# Patient Record
Sex: FEMALE | Race: BLACK OR AFRICAN AMERICAN | Employment: UNEMPLOYED | URBAN - METROPOLITAN AREA
[De-identification: names, ages, dates, MRNs, and addresses within clinical notes are randomized per-mention and may not be internally consistent; named-entity substitution may affect disease eponyms.]

---

## 2018-03-30 ENCOUNTER — HOSPITAL ENCOUNTER (INPATIENT)
Age: 58
LOS: 24 days | Discharge: HOME | DRG: 750 | End: 2018-04-23
Attending: PSYCHIATRY & NEUROLOGY | Admitting: PSYCHIATRY & NEUROLOGY
Payer: MEDICAID

## 2018-03-30 PROBLEM — F25.9 SCHIZOAFFECTIVE DISORDER (HCC): Status: ACTIVE | Noted: 2018-03-30

## 2018-03-30 PROCEDURE — 65220000003 HC RM SEMIPRIVATE PSYCH

## 2018-03-30 PROCEDURE — 74011250636 HC RX REV CODE- 250/636: Performed by: PSYCHIATRY & NEUROLOGY

## 2018-03-30 PROCEDURE — 74011000250 HC RX REV CODE- 250: Performed by: PSYCHIATRY & NEUROLOGY

## 2018-03-30 RX ORDER — ZOLPIDEM TARTRATE 10 MG/1
10 TABLET ORAL
Status: DISCONTINUED | OUTPATIENT
Start: 2018-03-30 | End: 2018-03-31

## 2018-03-30 RX ORDER — IBUPROFEN 200 MG
1 TABLET ORAL
Status: DISCONTINUED | OUTPATIENT
Start: 2018-03-30 | End: 2018-04-23 | Stop reason: HOSPADM

## 2018-03-30 RX ORDER — LORAZEPAM 1 MG/1
1 TABLET ORAL
Status: DISCONTINUED | OUTPATIENT
Start: 2018-03-30 | End: 2018-04-23 | Stop reason: HOSPADM

## 2018-03-30 RX ORDER — BENZTROPINE MESYLATE 2 MG/1
2 TABLET ORAL
Status: DISCONTINUED | OUTPATIENT
Start: 2018-03-30 | End: 2018-04-23 | Stop reason: HOSPADM

## 2018-03-30 RX ORDER — IBUPROFEN 400 MG/1
400 TABLET ORAL
Status: DISCONTINUED | OUTPATIENT
Start: 2018-03-30 | End: 2018-04-23 | Stop reason: HOSPADM

## 2018-03-30 RX ORDER — ACETAMINOPHEN 325 MG/1
650 TABLET ORAL
Status: DISCONTINUED | OUTPATIENT
Start: 2018-03-30 | End: 2018-04-23 | Stop reason: HOSPADM

## 2018-03-30 RX ORDER — BENZTROPINE MESYLATE 1 MG/ML
2 INJECTION INTRAMUSCULAR; INTRAVENOUS
Status: DISCONTINUED | OUTPATIENT
Start: 2018-03-30 | End: 2018-04-23 | Stop reason: HOSPADM

## 2018-03-30 RX ORDER — LORAZEPAM 2 MG/ML
2 INJECTION INTRAMUSCULAR
Status: DISCONTINUED | OUTPATIENT
Start: 2018-03-30 | End: 2018-04-23 | Stop reason: HOSPADM

## 2018-03-30 RX ORDER — OLANZAPINE 5 MG/1
5 TABLET ORAL
Status: DISCONTINUED | OUTPATIENT
Start: 2018-03-30 | End: 2018-04-23 | Stop reason: HOSPADM

## 2018-03-30 RX ORDER — ADHESIVE BANDAGE
30 BANDAGE TOPICAL DAILY PRN
Status: DISCONTINUED | OUTPATIENT
Start: 2018-03-30 | End: 2018-04-23 | Stop reason: HOSPADM

## 2018-03-30 RX ADMIN — LORAZEPAM 2 MG: 2 INJECTION, SOLUTION INTRAMUSCULAR; INTRAVENOUS at 21:53

## 2018-03-30 RX ADMIN — WATER 20 MG: 1 INJECTION INTRAMUSCULAR; INTRAVENOUS; SUBCUTANEOUS at 21:53

## 2018-03-30 NOTE — IP AVS SNAPSHOT
[address] [facility], [place], operated by [facility] 
 
 
 [address] Patient: Shahla Dupree MRN: [id number] About your hospitalization You were admitted on:  March 30, 2018 You last received care in the:  [address] You were discharged on:  April 23, 2018 Why you were hospitalized Your primary diagnosis was:  Schizoaffective Disorder (Hcc) Your diagnoses also included:  Agitation Follow-up Information Follow up With Details Comments Contact Info [address]  Please follow up with [facility] by accessing walk-in Mon-Fri 8:30am-4:30pm  [address] [place] [address] 
[phone] Haloperidol decanoate 100 mg IM injection  On 5/14/2018 Haloperidol decanoate 100 mg IM injection given on 4/17/18. Next injection is due on 5/14/18. Oculeve None   None (395) Patient stated that they have no PCP Discharge Orders None A check marvin indicates which time of day the medication should be taken. My Medications START taking these medications Instructions Each Dose to Equal  
 Morning Noon Evening Bedtime  
 benztropine 0.5 mg tablet Commonly known as:  COGENTIN Your last dose was: Your next dose is: Take 1 Tab by mouth two (2) times a day for 14 days. Indications: extrapyramidal disease 0.5 mg  
    
   
   
   
  
 divalproex  mg ER tablet Commonly known as:  DEPAKOTE ER Your last dose was: Your next dose is: Take 2 Tabs by mouth nightly for 14 days. Indications: MIXED BIPOLAR I DISORDER  
 1000 mg  
    
   
   
   
  
 haloperidol 10 mg tablet Commonly known as:  HALDOL Your last dose was: Your next dose is: Take 1 Tab by mouth two (2) times a day for 14 days. Indications: Schizoaffective disorder  10 mg  
    
   
   
   
  
 haloperidol decanoate 100 mg/mL injection Commonly known as:  HALDOL DECANOATE Start taking on:  5/15/2018 Your last dose was: Your next dose is:    
   
   
 1 mL by IntraMUSCular route every twenty-eight (28) days for 30 days. Indications: Schizoaffective disorder 100 mg  
    
   
   
   
  
 traZODone 50 mg tablet Commonly known as:  Mery Debarbie Your last dose was: Your next dose is: Take 1 Tab by mouth nightly for 14 days. Indications: insomnia associated with depression 50 mg CONTINUE taking these medications Instructions Each Dose to Equal  
 Morning Noon Evening Bedtime  
 albuterol 90 mcg/actuation inhaler Commonly known as:  PROVENTIL HFA, VENTOLIN HFA, PROAIR HFA Your last dose was: Your next dose is: Take 2 Puffs by inhalation three (3) times daily as needed for Wheezing or Shortness of Breath. 2 Puff Where to Get Your Medications Information on where to get these meds will be given to you by the nurse or doctor. ! Ask your nurse or doctor about these medications  
  benztropine 0.5 mg tablet  
 divalproex  mg ER tablet  
 haloperidol 10 mg tablet  
 haloperidol decanoate 100 mg/mL injection  
 traZODone 50 mg tablet Discharge Instructions DISCHARGE SUMMARY from Nurse PATIENT INSTRUCTIONS: 
 
What to do at Home: 
 
Recommended activity: Activity as tolerated, *  Please give a list of your current medications to your Primary Care Provider. *  Please update this list whenever your medications are discontinued, doses are 
    changed, or new medications (including over-the-counter products) are added. *  Please carry medication information at all times in case of emergency situations. These are general instructions for a healthy lifestyle: No smoking/ No tobacco products/ Avoid exposure to second hand smoke Surgeon General's Warning:  Quitting smoking now greatly reduces serious risk to your health. Obesity, smoking, and sedentary lifestyle greatly increases your risk for illness A healthy diet, regular physical exercise & weight monitoring are important for maintaining a healthy lifestyle You may be retaining fluid if you have a history of heart failure or if you experience any of the following symptoms:  Weight gain of 3 pounds or more overnight or 5 pounds in a week, increased swelling in our hands or feet or shortness of breath while lying flat in bed. Please call your doctor as soon as you notice any of these symptoms; do not wait until your next office visit. Recognize signs and symptoms of STROKE: 
 
F-face looks uneven A-arms unable to move or move unevenly S-speech slurred or non-existent T-time-call 911 as soon as signs and symptoms begin-DO NOT go Back to bed or wait to see if you get better-TIME IS BRAIN. Warning Signs of HEART ATTACK Call 911 if you have these symptoms: 
? Chest discomfort. Most heart attacks involve discomfort in the center of the chest that lasts more than a few minutes, or that goes away and comes back. It can feel like uncomfortable pressure, squeezing, fullness, or pain. ? Discomfort in other areas of the upper body. Symptoms can include pain or discomfort in one or both arms, the back, neck, jaw, or stomach. ? Shortness of breath with or without chest discomfort. ? Other signs may include breaking out in a cold sweat, nausea, or lightheadedness. Don't wait more than five minutes to call [address]! Fast action can save your life. Calling 911 is almost always the fastest way to get lifesaving treatment. Emergency Medical Services staff can begin treatment when they arrive  up to an hour sooner than if someone gets to the hospital by car. The discharge information has been reviewed with the patient.   The patient verbalized understanding. Discharge medications reviewed with the patient and appropriate educational materials and side effects teaching were provided. If I feel that I can not keep these promises and I am at risk of hurting myself or others,I will call the crisis office and speak with a crisis worker who will assist me during my crisis. [address] [place] [address] [place] [phone] [address] [address] [phone] [facility] Ronald  [facility] Crisis [phone] [facility] [phone] 
___________________________________________________________________________________________________________________________________ [employer] Announcement We are excited to announce that we are making your provider's discharge notes available to you in [facility]. You will see these notes when they are completed and signed by the physician that discharged you from your recent hospital stay. If you have any questions or concerns about any information you see in [facility], please call the Health Information Department where you were seen or reach out to your Primary Care Provider for more information about your plan of care. Introducing [facility]! [facility] introduces [facility] patient portal. Now you can access parts of your medical record, email your doctor's office, and request medication refills online. 1. In your internet browser, go to [email or web address]. [employer] 2. Click on the First Time User? Click Here link in the Sign In box. You will see the New Member Sign Up page. 3. Enter your [facility] Access Code exactly as it appears below. You will not need to use this code after youve completed the sign-up process. If you do not sign up before the expiration date, you must request a new code. · [facility] Access Code: [email or web address] Expires: 7/22/2018  9:46 AM 
 
4.  Enter the last four digits of your Social Security Number (xxxx) and Date of Birth (mm/dd/yyyy) as indicated and click Submit. You will be taken to the next sign-up page. 5. Create a [employer] ID. This will be your [employer] login ID and cannot be changed, so think of one that is secure and easy to remember. 6. Create a [employer] password. You can change your password at any time. 7. Enter your Password Reset Question and Answer. This can be used at a later time if you forget your password. 8. Enter your e-mail address. You will receive e-mail notification when new information is available in [address] 9. Click Sign Up. You can now view and download portions of your medical record. 10. Click the Download Summary menu link to download a portable copy of your medical information. If you have questions, please visit the Frequently Asked Questions section of the [employer] website. Remember, [employer] is NOT to be used for urgent needs. For medical emergencies, dial 911. Now available from your iPhone and Android! Introducing Chance Ugarte As a [facility] patient, I wanted to make you aware of our electronic visit tool called Chance Lealradha. [facility] 24/7 allows you to connect within minutes with a medical provider 24 hours a day, seven days a week via a mobile device or tablet or logging into a secure website from your computer. You can access Chance Lealfin from anywhere in the [place]. A virtual visit might be right for you when you have a simple condition and feel like you just dont want to get out of bed, or cant get away from work for an appointment, when your regular [facility] provider is not available (evenings, weekends or holidays), or when youre out of town and need minor care. Electronic visits cost only $49 and if the [facility] 24/7 provider determines a prescription is needed to treat your condition, one can be electronically transmitted to a nearby pharmacy*. Please take a moment to enroll today if you have not already done so. The enrollment process is free and takes just a few minutes. To enroll, please download the [facility]out 24/7 dorian to your tablet or phone, or visit [email or web address] to enroll on your computer. And, as an [address] [place] [address] patient with a [employer] account, the results of your visits will be scanned into your electronic medical record and your primary care provider will be able to view the scanned results. We urge you to continue to see your regular [facility] provider for your ongoing medical care. And while your primary care provider may not be the one available when you seek a [employer] virtual visit, the peace of mind you get from getting a real diagnosis real time can be priceless. For more information on [employer], view our Frequently Asked Questions (FAQs) at [email or web address]. Sincerely, 
 
Mehnaz Lance MD 
Chief Medical Officer [address] [place] [address] *:  certain medications cannot be prescribed via [employer] Providers Seen During Your Hospitalization Provider Specialty Primary office phone Delvis Sanchez MD Psychiatry [phone] Your Primary Care Physician (PCP) Primary Care Physician Office Phone Office Fax NONE ** None ** ** None ** You are allergic to the following No active allergies Recent Documentation Height Weight BMI OB Status Smoking Status 1.549 m 59 kg 24.58 kg/m2 Menopause Current Every Day Smoker Emergency Contacts Name Discharge Info Relation Home Work Mobile Henrietta Jack N/A  AT THIS TIME [6] Other Relative [6] [phone] Patient Belongings The following personal items are in your possession at time of discharge: 
  Dental Appliances: None  Visual Aid: None      Home Medications: None   Jewelry: With patient  Clothing:  At bedside (Shoes and justino in closet) Other Valuables: None  Personal Items Sent to Safe: money, id, and ebt card Please provide this summary of care documentation to your next provider. Signatures-by signing, you are acknowledging that this After Visit Summary has been reviewed with you and you have received a copy. Patient Signature:  ____________________________________________________________ Date:  ____________________________________________________________  
  
Carlitos Landsman Provider Signature:  ____________________________________________________________ Date:  ____________________________________________________________

## 2018-03-30 NOTE — H&P
History and Physical    Subjective:     Michelle Mcgraw is a 61 y.o. female  With no significnant past medical hx. Pt is a pleasant individual. Pt is hospitalized with principal diagnosis of schizoaffective disorder. Pt denies any acute medical issue. Pt is showing no signs of acute distress. Pt has hx of Hep C as lited below but not aware of it. Past Medical History:   Diagnosis Date    Hepatitis C       PSHx:none declared by pt. FHX: none declared by pt. Social History   Substance Use Topics    Smoking status: Current Every Day Smoker    Smokeless tobacco: None    Alcohol use Yes       Prior to Admission medications    Medication Sig Start Date End Date Taking? Authorizing Provider   guaiFENesin (ROBITUSSIN CHEST CONGESTION) 100 mg/5 mL liquid Take 10 mL by mouth three (3) times daily as needed for Cough. 11/9/14   Katie Allen NP   traMADol Shahida Bigger) 50 mg tablet Take 1 tablet by mouth every eight (8) hours as needed for Pain. 11/9/14   Katie Allen NP     No Known Allergies     Review of Systems:  Constitutional: negative  Eyes: negative  Ears, Nose, Mouth, Throat, and Face: negative  Respiratory: negative  Cardiovascular: negative  Gastrointestinal: negative  Genitourinary:negative  Integument/Breast: negative  Hematologic/Lymphatic: negative  Musculoskeletal:negative  Neurological: negative  Behavioral/Psychiatric: schizophrenia. Endocrine: negative  Allergic/Immunologic: negative     Objective: Intake and Output:            Physical Exam:   Visit Vitals    /70 (BP 1 Location: Left arm, BP Patient Position: Sitting)    Pulse 83    Resp 16     General:  Alert, cooperative, no distress, appears stated age. Head:  Normocephalic, without obvious abnormality, atraumatic. Eyes:  Conjunctivae/corneas clear. PERRL, EOMs intact. Ears:  Normal external ear canals both ears. Nose: Nares normal. Septum midline. Mucosa normal. No drainage or sinus tenderness.    Throat: Lips, mucosa, and tongue normal. Teeth and gums normal.   Neck: Supple, symmetrical, trachea midline, no adenopathy, thyroid: no enlargement/tenderness/nodules, no carotid bruit and no JVD. Back:   Symmetric, no curvature. ROM normal. No CVA tenderness. Lungs:   Clear to auscultation bilaterally. Chest wall:  No tenderness or deformity. Heart:  Regular rate and rhythm, S1, S2 normal, no murmur, click, rub or gallop. Abdomen:   Soft, non-tender. Bowel sounds normal. No masses,  No organomegaly. Extremities: Extremities normal, atraumatic, no cyanosis or edema. Pulses: 2+ and symmetric all extremities. Skin: Skin color, texture, turgor normal. No rashes or lesions   Lymph nodes: Cervical, supraclavicular, and axillary nodes normal.   Neurologic: CNII-XII intact. Normal strength, sensation and reflexes throughout. Data Review:   No results found for this or any previous visit (from the past 24 hour(s)). Assessment:     Active Problems:    Schizoaffective disorder ([facility] [address] (3/30/2018)    Hep C    Plan:     Review LFTs. No VTE prophylaxis indicated or necessary at this time.    Signed By: Bossman Skinner MD     March 30, 2018

## 2018-03-30 NOTE — PROGRESS NOTES
Pt admitted under TDO from [facility]. Pt refused to answer any questions and would only say, \"I'm supposed to go home. \" Pt resistant with safety search and skin assessment. Pt refusing food. Pt placed on q 15 min safety checks. Admit orders received from Dr. John Ace. Will continue to monitor.

## 2018-03-30 NOTE — IP AVS SNAPSHOT
Summary of Care Report The Summary of Care report has been created to help improve care coordination. Users with access to [employer] or [address] (Web-based application) may access additional patient information including the Discharge Summary. If you are not currently a [address] user and need more information, please call the number listed below in the [place] [address] section and ask to be connected with Medical Records. Facility Information Name Address Phone [address] [place] [address] [phone] Patient Information Patient Name Sex  Anthony Sepulveda ([id number]) Female 1960 Discharge Information Admitting Provider Service Area Unit Geetha Thomas MD / [address] [place] [address] / [phone] Discharge Provider Discharge Date/Time Discharge Disposition Destination (none) 2018 Afternoon (Pending) AHR (none) Patient Language Language ENGLISH [13] Hospital Problems as of 2018  Never Reviewed Class Noted - Resolved Last Modified POA Active Problems * (Principal)Schizoaffective disorder ([facility] [address]  3/30/2018 - Present 3/31/2018 by Geetha Thomas MD Unknown Entered by Geetha Thomas MD  
  Agitation  3/31/2018 - Present 3/31/2018 by Geetha Thomas MD Yes Entered by Geetha Thomas MD  
  
You are allergic to the following No active allergies Current Discharge Medication List  
  
START taking these medications Dose & Instructions Dispensing Information Comments  
 benztropine 0.5 mg tablet Commonly known as:  COGENTIN Dose:  0.5 mg Take 1 Tab by mouth two (2) times a day for 14 days. Indications: extrapyramidal disease Quantity:  28 Tab Refills:  0  
   
 divalproex  mg ER tablet Commonly known as:  DEPAKOTE ER Dose:  1000 mg Take 2 Tabs by mouth nightly for 14 days. Indications: MIXED BIPOLAR I DISORDER Quantity:  28 Tab Refills:  0  
   
 haloperidol 10 mg tablet Commonly known as:  HALDOL Dose:  10 mg Take 1 Tab by mouth two (2) times a day for 14 days. Indications: Schizoaffective disorder Quantity:  28 Tab Refills:  0  
   
 haloperidol decanoate 100 mg/mL injection Commonly known as:  HALDOL DECANOATE Start taking on:  5/15/2018 Dose:  100 mg  
1 mL by IntraMUSCular route every twenty-eight (28) days for 30 days. Indications: Schizoaffective disorder Quantity:  1 mL Refills:  0  
   
 traZODone 50 mg tablet Commonly known as:  Patricia Hail Dose:  50 mg Take 1 Tab by mouth nightly for 14 days. Indications: insomnia associated with depression Quantity:  14 Tab Refills:  0 CONTINUE these medications which have NOT CHANGED Dose & Instructions Dispensing Information Comments  
 albuterol 90 mcg/actuation inhaler Commonly known as:  PROVENTIL HFA, VENTOLIN HFA, PROAIR HFA Dose:  2 Puff Take 2 Puffs by inhalation three (3) times daily as needed for Wheezing or Shortness of Breath. Refills:  0 Follow-up Information Follow up With Details Comments Contact Info [address]  Please follow up with [facility] by accessing walk-in Mon-Fri 8:30am-4:30pm  [address] [place] [address] 
[phone] Haloperidol decanoate 100 mg IM injection  On 5/14/2018 Haloperidol decanoate 100 mg IM injection given on 4/17/18. Next injection is due on 5/14/18. [email or web address] None   None (395) Patient stated that they have no PCP Discharge Instructions DISCHARGE SUMMARY from Nurse PATIENT INSTRUCTIONS: 
 
What to do at Home: 
 
Recommended activity: Activity as tolerated, *  Please give a list of your current medications to your Primary Care Provider. *  Please update this list whenever your medications are discontinued, doses are 
    changed, or new medications (including over-the-counter products) are added. *  Please carry medication information at all times in case of emergency situations. These are general instructions for a healthy lifestyle: No smoking/ No tobacco products/ Avoid exposure to second hand smoke Surgeon General's Warning:  Quitting smoking now greatly reduces serious risk to your health. Obesity, smoking, and sedentary lifestyle greatly increases your risk for illness A healthy diet, regular physical exercise & weight monitoring are important for maintaining a healthy lifestyle You may be retaining fluid if you have a history of heart failure or if you experience any of the following symptoms:  Weight gain of 3 pounds or more overnight or 5 pounds in a week, increased swelling in our hands or feet or shortness of breath while lying flat in bed. Please call your doctor as soon as you notice any of these symptoms; do not wait until your next office visit. Recognize signs and symptoms of STROKE: 
 
F-face looks uneven A-arms unable to move or move unevenly S-speech slurred or non-existent T-time-call 911 as soon as signs and symptoms begin-DO NOT go Back to bed or wait to see if you get better-TIME IS BRAIN. Warning Signs of HEART ATTACK Call 911 if you have these symptoms: 
? Chest discomfort. Most heart attacks involve discomfort in the center of the chest that lasts more than a few minutes, or that goes away and comes back. It can feel like uncomfortable pressure, squeezing, fullness, or pain. ? Discomfort in other areas of the upper body. Symptoms can include pain or discomfort in one or both arms, the back, neck, jaw, or stomach. ? Shortness of breath with or without chest discomfort. ? Other signs may include breaking out in a cold sweat, nausea, or lightheadedness. Don't wait more than five minutes to call [address]! Fast action can save your life. Calling 911 is almost always the fastest way to get lifesaving treatment. Emergency Medical Services staff can begin treatment when they arrive  up to an hour sooner than if someone gets to the hospital by car. The discharge information has been reviewed with the patient. The patient verbalized understanding. Discharge medications reviewed with the patient and appropriate educational materials and side effects teaching were provided. If I feel that I can not keep these promises and I am at risk of hurting myself or others,I will call the crisis office and speak with a crisis worker who will assist me during my crisis. [address] [place] [address] [phone] [address] [place] [address] [phone] [facility] [place][note 2] Ronald Wild [facility] [phone] Rene Bhupinder [phone] 
___________________________________________________________________________________________________________________________________ Chart Review Routing History No Routing History on File

## 2018-03-30 NOTE — IP AVS SNAPSHOT
[facility] 
 
 
 [address] Patient: Daija Louis MRN: [id number] MICHELLE:3/0[birth year]0346 A check marvin indicates which time of day the medication should be taken. My Medications START taking these medications Instructions Each Dose to Equal  
 Morning Noon Evening Bedtime  
 benztropine 0.5 mg tablet Commonly known as:  COGENTIN Your last dose was: Your next dose is: Take 1 Tab by mouth two (2) times a day for 14 days. Indications: extrapyramidal disease 0.5 mg  
    
   
   
   
  
 divalproex  mg ER tablet Commonly known as:  DEPAKOTE ER Your last dose was: Your next dose is: Take 2 Tabs by mouth nightly for 14 days. Indications: MIXED BIPOLAR I DISORDER  
 1000 mg  
    
   
   
   
  
 haloperidol 10 mg tablet Commonly known as:  HALDOL Your last dose was: Your next dose is: Take 1 Tab by mouth two (2) times a day for 14 days. Indications: Schizoaffective disorder 10 mg  
    
   
   
   
  
 haloperidol decanoate 100 mg/mL injection Commonly known as:  HALDOL DECANOATE Start taking on:  5/15/2018 Your last dose was: Your next dose is:    
   
   
 1 mL by IntraMUSCular route every twenty-eight (28) days for 30 days. Indications: Schizoaffective disorder 100 mg  
    
   
   
   
  
 traZODone 50 mg tablet Commonly known as:  Florentina Runner Your last dose was: Your next dose is: Take 1 Tab by mouth nightly for 14 days. Indications: insomnia associated with depression 50 mg CONTINUE taking these medications Instructions Each Dose to Equal  
 Morning Noon Evening Bedtime  
 albuterol 90 mcg/actuation inhaler Commonly known as:  PROVENTIL HFA, VENTOLIN HFA, PROAIR HFA Your last dose was: Your next dose is: Take 2 Puffs by inhalation three (3) times daily as needed for Wheezing or Shortness of Breath. 2 Puff Where to Get Your Medications Information on where to get these meds will be given to you by the nurse or doctor. ! Ask your nurse or doctor about these medications  
  benztropine 0.5 mg tablet  
 divalproex  mg ER tablet  
 haloperidol 10 mg tablet  
 haloperidol decanoate 100 mg/mL injection  
 traZODone 50 mg tablet

## 2018-03-31 PROBLEM — R45.1 AGITATION: Status: ACTIVE | Noted: 2018-03-31

## 2018-03-31 PROCEDURE — 74011250636 HC RX REV CODE- 250/636: Performed by: PSYCHIATRY & NEUROLOGY

## 2018-03-31 PROCEDURE — 65220000003 HC RM SEMIPRIVATE PSYCH

## 2018-03-31 PROCEDURE — 74011000250 HC RX REV CODE- 250: Performed by: PSYCHIATRY & NEUROLOGY

## 2018-03-31 RX ORDER — TRAZODONE HYDROCHLORIDE 100 MG/1
100 TABLET ORAL
Status: DISCONTINUED | OUTPATIENT
Start: 2018-03-31 | End: 2018-04-21 | Stop reason: DRUGHIGH

## 2018-03-31 RX ADMIN — WATER 20 MG: 1 INJECTION INTRAMUSCULAR; INTRAVENOUS; SUBCUTANEOUS at 14:36

## 2018-03-31 NOTE — H&P
INITIAL PSYCHIATRIC EVALUATION            IDENTIFICATION:    Patient Name  Aliyah Louis   Date of Birth 1960   [facility] [id number]   Medical Record Number  [id number]      Age  61 y.o. PCP None   Admit date:  3/30/2018    Room Number  315/02  @ [facility]   Date of Service  3/31/2018            HISTORY         REASON FOR HOSPITALIZATION:  CC: \"leave me alone\". Pt admitted under a temporary [facility] order (TDO) for agitation and psychosis proving to be an imminent danger to self and others and an inability to care for self. HISTORY OF PRESENT ILLNESS:    The patient, Aliyah Louis, is a 61 y.o. BLACK OR  female with a past psychiatric history significant for schizoaffective d/o, who presents at this time with complaints of (and/or evidence of) the following emotional symptoms: agitation and mood swings. Additional symptomatology include refusing to talk to team, irritable and angry mood and guarded. Per report pt has been deteriorating in the last 2 month and threatening to kill family members. She has been aggressive towards the , she has been breaking into neighbors apartment and religiously preoccupied. She has poor appetite and refusing to complete her ADL with poor hygiene. She has no insight and does not want to take medication. The above symptoms have been present for . These symptoms are of severe severity. These symptoms are constant in nature. The patient's condition has been precipitated by and psychosocial stressors ( ). Patient's condition made worse by treatment noncompliance. UDS: negative; BAL=0. ALLERGIES: No Known Allergies   MEDICATIONS PRIOR TO ADMISSION:   Prescriptions Prior to Admission   Medication Sig    guaiFENesin (ROBITUSSIN CHEST CONGESTION) 100 mg/5 mL liquid Take 10 mL by mouth three (3) times daily as needed for Cough.     traMADol (ULTRAM) 50 mg tablet Take 1 tablet by mouth every eight (8) hours as needed for Pain. PAST MEDICAL HISTORY:   Past Medical History:   Diagnosis Date    Asthma     Hepatitis C    No past surgical history on file. SOCIAL HISTORY:    Social History     Social History    Marital status: SINGLE     Spouse name: N/A    Number of children: N/A    Years of education: N/A     Occupational History    Not on file. Social History Main Topics    Smoking status: Current Every Day Smoker    Smokeless tobacco: Not on file    Alcohol use Yes    Drug use: Yes     Special: Marijuana    Sexual activity: Yes     Partners: Male     Birth control/ protection: None     Other Topics Concern    Not on file     Social History Narrative    No narrative on file      FAMILY HISTORY: History reviewed. No pertinent family history. No family history on file. REVIEW OF SYSTEMS:   Psychological ROS: positive for - behavioral disorder, concentration difficulties, hostility, mood swings, sleep disturbances and psychosis  Pertinent items are noted in the History of Present Illness. All other Systems reviewed and are considered negative. MENTAL STATUS EXAM & VITALS     MENTAL STATUS EXAM (MSE):    MSE FINDINGS ARE WITHIN NORMAL LIMITS (WNL) UNLESS OTHERWISE STATED BELOW. ( ALL OF THE BELOW CATEGORIES OF THE MSE HAVE BEEN REVIEWED (reviewed 3/31/2018) AND UPDATED AS DEEMED APPROPRIATE )  General Presentation age appropriate, evasive, uncooperative and unreliable   Orientation oriented to time, place and person   Vital Signs  See below (reviewed 3/31/2018); Vital Signs (BP, Pulse, & Temp) are within normal limits if not listed below.    Gait and Station Stable/steady, no ataxia   Musculoskeletal System No extrapyramidal symptoms (EPS); no abnormal muscular movements or Tardive Dyskinesia (TD); muscle strength and tone are within normal limits   Language No aphasia or dysarthria   Speech:  hyperverbal and loud   Thought Processes illogical; normal rate of thoughts; poor abstract reasoning/computation   Thought Associations tangential   Thought Content paranoid delusions, Sikhism delusions, auditory hallucinations and internally preoccupied   Suicidal Ideations none   Homicidal Ideations none   Mood:  angry and irritable   Affect:  anxious, hostile, increased in intensity and irritable   Memory recent  impaired   Memory remote:  intact   Concentration/Attention:  distractable   Fund of Knowledge average   Insight:  poor   Reliability poor   Judgment:  poor          VITALS:     Patient Vitals for the past 24 hrs:   Pulse Resp BP   03/30/18 1717 83 16 125/70     Wt Readings from Last 3 Encounters:   12/27/15 61.2 kg (135 lb)   11/09/14 65.8 kg (145 lb)   10/19/13 56.7 kg (125 lb)     Temp Readings from Last 3 Encounters:   12/27/15 98.7 °F (37.1 °C)   11/09/14 98.7 °F (37.1 °C)   10/19/13 98.9 °F (37.2 °C)     BP Readings from Last 3 Encounters:   03/30/18 125/70   12/27/15 132/65   11/09/14 111/65     Pulse Readings from Last 3 Encounters:   03/30/18 83   12/27/15 76   11/09/14 65            DATA     LABORATORY DATA:  Labs Reviewed - No data to display  No visits with results within 2 Day(s) from this visit. Latest known visit with results is:    Admission on 12/27/2015, Discharged on 12/27/2015   Component Date Value Ref Range Status    WBC 12/27/2015 6.0  3.6 - 11.0 K/uL Final    RBC 12/27/2015 4.05  3.80 - 5.20 M/uL Final    HGB 12/27/2015 13.1  11.5 - 16.0 g/dL Final    HCT 12/27/2015 38.3  35.0 - 47.0 % Final    MCV 12/27/2015 94.6  80.0 - 99.0 FL Final    MCH 12/27/2015 32.3  26.0 - 34.0 PG Final    MCHC 12/27/2015 34.2  30.0 - 36.5 g/dL Final    RDW 12/27/2015 12.3  11.5 - 14.5 % Final    PLATELET 19/51/4029 848  150 - 400 K/uL Final    NEUTROPHILS 12/27/2015 38  32 - 75 % Final    LYMPHOCYTES 12/27/2015 49  12 - 49 % Final    MONOCYTES 12/27/2015 11  5 - 13 % Final    EOSINOPHILS 12/27/2015 2  0 - 7 % Final    BASOPHILS 12/27/2015 0  0 - 1 % Final    ABS.  NEUTROPHILS 12/27/2015 2.2  1.8 - 8.0 K/UL Final    ABS. LYMPHOCYTES 12/27/2015 2.9  0.8 - 3.5 K/UL Final    ABS. MONOCYTES 12/27/2015 0.7  0.0 - 1.0 K/UL Final    ABS. EOSINOPHILS 12/27/2015 0.1  0.0 - 0.4 K/UL Final    ABS. BASOPHILS 12/27/2015 0.0  0.0 - 0.1 K/UL Final    Sodium 12/27/2015 141  136 - 145 mmol/L Final    Potassium 12/27/2015 4.1  3.5 - 5.1 mmol/L Final    Chloride 12/27/2015 102  97 - 108 mmol/L Final    CO2 12/27/2015 31  21 - 32 mmol/L Final    Anion gap 12/27/2015 8  5 - 15 mmol/L Final    Glucose 12/27/2015 90  65 - 100 mg/dL Final    BUN 12/27/2015 10  6 - 20 MG/DL Final    Creatinine 12/27/2015 0.96  0.55 - 1.02 MG/DL Final    BUN/Creatinine ratio 12/27/2015 10* 12 - 20   Final    GFR est AA 12/27/2015 >60  >60 ml/min/1.73m2 Final    GFR est non-AA 12/27/2015 >60  >60 ml/min/1.73m2 Final    Calcium 12/27/2015 8.8  8.5 - 10.1 MG/DL Final    Bilirubin, total 12/27/2015 0.2  0.2 - 1.0 MG/DL Final    ALT (SGPT) 12/27/2015 20  12 - 78 U/L Final    AST (SGOT) 12/27/2015 15  15 - 37 U/L Final    Alk.  phosphatase 12/27/2015 97  45 - 117 U/L Final    Protein, total 12/27/2015 7.5  6.4 - 8.2 g/dL Final    Albumin 12/27/2015 3.9  3.5 - 5.0 g/dL Final    Globulin 12/27/2015 3.6  2.0 - 4.0 g/dL Final    A-G Ratio 12/27/2015 1.1  1.1 - 2.2   Final    Color 12/27/2015 YELLOW/STRAW    Final    Appearance 12/27/2015 CLEAR  CLEAR   Final    Specific gravity 12/27/2015 1.025  1.003 - 1.030   Final    pH (UA) 12/27/2015 5.0  5.0 - 8.0   Final    Protein 12/27/2015 NEGATIVE   NEG mg/dL Final    Glucose 12/27/2015 NEGATIVE   NEG mg/dL Final    Ketone 12/27/2015 NEGATIVE   NEG mg/dL Final    Bilirubin 12/27/2015 NEGATIVE   NEG   Final    Blood 12/27/2015 NEGATIVE   NEG   Final    Urobilinogen 12/27/2015 0.2  0.2 - 1.0 EU/dL Final    Nitrites 12/27/2015 NEGATIVE   NEG   Final    Leukocyte Esterase 12/27/2015 NEGATIVE   NEG   Final    WBC 12/27/2015 0-4  0 - 4 /hpf Final    RBC 12/27/2015 0-5  0 - 5 /hpf Final    Epithelial cells 12/27/2015 FEW  FEW /lpf Final    Bacteria 12/27/2015 NEGATIVE   NEG /hpf Final    UA:UC IF INDICATED 12/27/2015 CULTURE NOT INDICATED BY UA RESULT  CNI   Final    AMPHETAMINES 12/27/2015 NEGATIVE   NEG   Final    BARBITURATES 12/27/2015 NEGATIVE   NEG   Final    BENZODIAZEPINES 12/27/2015 NEGATIVE   NEG   Final    COCAINE 12/27/2015 NEGATIVE   NEG   Final    METHADONE 12/27/2015 NEGATIVE   NEG   Final    OPIATES 12/27/2015 NEGATIVE   NEG   Final    PCP(PHENCYCLIDINE) 12/27/2015 NEGATIVE   NEG   Final    THC (TH-CANNABINOL) 12/27/2015 POSITIVE* NEG   Final    Drug screen comment 12/27/2015 (NOTE)   Final    Lipase 12/27/2015 136  73 - 393 U/L Final    Acetaminophen level 12/27/2015 <2* 10 - 30 ug/mL Final    Salicylate level 37/67/8314 2.2* 2.8 - 20.0 MG/DL Final        RADIOLOGY REPORTS:    Results from Hospital Encounter encounter on 11/09/14   XR CHEST PA LAT   Narrative **Final Report**      ICD Codes / Adm. Diagnosis: 12  82 / Back Pain  Sore Throat  Examination:  CR CHEST PA AND LATERAL  - [id number] - Nov 9 2014 12:54PM  Accession No:  [id number]  Reason:  pneumonia      REPORT:  Chest PA and lateral    History: Pneumonia    Comparison: 10/19/2013    Findings: The lungs are well expanded. No focal consolidation, pleural   effusion, or pneumothorax. The cardiomediastinal silhouette is   unremarkable. The visualized osseous structures are unremarkable. IMPRESSION:  No acute cardiopulmonary process. Signing/Reading Doctor: Brayden Linares ([id number])    Approved: Brayden Linares ([id number])  Nov 9 2014 12:57PM                                  No results found.            MEDICATIONS       ALL MEDICATIONS  Current Facility-Administered Medications   Medication Dose Route Frequency    traZODone (DESYREL) tablet 100 mg  100 mg Oral QHS PRN    ziprasidone (GEODON) 20 mg in sterile water (preservative free) 1 mL injection  20 mg IntraMUSCular BID PRN    OLANZapine (ZyPREXA) tablet 5 mg  5 mg Oral Q6H PRN    benztropine (COGENTIN) tablet 2 mg  2 mg Oral BID PRN    benztropine (COGENTIN) injection 2 mg  2 mg IntraMUSCular BID PRN    LORazepam (ATIVAN) injection 2 mg  2 mg IntraMUSCular Q4H PRN    LORazepam (ATIVAN) tablet 1 mg  1 mg Oral Q4H PRN    acetaminophen (TYLENOL) tablet 650 mg  650 mg Oral Q4H PRN    ibuprofen (MOTRIN) tablet 400 mg  400 mg Oral Q8H PRN    magnesium hydroxide (MILK OF MAGNESIA) 400 mg/5 mL oral suspension 30 mL  30 mL Oral DAILY PRN    nicotine (NICODERM CQ) 21 mg/24 hr patch 1 Patch  1 Patch TransDERmal DAILY PRN      SCHEDULED MEDICATIONS  Current Facility-Administered Medications   Medication Dose Route Frequency                ASSESSMENT & PLAN        The patient, Kwadwo Steele, is a 61 y.o.  female who presents at this time for treatment of the following diagnoses:  Patient Active Hospital Problem List:   Schizoaffective disorder ([facility] [address] (3/30/2018)    Assessment: paranoid and Religion delusion themes, agitation- poor insight and non compliance. Plan: prn med and consider AP/Mood stabilizer- need collateral info   Agitation (3/31/2018)    Assessment: acute due to psychosis    Plan: prn med for now          I will continue to monitor blood levels (Depakote, Tegretol, lithium, clozapine---a drug with a narrow therapeutic index= NTI) and associated labs for drug therapy implemented that require intense monitoring for toxicity as deemed appropriate based on current medication side effects and pharmacodynamically determined drug 1/2 lives. A coordinated, multidisplinary treatment team (includes the nurse, unit pharmcist,  and writer) round was conducted for this initial evaluation with the patient present. The following regarding medications was addressed during rounds with patient:   the risks and benefits of the proposed medication. The patient was given the opportunity to ask questions.  Informed consent given to the use of the above medications. I will continue to adjust psychiatric and non-psychiatric medications (see above \"medication\" section and orders section for details) as deemed appropriate & based upon diagnoses and response to treatment. I have reviewed admission (and previous/old) labs and medical tests in the EHR and or transferring hospital documents. I will continue to order blood tests/labs and diagnostic tests as deemed appropriate and review results as they become available (see orders for details). I have reviewed old psychiatric and medical records available in the EHR. I Will order additional psychiatric records from other institutions to further elucidate the nature of patient's psychopathology and review once available. I will gather additional collateral information from friends, family and o/p treatment team to further elucidate the nature of patient's psychopathology and baselline level of psychiatric functioning.       ESTIMATED LENGTH OF STAY:    tbd       STRENGTHS:  Access to housing/residential stability and Interpersonal/supportive relationships (family, friends, peers)                                        SIGNED:    Mandy Aguilar MD  3/31/2018

## 2018-03-31 NOTE — BH NOTES
The patient has no insight @ this documentation. Patient orientated X 2 name & place. Thought blockage is evidenced,with thought process being loose in Associations with topic. Pt contracted for safety. Patient appetite poor,personal hygiene fair. Patient isolative & preoccupied. Patient guarded and Paranoid   1:1 interaction to assess mood and thought process. Staff offering assistance as needed. Pt denies S/H ideations. Pt admits to hearing voices At this times especially when off her medications. Pt absent from most groups. Staff plan is to assess mood & thought process. Staff will monitor for changes. Q 15 minute checks continue.

## 2018-03-31 NOTE — BH NOTES
Patient is trying to elope off unit. Loud. Irritable. Responding to internal stimuli. Not following redirection. patient given Geodon 20mg Im for psychosis. Will continue to monitor patient and assess needs.

## 2018-03-31 NOTE — BH NOTES
Kristy Moore who was absent [place] [address] [facility]    Mac Cueva  3/31/2018  1:16 PM

## 2018-03-31 NOTE — BH NOTES
Pt was observed in her room sitting on bed  and expressed concerns regarding discharge as she stated \" I suppose to be going home, aint I ? \", Writer explained protocol regarding discharge. Writer asked Pt questions regarding SI , HI , AH, and VH, Pt was silent, starred at Carlos Acevedo, observe to have increased labored breathing (appeared upset) , then just laid in bed with back turned to Writer. Writer offered support to Pt. Q15 safety round monitoring continued .

## 2018-03-31 NOTE — BH NOTES
Pt was talking loud in her room, disrupting the unit, fixated on discharge, demanding to let her go home. Unable to follow staff directions. Refused PO PRN medication. Geodon and Ativan I/M PRN given.

## 2018-04-01 PROCEDURE — 65220000003 HC RM SEMIPRIVATE PSYCH

## 2018-04-01 RX ORDER — RISPERIDONE 1 MG/1
1 TABLET, FILM COATED ORAL 2 TIMES DAILY
Status: DISCONTINUED | OUTPATIENT
Start: 2018-04-01 | End: 2018-04-02

## 2018-04-01 NOTE — BH NOTES
GROUP THERAPY PROGRESS NOTE    Media Lax is participating in 5to1.      Group time: 30 minutes    Personal goal for participation:  Unit orientation    Goal orientation: Community    Group therapy participation: active    Therapeutic interventions reviewed and discussed: Yes    Impression of participation: good

## 2018-04-01 NOTE — BH NOTES
Patient up talking, at times with an agitated tone, in room most of night. She declined offered po prns. No s/s of physical distress. Total hours slept 0 - 2. Will continue to monitor.

## 2018-04-01 NOTE — BH NOTES
Pt stayed awake for most of the night. Slept for only 3 hours at intervals. She appears to be responding to internal stimuli evidenced by talking to self, yelling at times and cursing out. Redirected. Refused PRN PO medication. Continues to monitor and assess her needs.

## 2018-04-01 NOTE — BH NOTES
Received patient resting in bed then noted sitting up in bed talking loudly with an angry tone. Cup noted at bedside, offered patient fresh water or sleep and she refused. Will continue to monitor patient in room with q15 min checks.

## 2018-04-01 NOTE — PROGRESS NOTES
Problem: Altered Thought Process (Adult/Pediatric)  Goal: *STG: Participates in treatment plan  Outcome: Not Progressing Towards Goal  Pt is more visible on the unit today. While in her room, she continues to loudly respond, requiring redirection. Pt denies any needs or pain at this time. Pt does not participate in rounds with the MD, turning her back to the MD and ignoring his questions. When she does speak it is with flight of ideas. Pt states that she does not want to take medications. Will continue to monitor pt q15 minutes for safety and support.

## 2018-04-01 NOTE — PROGRESS NOTES
Problem: Altered Thought Process (Adult/Pediatric)  Goal: *STG: Remains safe in hospital  Outcome: Progressing Towards Goal  Patient is alert and oriented but not to the situation and has poor insight. Isolative, stayed in her room for most of the shift, appears to be responding talking to self and yells at times. Medication and meal compliant. Denies SI / HI, pain or discomfort. Continue to monitor the patient's status and assess needs.

## 2018-04-01 NOTE — BH NOTES
PSYCHIATRIC PROGRESS NOTE         Patient Name  Jannette De La Rosa   Date of Birth 1960   [facility] [id number]   Medical Record Number  [id number]      Age  61 y.o. PCP None   Admit date:  3/30/2018    Room Number  315/02  @ [facility]   Date of Service  4/1/2018           E & M PROGRESS NOTE:         HISTORY       CC:  \"leave me alone\"  HISTORY OF PRESENT ILLNESS/INTERVAL HISTORY:  (reviewed/updated 4/1/2018). per initial evaluation: The patient, Jannette De La Rosa, is a 61 y.o. BLACK OR  female with a past psychiatric history significant for schizoaffective d/o, who presents at this time with complaints of (and/or evidence of) the following emotional symptoms: agitation and mood swings. Additional symptomatology include refusing to talk to team, irritable and angry mood and guarded. Per report pt has been deteriorating in the last 2 month and threatening to kill family members. She has been aggressive towards the , she has been breaking into neighbors apartment and religiously preoccupied. She has poor appetite and refusing to complete her ADL with poor hygiene. She has no insight and does not want to take medication. The above symptoms have been present for . These symptoms are of severe severity. These symptoms are constant in nature. The patient's condition has been precipitated by and psychosocial stressors ( ). Patient's condition made worse by treatment noncompliance. UDS: negative; BAL=0. Jannette De La Rosa presents/reports/evidences the following emotional symptoms today, 4/1/2018:agitation and psychotic behavior. The above symptoms have been present for few month. These symptoms are of severe severity. The symptoms are constant in nature. Additional symptomatology and features include labile and angry mood, responding to internal stimuli, paranoid delusion and poor insight. Refusing to take medication, received prn for agitation. SIDE EFFECTS: (reviewed/updated 4/1/2018)  None reported or admitted to. No noted toxicity with use of Depakote/Tegretol/lithium/Clozaril/TCAs   ALLERGIES:(reviewed/updated 4/1/2018)  No Known Allergies   MEDICATIONS PRIOR TO ADMISSION:(reviewed/updated 4/1/2018)  Prescriptions Prior to Admission   Medication Sig    guaiFENesin (ROBITUSSIN CHEST CONGESTION) 100 mg/5 mL liquid Take 10 mL by mouth three (3) times daily as needed for Cough.  traMADol (ULTRAM) 50 mg tablet Take 1 tablet by mouth every eight (8) hours as needed for Pain. PAST MEDICAL HISTORY: Past medical history from the initial psychiatric evaluation has been reviewed (reviewed/updated 4/1/2018) with no additional updates (I asked patient and no additional past medical history provided). Past Medical History:   Diagnosis Date    Asthma     Hepatitis C    No past surgical history on file. SOCIAL HISTORY: Social history from the initial psychiatric evaluation has been reviewed (reviewed/updated 4/1/2018) with no additional updates (I asked patient and no additional social history provided). Social History     Social History    Marital status: SINGLE     Spouse name: N/A    Number of children: N/A    Years of education: N/A     Occupational History    Not on file. Social History Main Topics    Smoking status: Current Every Day Smoker    Smokeless tobacco: Not on file    Alcohol use Yes    Drug use: Yes     Special: Marijuana    Sexual activity: Yes     Partners: Male     Birth control/ protection: None     Other Topics Concern    Not on file     Social History Narrative    No narrative on file      FAMILY HISTORY: Family history from the initial psychiatric evaluation has been reviewed (reviewed/updated 4/1/2018) with no additional updates (I asked patient and no additional family history provided). No family history on file.     REVIEW OF SYSTEMS: (reviewed/updated 4/1/2018)  Appetite:no change from normal   Sleep: 2 hrs  All other Review of Systems: Psychological ROS: positive for - behavioral disorder, concentration difficulties, hallucinations, hostility, mood swings and psychosis  Respiratory ROS: no cough, shortness of breath, or wheezing  Cardiovascular ROS: no chest pain or dyspnea on exertion  Neurological ROS: no TIA or stroke symptoms         [facility] (MSE):    MSE FINDINGS ARE WITHIN NORMAL LIMITS (WNL) UNLESS OTHERWISE STATED BELOW. ( ALL OF THE BELOW CATEGORIES OF THE MSE HAVE BEEN REVIEWED (reviewed 4/1/2018) AND UPDATED AS DEEMED APPROPRIATE )  General Presentation age appropriate and disheveled, evasive, uncooperative and unreliable   Orientation disorganized   Vital Signs  See below (reviewed 4/1/2018); Vital Signs (BP, Pulse, & Temp) are within normal limits if not listed below.    Gait and Station Stable/steady, no ataxia   Musculoskeletal System No extrapyramidal symptoms (EPS); no abnormal muscular movements or Tardive Dyskinesia (TD); muscle strength and tone are within normal limits   Language No aphasia or dysarthria   Speech:  loud and pressured   Thought Processes illogical; slow rate of thoughts; poor abstract reasoning/computation   Thought Associations tangential   Thought Content paranoid delusions, Judaism delusions, auditory hallucinations and internally preoccupied   Suicidal Ideations none   Homicidal Ideations none   Mood:  hostile  and labile    Affect:  hostile, inappropriate and increased in intensity   Memory recent  impaired   Memory remote:  intact   Concentration/Attention:  distractable and hypervigilance   Fund of Knowledge below average   Insight:  poor   Reliability poor   Judgment:  poor          VITALS:     Patient Vitals for the past 24 hrs:   Temp Pulse Resp BP   04/01/18 0550 97 °F (36.1 °C) 82 16 117/75   03/31/18 1233 98 °F (36.7 °C) 82 18 (!) 107/96     Wt Readings from Last 3 Encounters:   12/27/15 61.2 kg (135 lb)   11/09/14 65.8 kg (145 lb)   10/19/13 56.7 kg (125 lb)     Temp Readings from Last 3 Encounters:   04/01/18 97 °F (36.1 °C)   12/27/15 98.7 °F (37.1 °C)   11/09/14 98.7 °F (37.1 °C)     BP Readings from Last 3 Encounters:   04/01/18 117/75   12/27/15 132/65   11/09/14 111/65     Pulse Readings from Last 3 Encounters:   04/01/18 82   12/27/15 76   11/09/14 65            DATA     LABORATORY DATA:(reviewed/updated 4/1/2018)  No results found for this or any previous visit (from the past 24 hour(s)). No results found for: VALF2, VALAC, VALP, VALPR, DS6, CRBAM, CRBAMP, CARB2, XCRBAM  No results found for: LITHM   RADIOLOGY REPORTS:(reviewed/updated 4/1/2018)  No results found.        MEDICATIONS     ALL MEDICATIONS:   Current Facility-Administered Medications   Medication Dose Route Frequency    risperiDONE (RisperDAL) tablet 1 mg  1 mg Oral BID    traZODone (DESYREL) tablet 100 mg  100 mg Oral QHS PRN    ziprasidone (GEODON) 20 mg in sterile water (preservative free) 1 mL injection  20 mg IntraMUSCular BID PRN    OLANZapine (ZyPREXA) tablet 5 mg  5 mg Oral Q6H PRN    benztropine (COGENTIN) tablet 2 mg  2 mg Oral BID PRN    benztropine (COGENTIN) injection 2 mg  2 mg IntraMUSCular BID PRN    LORazepam (ATIVAN) injection 2 mg  2 mg IntraMUSCular Q4H PRN    LORazepam (ATIVAN) tablet 1 mg  1 mg Oral Q4H PRN    acetaminophen (TYLENOL) tablet 650 mg  650 mg Oral Q4H PRN    ibuprofen (MOTRIN) tablet 400 mg  400 mg Oral Q8H PRN    magnesium hydroxide (MILK OF MAGNESIA) 400 mg/5 mL oral suspension 30 mL  30 mL Oral DAILY PRN    nicotine (NICODERM CQ) 21 mg/24 hr patch 1 Patch  1 Patch TransDERmal DAILY PRN      SCHEDULED MEDICATIONS:   Current Facility-Administered Medications   Medication Dose Route Frequency    risperiDONE (RisperDAL) tablet 1 mg  1 mg Oral BID          ASSESSMENT & PLAN     DIAGNOSES REQUIRING ACTIVE TREATMENT AND MONITORING: (reviewed/updated 4/1/2018)  Patient Active Hospital Problem List:    Schizoaffective disorder ([facility] (3/30/2018)    Assessment: paranoid and Roman Catholic delusion themes, agitation- poor insight and non compliance. No change    Plan: prn med and consider AP/Mood stabilizer- need collateral info- start Risperdal, request court order     Agitation (3/31/2018)    Assessment: acute due to psychosis. Prn med    Plan: prn med for now        I will continue to monitor blood levels (Depakote, Tegretol, lithium, clozapine---a drug with a narrow therapeutic index= NTI) and associated labs for drug therapy implemented that require intense monitoring for toxicity as deemed appropriate based on current medication side effects and pharmacodynamically determined drug 1/2 lives. In summary, Iman Ott, is a 61 y.o.  female who presents with a severe exacerbation of the principal diagnosis of Schizoaffective disorder ([facility]  Patient's condition is worsening/not improving/not stable . Patient requires continued inpatient hospitalization for further stabilization, safety monitoring and medication management. I will continue to coordinate the provision of individual, milieu, occupational, group, and substance abuse therapies to address target symptoms/diagnoses as deemed appropriate for the individual patient. A coordinated, multidisplinary treatment team round was conducted with the patient (this team consists of the nurse, psychiatric unit pharmcist,  and writer). Complete current electronic health record for patient has been reviewed today including consultant notes, ancillary staff notes, nurses and psychiatric tech notes. Suicide risk assessment completed and patient deemed to be of low risk for suicide at this time. The following regarding medications was addressed during rounds with patient:   the risks and benefits of the proposed medication. The patient was given the opportunity to ask questions. Informed consent given to the use of the above medications.  Will continue to adjust psychiatric and non-psychiatric medications (see above \"medication\" section and orders section for details) as deemed appropriate & based upon diagnoses and response to treatment. I will continue to order blood tests/labs and diagnostic tests as deemed appropriate and review results as they become available (see orders for details and above listed lab/test results). I will order psychiatric records from previous [facility] hospitals to further elucidate the nature of patient's psychopathology and review once available. I will gather additional collateral information from friends, family and o/p treatment team to further elucidate the nature of patient's psychopathology and baselline level of psychiatric functioning. I certify that this patient's inpatient psychiatric hospital services furnished since the previous certification were, and continue to be, required for treatment that could reasonably be expected to improve the patient's condition, or for diagnostic study, and that the patient continues to need, on a daily basis, active treatment furnished directly by or requiring the supervision of inpatient psychiatric facility personnel. In addition, the hospital records show that services furnished were intensive treatment services, admission or related services, or equivalent services.     EXPECTED DISCHARGE DATE/DAY: TBD     DISPOSITION: Home       Signed By:   Neeta Ace MD  4/1/2018

## 2018-04-02 PROCEDURE — 74011250636 HC RX REV CODE- 250/636: Performed by: PSYCHIATRY & NEUROLOGY

## 2018-04-02 PROCEDURE — 65220000003 HC RM SEMIPRIVATE PSYCH

## 2018-04-02 PROCEDURE — 74011000250 HC RX REV CODE- 250: Performed by: PSYCHIATRY & NEUROLOGY

## 2018-04-02 PROCEDURE — 74011250637 HC RX REV CODE- 250/637: Performed by: PSYCHIATRY & NEUROLOGY

## 2018-04-02 RX ORDER — HALOPERIDOL 5 MG/1
5 TABLET ORAL 2 TIMES DAILY
Status: DISCONTINUED | OUTPATIENT
Start: 2018-04-02 | End: 2018-04-11

## 2018-04-02 RX ORDER — LORAZEPAM 2 MG/ML
1 INJECTION INTRAMUSCULAR
Status: DISCONTINUED | OUTPATIENT
Start: 2018-04-02 | End: 2018-04-12

## 2018-04-02 RX ORDER — HALOPERIDOL 5 MG/ML
5 INJECTION INTRAMUSCULAR 2 TIMES DAILY
Status: DISCONTINUED | OUTPATIENT
Start: 2018-04-02 | End: 2018-04-11

## 2018-04-02 RX ORDER — ALBUTEROL SULFATE 90 UG/1
2 AEROSOL, METERED RESPIRATORY (INHALATION)
COMMUNITY
End: 2022-05-19

## 2018-04-02 RX ORDER — DIVALPROEX SODIUM 500 MG/1
1000 TABLET, EXTENDED RELEASE ORAL
Status: DISCONTINUED | OUTPATIENT
Start: 2018-04-02 | End: 2018-04-12

## 2018-04-02 RX ADMIN — WATER 20 MG: 1 INJECTION INTRAMUSCULAR; INTRAVENOUS; SUBCUTANEOUS at 01:33

## 2018-04-02 RX ADMIN — LORAZEPAM 1 MG: 2 INJECTION, SOLUTION INTRAMUSCULAR; INTRAVENOUS at 21:46

## 2018-04-02 RX ADMIN — HALOPERIDOL LACTATE 5 MG: 5 INJECTION, SOLUTION INTRAMUSCULAR at 21:45

## 2018-04-02 NOTE — BH NOTES
PSYCHOSOCIAL ASSESSMENT  :Patient identifying info:  Wilber Andrade is a 61 y.o., female admitted 3/30/2018 11:11 AM     Presenting problem and precipitating factors: Patient was TDO'd for threatening to kill her family and police. Per the pre-screening the patient has been off her medications since November 2017. Mental status assessment:  The treatment went to the patient's room to meet with her. She got up and left the room without speaking, went down the [place] and in the dayroom and was not responding to the team's request to speak with us. Per report, the past two days the patient yelled at the psychiatrist to leave her alone during his attempts to speak with her. She's refusing to take medication prescribed for her, Risperdal.      Per the pre-screening the patient has not been conducting her ADLs, she's not been eating, was physically aggressive towards family and had a diagnosis of schizoaffective disorder, bipolar type. Her daughter's name is Narciso Bishop (ph: [phone]). Current psychiatric providers and contact info:  None. Per pre-screening the patient does not have an outpatient provider. Previous psychiatric services/providers and response to treatment: Unknown. Worker left a message for [facility] (ph: [phone]), to see if patient has had a history of treatment there. Family history of mental illness : unknown    Substance abuse history:   unknown  Social History   Substance Use Topics    Smoking status: Current Every Day Smoker    Smokeless tobacco: Not on file    Alcohol use Yes       Family constellation: Unknown. Per pres-screening, the patient has at least one daughter, Naomi Ro.       Is significant other involved?  uknownabila      Describe support system:  Unknown     Describe living arrangements and home environment: The patient may reside with her daughter  Health issues:   Hospital Problems  Never Reviewed          Codes Class Noted POA    Agitation ICD-10-CM: R45.1  ICD-9-CM: 307.9  3/31/2018 Yes        * (Principal)Schizoaffective disorder ([facility] ICD-10-CM: F25.9  ICD-9-CM: 295.70  3/30/2018 Unknown              Trauma history:  uknown     Legal issues:  Unknown    History of  service:  unknown    Financial status:  Unknown, but likely has disability    Anglican/cultural factors:  N/A     Education/work history:  unknown    Have you been licensed as a charli care professional (current or ): unknown  Leisure and recreation preferences: unknown  Describe coping skills: unknown    More history could not be gathered at this time due to the patient not speaking with/sharing information at this time.       Tammie Herrera LCSW  2018

## 2018-04-02 NOTE — BH NOTES
PSYCHIATRIC PROGRESS NOTE         Patient Name  Vonda Martinez   Date of Birth 1960   [facility] [id number]   Medical Record Number  [id number]      Age  61 y.o. PCP None   Admit date:  3/30/2018    Room Number  315/02  @ [facility]   Date of Service  4/2/2018           E & M PROGRESS NOTE:         HISTORY       CC:  \"leave me alone\"  HISTORY OF PRESENT ILLNESS/INTERVAL HISTORY:  (reviewed/updated 4/2/2018). per initial evaluation: The patient, Vonda Martinez, is a 61 y.o. BLACK OR  female with a past psychiatric history significant for schizoaffective d/o, who presents at this time with complaints of (and/or evidence of) the following emotional symptoms: agitation and mood swings. Additional symptomatology include refusing to talk to team, irritable and angry mood and guarded. Per report pt has been deteriorating in the last 2 month and threatening to kill family members. She has been aggressive towards the , she has been breaking into neighbors apartment and religiously preoccupied. She has poor appetite and refusing to complete her ADL with poor hygiene. She has no insight and does not want to take medication. The above symptoms have been present for . These symptoms are of severe severity. These symptoms are constant in nature. The patient's condition has been precipitated by and psychosocial stressors ( ). Patient's condition made worse by treatment noncompliance. UDS: negative; BAL=0. Vonda Martinez presents/reports/evidences the following emotional symptoms today, 4/2/2018:agitation and psychotic behavior. The above symptoms have been present for few month. These symptoms are of severe severity. The symptoms are constant in nature. Additional symptomatology and features include labile and angry mood, responding to internal stimuli, paranoid delusion and poor insight. Refusing to take medication, received prn for agitation.     4/2- left the room and refused to be evaluated. She was seen in the hallway yelling and being loud. Poor sleep, paranoid , responding to internal stimuli and anger outburst last night pt received prn med. Refused po med and has poor insight      SIDE EFFECTS: (reviewed/updated 4/2/2018)  None reported or admitted to. No noted toxicity with use of Depakote/Tegretol/lithium/Clozaril/TCAs   ALLERGIES:(reviewed/updated 4/2/2018)  No Known Allergies   MEDICATIONS PRIOR TO ADMISSION:(reviewed/updated 4/2/2018)  Prescriptions Prior to Admission   Medication Sig    guaiFENesin (ROBITUSSIN CHEST CONGESTION) 100 mg/5 mL liquid Take 10 mL by mouth three (3) times daily as needed for Cough.  traMADol (ULTRAM) 50 mg tablet Take 1 tablet by mouth every eight (8) hours as needed for Pain. PAST MEDICAL HISTORY: Past medical history from the initial psychiatric evaluation has been reviewed (reviewed/updated 4/2/2018) with no additional updates (I asked patient and no additional past medical history provided). Past Medical History:   Diagnosis Date    Asthma     Hepatitis C    No past surgical history on file. SOCIAL HISTORY: Social history from the initial psychiatric evaluation has been reviewed (reviewed/updated 4/2/2018) with no additional updates (I asked patient and no additional social history provided). Social History     Social History    Marital status: SINGLE     Spouse name: N/A    Number of children: N/A    Years of education: N/A     Occupational History    Not on file.      Social History Main Topics    Smoking status: Current Every Day Smoker    Smokeless tobacco: Not on file    Alcohol use Yes    Drug use: Yes     Special: Marijuana    Sexual activity: Yes     Partners: Male     Birth control/ protection: None     Other Topics Concern    Not on file     Social History Narrative    No narrative on file      FAMILY HISTORY: Family history from the initial psychiatric evaluation has been reviewed (reviewed/updated 4/2/2018) with no additional updates (I asked patient and no additional family history provided). No family history on file. REVIEW OF SYSTEMS: (reviewed/updated 4/2/2018)  Appetite:no change from normal   Sleep: 2 hrs  All other Review of Systems: Psychological ROS: positive for - behavioral disorder, concentration difficulties, hallucinations, hostility, mood swings and psychosis  Respiratory ROS: no cough, shortness of breath, or wheezing  Cardiovascular ROS: no chest pain or dyspnea on exertion  Neurological ROS: no TIA or stroke symptoms         [facility] (MSE):    MSE FINDINGS ARE WITHIN NORMAL LIMITS (WNL) UNLESS OTHERWISE STATED BELOW. ( ALL OF THE BELOW CATEGORIES OF THE MSE HAVE BEEN REVIEWED (reviewed 4/2/2018) AND UPDATED AS DEEMED APPROPRIATE )  General Presentation age appropriate and disheveled, evasive, uncooperative and unreliable   Orientation disorganized   Vital Signs  See below (reviewed 4/2/2018); Vital Signs (BP, Pulse, & Temp) are within normal limits if not listed below.    Gait and Station Stable/steady, no ataxia   Musculoskeletal System No extrapyramidal symptoms (EPS); no abnormal muscular movements or Tardive Dyskinesia (TD); muscle strength and tone are within normal limits   Language No aphasia or dysarthria   Speech:  loud and pressured   Thought Processes illogical; slow rate of thoughts; poor abstract reasoning/computation   Thought Associations tangential   Thought Content paranoid delusions, Buddhist delusions, auditory hallucinations and internally preoccupied   Suicidal Ideations none   Homicidal Ideations none   Mood:  hostile  and labile    Affect:  hostile, inappropriate and increased in intensity   Memory recent  impaired   Memory remote:  intact   Concentration/Attention:  distractable and hypervigilance   Fund of Knowledge below average   Insight:  poor   Reliability poor   Judgment:  poor          VITALS: No data found. Wt Readings from Last 3 Encounters:   12/27/15 61.2 kg (135 lb)   11/09/14 65.8 kg (145 lb)   10/19/13 56.7 kg (125 lb)     Temp Readings from Last 3 Encounters:   04/01/18 97 °F (36.1 °C)   12/27/15 98.7 °F (37.1 °C)   11/09/14 98.7 °F (37.1 °C)     BP Readings from Last 3 Encounters:   04/01/18 117/75   12/27/15 132/65   11/09/14 111/65     Pulse Readings from Last 3 Encounters:   04/01/18 82   12/27/15 76   11/09/14 65            DATA     LABORATORY DATA:(reviewed/updated 4/2/2018)  No results found for this or any previous visit (from the past 24 hour(s)). No results found for: VALF2, VALAC, VALP, VALPR, DS6, CRBAM, CRBAMP, CARB2, XCRBAM  No results found for: LITHM   RADIOLOGY REPORTS:(reviewed/updated 4/2/2018)  No results found.        MEDICATIONS     ALL MEDICATIONS:   Current Facility-Administered Medications   Medication Dose Route Frequency    risperiDONE (RisperDAL) tablet 1 mg  1 mg Oral BID    traZODone (DESYREL) tablet 100 mg  100 mg Oral QHS PRN    ziprasidone (GEODON) 20 mg in sterile water (preservative free) 1 mL injection  20 mg IntraMUSCular BID PRN    OLANZapine (ZyPREXA) tablet 5 mg  5 mg Oral Q6H PRN    benztropine (COGENTIN) tablet 2 mg  2 mg Oral BID PRN    benztropine (COGENTIN) injection 2 mg  2 mg IntraMUSCular BID PRN    LORazepam (ATIVAN) injection 2 mg  2 mg IntraMUSCular Q4H PRN    LORazepam (ATIVAN) tablet 1 mg  1 mg Oral Q4H PRN    acetaminophen (TYLENOL) tablet 650 mg  650 mg Oral Q4H PRN    ibuprofen (MOTRIN) tablet 400 mg  400 mg Oral Q8H PRN    magnesium hydroxide (MILK OF MAGNESIA) 400 mg/5 mL oral suspension 30 mL  30 mL Oral DAILY PRN    nicotine (NICODERM CQ) 21 mg/24 hr patch 1 Patch  1 Patch TransDERmal DAILY PRN      SCHEDULED MEDICATIONS:   Current Facility-Administered Medications   Medication Dose Route Frequency    risperiDONE (RisperDAL) tablet 1 mg  1 mg Oral BID          ASSESSMENT & PLAN     DIAGNOSES REQUIRING ACTIVE TREATMENT AND MONITORING: (reviewed/updated 4/2/2018)  Patient Active Hospital Problem List:    Schizoaffective disorder ([facility] (3/30/2018)    Assessment:no change in presentation-  paranoid and Zoroastrian delusion themes, agitation- poor insight and non compliance. Plan: prn med and consider AP/Mood stabilizer- need collateral info from family- start Risperdal, request court order med- consider WATERMAN. Agitation (3/31/2018)    Assessment: acute due to psychosis. Prn med    Plan: prn med for now        I will continue to monitor blood levels (Depakote, Tegretol, lithium, clozapine---a drug with a narrow therapeutic index= NTI) and associated labs for drug therapy implemented that require intense monitoring for toxicity as deemed appropriate based on current medication side effects and pharmacodynamically determined drug 1/2 lives. In summary, Aliyah Louis, is a 61 y.o.  female who presents with a severe exacerbation of the principal diagnosis of Schizoaffective disorder ([facility]  Patient's condition is worsening/not improving/not stable . Patient requires continued inpatient hospitalization for further stabilization, safety monitoring and medication management. I will continue to coordinate the provision of individual, milieu, occupational, group, and substance abuse therapies to address target symptoms/diagnoses as deemed appropriate for the individual patient. A coordinated, multidisplinary treatment team round was conducted with the patient (this team consists of the nurse, psychiatric unit pharmcist,  and writer). Complete current electronic health record for patient has been reviewed today including consultant notes, ancillary staff notes, nurses and psychiatric tech notes. Suicide risk assessment completed and patient deemed to be of low risk for suicide at this time.      The following regarding medications was addressed during rounds with patient:   the risks and benefits of the proposed medication. The patient was given the opportunity to ask questions. Informed consent given to the use of the above medications. Will continue to adjust psychiatric and non-psychiatric medications (see above \"medication\" section and orders section for details) as deemed appropriate & based upon diagnoses and response to treatment. I will continue to order blood tests/labs and diagnostic tests as deemed appropriate and review results as they become available (see orders for details and above listed lab/test results). I will order psychiatric records from previous [facility] hospitals to further elucidate the nature of patient's psychopathology and review once available. I will gather additional collateral information from friends, family and o/p treatment team to further elucidate the nature of patient's psychopathology and baselline level of psychiatric functioning. I certify that this patient's inpatient psychiatric hospital services furnished since the previous certification were, and continue to be, required for treatment that could reasonably be expected to improve the patient's condition, or for diagnostic study, and that the patient continues to need, on a daily basis, active treatment furnished directly by or requiring the supervision of inpatient psychiatric facility personnel. In addition, the hospital records show that services furnished were intensive treatment services, admission or related services, or equivalent services.     EXPECTED DISCHARGE DATE/DAY: TBD     DISPOSITION: Home       Signed By:   Paolo Bone MD  4/2/2018

## 2018-04-02 NOTE — PROGRESS NOTES
Problem: Altered Thought Process (Adult/Pediatric)  Goal: *STG: Participates in treatment plan  Outcome: Not Progressing Towards Goal  Pt visible on the unit. Disorganized. Irritable. Refused to speak with the  and  for her hearing. Difficult to redirect. Refused scheduled medications. Paces the halls. Appears preoccupied. Grandiose. 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 Will continue to monitor pt and assess needs.

## 2018-04-02 NOTE — BH NOTES
Pt witness responding to internal stimuli in the day room. Pt heard saying people are trying to control her and she would not let them. Pt was easily redirected when another pt asked her to quiet down. Pt left the day room and returned, now sitting, quietly talking to self.

## 2018-04-02 NOTE — PROGRESS NOTES
Initial Nutrition Assessment:    INTERVENTIONS/RECOMMENDATIONS:   · Meals/Snacks: General/healthful diet:  Continue regular diet. Enc po. ASSESSMENT:   4/2:  Chart reviewed; med noted for schizoaffective disorder on admission. MST trigger for unsure weight loss. No new weight on admission so est needs based on prior admission. Per RN, pt refused to provide information regarding weight hx. Diet Order: Regular  % Eaten:  No data found. Pertinent Medications: [x]Reviewed []Other  Pertinent Labs: [x]Reviewed []Other  Food Allergies: [x]None []Other   Last BM:    [x]Active     []Hyperactive  []Hypoactive       [] Absent BS  Skin:    [x] Intact   [] Incision  [] Breakdown  [] Other:    Anthropometrics:   Height: 5' 1\" (154.9 cm) Weight:     IBW (%IBW):   ( ) UBW (%UBW):   (  %)   Last Weight Metrics:  Weight Loss Metrics 12/27/2015 11/9/2014 10/19/2013   Today's Wt 135 lb 145 lb 125 lb   BMI 24.69 kg/m2 26.51 kg/m2 22.86 kg/m2       BMI: There is no height or weight on file to calculate BMI. This BMI is indicative of:   []Underweight    [x]Normal    []Overweight    [] Obesity   [] Extreme Obesity (BMI>40)     Estimated Nutrition Needs (Based on):   1461 Kcals/day (BMR (1124) x 1. 3AF) , 61 g (1.0 g/kg bw) Protein  Carbohydrate:  At Least 130 g/day  Fluids: 1500 mL/day (1ml/kcal)    NUTRITION DIAGNOSES:   Problem:  No nutritional diagnosis at this time      Etiology: related to       Signs/Symptoms: as evidenced by        NUTRITION INTERVENTIONS:  Meals/Snacks: General/healthful diet                  GOAL:   PO intake at least 50% of meals next 5-7 days    LEARNING NEEDS (Diet, Food/Nutrient-Drug Interaction):    [x] None Identified   [] Identified and Education Provided/Documented   [] Identified and Pt declined/was not appropriate     Cultureal, Jainism, OR Ethnic Dietary Needs:    [x] None Identified   [] Identified and Addressed     [x] Interdisciplinary Care Plan Reviewed/Documented    [x] Discharge Planning:   Continue regular diet    MONITORING /EVALUATION:      Food/Nutrient Intake Outcomes:  Total energy intake  Physical Signs/Symptoms Outcomes: Weight/weight change    NUTRITION RISK:    [x] Patient At Nutritional Risk    [] High              [] Moderate/Mild           [x]  Low     [] Patient Not At Nutritional Risk    PT SEEN FOR:    []  MD Consult: []Calorie Count      []Diabetic Diet Education        []Diet Education     []Electrolyte Management     []General Nutrition Management and Supplements     []Management of Tube Feeding     []TPN Recommendations    [x]  RN Referral:  []MST score >=2     []Enteral/Parenteral Nutrition PTA     []Pregnant: Gestational DM or Multigestation     []Pressure Ulcer/Wound Care needs        []  Low BMI  []  COLLIN      Kyrajohan Anderson[facility], [address]  Pager [phone]  Weekend Pager [phone]

## 2018-04-02 NOTE — BH NOTES
Pt refusing PO medications. Dr. Placido Crisostomo called to get order for linked medication alternatives. Pt refused height and weight due to fear of standing on scale.

## 2018-04-02 NOTE — BH NOTES
Pt remains guarded, paranoid, responding to internal stimuli, yelling loud and cursing at voices. Disruptive, unable to sit or rest. Offered her PO PRN medication and she refused. Escorted to open seclusion, Geodon 20 mg I/M given with no hands on required. Effective. Slept for 3 hours in bed. Q 15 minutes monitoring maintained for the safety.

## 2018-04-02 NOTE — BH NOTES
GROUP THERAPY PROGRESS NOTE    The patient Zabrina Rayo a 61 y.o. female is participating in Reflections Group    Group time: 30 minutes    Personal goal for participation: To discuss the daily goals    Goal orientation: personal    Group therapy participation: passive    Therapeutic interventions reviewed and discussed:  Yes    Impression of participation: Alin Hughes  4/1/2018  9:00 PM

## 2018-04-02 NOTE — PROGRESS NOTES
[facility] Admission Pharmacy Medication Reconciliation     Recommendations/Findings:   1) Per prescreening, patient has not been on medications since November 2017. Unknown what patient was on at that time. Patient is not appropriate for interview at this time. 2) Added albuterol inhaler (last filled in 2/2018)  3) Removed guaifenesin, tramadol     Total Time Spent: 10 minutes    Information obtained from: chart review, , RxQuery    Patient allergies: Allergies as of 03/30/2018    (No Known Allergies)       Prior to Admission Medications   Prescriptions Last Dose Informant Patient Reported? Taking? albuterol (PROVENTIL HFA, VENTOLIN HFA, PROAIR HFA) 90 mcg/actuation inhaler   Yes Yes   Sig: Take 2 Puffs by inhalation three (3) times daily as needed for Wheezing or Shortness of Breath.       Facility-Administered Medications: None        Thank you,  Dianne Gonzales, PHARMD, BCPS

## 2018-04-02 NOTE — PROGRESS NOTES
Laboratory monitoring for mood stabilizer and antipsychotics:    Recommended baseline monitoring has been completed based on this patient's current medication regimen. The following labs were completed at transferring facility: WBC 11.74 (elevated), Hgb 12.5, Hct 36.3, Plts 473 (elevated), sodium 142, potassium 3.4 (low), BUN 3, creatinine 0.81, glucose 109, calcium 9.6, AST 14, ALT 21, alk phos 75, total bili 0.3, albumin 3.6, UDS (-), BAL (-), UA completed     The patient is currently taking the following medication(s):   Current Facility-Administered Medications   Medication Dose Route Frequency    [START ON 4/17/2018] haloperidol decanoate (HALDOL DECANOATE) 100 mg/mL injection 100 mg  100 mg IntraMUSCular Q28D    haloperidol lactate (HALDOL) injection 10 mg +++COURT ORDERED MEDICATION FOR REFUSAL OF PO HALOPERIDOL+++  10 mg IntraMUSCular BID    Or    haloperidol (HALDOL) tablet 10 mg  10 mg Oral BID    LORazepam (ATIVAN) injection 0.5 mg +++COURT ORDERED MEDICATION FOR REFUSAL OF PO DEPAKOTE+++  0.5 mg IntraMUSCular QHS    Or    divalproex ER (DEPAKOTE ER) 24 hour tablet 1,000 mg  1,000 mg Oral QHS    benztropine (COGENTIN) tablet 0.5 mg  0.5 mg Oral BID     Height, Weight, BMI Estimation  Estimated body mass index is 24.58 kg/(m^2) as calculated from the following:    Height as of this encounter: 154.9 cm (61\"). Weight as of this encounter: 59 kg (130 lb 1.1 oz).      Lab Results   Component Value Date/Time    Sodium 141 12/27/2015 12:00 PM    Potassium 4.1 12/27/2015 12:00 PM    Chloride 102 12/27/2015 12:00 PM    CO2 31 12/27/2015 12:00 PM    Anion gap 8 12/27/2015 12:00 PM    Glucose 90 12/27/2015 12:00 PM    BUN 10 12/27/2015 12:00 PM    Creatinine 0.96 12/27/2015 12:00 PM    BUN/Creatinine ratio 10 (L) 12/27/2015 12:00 PM    GFR est AA >60 12/27/2015 12:00 PM    GFR est non-AA >60 12/27/2015 12:00 PM    Calcium 8.8 12/27/2015 12:00 PM    ALT (SGPT) 20 12/27/2015 12:00 PM    AST (SGOT) 15 12/27/2015 12:00 PM    Alk.  phosphatase 97 12/27/2015 12:00 PM    Protein, total 7.5 12/27/2015 12:00 PM    Albumin 3.9 12/27/2015 12:00 PM    Globulin 3.6 12/27/2015 12:00 PM    A-G Ratio 1.1 12/27/2015 12:00 PM    Bilirubin, total 0.2 12/27/2015 12:00 PM       Lab Results   Component Value Date/Time    Glucose 90 12/27/2015 12:00 PM       Lab Results   Component Value Date/Time    Hemoglobin A1c 5.6 04/16/2018 04:48 AM       Hematology  Lab Results   Component Value Date/Time    WBC 6.0 12/27/2015 12:00 PM    HGB 13.1 12/27/2015 12:00 PM    HCT 38.3 12/27/2015 12:00 PM    PLATELET 893 04/44/1225 12:00 PM    MCV 94.6 12/27/2015 12:00 PM       Lipids  Lab Results   Component Value Date/Time    Cholesterol, total 134 04/16/2018 04:48 AM    HDL Cholesterol 46 04/16/2018 04:48 AM    LDL, calculated 75 04/16/2018 04:48 AM    Triglyceride 65 04/16/2018 04:48 AM    CHOL/HDL Ratio 2.9 04/16/2018 04:48 AM       Thyroid Function    Lab Results   Component Value Date/Time    TSH 0.55 04/16/2018 04:48 AM     Vitals  Visit Vitals    BP 97/56    Pulse 62    Temp 97.4 °F (36.3 °C)    Resp 18    Ht 154.9 cm (61\")    Wt 59 kg (130 lb 1.1 oz)    BMI 24.58 kg/m2       Chani Rey, PharmD, PGY1 Pharmacy Resident  [phone] (pharmacy)

## 2018-04-03 PROCEDURE — 74011250636 HC RX REV CODE- 250/636: Performed by: PSYCHIATRY & NEUROLOGY

## 2018-04-03 PROCEDURE — 65220000003 HC RM SEMIPRIVATE PSYCH

## 2018-04-03 RX ADMIN — HALOPERIDOL LACTATE 5 MG: 5 INJECTION, SOLUTION INTRAMUSCULAR at 21:44

## 2018-04-03 RX ADMIN — LORAZEPAM 1 MG: 2 INJECTION, SOLUTION INTRAMUSCULAR; INTRAVENOUS at 21:45

## 2018-04-03 RX ADMIN — HALOPERIDOL LACTATE 5 MG: 5 INJECTION, SOLUTION INTRAMUSCULAR at 08:56

## 2018-04-03 NOTE — PROGRESS NOTES
Problem: Altered Thought Process (Adult/Pediatric)  Goal: *STG: Participates in treatment plan  Outcome: Not Progressing Towards Goal  Variance: Patient Condition  Pt is alert and visible on the unit. She has been responding to internal stimuli throughout the shift. Pt refused PO medication, alternatives not available as of yet so Dr. Jaylin Patton was contacted. Pt refused height and weight due to fear of the scale. Pt redirectable and cooperative otherwise. VS stable, no concerns reported. Staff will continue to monitor for health and safety.

## 2018-04-03 NOTE — BH NOTES
PSYCHIATRIC PROGRESS NOTE         Patient Name  Wilber Andrade   Date of Birth 1960   [facility] [id number]   Medical Record Number  [id number]      Age  61 y.o. PCP None   Admit date:  3/30/2018    Room Number  315/02  @ [facility]   Date of Service  4/3/2018           E & M PROGRESS NOTE:         HISTORY       CC:  \"leave me alone\"  HISTORY OF PRESENT ILLNESS/INTERVAL HISTORY:  (reviewed/updated 4/3/2018). per initial evaluation: The patient, Wilber Andrade, is a 61 y.o. BLACK OR  female with a past psychiatric history significant for schizoaffective d/o, who presents at this time with complaints of (and/or evidence of) the following emotional symptoms: agitation and mood swings. Additional symptomatology include refusing to talk to team, irritable and angry mood and guarded. Per report pt has been deteriorating in the last 2 month and threatening to kill family members. She has been aggressive towards the , she has been breaking into neighbors apartment and religiously preoccupied. She has poor appetite and refusing to complete her ADL with poor hygiene. She has no insight and does not want to take medication. The above symptoms have been present for . These symptoms are of severe severity. These symptoms are constant in nature. The patient's condition has been precipitated by and psychosocial stressors ( ). Patient's condition made worse by treatment noncompliance. UDS: negative; BAL=0. Wilber Andrade presents/reports/evidences the following emotional symptoms today, 4/3/2018:agitation and psychotic behavior. The above symptoms have been present for few month. These symptoms are of severe severity. The symptoms are constant in nature. Additional symptomatology and features include labile and angry mood, responding to internal stimuli, paranoid delusion and poor insight. Refusing to take medication, received prn for agitation.     4/2- left the room and refused to be evaluated. She was seen in the hallway yelling and being loud. Poor sleep, paranoid , responding to internal stimuli and anger outburst last night pt received prn med. Refused po med and has poor insight  4/3- remains labile, paranoid and with poor insight. Responding to internal stimuli. period of threatening behavior and anger outburst. Refusing to speak with the team.      SIDE EFFECTS: (reviewed/updated 4/3/2018)  None reported or admitted to. No noted toxicity with use of Depakote/Tegretol/lithium/Clozaril/TCAs   ALLERGIES:(reviewed/updated 4/3/2018)  No Known Allergies   MEDICATIONS PRIOR TO ADMISSION:(reviewed/updated 4/3/2018)  Prescriptions Prior to Admission   Medication Sig    albuterol (PROVENTIL HFA, VENTOLIN HFA, PROAIR HFA) 90 mcg/actuation inhaler Take 2 Puffs by inhalation three (3) times daily as needed for Wheezing or Shortness of Breath. PAST MEDICAL HISTORY: Past medical history from the initial psychiatric evaluation has been reviewed (reviewed/updated 4/3/2018) with no additional updates (I asked patient and no additional past medical history provided). Past Medical History:   Diagnosis Date    Asthma     Hepatitis C    No past surgical history on file. SOCIAL HISTORY: Social history from the initial psychiatric evaluation has been reviewed (reviewed/updated 4/3/2018) with no additional updates (I asked patient and no additional social history provided). Social History     Social History    Marital status: SINGLE     Spouse name: N/A    Number of children: N/A    Years of education: N/A     Occupational History    Not on file.      Social History Main Topics    Smoking status: Current Every Day Smoker    Smokeless tobacco: Not on file    Alcohol use Yes    Drug use: Yes     Special: Marijuana    Sexual activity: Yes     Partners: Male     Birth control/ protection: None     Other Topics Concern    Not on file     Social History Narrative  No narrative on file      FAMILY HISTORY: Family history from the initial psychiatric evaluation has been reviewed (reviewed/updated 4/3/2018) with no additional updates (I asked patient and no additional family history provided). No family history on file. REVIEW OF SYSTEMS: (reviewed/updated 4/3/2018)  Appetite:no change from normal   Sleep: 2 hrs  All other Review of Systems: Psychological ROS: positive for - behavioral disorder, concentration difficulties, hallucinations, hostility, mood swings and psychosis  Respiratory ROS: no cough, shortness of breath, or wheezing  Cardiovascular ROS: no chest pain or dyspnea on exertion  Neurological ROS: no TIA or stroke symptoms         [address] [place] [address] ([facility]):    MSE FINDINGS ARE WITHIN NORMAL LIMITS (WNL) UNLESS OTHERWISE STATED BELOW. ( ALL OF THE BELOW CATEGORIES OF THE MSE HAVE BEEN REVIEWED (reviewed 4/3/2018) AND UPDATED AS DEEMED APPROPRIATE )  General Presentation age appropriate and disheveled, evasive, uncooperative and unreliable   Orientation disorganized   Vital Signs  See below (reviewed 4/3/2018); Vital Signs (BP, Pulse, & Temp) are within normal limits if not listed below.    Gait and Station Stable/steady, no ataxia   Musculoskeletal System No extrapyramidal symptoms (EPS); no abnormal muscular movements or Tardive Dyskinesia (TD); muscle strength and tone are within normal limits   Language No aphasia or dysarthria   Speech:  loud and pressured   Thought Processes illogical; slow rate of thoughts; poor abstract reasoning/computation   Thought Associations tangential   Thought Content paranoid delusions, Jainism delusions, auditory hallucinations and internally preoccupied   Suicidal Ideations none   Homicidal Ideations none   Mood:  hostile  and labile    Affect:  hostile, inappropriate and increased in intensity   Memory recent  impaired   Memory remote:  intact   Concentration/Attention:  distractable and hypervigilance   Fund of Knowledge below average   Insight:  poor   Reliability poor   Judgment:  poor          VITALS:     Patient Vitals for the past 24 hrs:   Temp Pulse Resp BP   04/03/18 0611 - 86 16 124/73   04/02/18 1614 98.6 °F (37 °C) 94 16 138/71   04/02/18 1152 98 °F (36.7 °C) 89 18 139/70     Wt Readings from Last 3 Encounters:   12/27/15 61.2 kg (135 lb)   11/09/14 65.8 kg (145 lb)   10/19/13 56.7 kg (125 lb)     Temp Readings from Last 3 Encounters:   12/27/15 98.7 °F (37.1 °C)   11/09/14 98.7 °F (37.1 °C)   10/19/13 98.9 °F (37.2 °C)     BP Readings from Last 3 Encounters:   04/03/18 124/73   12/27/15 132/65   11/09/14 111/65     Pulse Readings from Last 3 Encounters:   04/03/18 86   12/27/15 76   11/09/14 65            DATA     LABORATORY DATA:(reviewed/updated 4/3/2018)  No results found for this or any previous visit (from the past 24 hour(s)). No results found for: VALF2, VALAC, VALP, VALPR, DS6, CRBAM, CRBAMP, CARB2, XCRBAM  No results found for: LITHM   RADIOLOGY REPORTS:(reviewed/updated 4/3/2018)  No results found.        MEDICATIONS     ALL MEDICATIONS:   Current Facility-Administered Medications   Medication Dose Route Frequency    divalproex ER (DEPAKOTE ER) 24 hour tablet 1,000 mg  1,000 mg Oral QHS    Or    LORazepam (ATIVAN) injection 1 mg  1 mg IntraMUSCular QHS    haloperidol (HALDOL) tablet 5 mg  5 mg Oral BID    Or    haloperidol lactate (HALDOL) injection 5 mg  5 mg IntraMUSCular BID    traZODone (DESYREL) tablet 100 mg  100 mg Oral QHS PRN    ziprasidone (GEODON) 20 mg in sterile water (preservative free) 1 mL injection  20 mg IntraMUSCular BID PRN    OLANZapine (ZyPREXA) tablet 5 mg  5 mg Oral Q6H PRN    benztropine (COGENTIN) tablet 2 mg  2 mg Oral BID PRN    benztropine (COGENTIN) injection 2 mg  2 mg IntraMUSCular BID PRN    LORazepam (ATIVAN) injection 2 mg  2 mg IntraMUSCular Q4H PRN    LORazepam (ATIVAN) tablet 1 mg  1 mg Oral Q4H PRN    acetaminophen (TYLENOL) tablet 650 mg  650 mg Oral Q4H PRN    ibuprofen (MOTRIN) tablet 400 mg  400 mg Oral Q8H PRN    magnesium hydroxide (MILK OF MAGNESIA) 400 mg/5 mL oral suspension 30 mL  30 mL Oral DAILY PRN    nicotine (NICODERM CQ) 21 mg/24 hr patch 1 Patch  1 Patch TransDERmal DAILY PRN      SCHEDULED MEDICATIONS:   Current Facility-Administered Medications   Medication Dose Route Frequency    divalproex ER (DEPAKOTE ER) 24 hour tablet 1,000 mg  1,000 mg Oral QHS    Or    LORazepam (ATIVAN) injection 1 mg  1 mg IntraMUSCular QHS    haloperidol (HALDOL) tablet 5 mg  5 mg Oral BID    Or    haloperidol lactate (HALDOL) injection 5 mg  5 mg IntraMUSCular BID          ASSESSMENT & PLAN     DIAGNOSES REQUIRING ACTIVE TREATMENT AND MONITORING: (reviewed/updated 4/3/2018)  Patient Active Hospital Problem List:    Schizoaffective disorder ([facility] (3/30/2018)    Assessment:minimal change-  paranoid and Cheondoism delusion themes, agitation- poor insight and non compliance. Plan: Ct to adjust med- Haldol and Depakote, court order granted     Agitation (3/31/2018)    Assessment: acute due to psychosis. Prn med    Plan: prn med for now        I will continue to monitor blood levels (Depakote, Tegretol, lithium, clozapine---a drug with a narrow therapeutic index= NTI) and associated labs for drug therapy implemented that require intense monitoring for toxicity as deemed appropriate based on current medication side effects and pharmacodynamically determined drug 1/2 lives. In summary, Ayde Villalobos, is a 61 y.o.  female who presents with a severe exacerbation of the principal diagnosis of Schizoaffective disorder ([facility]  Patient's condition is worsening/not improving/not stable . Patient requires continued inpatient hospitalization for further stabilization, safety monitoring and medication management.   I will continue to coordinate the provision of individual, milieu, occupational, group, and substance abuse therapies to address target symptoms/diagnoses as deemed appropriate for the individual patient. A coordinated, multidisplinary treatment team round was conducted with the patient (this team consists of the nurse, psychiatric unit pharmcist,  and writer). Complete current electronic health record for patient has been reviewed today including consultant notes, ancillary staff notes, nurses and psychiatric tech notes. Suicide risk assessment completed and patient deemed to be of low risk for suicide at this time. The following regarding medications was addressed during rounds with patient:   the risks and benefits of the proposed medication. The patient was given the opportunity to ask questions. Informed consent given to the use of the above medications. Will continue to adjust psychiatric and non-psychiatric medications (see above \"medication\" section and orders section for details) as deemed appropriate & based upon diagnoses and response to treatment. I will continue to order blood tests/labs and diagnostic tests as deemed appropriate and review results as they become available (see orders for details and above listed lab/test results). I will order psychiatric records from previous [facility] hospitals to further elucidate the nature of patient's psychopathology and review once available. I will gather additional collateral information from friends, family and o/p treatment team to further elucidate the nature of patient's psychopathology and baselline level of psychiatric functioning.          I certify that this patient's inpatient psychiatric hospital services furnished since the previous certification were, and continue to be, required for treatment that could reasonably be expected to improve the patient's condition, or for diagnostic study, and that the patient continues to need, on a daily basis, active treatment furnished directly by or requiring the supervision of inpatient psychiatric facility personnel. In addition, the hospital records show that services furnished were intensive treatment services, admission or related services, or equivalent services.     EXPECTED DISCHARGE DATE/DAY: TBD     DISPOSITION: Home       Signed By:   Juanjo Nelson MD  4/3/2018

## 2018-04-03 NOTE — BH NOTES
GROUP THERAPY PROGRESS NOTE    The patient Kwadwo lozano 61 y.o. female is participating in Reflections Group    Group time: 30 minutes    Personal goal for participation: To discuss the daily goals    Goal orientation: personal    Group therapy participation: passive    Therapeutic interventions reviewed and discussed:  Yes    Impression of participation: Gretchen Escoto  4/2/2018  9:59 PM

## 2018-04-03 NOTE — BH NOTES
Pt refused HS PO medications. Pt informed of court order medications. Pt told RN \"I will stab you with an injection. \" Pt given IM medication alternatives per court order without issue. After administration pt continued to state \"I don't take medication\".

## 2018-04-03 NOTE — PROGRESS NOTES
Problem: Altered Thought Process (Adult/Pediatric)  Goal: *STG: Participates in treatment plan  Outcome: Progressing Towards Goal  Patient remains paranoid. Refusing PO medications. Patient given IM court ordered medications. Patient is visible on the unit. continues to be preoccupied. Responding to internal stimuli. Will continue to monitor patient and assess needs.

## 2018-04-04 PROCEDURE — 65220000003 HC RM SEMIPRIVATE PSYCH

## 2018-04-04 PROCEDURE — 74011250636 HC RX REV CODE- 250/636: Performed by: PSYCHIATRY & NEUROLOGY

## 2018-04-04 RX ADMIN — HALOPERIDOL LACTATE 5 MG: 5 INJECTION, SOLUTION INTRAMUSCULAR at 08:46

## 2018-04-04 RX ADMIN — HALOPERIDOL LACTATE 5 MG: 5 INJECTION, SOLUTION INTRAMUSCULAR at 22:01

## 2018-04-04 RX ADMIN — LORAZEPAM 1 MG: 2 INJECTION, SOLUTION INTRAMUSCULAR; INTRAVENOUS at 22:05

## 2018-04-04 NOTE — BH NOTES
Pt has been isolative to her room for most of the shift. Pt was visibile in the dayroom eating breakfast and lunch. Pt became irritable during rest period because she could not go inside the dayroom but was easily redirected. Pt is selectively mute. Pt will continued to be monitored by staff every 15 min for safety.

## 2018-04-04 NOTE — BH NOTES
PSYCHIATRIC PROGRESS NOTE         Patient Name  Lazarus Gowda   Date of Birth 1960   [facility] [id number]   Medical Record Number  [id number]      Age  61 y.o. PCP None   Admit date:  3/30/2018    Room Number  784/98  @ [facility]   Date of Service  4/4/2018           E & M PROGRESS NOTE:         HISTORY       CC:  \"leave me alone\"  HISTORY OF PRESENT ILLNESS/INTERVAL HISTORY:  (reviewed/updated 4/4/2018). per initial evaluation: The patient, Lazarus Gowda, is a 61 y.o. BLACK OR  female with a past psychiatric history significant for schizoaffective d/o, who presents at this time with complaints of (and/or evidence of) the following emotional symptoms: agitation and mood swings. Additional symptomatology include refusing to talk to team, irritable and angry mood and guarded. Per report pt has been deteriorating in the last 2 month and threatening to kill family members. She has been aggressive towards the , she has been breaking into neighbors apartment and religiously preoccupied. She has poor appetite and refusing to complete her ADL with poor hygiene. She has no insight and does not want to take medication. The above symptoms have been present for . These symptoms are of severe severity. These symptoms are constant in nature. The patient's condition has been precipitated by and psychosocial stressors ( ). Patient's condition made worse by treatment noncompliance. UDS: negative; BAL=0. Lazarus Gowda presents/reports/evidences the following emotional symptoms today, 4/4/2018:agitation and psychotic behavior. The above symptoms have been present for few month. These symptoms are of severe severity. The symptoms are constant in nature. Additional symptomatology and features include labile and angry mood, responding to internal stimuli, paranoid delusion and poor insight. Refusing to take medication, received prn for agitation.     4/2- left the room and refused to be evaluated. She was seen in the hallway yelling and being loud. Poor sleep, paranoid , responding to internal stimuli and anger outburst last night pt received prn med. Refused po med and has poor insight  4/3- remains labile, paranoid and with poor insight. Responding to internal stimuli. period of threatening behavior and anger outburst. Refusing to speak with the team.  4/4/18 she remains paranoid and she refusing to come to see her treatment team , she refused to take medications and she is court ordered to take medications        SIDE EFFECTS: (reviewed/updated 4/4/2018)  None reported or admitted to. No noted toxicity with use of Depakote/Tegretol/lithium/Clozaril/TCAs   ALLERGIES:(reviewed/updated 4/4/2018)  No Known Allergies   MEDICATIONS PRIOR TO ADMISSION:(reviewed/updated 4/4/2018)  Prescriptions Prior to Admission   Medication Sig    albuterol (PROVENTIL HFA, VENTOLIN HFA, PROAIR HFA) 90 mcg/actuation inhaler Take 2 Puffs by inhalation three (3) times daily as needed for Wheezing or Shortness of Breath. PAST MEDICAL HISTORY: Past medical history from the initial psychiatric evaluation has been reviewed (reviewed/updated 4/4/2018) with no additional updates (I asked patient and no additional past medical history provided). Past Medical History:   Diagnosis Date    Asthma     Hepatitis C    No past surgical history on file. SOCIAL HISTORY: Social history from the initial psychiatric evaluation has been reviewed (reviewed/updated 4/4/2018) with no additional updates (I asked patient and no additional social history provided). Social History     Social History    Marital status: SINGLE     Spouse name: N/A    Number of children: N/A    Years of education: N/A     Occupational History    Not on file.      Social History Main Topics    Smoking status: Current Every Day Smoker    Smokeless tobacco: Not on file    Alcohol use Yes    Drug use: Yes     Special: Marijuana    Sexual activity: Yes     Partners: Male     Birth control/ protection: None     Other Topics Concern    Not on file     Social History Narrative    No narrative on file      FAMILY HISTORY: Family history from the initial psychiatric evaluation has been reviewed (reviewed/updated 4/4/2018) with no additional updates (I asked patient and no additional family history provided). No family history on file. REVIEW OF SYSTEMS: (reviewed/updated 4/4/2018)  Appetite:no change from normal   Sleep: 2 hrs  All other Review of Systems: Psychological ROS: positive for - behavioral disorder, concentration difficulties, hallucinations, hostility, mood swings and psychosis  Respiratory ROS: no cough, shortness of breath, or wheezing  Cardiovascular ROS: no chest pain or dyspnea on exertion  Neurological ROS: no TIA or stroke symptoms         [facility] (MSE):    MSE FINDINGS ARE WITHIN NORMAL LIMITS (WNL) UNLESS OTHERWISE STATED BELOW. ( ALL OF THE BELOW CATEGORIES OF THE MSE HAVE BEEN REVIEWED (reviewed 4/4/2018) AND UPDATED AS DEEMED APPROPRIATE )  General Presentation age appropriate and disheveled, evasive, uncooperative and unreliable   Orientation disorganized   Vital Signs  See below (reviewed 4/4/2018); Vital Signs (BP, Pulse, & Temp) are within normal limits if not listed below.    Gait and Station Stable/steady, no ataxia   Musculoskeletal System No extrapyramidal symptoms (EPS); no abnormal muscular movements or Tardive Dyskinesia (TD); muscle strength and tone are within normal limits   Language No aphasia or dysarthria   Speech:  loud and pressured   Thought Processes illogical; slow rate of thoughts; poor abstract reasoning/computation   Thought Associations tangential   Thought Content paranoid delusions, Protestant delusions, auditory hallucinations and internally preoccupied   Suicidal Ideations none   Homicidal Ideations none   Mood:  hostile  and labile Affect:  hostile, inappropriate and increased in intensity   Memory recent  impaired   Memory remote:  intact   Concentration/Attention:  distractable and hypervigilance   Fund of Knowledge below average   Insight:  poor   Reliability poor   Judgment:  poor          VITALS:     No data found. Wt Readings from Last 3 Encounters:   12/27/15 61.2 kg (135 lb)   11/09/14 65.8 kg (145 lb)   10/19/13 56.7 kg (125 lb)     Temp Readings from Last 3 Encounters:   12/27/15 98.7 °F (37.1 °C)   11/09/14 98.7 °F (37.1 °C)   10/19/13 98.9 °F (37.2 °C)     BP Readings from Last 3 Encounters:   04/03/18 124/73   12/27/15 132/65   11/09/14 111/65     Pulse Readings from Last 3 Encounters:   04/03/18 86   12/27/15 76   11/09/14 65            DATA     LABORATORY DATA:(reviewed/updated 4/4/2018)  No results found for this or any previous visit (from the past 24 hour(s)). No results found for: VALF2, VALAC, VALP, VALPR, DS6, CRBAM, CRBAMP, CARB2, XCRBAM  No results found for: LITHM   RADIOLOGY REPORTS:(reviewed/updated 4/4/2018)  No results found.        MEDICATIONS     ALL MEDICATIONS:   Current Facility-Administered Medications   Medication Dose Route Frequency    weight needed  1 Each Other ONCE    divalproex ER (DEPAKOTE ER) 24 hour tablet 1,000 mg  1,000 mg Oral QHS    Or    LORazepam (ATIVAN) injection 1 mg +++COURT ORDERED MEDICATION FOR REFUSAL OF PO DEPAKOTE+++  1 mg IntraMUSCular QHS    haloperidol (HALDOL) tablet 5 mg  5 mg Oral BID    Or    haloperidol lactate (HALDOL) injection 5 mg +++COURT ORDERED MEDICATION FOR REFUSAL OF PO HALOPERIDOL+++  5 mg IntraMUSCular BID    traZODone (DESYREL) tablet 100 mg  100 mg Oral QHS PRN    ziprasidone (GEODON) 20 mg in sterile water (preservative free) 1 mL injection  20 mg IntraMUSCular BID PRN    OLANZapine (ZyPREXA) tablet 5 mg  5 mg Oral Q6H PRN    benztropine (COGENTIN) tablet 2 mg  2 mg Oral BID PRN    benztropine (COGENTIN) injection 2 mg  2 mg IntraMUSCular BID PRN  LORazepam (ATIVAN) injection 2 mg  2 mg IntraMUSCular Q4H PRN    LORazepam (ATIVAN) tablet 1 mg  1 mg Oral Q4H PRN    acetaminophen (TYLENOL) tablet 650 mg  650 mg Oral Q4H PRN    ibuprofen (MOTRIN) tablet 400 mg  400 mg Oral Q8H PRN    magnesium hydroxide (MILK OF MAGNESIA) 400 mg/5 mL oral suspension 30 mL  30 mL Oral DAILY PRN    nicotine (NICODERM CQ) 21 mg/24 hr patch 1 Patch  1 Patch TransDERmal DAILY PRN      SCHEDULED MEDICATIONS:   Current Facility-Administered Medications   Medication Dose Route Frequency    weight needed  1 Each Other ONCE    divalproex ER (DEPAKOTE ER) 24 hour tablet 1,000 mg  1,000 mg Oral QHS    Or    LORazepam (ATIVAN) injection 1 mg +++COURT ORDERED MEDICATION FOR REFUSAL OF PO DEPAKOTE+++  1 mg IntraMUSCular QHS    haloperidol (HALDOL) tablet 5 mg  5 mg Oral BID    Or    haloperidol lactate (HALDOL) injection 5 mg +++COURT ORDERED MEDICATION FOR REFUSAL OF PO HALOPERIDOL+++  5 mg IntraMUSCular BID          ASSESSMENT & PLAN     DIAGNOSES REQUIRING ACTIVE TREATMENT AND MONITORING: (reviewed/updated 4/4/2018)  Patient Active Hospital Problem List:    Schizoaffective disorder ([facility] [address] (3/30/2018)    Assessment:minimal change-  paranoid and Hoahaoism delusion themes, agitation- poor insight and non compliance. Plan: Ct to adjust med- Haldol and Depakote, court order granted     Agitation (3/31/2018)    Assessment: acute due to psychosis. Prn med    Plan: prn med for now   4/4/18 continue medications        I will continue to monitor blood levels (Depakote, Tegretol, lithium, clozapine---a drug with a narrow therapeutic index= NTI) and associated labs for drug therapy implemented that require intense monitoring for toxicity as deemed appropriate based on current medication side effects and pharmacodynamically determined drug 1/2 lives.     In summary, Mckenzie Erickson, is a 61 y.o.  female who presents with a severe exacerbation of the principal diagnosis of Schizoaffective disorder ([facility] [address]  Patient's condition is worsening/not improving/not stable . Patient requires continued inpatient hospitalization for further stabilization, safety monitoring and medication management. I will continue to coordinate the provision of individual, milieu, occupational, group, and substance abuse therapies to address target symptoms/diagnoses as deemed appropriate for the individual patient. A coordinated, multidisplinary treatment team round was conducted with the patient (this team consists of the nurse, psychiatric unit pharmcist,  and writer). Complete current electronic health record for patient has been reviewed today including consultant notes, ancillary staff notes, nurses and psychiatric tech notes. Suicide risk assessment completed and patient deemed to be of low risk for suicide at this time. The following regarding medications was addressed during rounds with patient:   the risks and benefits of the proposed medication. The patient was given the opportunity to ask questions. Informed consent given to the use of the above medications. Will continue to adjust psychiatric and non-psychiatric medications (see above \"medication\" section and orders section for details) as deemed appropriate & based upon diagnoses and response to treatment. I will continue to order blood tests/labs and diagnostic tests as deemed appropriate and review results as they become available (see orders for details and above listed lab/test results). I will order psychiatric records from previous [facility] hospitals to further elucidate the nature of patient's psychopathology and review once available. I will gather additional collateral information from friends, family and o/p treatment team to further elucidate the nature of patient's psychopathology and baselline level of psychiatric functioning.          I certify that this patient's inpatient psychiatric hospital services furnished since the previous certification were, and continue to be, required for treatment that could reasonably be expected to improve the patient's condition, or for diagnostic study, and that the patient continues to need, on a daily basis, active treatment furnished directly by or requiring the supervision of inpatient psychiatric facility personnel. In addition, the hospital records show that services furnished were intensive treatment services, admission or related services, or equivalent services.     EXPECTED DISCHARGE DATE/DAY: TBD     DISPOSITION: Home       Signed By:   Stevie Velez MD  4/4/2018

## 2018-04-04 NOTE — BH NOTES
Patient continues to refuse to see the treatment team or take medication. She has been court ordered to receive medications, thus is getting injections. Worker did connect with [employer] and they have no history on the patient besides what is in her pre-screening. She appears to have a history of non-compliance and due to her not speaking with worker, I've not been able to get her permission to speak with family or others. Worker will continue to allow patient to clear more and will attempt to gather more info and collateral at that time.      Jaime Fox, THALIA

## 2018-04-04 NOTE — PROGRESS NOTES
Problem: Altered Thought Process (Adult/Pediatric)  Goal: *STG: Remains safe in hospital  Outcome: Progressing Towards Goal  Pt guarded but less so as the shift ends. She was cooperative with moving her bed to accommodate a roommate and smiled when thanked for doing so. Pt makes little eye contact. Hygiene is good, independent in ADLs. Gait is steady. Sleep and appetite patterns WNL per patient. No evidence of intent to harm self or others. Will continue to monitor pt with q 15 min checks.

## 2018-04-05 PROCEDURE — 65220000003 HC RM SEMIPRIVATE PSYCH

## 2018-04-05 PROCEDURE — 74011250636 HC RX REV CODE- 250/636: Performed by: PSYCHIATRY & NEUROLOGY

## 2018-04-05 PROCEDURE — 74011250637 HC RX REV CODE- 250/637: Performed by: PSYCHIATRY & NEUROLOGY

## 2018-04-05 RX ADMIN — HALOPERIDOL LACTATE 5 MG: 5 INJECTION, SOLUTION INTRAMUSCULAR at 22:01

## 2018-04-05 RX ADMIN — HALOPERIDOL LACTATE 5 MG: 5 INJECTION, SOLUTION INTRAMUSCULAR at 09:01

## 2018-04-05 RX ADMIN — LORAZEPAM 1 MG: 2 INJECTION, SOLUTION INTRAMUSCULAR; INTRAVENOUS at 22:00

## 2018-04-05 NOTE — PROGRESS NOTES
Problem: Altered Thought Process (Adult/Pediatric)  Goal: *STG: Remains safe in hospital  Outcome: Progressing Towards Goal  Pt has been visible and cooperative on the unit. Pt refused medication PO and took IM court ordered meds. Pt continues to respond. She is less irritable. No medical concerns reported. No PRNs given. Staff will continue to monitor for health and safety.

## 2018-04-05 NOTE — BH NOTES
PSYCHIATRIC PROGRESS NOTE         Patient Name  Edilson Sinclair   Date of Birth 1960   [facility] [id number]   Medical Record Number  [id number]      Age  61 y.o. PCP None   Admit date:  3/30/2018    Room Number  316/01  @ [facility]   Date of Service  4/5/2018           E & M PROGRESS NOTE:         HISTORY       CC:  \"leave me alone\"  HISTORY OF PRESENT ILLNESS/INTERVAL HISTORY:  (reviewed/updated 4/5/2018). per initial evaluation: The patient, Edilson Sinclair, is a 61 y.o. BLACK OR  female with a past psychiatric history significant for schizoaffective d/o, who presents at this time with complaints of (and/or evidence of) the following emotional symptoms: agitation and mood swings. Additional symptomatology include refusing to talk to team, irritable and angry mood and guarded. Per report pt has been deteriorating in the last 2 month and threatening to kill family members. She has been aggressive towards the , she has been breaking into neighbors apartment and religiously preoccupied. She has poor appetite and refusing to complete her ADL with poor hygiene. She has no insight and does not want to take medication. The above symptoms have been present for . These symptoms are of severe severity. These symptoms are constant in nature. The patient's condition has been precipitated by and psychosocial stressors ( ). Patient's condition made worse by treatment noncompliance. UDS: negative; BAL=0. Edilson Sinclair presents/reports/evidences the following emotional symptoms today, 4/5/2018:agitation and psychotic behavior. The above symptoms have been present for few month. These symptoms are of severe severity. The symptoms are constant in nature. Additional symptomatology and features include labile and angry mood, responding to internal stimuli, paranoid delusion and poor insight. Refusing to take medication, received prn for agitation.     4/2- left the room and refused to be evaluated. She was seen in the hallway yelling and being loud. Poor sleep, paranoid , responding to internal stimuli and anger outburst last night pt received prn med. Refused po med and has poor insight  4/3- remains labile, paranoid and with poor insight. Responding to internal stimuli. period of threatening behavior and anger outburst. Refusing to speak with the team.  4/4/18 she remains paranoid and she refusing to come to see her treatment team , she refused to take medications and she is court ordered to take medications    4/5/18 she still paranoid and not engaging well and refused to be seen       SIDE EFFECTS: (reviewed/updated 4/5/2018)  None reported or admitted to. No noted toxicity with use of Depakote/Tegretol/lithium/Clozaril/TCAs   ALLERGIES:(reviewed/updated 4/5/2018)  No Known Allergies   MEDICATIONS PRIOR TO ADMISSION:(reviewed/updated 4/5/2018)  Prescriptions Prior to Admission   Medication Sig    albuterol (PROVENTIL HFA, VENTOLIN HFA, PROAIR HFA) 90 mcg/actuation inhaler Take 2 Puffs by inhalation three (3) times daily as needed for Wheezing or Shortness of Breath. PAST MEDICAL HISTORY: Past medical history from the initial psychiatric evaluation has been reviewed (reviewed/updated 4/5/2018) with no additional updates (I asked patient and no additional past medical history provided). Past Medical History:   Diagnosis Date    Asthma     Hepatitis C    No past surgical history on file. SOCIAL HISTORY: Social history from the initial psychiatric evaluation has been reviewed (reviewed/updated 4/5/2018) with no additional updates (I asked patient and no additional social history provided). Social History     Social History    Marital status: SINGLE     Spouse name: N/A    Number of children: N/A    Years of education: N/A     Occupational History    Not on file.      Social History Main Topics    Smoking status: Current Every Day Smoker    Smokeless tobacco: Not on file    Alcohol use Yes    Drug use: Yes     Special: Marijuana    Sexual activity: Yes     Partners: Male     Birth control/ protection: None     Other Topics Concern    Not on file     Social History Narrative    No narrative on file      FAMILY HISTORY: Family history from the initial psychiatric evaluation has been reviewed (reviewed/updated 4/5/2018) with no additional updates (I asked patient and no additional family history provided). No family history on file. REVIEW OF SYSTEMS: (reviewed/updated 4/5/2018)  Appetite:no change from normal   Sleep: 2 hrs  All other Review of Systems: Psychological ROS: positive for - behavioral disorder, concentration difficulties, hallucinations, hostility, mood swings and psychosis  Respiratory ROS: no cough, shortness of breath, or wheezing  Cardiovascular ROS: no chest pain or dyspnea on exertion  Neurological ROS: no TIA or stroke symptoms         [facility] ([facility]    [facility] FINDINGS ARE WITHIN NORMAL LIMITS (WNL) UNLESS OTHERWISE STATED BELOW. ( ALL OF THE BELOW CATEGORIES OF THE MSE HAVE BEEN REVIEWED (reviewed 4/5/2018) AND UPDATED AS DEEMED APPROPRIATE )  General Presentation age appropriate and disheveled, evasive, uncooperative and unreliable   Orientation disorganized   Vital Signs  See below (reviewed 4/5/2018); Vital Signs (BP, Pulse, & Temp) are within normal limits if not listed below.    Gait and Station Stable/steady, no ataxia   Musculoskeletal System No extrapyramidal symptoms (EPS); no abnormal muscular movements or Tardive Dyskinesia (TD); muscle strength and tone are within normal limits   Language No aphasia or dysarthria   Speech:  loud and pressured   Thought Processes illogical; slow rate of thoughts; poor abstract reasoning/computation   Thought Associations tangential   Thought Content paranoid delusions, Sikhism delusions, auditory hallucinations and internally preoccupied Suicidal Ideations none   Homicidal Ideations none   Mood:  hostile  and labile    Affect:  hostile, inappropriate and increased in intensity   Memory recent  impaired   Memory remote:  intact   Concentration/Attention:  distractable and hypervigilance   Fund of Knowledge below average   Insight:  poor   Reliability poor   Judgment:  poor          VITALS:     Patient Vitals for the past 24 hrs:   Temp Pulse Resp BP   04/05/18 0645 - 81 18 133/71   04/04/18 1737 97.9 °F (36.6 °C) 83 18 108/61     Wt Readings from Last 3 Encounters:   04/04/18 59 kg (130 lb 1.1 oz)   12/27/15 61.2 kg (135 lb)   11/09/14 65.8 kg (145 lb)     Temp Readings from Last 3 Encounters:   12/27/15 98.7 °F (37.1 °C)   11/09/14 98.7 °F (37.1 °C)   10/19/13 98.9 °F (37.2 °C)     BP Readings from Last 3 Encounters:   04/05/18 133/71   12/27/15 132/65   11/09/14 111/65     Pulse Readings from Last 3 Encounters:   04/05/18 81   12/27/15 76   11/09/14 65            DATA     LABORATORY DATA:(reviewed/updated 4/5/2018)  No results found for this or any previous visit (from the past 24 hour(s)). No results found for: VALF2, VALAC, VALP, VALPR, DS6, CRBAM, CRBAMP, CARB2, XCRBAM  No results found for: LITHM   RADIOLOGY REPORTS:(reviewed/updated 4/5/2018)  No results found.        MEDICATIONS     ALL MEDICATIONS:   Current Facility-Administered Medications   Medication Dose Route Frequency    divalproex ER (DEPAKOTE ER) 24 hour tablet 1,000 mg  1,000 mg Oral QHS    Or    LORazepam (ATIVAN) injection 1 mg +++COURT ORDERED MEDICATION FOR REFUSAL OF PO DEPAKOTE+++  1 mg IntraMUSCular QHS    haloperidol (HALDOL) tablet 5 mg  5 mg Oral BID    Or    haloperidol lactate (HALDOL) injection 5 mg +++COURT ORDERED MEDICATION FOR REFUSAL OF PO HALOPERIDOL+++  5 mg IntraMUSCular BID    traZODone (DESYREL) tablet 100 mg  100 mg Oral QHS PRN    ziprasidone (GEODON) 20 mg in sterile water (preservative free) 1 mL injection  20 mg IntraMUSCular BID PRN    OLANZapine (ZyPREXA) tablet 5 mg  5 mg Oral Q6H PRN    benztropine (COGENTIN) tablet 2 mg  2 mg Oral BID PRN    benztropine (COGENTIN) injection 2 mg  2 mg IntraMUSCular BID PRN    LORazepam (ATIVAN) injection 2 mg  2 mg IntraMUSCular Q4H PRN    LORazepam (ATIVAN) tablet 1 mg  1 mg Oral Q4H PRN    acetaminophen (TYLENOL) tablet 650 mg  650 mg Oral Q4H PRN    ibuprofen (MOTRIN) tablet 400 mg  400 mg Oral Q8H PRN    magnesium hydroxide (MILK OF MAGNESIA) 400 mg/5 mL oral suspension 30 mL  30 mL Oral DAILY PRN    nicotine (NICODERM CQ) 21 mg/24 hr patch 1 Patch  1 Patch TransDERmal DAILY PRN      SCHEDULED MEDICATIONS:   Current Facility-Administered Medications   Medication Dose Route Frequency    divalproex ER (DEPAKOTE ER) 24 hour tablet 1,000 mg  1,000 mg Oral QHS    Or    LORazepam (ATIVAN) injection 1 mg +++COURT ORDERED MEDICATION FOR REFUSAL OF PO DEPAKOTE+++  1 mg IntraMUSCular QHS    haloperidol (HALDOL) tablet 5 mg  5 mg Oral BID    Or    haloperidol lactate (HALDOL) injection 5 mg +++COURT ORDERED MEDICATION FOR REFUSAL OF PO HALOPERIDOL+++  5 mg IntraMUSCular BID          ASSESSMENT & PLAN     DIAGNOSES REQUIRING ACTIVE TREATMENT AND MONITORING: (reviewed/updated 4/5/2018)  Patient Active Hospital Problem List:    Schizoaffective disorder ([facility] [address] (3/30/2018)    Assessment:minimal change-  paranoid and Anglican delusion themes, agitation- poor insight and non compliance. Plan: Ct to adjust med- Haldol and Depakote, court order granted     Agitation (3/31/2018)    Assessment: acute due to psychosis.  Prn med    Plan: prn med for now   4/4/18 continue medications   4/5/18 continue same medications         I will continue to monitor blood levels (Depakote, Tegretol, lithium, clozapine---a drug with a narrow therapeutic index= NTI) and associated labs for drug therapy implemented that require intense monitoring for toxicity as deemed appropriate based on current medication side effects and pharmacodynamically determined drug 1/2 lives. In summary, Michelle Mcgraw, is a 61 y.o.  female who presents with a severe exacerbation of the principal diagnosis of Schizoaffective disorder ([facility] [address]  Patient's condition is worsening/not improving/not stable . Patient requires continued inpatient hospitalization for further stabilization, safety monitoring and medication management. I will continue to coordinate the provision of individual, milieu, occupational, group, and substance abuse therapies to address target symptoms/diagnoses as deemed appropriate for the individual patient. A coordinated, multidisplinary treatment team round was conducted with the patient (this team consists of the nurse, psychiatric unit pharmcist,  and writer). Complete current electronic health record for patient has been reviewed today including consultant notes, ancillary staff notes, nurses and psychiatric tech notes. Suicide risk assessment completed and patient deemed to be of low risk for suicide at this time. The following regarding medications was addressed during rounds with patient:   the risks and benefits of the proposed medication. The patient was given the opportunity to ask questions. Informed consent given to the use of the above medications. Will continue to adjust psychiatric and non-psychiatric medications (see above \"medication\" section and orders section for details) as deemed appropriate & based upon diagnoses and response to treatment. I will continue to order blood tests/labs and diagnostic tests as deemed appropriate and review results as they become available (see orders for details and above listed lab/test results). I will order psychiatric records from previous [facility] hospitals to further elucidate the nature of patient's psychopathology and review once available.     I will gather additional collateral information from friends, family and o/p treatment team to further elucidate the nature of patient's psychopathology and baselline level of psychiatric functioning. I certify that this patient's inpatient psychiatric hospital services furnished since the previous certification were, and continue to be, required for treatment that could reasonably be expected to improve the patient's condition, or for diagnostic study, and that the patient continues to need, on a daily basis, active treatment furnished directly by or requiring the supervision of inpatient psychiatric facility personnel. In addition, the hospital records show that services furnished were intensive treatment services, admission or related services, or equivalent services.     EXPECTED DISCHARGE DATE/DAY: TBD     DISPOSITION: Home       Signed By:   Edilberto Christie MD  4/5/2018

## 2018-04-05 NOTE — BH NOTES
GROUP THERAPY PROGRESS NOTE    The patient Santos lozano 61 y.o. female is participating in [employer]cr[employer]. Group time: 45 minutes    Personal goal for participation:  To participate in self esteem General Sentiment game    Goal orientation:  personal    Group therapy participation: minimal    Therapeutic interventions reviewed and discussed: things pertaining to self esteem    Impression of participation:  The patient was present-left early    Calli Clark  4/5/2018  5:37 PM

## 2018-04-05 NOTE — PROGRESS NOTES
Problem: Altered Thought Process (Adult/Pediatric)  Goal: *STG: Remains safe in hospital  Client has been in and out of her room this pm.  Does not want to talk to certain staff, only does when has to. Has been seen talking to herself and if the dayroom gets too noisy she goes back to her room. Meal and med compliant, q 15 min checks for safety continue.

## 2018-04-05 NOTE — BH NOTES
Behavior  The patient Jose Lindsay is alert and oriented.  Her hygiene and nutritional intake  is adequate. Her behavior has been  appropriate in the milieu with peers and staff, but presents isolative.  Contracts for safety.  Pt medication compliant. Per medication nurse. Flat affect.  Depressed, Angry  mood.        Intervention  1:1 interaction to assess mood and thought process. Staff offering assistance as needed.     Response  Pt denies S/H ideations. Pt denies hearing voices At this  times. Pt attending groups. Plan  Plan is to assess mood and thought process Staff encouraging verbalization of issues and feelings in the Appropriate manner Staff will monitor for changes.  Q 15 minute checks continue.

## 2018-04-06 PROCEDURE — 65220000003 HC RM SEMIPRIVATE PSYCH

## 2018-04-06 PROCEDURE — 74011250636 HC RX REV CODE- 250/636: Performed by: PSYCHIATRY & NEUROLOGY

## 2018-04-06 RX ADMIN — HALOPERIDOL LACTATE 5 MG: 5 INJECTION, SOLUTION INTRAMUSCULAR at 09:19

## 2018-04-06 RX ADMIN — HALOPERIDOL LACTATE 5 MG: 5 INJECTION, SOLUTION INTRAMUSCULAR at 21:55

## 2018-04-06 RX ADMIN — LORAZEPAM 1 MG: 2 INJECTION, SOLUTION INTRAMUSCULAR; INTRAVENOUS at 21:48

## 2018-04-06 NOTE — BH NOTES
PSYCHIATRIC PROGRESS NOTE         Patient Name  Iman Ott   Date of Birth 1960   [facility] [id number]   Medical Record Number  [id number]      Age  61 y.o. PCP None   Admit date:  3/30/2018    Room Number  316/01  @ [facility]   Date of Service  4/6/2018           E & M PROGRESS NOTE:         HISTORY       CC:  \"leave me alone\"  HISTORY OF PRESENT ILLNESS/INTERVAL HISTORY:  (reviewed/updated 4/6/2018). per initial evaluation: The patient, Iman Ott, is a 61 y.o. BLACK OR  female with a past psychiatric history significant for schizoaffective d/o, who presents at this time with complaints of (and/or evidence of) the following emotional symptoms: agitation and mood swings. Additional symptomatology include refusing to talk to team, irritable and angry mood and guarded. Per report pt has been deteriorating in the last 2 month and threatening to kill family members. She has been aggressive towards the , she has been breaking into neighbors apartment and religiously preoccupied. She has poor appetite and refusing to complete her ADL with poor hygiene. She has no insight and does not want to take medication. The above symptoms have been present for . These symptoms are of severe severity. These symptoms are constant in nature. The patient's condition has been precipitated by and psychosocial stressors ( ). Patient's condition made worse by treatment noncompliance. UDS: negative; BAL=0. Iman Ott presents/reports/evidences the following emotional symptoms today, 4/6/2018:agitation and psychotic behavior. The above symptoms have been present for few month. These symptoms are of severe severity. The symptoms are constant in nature. Additional symptomatology and features include labile and angry mood, responding to internal stimuli, paranoid delusion and poor insight. Refusing to take medication, received prn for agitation.     4/2- left the room and refused to be evaluated. She was seen in the hallway yelling and being loud. Poor sleep, paranoid , responding to internal stimuli and anger outburst last night pt received prn med. Refused po med and has poor insight  4/3- remains labile, paranoid and with poor insight. Responding to internal stimuli. period of threatening behavior and anger outburst. Refusing to speak with the team.  4/4/18 she remains paranoid and she refusing to come to see her treatment team , she refused to take medications and she is court ordered to take medications    4/5/18 she still paranoid and not engaging well and refused to be seen   4/6/18 she still irritable and paranoid and she still refusing treatment team and still angry       SIDE EFFECTS: (reviewed/updated 4/6/2018)  None reported or admitted to. No noted toxicity with use of Depakote/Tegretol/lithium/Clozaril/TCAs   ALLERGIES:(reviewed/updated 4/6/2018)  No Known Allergies   MEDICATIONS PRIOR TO ADMISSION:(reviewed/updated 4/6/2018)  Prescriptions Prior to Admission   Medication Sig    albuterol (PROVENTIL HFA, VENTOLIN HFA, PROAIR HFA) 90 mcg/actuation inhaler Take 2 Puffs by inhalation three (3) times daily as needed for Wheezing or Shortness of Breath. PAST MEDICAL HISTORY: Past medical history from the initial psychiatric evaluation has been reviewed (reviewed/updated 4/6/2018) with no additional updates (I asked patient and no additional past medical history provided). Past Medical History:   Diagnosis Date    Asthma     Hepatitis C    No past surgical history on file. SOCIAL HISTORY: Social history from the initial psychiatric evaluation has been reviewed (reviewed/updated 4/6/2018) with no additional updates (I asked patient and no additional social history provided).    Social History     Social History    Marital status: SINGLE     Spouse name: N/A    Number of children: N/A    Years of education: N/A     Occupational History    Not on file. Social History Main Topics    Smoking status: Current Every Day Smoker    Smokeless tobacco: Not on file    Alcohol use Yes    Drug use: Yes     Special: Marijuana    Sexual activity: Yes     Partners: Male     Birth control/ protection: None     Other Topics Concern    Not on file     Social History Narrative    No narrative on file      FAMILY HISTORY: Family history from the initial psychiatric evaluation has been reviewed (reviewed/updated 4/6/2018) with no additional updates (I asked patient and no additional family history provided). No family history on file. REVIEW OF SYSTEMS: (reviewed/updated 4/6/2018)  Appetite:no change from normal   Sleep: 2 hrs  All other Review of Systems: Psychological ROS: positive for - behavioral disorder, concentration difficulties, hallucinations, hostility, mood swings and psychosis  Respiratory ROS: no cough, shortness of breath, or wheezing  Cardiovascular ROS: no chest pain or dyspnea on exertion  Neurological ROS: no TIA or stroke symptoms         [facility] (MSE):    MSE FINDINGS ARE WITHIN NORMAL LIMITS (WNL) UNLESS OTHERWISE STATED BELOW. ( ALL OF THE BELOW CATEGORIES OF THE MSE HAVE BEEN REVIEWED (reviewed 4/6/2018) AND UPDATED AS DEEMED APPROPRIATE )  General Presentation age appropriate and disheveled, evasive, uncooperative and unreliable   Orientation disorganized   Vital Signs  See below (reviewed 4/6/2018); Vital Signs (BP, Pulse, & Temp) are within normal limits if not listed below.    Gait and Station Stable/steady, no ataxia   Musculoskeletal System No extrapyramidal symptoms (EPS); no abnormal muscular movements or Tardive Dyskinesia (TD); muscle strength and tone are within normal limits   Language No aphasia or dysarthria   Speech:  loud and pressured   Thought Processes illogical; slow rate of thoughts; poor abstract reasoning/computation   Thought Associations tangential   Thought Content paranoid delusions, Advent delusions, auditory hallucinations and internally preoccupied   Suicidal Ideations none   Homicidal Ideations none   Mood:  hostile  and labile    Affect:  hostile, inappropriate and increased in intensity   Memory recent  impaired   Memory remote:  intact   Concentration/Attention:  distractable and hypervigilance   Fund of Knowledge below average   Insight:  poor   Reliability poor   Judgment:  poor          VITALS:     No data found. Wt Readings from Last 3 Encounters:   04/04/18 59 kg (130 lb 1.1 oz)   12/27/15 61.2 kg (135 lb)   11/09/14 65.8 kg (145 lb)     Temp Readings from Last 3 Encounters:   12/27/15 98.7 °F (37.1 °C)   11/09/14 98.7 °F (37.1 °C)   10/19/13 98.9 °F (37.2 °C)     BP Readings from Last 3 Encounters:   04/05/18 133/71   12/27/15 132/65   11/09/14 111/65     Pulse Readings from Last 3 Encounters:   04/05/18 81   12/27/15 76   11/09/14 65            DATA     LABORATORY DATA:(reviewed/updated 4/6/2018)  No results found for this or any previous visit (from the past 24 hour(s)). No results found for: VALF2, VALAC, VALP, VALPR, DS6, CRBAM, CRBAMP, CARB2, XCRBAM  No results found for: LITHM   RADIOLOGY REPORTS:(reviewed/updated 4/6/2018)  No results found.        MEDICATIONS     ALL MEDICATIONS:   Current Facility-Administered Medications   Medication Dose Route Frequency    divalproex ER (DEPAKOTE ER) 24 hour tablet 1,000 mg  1,000 mg Oral QHS    Or    LORazepam (ATIVAN) injection 1 mg +++COURT ORDERED MEDICATION FOR REFUSAL OF PO DEPAKOTE+++  1 mg IntraMUSCular QHS    haloperidol (HALDOL) tablet 5 mg  5 mg Oral BID    Or    haloperidol lactate (HALDOL) injection 5 mg +++COURT ORDERED MEDICATION FOR REFUSAL OF PO HALOPERIDOL+++  5 mg IntraMUSCular BID    traZODone (DESYREL) tablet 100 mg  100 mg Oral QHS PRN    ziprasidone (GEODON) 20 mg in sterile water (preservative free) 1 mL injection  20 mg IntraMUSCular BID PRN    OLANZapine (ZyPREXA) tablet 5 mg  5 mg Oral Q6H PRN    benztropine (COGENTIN) tablet 2 mg  2 mg Oral BID PRN    benztropine (COGENTIN) injection 2 mg  2 mg IntraMUSCular BID PRN    LORazepam (ATIVAN) injection 2 mg  2 mg IntraMUSCular Q4H PRN    LORazepam (ATIVAN) tablet 1 mg  1 mg Oral Q4H PRN    acetaminophen (TYLENOL) tablet 650 mg  650 mg Oral Q4H PRN    ibuprofen (MOTRIN) tablet 400 mg  400 mg Oral Q8H PRN    magnesium hydroxide (MILK OF MAGNESIA) 400 mg/5 mL oral suspension 30 mL  30 mL Oral DAILY PRN    nicotine (NICODERM CQ) 21 mg/24 hr patch 1 Patch  1 Patch TransDERmal DAILY PRN      SCHEDULED MEDICATIONS:   Current Facility-Administered Medications   Medication Dose Route Frequency    divalproex ER (DEPAKOTE ER) 24 hour tablet 1,000 mg  1,000 mg Oral QHS    Or    LORazepam (ATIVAN) injection 1 mg +++COURT ORDERED MEDICATION FOR REFUSAL OF PO DEPAKOTE+++  1 mg IntraMUSCular QHS    haloperidol (HALDOL) tablet 5 mg  5 mg Oral BID    Or    haloperidol lactate (HALDOL) injection 5 mg +++COURT ORDERED MEDICATION FOR REFUSAL OF PO HALOPERIDOL+++  5 mg IntraMUSCular BID          ASSESSMENT & PLAN     DIAGNOSES REQUIRING ACTIVE TREATMENT AND MONITORING: (reviewed/updated 4/6/2018)  Patient Active Hospital Problem List:    Schizoaffective disorder ([facility] [address] (3/30/2018)    Assessment:minimal change-  paranoid and Congregation delusion themes, agitation- poor insight and non compliance. Plan: Ct to adjust med- Haldol and Depakote, court order granted     Agitation (3/31/2018)    Assessment: acute due to psychosis.  Prn med    Plan: prn med for now   4/4/18 continue medications   4/5/18 continue same medications    4/6/18 continue same medications         I will continue to monitor blood levels (Depakote, Tegretol, lithium, clozapine---a drug with a narrow therapeutic index= NTI) and associated labs for drug therapy implemented that require intense monitoring for toxicity as deemed appropriate based on current medication side effects and pharmacodynamically determined drug 1/2 lives. In summary, Ayde Villalobos, is a 61 y.o.  female who presents with a severe exacerbation of the principal diagnosis of Schizoaffective disorder ([address]  Patient's condition is worsening/not improving/not stable . Patient requires continued inpatient hospitalization for further stabilization, safety monitoring and medication management. I will continue to coordinate the provision of individual, milieu, occupational, group, and substance abuse therapies to address target symptoms/diagnoses as deemed appropriate for the individual patient. A coordinated, multidisplinary treatment team round was conducted with the patient (this team consists of the nurse, psychiatric unit pharmcist,  and writer). Complete current electronic health record for patient has been reviewed today including consultant notes, ancillary staff notes, nurses and psychiatric tech notes. Suicide risk assessment completed and patient deemed to be of low risk for suicide at this time. The following regarding medications was addressed during rounds with patient:   the risks and benefits of the proposed medication. The patient was given the opportunity to ask questions. Informed consent given to the use of the above medications. Will continue to adjust psychiatric and non-psychiatric medications (see above \"medication\" section and orders section for details) as deemed appropriate & based upon diagnoses and response to treatment. I will continue to order blood tests/labs and diagnostic tests as deemed appropriate and review results as they become available (see orders for details and above listed lab/test results). I will order psychiatric records from previous [facility] hospitals to further elucidate the nature of patient's psychopathology and review once available.     I will gather additional collateral information from friends, family and o/p treatment team to further elucidate the nature of patient's psychopathology and baselline level of psychiatric functioning. I certify that this patient's inpatient psychiatric hospital services furnished since the previous certification were, and continue to be, required for treatment that could reasonably be expected to improve the patient's condition, or for diagnostic study, and that the patient continues to need, on a daily basis, active treatment furnished directly by or requiring the supervision of inpatient psychiatric facility personnel. In addition, the hospital records show that services furnished were intensive treatment services, admission or related services, or equivalent services.     EXPECTED DISCHARGE DATE/DAY: TBD     DISPOSITION: Home       Signed By:   Heather Carr MD  4/6/2018

## 2018-04-06 NOTE — BH NOTES
GROUP THERAPY PROGRESS NOTE    The patient Chao Wilkes a 61 y.o. female is participating in [employer]. Group time: 1 hour    Personal goal for participation:  To concentrate on selected task    Goal orientation: social    Group therapy participation: active    Therapeutic interventions reviewed and discussed: Crafts, games, music    Impression of participation: The patient was attentive-required prompting     Angel Sampson  4/6/2018  5:32 PM

## 2018-04-06 NOTE — PROGRESS NOTES
Problem: Altered Thought Process (Adult/Pediatric)  Goal: *STG: Participates in treatment plan  Outcome: Progressing Towards Goal  Patient refuses to participate in treatment team. Paranoid. Visible in the milieu during meals, isolative otherwise. Denies SI/HI. Appears preoccupied. Will continue to monitor patient status and assess needs.

## 2018-04-06 NOTE — BH NOTES
GROUP THERAPY PROGRESS NOTE    The patient Media Lax a 61 y.o. female is participating in [employer] Group    Group time: 30 minutes    Personal goal for participation: To discuss the daily goals    Goal orientation: personal     Group therapy participation: passive    Therapeutic interventions reviewed and discussed:  Yes    Impression of participation:nina Liang  4/5/2018  9:47 PM

## 2018-04-06 NOTE — PROGRESS NOTES
Problem: Altered Thought Process (Adult/Pediatric)  Goal: *STG: Remains safe in hospital  Client has been out of her room a lot this pm, but once she has had enough of the mileu, she goes back to her room. Feels the staff takes too long when giving out snacks or handing out meals. Still noted to be responding, very little eye contact. Still refusing meds, labs and treatment team.  Receiving IM meds with court order.

## 2018-04-07 PROCEDURE — 65220000003 HC RM SEMIPRIVATE PSYCH

## 2018-04-07 PROCEDURE — 74011250636 HC RX REV CODE- 250/636: Performed by: PSYCHIATRY & NEUROLOGY

## 2018-04-07 RX ADMIN — HALOPERIDOL LACTATE 5 MG: 5 INJECTION, SOLUTION INTRAMUSCULAR at 08:55

## 2018-04-07 RX ADMIN — HALOPERIDOL LACTATE 5 MG: 5 INJECTION, SOLUTION INTRAMUSCULAR at 21:34

## 2018-04-07 RX ADMIN — LORAZEPAM 1 MG: 2 INJECTION, SOLUTION INTRAMUSCULAR; INTRAVENOUS at 21:34

## 2018-04-07 NOTE — BH NOTES
GROUP THERAPY PROGRESS NOTE    The patient Analy Hoff is participating in [employer]. Group time: 30 minutes    Personal goal for participation: to orient the patient to the unit.     Goal orientation: successful adoption of unit rules    Group therapy participation: minimal    Therapeutic interventions reviewed and discussed: Yes    Impression of participation: dakota Easley  4/7/2018 1:07 PM

## 2018-04-07 NOTE — BH NOTES
PSYCHIATRIC PROGRESS NOTE         Patient Name  Yisel Sparks   Date of Birth 1960   [place] [place] [id number]   Medical Record Number  [id number]      Age  61 y.o. PCP None   Admit date:  3/30/2018    Room Number  316/01  @ [facility]   Date of Service  4/7/2018           E & M PROGRESS NOTE:         HISTORY       CC:  \"leave me alone\"  HISTORY OF PRESENT ILLNESS/INTERVAL HISTORY:  (reviewed/updated 4/7/2018). per initial evaluation: The patient, Yisel Sparks, is a 61 y.o. BLACK OR  female with a past psychiatric history significant for schizoaffective d/o, who presents at this time with complaints of (and/or evidence of) the following emotional symptoms: agitation and mood swings. Additional symptomatology include refusing to talk to team, irritable and angry mood and guarded. Per report pt has been deteriorating in the last 2 month and threatening to kill family members. She has been aggressive towards the , she has been breaking into neighbors apartment and religiously preoccupied. She has poor appetite and refusing to complete her ADL with poor hygiene. She has no insight and does not want to take medication. The above symptoms have been present for . These symptoms are of severe severity. These symptoms are constant in nature. The patient's condition has been precipitated by and psychosocial stressors ( ). Patient's condition made worse by treatment noncompliance. UDS: negative; BAL=0. Yisel Sparks presents/reports/evidences the following emotional symptoms today, 4/7/2018:agitation and psychotic behavior. The above symptoms have been present for few month. These symptoms are of severe severity. The symptoms are constant in nature. Additional symptomatology and features include labile and angry mood, responding to internal stimuli, paranoid delusion and poor insight. Refusing to take medication, received prn for agitation.     4/2- left the room and refused to be evaluated. She was seen in the hallway yelling and being loud. Poor sleep, paranoid , responding to internal stimuli and anger outburst last night pt received prn med. Refused po med and has poor insight  4/3- remains labile, paranoid and with poor insight. Responding to internal stimuli. period of threatening behavior and anger outburst. Refusing to speak with the team.  4/4/18 she remains paranoid and she refusing to come to see her treatment team , she refused to take medications and she is court ordered to take medications    4/5/18 she still paranoid and not engaging well and refused to be seen   4/6/18 she still irritable and paranoid and she still refusing treatment team and still angry   4/7/18: patient refuse to come in the conference  room, when went to her room with staff she refuse to speak and left the room,she is still paranoid, refusing treatments,irritable, accepting meals. SIDE EFFECTS: (reviewed/updated 4/7/2018)  None reported or admitted to. No noted toxicity with use of Depakote/Tegretol/lithium/Clozaril/TCAs   ALLERGIES:(reviewed/updated 4/7/2018)  No Known Allergies   MEDICATIONS PRIOR TO ADMISSION:(reviewed/updated 4/7/2018)  Prescriptions Prior to Admission   Medication Sig    albuterol (PROVENTIL HFA, VENTOLIN HFA, PROAIR HFA) 90 mcg/actuation inhaler Take 2 Puffs by inhalation three (3) times daily as needed for Wheezing or Shortness of Breath. PAST MEDICAL HISTORY: Past medical history from the initial psychiatric evaluation has been reviewed (reviewed/updated 4/7/2018) with no additional updates (I asked patient and no additional past medical history provided). Past Medical History:   Diagnosis Date    Asthma     Hepatitis C    No past surgical history on file.    SOCIAL HISTORY: Social history from the initial psychiatric evaluation has been reviewed (reviewed/updated 4/7/2018) with no additional updates (I asked patient and no additional social history provided). Social History     Social History    Marital status: SINGLE     Spouse name: N/A    Number of children: N/A    Years of education: N/A     Occupational History    Not on file. Social History Main Topics    Smoking status: Current Every Day Smoker    Smokeless tobacco: Not on file    Alcohol use Yes    Drug use: Yes     Special: Marijuana    Sexual activity: Yes     Partners: Male     Birth control/ protection: None     Other Topics Concern    Not on file     Social History Narrative    No narrative on file      FAMILY HISTORY: Family history from the initial psychiatric evaluation has been reviewed (reviewed/updated 4/7/2018) with no additional updates (I asked patient and no additional family history provided). No family history on file. REVIEW OF SYSTEMS: (reviewed/updated 4/7/2018)  Appetite:no change from normal   Sleep: 2 hrs  All other Review of Systems: Psychological ROS: positive for - behavioral disorder, concentration difficulties, hallucinations, hostility, mood swings and psychosis  Respiratory ROS: no cough, shortness of breath, or wheezing  Cardiovascular ROS: no chest pain or dyspnea on exertion  Neurological ROS: no TIA or stroke symptoms         [address] ([facility]):    [facility] FINDINGS ARE WITHIN NORMAL LIMITS (WNL) UNLESS OTHERWISE STATED BELOW. ( ALL OF THE BELOW CATEGORIES OF THE MSE HAVE BEEN REVIEWED (reviewed 4/7/2018) AND UPDATED AS DEEMED APPROPRIATE )  General Presentation age appropriate and disheveled, evasive, uncooperative and unreliable   Orientation disorganized   Vital Signs  See below (reviewed 4/7/2018); Vital Signs (BP, Pulse, & Temp) are within normal limits if not listed below.    Gait and Station Stable/steady, no ataxia   Musculoskeletal System No extrapyramidal symptoms (EPS); no abnormal muscular movements or Tardive Dyskinesia (TD); muscle strength and tone are within normal limits   Language No aphasia or dysarthria   Speech:  loud and pressured   Thought Processes illogical; slow rate of thoughts; poor abstract reasoning/computation   Thought Associations tangential   Thought Content paranoid delusions, Gnosticism delusions, auditory hallucinations and internally preoccupied   Suicidal Ideations none   Homicidal Ideations none   Mood:  hostile  and labile    Affect:  hostile, inappropriate and increased in intensity   Memory recent  impaired   Memory remote:  intact   Concentration/Attention:  distractable and hypervigilance   Fund of Knowledge below average   Insight:  poor   Reliability poor   Judgment:  poor          VITALS:     Patient Vitals for the past 24 hrs:   Pulse Resp BP   04/07/18 0537 86 18 120/68     Wt Readings from Last 3 Encounters:   04/04/18 59 kg (130 lb 1.1 oz)   12/27/15 61.2 kg (135 lb)   11/09/14 65.8 kg (145 lb)     Temp Readings from Last 3 Encounters:   12/27/15 98.7 °F (37.1 °C)   11/09/14 98.7 °F (37.1 °C)   10/19/13 98.9 °F (37.2 °C)     BP Readings from Last 3 Encounters:   04/07/18 120/68   12/27/15 132/65   11/09/14 111/65     Pulse Readings from Last 3 Encounters:   04/07/18 86   12/27/15 76   11/09/14 65            DATA     LABORATORY DATA:(reviewed/updated 4/7/2018)  No results found for this or any previous visit (from the past 24 hour(s)). No results found for: VALF2, VALAC, VALP, VALPR, DS6, CRBAM, CRBAMP, CARB2, XCRBAM  No results found for: LITHM   RADIOLOGY REPORTS:(reviewed/updated 4/7/2018)  No results found.        MEDICATIONS     ALL MEDICATIONS:   Current Facility-Administered Medications   Medication Dose Route Frequency    divalproex ER (DEPAKOTE ER) 24 hour tablet 1,000 mg  1,000 mg Oral QHS    Or    LORazepam (ATIVAN) injection 1 mg +++COURT ORDERED MEDICATION FOR REFUSAL OF PO DEPAKOTE+++  1 mg IntraMUSCular QHS    haloperidol (HALDOL) tablet 5 mg  5 mg Oral BID    Or    haloperidol lactate (HALDOL) injection 5 mg +++COURT ORDERED MEDICATION FOR REFUSAL OF PO HALOPERIDOL+++  5 mg IntraMUSCular BID    traZODone (DESYREL) tablet 100 mg  100 mg Oral QHS PRN    ziprasidone (GEODON) 20 mg in sterile water (preservative free) 1 mL injection  20 mg IntraMUSCular BID PRN    OLANZapine (ZyPREXA) tablet 5 mg  5 mg Oral Q6H PRN    benztropine (COGENTIN) tablet 2 mg  2 mg Oral BID PRN    benztropine (COGENTIN) injection 2 mg  2 mg IntraMUSCular BID PRN    LORazepam (ATIVAN) injection 2 mg  2 mg IntraMUSCular Q4H PRN    LORazepam (ATIVAN) tablet 1 mg  1 mg Oral Q4H PRN    acetaminophen (TYLENOL) tablet 650 mg  650 mg Oral Q4H PRN    ibuprofen (MOTRIN) tablet 400 mg  400 mg Oral Q8H PRN    magnesium hydroxide (MILK OF MAGNESIA) 400 mg/5 mL oral suspension 30 mL  30 mL Oral DAILY PRN    nicotine (NICODERM CQ) 21 mg/24 hr patch 1 Patch  1 Patch TransDERmal DAILY PRN      SCHEDULED MEDICATIONS:   Current Facility-Administered Medications   Medication Dose Route Frequency    divalproex ER (DEPAKOTE ER) 24 hour tablet 1,000 mg  1,000 mg Oral QHS    Or    LORazepam (ATIVAN) injection 1 mg +++COURT ORDERED MEDICATION FOR REFUSAL OF PO DEPAKOTE+++  1 mg IntraMUSCular QHS    haloperidol (HALDOL) tablet 5 mg  5 mg Oral BID    Or    haloperidol lactate (HALDOL) injection 5 mg +++COURT ORDERED MEDICATION FOR REFUSAL OF PO HALOPERIDOL+++  5 mg IntraMUSCular BID          ASSESSMENT & PLAN     DIAGNOSES REQUIRING ACTIVE TREATMENT AND MONITORING: (reviewed/updated 4/7/2018)  Patient Active Hospital Problem List:    Schizoaffective disorder ([facility] [address] (3/30/2018)    Assessment:minimal change-  paranoid and Methodist delusion themes, agitation- poor insight and non compliance. Plan: Ct to adjust med- Haldol and Depakote, court order granted     Agitation (3/31/2018)    Assessment: acute due to psychosis.  Prn med    Plan: prn med for now   4/4/18 continue medications   4/5/18 continue same medications    4/6/18 continue same medications     4/7/18: no significant change noted, still pyscotic    I will continue to monitor blood levels (Depakote, Tegretol, lithium, clozapine---a drug with a narrow therapeutic index= NTI) and associated labs for drug therapy implemented that require intense monitoring for toxicity as deemed appropriate based on current medication side effects and pharmacodynamically determined drug 1/2 lives. In summary, Oracio Arevalo, is a 61 y.o.  female who presents with a severe exacerbation of the principal diagnosis of Schizoaffective disorder ([facility] [address]  Patient's condition is worsening/not improving/not stable . Patient requires continued inpatient hospitalization for further stabilization, safety monitoring and medication management. I will continue to coordinate the provision of individual, milieu, occupational, group, and substance abuse therapies to address target symptoms/diagnoses as deemed appropriate for the individual patient. A coordinated, multidisplinary treatment team round was conducted with the patient (this team consists of the nurse, psychiatric unit pharmcist,  and writer). Complete current electronic health record for patient has been reviewed today including consultant notes, ancillary staff notes, nurses and psychiatric tech notes. Suicide risk assessment completed and patient deemed to be of low risk for suicide at this time. The following regarding medications was addressed during rounds with patient:   the risks and benefits of the proposed medication. The patient was given the opportunity to ask questions. Informed consent given to the use of the above medications. Will continue to adjust psychiatric and non-psychiatric medications (see above \"medication\" section and orders section for details) as deemed appropriate & based upon diagnoses and response to treatment.      I will continue to order blood tests/labs and diagnostic tests as deemed appropriate and review results as they become available (see orders for details and above listed lab/test results). I will order psychiatric records from previous [facility] hospitals to further elucidate the nature of patient's psychopathology and review once available. I will gather additional collateral information from friends, family and o/p treatment team to further elucidate the nature of patient's psychopathology and baselline level of psychiatric functioning. I certify that this patient's inpatient psychiatric hospital services furnished since the previous certification were, and continue to be, required for treatment that could reasonably be expected to improve the patient's condition, or for diagnostic study, and that the patient continues to need, on a daily basis, active treatment furnished directly by or requiring the supervision of inpatient psychiatric facility personnel. In addition, the hospital records show that services furnished were intensive treatment services, admission or related services, or equivalent services.     EXPECTED DISCHARGE DATE/DAY: TBD     DISPOSITION: Home       Signed By:   Alice Alexis MD  4/7/2018

## 2018-04-07 NOTE — PROGRESS NOTES
Problem: Altered Thought Process (Adult/Pediatric)  Goal: *STG: Remains safe in hospital  Pt remains confused with poor insight. Isolative to room much of shift. Pt refusing PO medications, but taking IM meds. Up for snacks. Minimal interaction with staff a peers. Smiles inappropriately. Will continue to monitor.

## 2018-04-07 NOTE — BH NOTES
GROUP THERAPY PROGRESS NOTE    Daija Louis is participating in reflections.      Group time: 30 minutes    Personal goal for participation: participate in their treatment plan    Goal orientation: personal    Group therapy participation: Patient attended but did not participate in group this evening    Therapeutic interventions reviewed and discussed: No    Impression of participation:  Negative participation

## 2018-04-07 NOTE — BH NOTES
Behavior  The patient, Zakiya Flowers, was alert and oriented, but only seen on the unit during meals. Her hygiene and nutritional intake was adequate. Her behavior remains isolative with peers and staff. Patient contracts for safety. Pt was medication compliant, per medication nurse, but only via IM. Mood pleasant upon approach. Intervention  1:1 interaction to assess mood and thought process. Staff offerred assistance as needed. Response  Pt denied S/H ideations. Pt denied hearing voices at this time. Pt showed no signs of distress, agitation or anxiety. Pt did not attend group today. Plan  Plan is to assess mood and thought process. Staff encouraging verbalization of issues and feelings in the appropriate manner. Staff will monitor for changes. Will continue to check on pt Q15 minutes.

## 2018-04-08 PROCEDURE — 74011250636 HC RX REV CODE- 250/636: Performed by: PSYCHIATRY & NEUROLOGY

## 2018-04-08 PROCEDURE — 65220000003 HC RM SEMIPRIVATE PSYCH

## 2018-04-08 RX ADMIN — HALOPERIDOL LACTATE 5 MG: 5 INJECTION, SOLUTION INTRAMUSCULAR at 09:32

## 2018-04-08 RX ADMIN — LORAZEPAM 1 MG: 2 INJECTION, SOLUTION INTRAMUSCULAR; INTRAVENOUS at 22:07

## 2018-04-08 RX ADMIN — HALOPERIDOL LACTATE 5 MG: 5 INJECTION, SOLUTION INTRAMUSCULAR at 22:07

## 2018-04-08 NOTE — BH NOTES
PSYCHIATRIC PROGRESS NOTE         Patient Name  Leydi Richmond   Date of Birth 1960   [facility] [id number]   Medical Record Number  [id number]      Age  61 y.o. PCP None   Admit date:  3/30/2018    Room Number  316/01  @ [facility]   Date of Service  4/8/2018           E & M PROGRESS NOTE:         HISTORY       CC:  \"leave me alone\"  HISTORY OF PRESENT ILLNESS/INTERVAL HISTORY:  (reviewed/updated 4/8/2018). per initial evaluation: The patient, Leydi Richmond, is a 61 y.o. BLACK OR  female with a past psychiatric history significant for schizoaffective d/o, who presents at this time with complaints of (and/or evidence of) the following emotional symptoms: agitation and mood swings. Additional symptomatology include refusing to talk to team, irritable and angry mood and guarded. Per report pt has been deteriorating in the last 2 month and threatening to kill family members. She has been aggressive towards the , she has been breaking into neighbors apartment and religiously preoccupied. She has poor appetite and refusing to complete her ADL with poor hygiene. She has no insight and does not want to take medication. The above symptoms have been present for . These symptoms are of severe severity. These symptoms are constant in nature. The patient's condition has been precipitated by and psychosocial stressors ( ). Patient's condition made worse by treatment noncompliance. UDS: negative; BAL=0. Leydi Richmond presents/reports/evidences the following emotional symptoms today, 4/8/2018:agitation and psychotic behavior. The above symptoms have been present for few month. These symptoms are of severe severity. The symptoms are constant in nature. Additional symptomatology and features include labile and angry mood, responding to internal stimuli, paranoid delusion and poor insight. Refusing to take medication, received prn for agitation.     4/2- left the room and refused to be evaluated. She was seen in the hallway yelling and being loud. Poor sleep, paranoid , responding to internal stimuli and anger outburst last night pt received prn med. Refused po med and has poor insight  4/3- remains labile, paranoid and with poor insight. Responding to internal stimuli. period of threatening behavior and anger outburst. Refusing to speak with the team.  4/4/18 she remains paranoid and she refusing to come to see her treatment team , she refused to take medications and she is court ordered to take medications    4/5/18 she still paranoid and not engaging well and refused to be seen   4/6/18 she still irritable and paranoid and she still refusing treatment team and still angry   4/7/18: patient refuse to come in the conference  room, when went to her room with staff she refuse to speak and left the room,she is still paranoid, refusing treatments,irritable, accepting meals. 04/8/18: Seen during morning round, she remains isolative, refusing po medications, received Haldol IM,paranoid, irritable, doesn not engage in conversation,acceptng meals,gets angry when questions asked or discuss about medications. SIDE EFFECTS: (reviewed/updated 4/8/2018)  None reported or admitted to. No noted toxicity with use of Depakote/Tegretol/lithium/Clozaril/TCAs   ALLERGIES:(reviewed/updated 4/8/2018)  No Known Allergies   MEDICATIONS PRIOR TO ADMISSION:(reviewed/updated 4/8/2018)  Prescriptions Prior to Admission   Medication Sig    albuterol (PROVENTIL HFA, VENTOLIN HFA, PROAIR HFA) 90 mcg/actuation inhaler Take 2 Puffs by inhalation three (3) times daily as needed for Wheezing or Shortness of Breath. PAST MEDICAL HISTORY: Past medical history from the initial psychiatric evaluation has been reviewed (reviewed/updated 4/8/2018) with no additional updates (I asked patient and no additional past medical history provided).    Past Medical History:   Diagnosis Date    Asthma  Hepatitis C    No past surgical history on file. SOCIAL HISTORY: Social history from the initial psychiatric evaluation has been reviewed (reviewed/updated 4/8/2018) with no additional updates (I asked patient and no additional social history provided). Social History     Social History    Marital status: SINGLE     Spouse name: N/A    Number of children: N/A    Years of education: N/A     Occupational History    Not on file. Social History Main Topics    Smoking status: Current Every Day Smoker    Smokeless tobacco: Not on file    Alcohol use Yes    Drug use: Yes     Special: Marijuana    Sexual activity: Yes     Partners: Male     Birth control/ protection: None     Other Topics Concern    Not on file     Social History Narrative    No narrative on file      FAMILY HISTORY: Family history from the initial psychiatric evaluation has been reviewed (reviewed/updated 4/8/2018) with no additional updates (I asked patient and no additional family history provided). No family history on file. REVIEW OF SYSTEMS: (reviewed/updated 4/8/2018)  Appetite:no change from normal   Sleep: 2 hrs  All other Review of Systems: Psychological ROS: positive for - behavioral disorder, concentration difficulties, hallucinations, hostility, mood swings and psychosis  Respiratory ROS: no cough, shortness of breath, or wheezing  Cardiovascular ROS: no chest pain or dyspnea on exertion  Neurological ROS: no TIA or stroke symptoms         [facility] (MSE):    MSE FINDINGS ARE WITHIN NORMAL LIMITS (WNL) UNLESS OTHERWISE STATED BELOW. ( ALL OF THE BELOW CATEGORIES OF THE MSE HAVE BEEN REVIEWED (reviewed 4/8/2018) AND UPDATED AS DEEMED APPROPRIATE )  General Presentation age appropriate and disheveled, evasive, uncooperative and unreliable   Orientation disorganized   Vital Signs  See below (reviewed 4/8/2018);  Vital Signs (BP, Pulse, & Temp) are within normal limits if not listed below.   Gait and Station Stable/steady, no ataxia   Musculoskeletal System No extrapyramidal symptoms (EPS); no abnormal muscular movements or Tardive Dyskinesia (TD); muscle strength and tone are within normal limits   Language No aphasia or dysarthria   Speech:  loud and pressured   Thought Processes illogical; slow rate of thoughts; poor abstract reasoning/computation   Thought Associations tangential   Thought Content paranoid delusions, Shinto delusions, auditory hallucinations and internally preoccupied   Suicidal Ideations none   Homicidal Ideations none   Mood:  hostile  and labile    Affect:  hostile, inappropriate and increased in intensity   Memory recent  impaired   Memory remote:  intact   Concentration/Attention:  distractable and hypervigilance   Fund of Knowledge below average   Insight:  poor   Reliability poor   Judgment:  poor          VITALS:     Patient Vitals for the past 24 hrs:   Temp Pulse Resp BP   04/08/18 1200 97.1 °F (36.2 °C) 77 16 102/61     Wt Readings from Last 3 Encounters:   04/04/18 59 kg (130 lb 1.1 oz)   12/27/15 61.2 kg (135 lb)   11/09/14 65.8 kg (145 lb)     Temp Readings from Last 3 Encounters:   04/08/18 97.1 °F (36.2 °C)   12/27/15 98.7 °F (37.1 °C)   11/09/14 98.7 °F (37.1 °C)     BP Readings from Last 3 Encounters:   04/08/18 102/61   12/27/15 132/65   11/09/14 111/65     Pulse Readings from Last 3 Encounters:   04/08/18 77   12/27/15 76   11/09/14 65            DATA     LABORATORY DATA:(reviewed/updated 4/8/2018)  No results found for this or any previous visit (from the past 24 hour(s)). No results found for: VALF2, VALAC, VALP, VALPR, DS6, CRBAM, CRBAMP, CARB2, XCRBAM  No results found for: LITHM   RADIOLOGY REPORTS:(reviewed/updated 4/8/2018)  No results found.        MEDICATIONS     ALL MEDICATIONS:   Current Facility-Administered Medications   Medication Dose Route Frequency    divalproex ER (DEPAKOTE ER) 24 hour tablet 1,000 mg  1,000 mg Oral QHS    Or    LORazepam (ATIVAN) injection 1 mg +++COURT ORDERED MEDICATION FOR REFUSAL OF PO DEPAKOTE+++  1 mg IntraMUSCular QHS    haloperidol (HALDOL) tablet 5 mg  5 mg Oral BID    Or    haloperidol lactate (HALDOL) injection 5 mg +++COURT ORDERED MEDICATION FOR REFUSAL OF PO HALOPERIDOL+++  5 mg IntraMUSCular BID    traZODone (DESYREL) tablet 100 mg  100 mg Oral QHS PRN    ziprasidone (GEODON) 20 mg in sterile water (preservative free) 1 mL injection  20 mg IntraMUSCular BID PRN    OLANZapine (ZyPREXA) tablet 5 mg  5 mg Oral Q6H PRN    benztropine (COGENTIN) tablet 2 mg  2 mg Oral BID PRN    benztropine (COGENTIN) injection 2 mg  2 mg IntraMUSCular BID PRN    LORazepam (ATIVAN) injection 2 mg  2 mg IntraMUSCular Q4H PRN    LORazepam (ATIVAN) tablet 1 mg  1 mg Oral Q4H PRN    acetaminophen (TYLENOL) tablet 650 mg  650 mg Oral Q4H PRN    ibuprofen (MOTRIN) tablet 400 mg  400 mg Oral Q8H PRN    magnesium hydroxide (MILK OF MAGNESIA) 400 mg/5 mL oral suspension 30 mL  30 mL Oral DAILY PRN    nicotine (NICODERM CQ) 21 mg/24 hr patch 1 Patch  1 Patch TransDERmal DAILY PRN      SCHEDULED MEDICATIONS:   Current Facility-Administered Medications   Medication Dose Route Frequency    divalproex ER (DEPAKOTE ER) 24 hour tablet 1,000 mg  1,000 mg Oral QHS    Or    LORazepam (ATIVAN) injection 1 mg +++COURT ORDERED MEDICATION FOR REFUSAL OF PO DEPAKOTE+++  1 mg IntraMUSCular QHS    haloperidol (HALDOL) tablet 5 mg  5 mg Oral BID    Or    haloperidol lactate (HALDOL) injection 5 mg +++COURT ORDERED MEDICATION FOR REFUSAL OF PO HALOPERIDOL+++  5 mg IntraMUSCular BID          ASSESSMENT & PLAN     DIAGNOSES REQUIRING ACTIVE TREATMENT AND MONITORING: (reviewed/updated 4/8/2018)  Patient Active Hospital Problem List:    Schizoaffective disorder ([facility] [address] (3/30/2018)    Assessment:minimal change-  paranoid and Mu-ism delusion themes, agitation- poor insight and non compliance.      Plan: Ct to adjust med- Haldol and Depakote, court order granted     Agitation (3/31/2018)    Assessment: acute due to psychosis. Prn med    Plan: prn med for now   4/4/18 continue medications   4/5/18 continue same medications    4/6/18 continue same medications     4/7/18: no significant change noted, still pyscotic  4/8/18: Refusing medications,recieving court order Haldol IM               Consider Long Acting injectable antipsychotic   I will continue to monitor blood levels (Depakote, Tegretol, lithium, clozapine---a drug with a narrow therapeutic index= NTI) and associated labs for drug therapy implemented that require intense monitoring for toxicity as deemed appropriate based on current medication side effects and pharmacodynamically determined drug 1/2 lives. In summary, Sophia Kennedy, is a 61 y.o.  female who presents with a severe exacerbation of the principal diagnosis of Schizoaffective disorder ([facility] [address]  Patient's condition is worsening/not improving/not stable . Patient requires continued inpatient hospitalization for further stabilization, safety monitoring and medication management. I will continue to coordinate the provision of individual, milieu, occupational, group, and substance abuse therapies to address target symptoms/diagnoses as deemed appropriate for the individual patient. A coordinated, multidisplinary treatment team round was conducted with the patient (this team consists of the nurse, psychiatric unit pharmcist,  and writer). Complete current electronic health record for patient has been reviewed today including consultant notes, ancillary staff notes, nurses and psychiatric tech notes. Suicide risk assessment completed and patient deemed to be of low risk for suicide at this time. The following regarding medications was addressed during rounds with patient:   the risks and benefits of the proposed medication. The patient was given the opportunity to ask questions.  Informed consent given to the use of the above medications. Will continue to adjust psychiatric and non-psychiatric medications (see above \"medication\" section and orders section for details) as deemed appropriate & based upon diagnoses and response to treatment. I will continue to order blood tests/labs and diagnostic tests as deemed appropriate and review results as they become available (see orders for details and above listed lab/test results). I will order psychiatric records from previous [facility] hospitals to further elucidate the nature of patient's psychopathology and review once available. I will gather additional collateral information from friends, family and o/p treatment team to further elucidate the nature of patient's psychopathology and baselline level of psychiatric functioning. I certify that this patient's inpatient psychiatric hospital services furnished since the previous certification were, and continue to be, required for treatment that could reasonably be expected to improve the patient's condition, or for diagnostic study, and that the patient continues to need, on a daily basis, active treatment furnished directly by or requiring the supervision of inpatient psychiatric facility personnel. In addition, the hospital records show that services furnished were intensive treatment services, admission or related services, or equivalent services.     EXPECTED DISCHARGE DATE/DAY: TBD     DISPOSITION: Home       Signed By:   Nelly Freeman MD  4/8/2018

## 2018-04-08 NOTE — BH NOTES
Behavior  The patient Zabrina Rayo is alert and oriented.  Her hygiene and nutritional intake  is adequate.  Their behavior is appropriate in the milieu with peers and staff, but presents isolative.  Contracts for safety.  Pt medication compliant. Per medication nurse. Flat affect.  Depressed, sad mood.        Intervention  1:1 interaction to assess mood and thought process. Staff offering assistance as needed.     Response  Pt denies S/H ideations. Pt denies hearing voices At this  times. Pt attending groups. Plan  Plan is to assess mood and thought process Staff encouraging verbalization of issues and feelings in the Appropriate manner Staff will monitor for changes.  Q 15 minute checks continue.

## 2018-04-09 PROCEDURE — 65220000003 HC RM SEMIPRIVATE PSYCH

## 2018-04-09 PROCEDURE — 74011250636 HC RX REV CODE- 250/636: Performed by: PSYCHIATRY & NEUROLOGY

## 2018-04-09 PROCEDURE — 74011250637 HC RX REV CODE- 250/637: Performed by: PSYCHIATRY & NEUROLOGY

## 2018-04-09 RX ADMIN — LORAZEPAM 1 MG: 2 INJECTION, SOLUTION INTRAMUSCULAR; INTRAVENOUS at 21:58

## 2018-04-09 RX ADMIN — HALOPERIDOL 5 MG: 5 TABLET ORAL at 21:44

## 2018-04-09 RX ADMIN — HALOPERIDOL LACTATE 5 MG: 5 INJECTION, SOLUTION INTRAMUSCULAR at 09:12

## 2018-04-09 NOTE — BH NOTES
REFLECTIONS GROUP THERAPY PROGRESS NOTE    The patient Eller Halsted is participating in Reflections Group Therapy. Group time: 30 minutes    Personal goal for participation: Share with group about feelings and concerns throughout the course of the day. Goal orientation: personal    Group therapy participation: active    Therapeutic interventions reviewed and discussed: Yes    Impression of participation:   Positive input.     Libby Up  4/8/2018

## 2018-04-09 NOTE — BH NOTES
PSYCHIATRIC PROGRESS NOTE         Patient Name  Sophia Kennedy   Date of Birth 1960   [facility] [id number]   Medical Record Number  [id number]      Age  61 y.o. PCP None   Admit date:  3/30/2018    Room Number  316/01  @ [facility]   Date of Service  4/9/2018           E & M PROGRESS NOTE:         HISTORY       CC:  \"leave me alone\"  HISTORY OF PRESENT ILLNESS/INTERVAL HISTORY:  (reviewed/updated 4/9/2018). per initial evaluation: The patient, Sophia Kennedy, is a 61 y.o. BLACK OR  female with a past psychiatric history significant for schizoaffective d/o, who presents at this time with complaints of (and/or evidence of) the following emotional symptoms: agitation and mood swings. Additional symptomatology include refusing to talk to team, irritable and angry mood and guarded. Per report pt has been deteriorating in the last 2 month and threatening to kill family members. She has been aggressive towards the , she has been breaking into neighbors apartment and religiously preoccupied. She has poor appetite and refusing to complete her ADL with poor hygiene. She has no insight and does not want to take medication. The above symptoms have been present for . These symptoms are of severe severity. These symptoms are constant in nature. The patient's condition has been precipitated by and psychosocial stressors ( ). Patient's condition made worse by treatment noncompliance. UDS: negative; BAL=0. Sophia Kennedy presents/reports/evidences the following emotional symptoms today, 4/9/2018:agitation and psychotic behavior. The above symptoms have been present for few month. These symptoms are of severe severity. The symptoms are constant in nature. Additional symptomatology and features include labile and angry mood, responding to internal stimuli, paranoid delusion and poor insight. Refusing to take medication, received prn for agitation.     4/2- left the room and refused to be evaluated. She was seen in the hallway yelling and being loud. Poor sleep, paranoid , responding to internal stimuli and anger outburst last night pt received prn med. Refused po med and has poor insight  4/3- remains labile, paranoid and with poor insight. Responding to internal stimuli. period of threatening behavior and anger outburst. Refusing to speak with the team.  4/4/18 she remains paranoid and she refusing to come to see her treatment team , she refused to take medications and she is court ordered to take medications    4/5/18 she still paranoid and not engaging well and refused to be seen   4/6/18 she still irritable and paranoid and she still refusing treatment team and still angry   4/7/18: patient refuse to come in the conference  room, when went to her room with staff she refuse to speak and left the room,she is still paranoid, refusing treatments,irritable, accepting meals. 04/8/18: Seen during morning round, she remains isolative, refusing po medications, received Haldol IM,paranoid, irritable, doesn not engage in conversation,acceptng meals,gets angry when questions asked or discuss about medications. 4/9/18 she still not want to give information and she still paranoid and still talking to herself and still paranoid  And she sleeping better and still refusing medications       SIDE EFFECTS: (reviewed/updated 4/9/2018)  None reported or admitted to. No noted toxicity with use of Depakote/Tegretol/lithium/Clozaril/TCAs   ALLERGIES:(reviewed/updated 4/9/2018)  No Known Allergies   MEDICATIONS PRIOR TO ADMISSION:(reviewed/updated 4/9/2018)  Prescriptions Prior to Admission   Medication Sig    albuterol (PROVENTIL HFA, VENTOLIN HFA, PROAIR HFA) 90 mcg/actuation inhaler Take 2 Puffs by inhalation three (3) times daily as needed for Wheezing or Shortness of Breath.       PAST MEDICAL HISTORY: Past medical history from the initial psychiatric evaluation has been reviewed (reviewed/updated 4/9/2018) with no additional updates (I asked patient and no additional past medical history provided). Past Medical History:   Diagnosis Date    Asthma     Hepatitis C    No past surgical history on file. SOCIAL HISTORY: Social history from the initial psychiatric evaluation has been reviewed (reviewed/updated 4/9/2018) with no additional updates (I asked patient and no additional social history provided). Social History     Social History    Marital status: SINGLE     Spouse name: N/A    Number of children: N/A    Years of education: N/A     Occupational History    Not on file. Social History Main Topics    Smoking status: Current Every Day Smoker    Smokeless tobacco: Not on file    Alcohol use Yes    Drug use: Yes     Special: Marijuana    Sexual activity: Yes     Partners: Male     Birth control/ protection: None     Other Topics Concern    Not on file     Social History Narrative    No narrative on file      FAMILY HISTORY: Family history from the initial psychiatric evaluation has been reviewed (reviewed/updated 4/9/2018) with no additional updates (I asked patient and no additional family history provided). No family history on file.     REVIEW OF SYSTEMS: (reviewed/updated 4/9/2018)  Appetite:no change from normal   Sleep: 2 hrs  All other Review of Systems: Psychological ROS: positive for - behavioral disorder, concentration difficulties, hallucinations, hostility, mood swings and psychosis  Respiratory ROS: no cough, shortness of breath, or wheezing  Cardiovascular ROS: no chest pain or dyspnea on exertion  Neurological ROS: no TIA or stroke symptoms         [facility] (MSE):    MSE FINDINGS ARE WITHIN NORMAL LIMITS (WNL) UNLESS OTHERWISE STATED BELOW. ( ALL OF THE BELOW CATEGORIES OF THE MSE HAVE BEEN REVIEWED (reviewed 4/9/2018) AND UPDATED AS DEEMED APPROPRIATE )  General Presentation age appropriate and disheveled, evasive, uncooperative and unreliable   Orientation disorganized   Vital Signs  See below (reviewed 4/9/2018); Vital Signs (BP, Pulse, & Temp) are within normal limits if not listed below. Gait and Station Stable/steady, no ataxia   Musculoskeletal System No extrapyramidal symptoms (EPS); no abnormal muscular movements or Tardive Dyskinesia (TD); muscle strength and tone are within normal limits   Language No aphasia or dysarthria   Speech:  loud and pressured   Thought Processes illogical; slow rate of thoughts; poor abstract reasoning/computation   Thought Associations tangential   Thought Content paranoid delusions, Mandaen delusions, auditory hallucinations and internally preoccupied   Suicidal Ideations none   Homicidal Ideations none   Mood:  hostile  and labile    Affect:  hostile, inappropriate and increased in intensity   Memory recent  impaired   Memory remote:  intact   Concentration/Attention:  distractable and hypervigilance   Fund of Knowledge below average   Insight:  poor   Reliability poor   Judgment:  poor          VITALS:     Patient Vitals for the past 24 hrs:   Temp Pulse Resp BP   04/08/18 1200 97.1 °F (36.2 °C) 77 16 102/61     Wt Readings from Last 3 Encounters:   04/04/18 59 kg (130 lb 1.1 oz)   12/27/15 61.2 kg (135 lb)   11/09/14 65.8 kg (145 lb)     Temp Readings from Last 3 Encounters:   04/08/18 97.1 °F (36.2 °C)   12/27/15 98.7 °F (37.1 °C)   11/09/14 98.7 °F (37.1 °C)     BP Readings from Last 3 Encounters:   04/08/18 102/61   12/27/15 132/65   11/09/14 111/65     Pulse Readings from Last 3 Encounters:   04/08/18 77   12/27/15 76   11/09/14 65            DATA     LABORATORY DATA:(reviewed/updated 4/9/2018)  No results found for this or any previous visit (from the past 24 hour(s)). No results found for: VALF2, VALAC, VALP, VALPR, DS6, CRBAM, CRBAMP, CARB2, XCRBAM  No results found for: LITHM   RADIOLOGY REPORTS:(reviewed/updated 4/9/2018)  No results found.        MEDICATIONS ALL MEDICATIONS:   Current Facility-Administered Medications   Medication Dose Route Frequency    divalproex ER (DEPAKOTE ER) 24 hour tablet 1,000 mg  1,000 mg Oral QHS    Or    LORazepam (ATIVAN) injection 1 mg +++COURT ORDERED MEDICATION FOR REFUSAL OF PO DEPAKOTE+++  1 mg IntraMUSCular QHS    haloperidol (HALDOL) tablet 5 mg  5 mg Oral BID    Or    haloperidol lactate (HALDOL) injection 5 mg +++COURT ORDERED MEDICATION FOR REFUSAL OF PO HALOPERIDOL+++  5 mg IntraMUSCular BID    traZODone (DESYREL) tablet 100 mg  100 mg Oral QHS PRN    ziprasidone (GEODON) 20 mg in sterile water (preservative free) 1 mL injection  20 mg IntraMUSCular BID PRN    OLANZapine (ZyPREXA) tablet 5 mg  5 mg Oral Q6H PRN    benztropine (COGENTIN) tablet 2 mg  2 mg Oral BID PRN    benztropine (COGENTIN) injection 2 mg  2 mg IntraMUSCular BID PRN    LORazepam (ATIVAN) injection 2 mg  2 mg IntraMUSCular Q4H PRN    LORazepam (ATIVAN) tablet 1 mg  1 mg Oral Q4H PRN    acetaminophen (TYLENOL) tablet 650 mg  650 mg Oral Q4H PRN    ibuprofen (MOTRIN) tablet 400 mg  400 mg Oral Q8H PRN    magnesium hydroxide (MILK OF MAGNESIA) 400 mg/5 mL oral suspension 30 mL  30 mL Oral DAILY PRN    nicotine (NICODERM CQ) 21 mg/24 hr patch 1 Patch  1 Patch TransDERmal DAILY PRN      SCHEDULED MEDICATIONS:   Current Facility-Administered Medications   Medication Dose Route Frequency    divalproex ER (DEPAKOTE ER) 24 hour tablet 1,000 mg  1,000 mg Oral QHS    Or    LORazepam (ATIVAN) injection 1 mg +++COURT ORDERED MEDICATION FOR REFUSAL OF PO DEPAKOTE+++  1 mg IntraMUSCular QHS    haloperidol (HALDOL) tablet 5 mg  5 mg Oral BID    Or    haloperidol lactate (HALDOL) injection 5 mg +++COURT ORDERED MEDICATION FOR REFUSAL OF PO HALOPERIDOL+++  5 mg IntraMUSCular BID          ASSESSMENT & PLAN     DIAGNOSES REQUIRING ACTIVE TREATMENT AND MONITORING: (reviewed/updated 4/9/2018)  Patient Active Hospital Problem List:    Schizoaffective disorder ([facility] (3/30/2018)    Assessment:minimal change-  paranoid and Oriental orthodox delusion themes, agitation- poor insight and non compliance. Plan: Ct to adjust med- Haldol and Depakote, court order granted     Agitation (3/31/2018)    Assessment: acute due to psychosis. Prn med    Plan: prn med for now   4/4/18 continue medications   4/5/18 continue same medications    4/6/18 continue same medications     4/7/18: no significant change noted, still pyscotic  4/8/18: Refusing medications,recieving court order Haldol IM               Consider Long Acting injectable antipsychotic   4/9/18 contnue same medications   I will continue to monitor blood levels (Depakote, Tegretol, lithium, clozapine---a drug with a narrow therapeutic index= NTI) and associated labs for drug therapy implemented that require intense monitoring for toxicity as deemed appropriate based on current medication side effects and pharmacodynamically determined drug 1/2 lives. In summary, Zabrina Rayo, is a 61 y.o.  female who presents with a severe exacerbation of the principal diagnosis of Schizoaffective disorder ([facility]  Patient's condition is worsening/not improving/not stable . Patient requires continued inpatient hospitalization for further stabilization, safety monitoring and medication management. I will continue to coordinate the provision of individual, milieu, occupational, group, and substance abuse therapies to address target symptoms/diagnoses as deemed appropriate for the individual patient. A coordinated, multidisplinary treatment team round was conducted with the patient (this team consists of the nurse, psychiatric unit pharmcist,  and writer). Complete current electronic health record for patient has been reviewed today including consultant notes, ancillary staff notes, nurses and psychiatric tech notes. Suicide risk assessment completed and patient deemed to be of low risk for suicide at this time. The following regarding medications was addressed during rounds with patient:   the risks and benefits of the proposed medication. The patient was given the opportunity to ask questions. Informed consent given to the use of the above medications. Will continue to adjust psychiatric and non-psychiatric medications (see above \"medication\" section and orders section for details) as deemed appropriate & based upon diagnoses and response to treatment. I will continue to order blood tests/labs and diagnostic tests as deemed appropriate and review results as they become available (see orders for details and above listed lab/test results). I will order psychiatric records from previous [facility] hospitals to further elucidate the nature of patient's psychopathology and review once available. I will gather additional collateral information from friends, family and o/p treatment team to further elucidate the nature of patient's psychopathology and baselline level of psychiatric functioning. I certify that this patient's inpatient psychiatric hospital services furnished since the previous certification were, and continue to be, required for treatment that could reasonably be expected to improve the patient's condition, or for diagnostic study, and that the patient continues to need, on a daily basis, active treatment furnished directly by or requiring the supervision of inpatient psychiatric facility personnel. In addition, the hospital records show that services furnished were intensive treatment services, admission or related services, or equivalent services.     EXPECTED DISCHARGE DATE/DAY: TBD     DISPOSITION: Home       Signed By:   Selin Epps MD  4/9/2018

## 2018-04-09 NOTE — BH NOTES
The patient did speak with treatment team a bit longer today. However, she said all her family is dead, therefore staff cannot contact them. The patient said she used to see PCP, Dr. Ari Milian, on [facility]. And worker left a message at the practice for collateral.  Per patient's pre-screening, her last psychiatric hospitalization was in 1997 at [facility]. Per report her daughter, Camron Patton (ph: [phone]) told the pre-screened the patient has been decompensating for about 2 months and she stopped taking her medication in November 2017. There is no further collateral to gain at this time. Worker will continue to try to speak with the patient's family if/when permission is given.       Lynne Sanchez LCSW

## 2018-04-09 NOTE — PROGRESS NOTES
Problem: Altered Thought Process (Adult/Pediatric)  Goal: *STG: Seeks staff when feelings of anxiety and fear arise  Outcome: Progressing Towards Goal  Patient is alert and oriented. She continues to isolates self with minimal interaction with staff and peers. Responded appropriately upon approach. Refused scheduled medication at HS, no receptive to encouragement. Received I/M medication per court order. Denies SI / HI, pain or discomfort. Continue to monitor the patient's status and assess needs.

## 2018-04-09 NOTE — BH NOTES
GROUP THERAPY PROGRESS NOTE    The patient Yisel Sparks is participating in [employer]. Group time: 30 minutes    Personal goal for participation: to orient the patient to the unit.     Goal orientation: successful adoption of unit rules    Group therapy participation: active    Therapeutic interventions reviewed and discussed: Yes    Impression of participation:     Jesusita Barnes  4/9/2018 8:50 AM

## 2018-04-09 NOTE — PROGRESS NOTES
Problem: Altered Thought Process (Adult/Pediatric)  Goal: *STG: Remains safe in hospital  Outcome: Progressing Towards Goal  Patient is visible on th unit. Continue to refused PO scheduled medications. Patient was given court ordered alternatives. Remains preoccupied and paranoid. Will continue to monitor patient and assess needs.

## 2018-04-09 NOTE — BH NOTES
Patient was observed to be sleeping for 7 hours without any distress and even respirations. Q 15 minutes monitoring maintained for the safety and support. Refused to have blood drawn for the labs.

## 2018-04-10 PROCEDURE — 74011250637 HC RX REV CODE- 250/637: Performed by: PSYCHIATRY & NEUROLOGY

## 2018-04-10 PROCEDURE — 65220000003 HC RM SEMIPRIVATE PSYCH

## 2018-04-10 PROCEDURE — 74011250636 HC RX REV CODE- 250/636: Performed by: PSYCHIATRY & NEUROLOGY

## 2018-04-10 RX ADMIN — HALOPERIDOL 5 MG: 5 TABLET ORAL at 21:37

## 2018-04-10 RX ADMIN — LORAZEPAM 1 MG: 2 INJECTION, SOLUTION INTRAMUSCULAR; INTRAVENOUS at 21:43

## 2018-04-10 RX ADMIN — HALOPERIDOL LACTATE 5 MG: 5 INJECTION, SOLUTION INTRAMUSCULAR at 08:38

## 2018-04-10 NOTE — PROGRESS NOTES
Problem: Altered Thought Process (Adult/Pediatric)  Goal: *STG: Participates in treatment plan  Outcome: Progressing Towards Goal  Pt was alert and visible on the unit. Pt took her Haldol PO but refused Depakote due to the size of the pill. She received Ativan IM per court order. Pt was calm and cooperative. She consumed all meals and snacks. No medical concerns reported. Staff will continue to monitor for health and safety.

## 2018-04-10 NOTE — BH NOTES
GROUP THERAPY PROGRESS NOTE    The patient Faisal Kumar a 61 y.o. female is participating in [employer]. Group time: 1 hour    Personal goal for participation: To concentrate on selected task    Goal orientation: social    Group therapy participation: active    Therapeutic interventions reviewed and discussed: Crafts, games, music    Impression of participation: The patient was attentive.     Socorro Velazquez  4/10/2018  5:51 PM

## 2018-04-10 NOTE — PROGRESS NOTES
Problem: Altered Thought Process (Adult/Pediatric)  Goal: *STG: Remains safe in hospital  Outcome: Progressing Towards Goal  Pt alert to self. Continues to refuse scheduled oral medications. Declines to get up and participate in treatment team.Irritable. Conversation loose. Less delusions noted. No behavior management issues. Will continue q15 minute safety checks and assess needs.

## 2018-04-10 NOTE — BH NOTES
GROUP THERAPY PROGRESS NOTE    The patient Maia lozano 61 y.o. female is participating in [address] [place] [address]. Group time: 45 minutes    Personal goal for participation:  To participate in chair yoga routine    Goal orientation:  relaxation    Group therapy participation: active    Therapeutic interventions reviewed and discussed: benefits of yoga/other relaxation techniques    Impression of participation:  The patient was attentive-required prompting    Mahad Mckoy  4/10/2018  5:33 PM

## 2018-04-11 PROCEDURE — 65220000003 HC RM SEMIPRIVATE PSYCH

## 2018-04-11 PROCEDURE — 74011250636 HC RX REV CODE- 250/636: Performed by: PSYCHIATRY & NEUROLOGY

## 2018-04-11 RX ORDER — HALOPERIDOL 5 MG/ML
5 INJECTION INTRAMUSCULAR 2 TIMES DAILY
Status: DISCONTINUED | OUTPATIENT
Start: 2018-04-11 | End: 2018-04-12

## 2018-04-11 RX ORDER — HALOPERIDOL 5 MG/1
10 TABLET ORAL 2 TIMES DAILY
Status: DISCONTINUED | OUTPATIENT
Start: 2018-04-11 | End: 2018-04-12

## 2018-04-11 RX ORDER — BENZTROPINE MESYLATE 1 MG/1
0.5 TABLET ORAL 2 TIMES DAILY
Status: DISCONTINUED | OUTPATIENT
Start: 2018-04-11 | End: 2018-04-23 | Stop reason: HOSPADM

## 2018-04-11 RX ADMIN — HALOPERIDOL LACTATE 5 MG: 5 INJECTION, SOLUTION INTRAMUSCULAR at 21:48

## 2018-04-11 RX ADMIN — HALOPERIDOL LACTATE 5 MG: 5 INJECTION, SOLUTION INTRAMUSCULAR at 08:35

## 2018-04-11 RX ADMIN — LORAZEPAM 1 MG: 2 INJECTION, SOLUTION INTRAMUSCULAR; INTRAVENOUS at 21:48

## 2018-04-11 NOTE — BH NOTES
PSYCHIATRIC PROGRESS NOTE         Patient Name  Holli Augustin   Date of Birth 1960   [facility] [id number]   Medical Record Number  [id number]      Age  61 y.o. PCP None   Admit date:  3/30/2018    Room Number  316/01  @ [facility]   Date of Service  4/11/2018           E & M PROGRESS NOTE:         HISTORY       CC:  \"leave me alone\"  HISTORY OF PRESENT ILLNESS/INTERVAL HISTORY:  (reviewed/updated 4/11/2018). per initial evaluation: The patient, Holli Augustin, is a 61 y.o. BLACK OR  female with a past psychiatric history significant for schizoaffective d/o, who presents at this time with complaints of (and/or evidence of) the following emotional symptoms: agitation and mood swings. Additional symptomatology include refusing to talk to team, irritable and angry mood and guarded. Per report pt has been deteriorating in the last 2 month and threatening to kill family members. She has been aggressive towards the , she has been breaking into neighbors apartment and religiously preoccupied. She has poor appetite and refusing to complete her ADL with poor hygiene. She has no insight and does not want to take medication. The above symptoms have been present for . These symptoms are of severe severity. These symptoms are constant in nature. The patient's condition has been precipitated by and psychosocial stressors ( ). Patient's condition made worse by treatment noncompliance. UDS: negative; BAL=0. Holli Augustin presents/reports/evidences the following emotional symptoms today, 4/11/2018:agitation and psychotic behavior. The above symptoms have been present for few month. These symptoms are of severe severity. The symptoms are constant in nature. Additional symptomatology and features include labile and angry mood, responding to internal stimuli, paranoid delusion and poor insight. Refusing to take medication, received prn for agitation. 4/2- left the room and refused to be evaluated. She was seen in the hallway yelling and being loud. Poor sleep, paranoid , responding to internal stimuli and anger outburst last night pt received prn med. Refused po med and has poor insight  4/3- remains labile, paranoid and with poor insight. Responding to internal stimuli. period of threatening behavior and anger outburst. Refusing to speak with the team.  4/4/18 she remains paranoid and she refusing to come to see her treatment team , she refused to take medications and she is court ordered to take medications    4/5/18 she still paranoid and not engaging well and refused to be seen   4/6/18 she still irritable and paranoid and she still refusing treatment team and still angry   4/7/18: patient refuse to come in the conference  room, when went to her room with staff she refuse to speak and left the room,she is still paranoid, refusing treatments,irritable, accepting meals. 04/8/18: Seen during morning round, she remains isolative, refusing po medications, received Haldol IM,paranoid, irritable, doesn not engage in conversation,acceptng meals,gets angry when questions asked or discuss about medications. 4/9/18 she still not want to give information and she still paranoid and still talking to herself and still paranoid  And she sleeping better and still refusing medications   4/11- refusing med selectively, poor insight but sl better affect and participating more in the groups. She is refusing to meet up with tx team. Guarded. SIDE EFFECTS: (reviewed/updated 4/11/2018)  None reported or admitted to.   No noted toxicity with use of current med   ALLERGIES:(reviewed/updated 4/11/2018)  No Known Allergies   MEDICATIONS PRIOR TO ADMISSION:(reviewed/updated 4/11/2018)  Prescriptions Prior to Admission   Medication Sig    albuterol (PROVENTIL HFA, VENTOLIN HFA, PROAIR HFA) 90 mcg/actuation inhaler Take 2 Puffs by inhalation three (3) times daily as needed for Wheezing or Shortness of Breath. PAST MEDICAL HISTORY: Past medical history from the initial psychiatric evaluation has been reviewed (reviewed/updated 4/11/2018) with no additional updates (I asked patient and no additional past medical history provided). Past Medical History:   Diagnosis Date    Asthma     Hepatitis C    No past surgical history on file. SOCIAL HISTORY: Social history from the initial psychiatric evaluation has been reviewed (reviewed/updated 4/11/2018) with no additional updates (I asked patient and no additional social history provided). Social History     Social History    Marital status: SINGLE     Spouse name: N/A    Number of children: N/A    Years of education: N/A     Occupational History    Not on file. Social History Main Topics    Smoking status: Current Every Day Smoker    Smokeless tobacco: Not on file    Alcohol use Yes    Drug use: Yes     Special: Marijuana    Sexual activity: Yes     Partners: Male     Birth control/ protection: None     Other Topics Concern    Not on file     Social History Narrative    No narrative on file      FAMILY HISTORY: Family history from the initial psychiatric evaluation has been reviewed (reviewed/updated 4/11/2018) with no additional updates (I asked patient and no additional family history provided). No family history on file.     REVIEW OF SYSTEMS: (reviewed/updated 4/11/2018)  Appetite:no change from normal   Sleep: 2 hrs  All other Review of Systems: Psychological ROS: positive for - behavioral disorder, concentration difficulties, hallucinations, hostility, mood swings and psychosis  Respiratory ROS: no cough, shortness of breath, or wheezing  Cardiovascular ROS: no chest pain or dyspnea on exertion  Neurological ROS: no TIA or stroke symptoms         MENTAL STATUS EXAM & VITALS     MENTAL STATUS EXAM (MSE):    MSE FINDINGS ARE WITHIN NORMAL LIMITS (WNL) UNLESS OTHERWISE STATED BELOW. ( ALL OF THE BELOW CATEGORIES OF THE MSE HAVE BEEN REVIEWED (reviewed 4/11/2018) AND UPDATED AS DEEMED APPROPRIATE )  General Presentation age appropriate and disheveled, evasive, uncooperative and unreliable   Orientation disorganized   Vital Signs  See below (reviewed 4/11/2018); Vital Signs (BP, Pulse, & Temp) are within normal limits if not listed below. Gait and Station Stable/steady, no ataxia   Musculoskeletal System No extrapyramidal symptoms (EPS); no abnormal muscular movements or Tardive Dyskinesia (TD); muscle strength and tone are within normal limits   Language No aphasia or dysarthria   Speech:  loud and pressured   Thought Processes illogical; slow rate of thoughts; poor abstract reasoning/computation   Thought Associations tangential   Thought Content paranoid delusions, Anglican delusions, auditory hallucinations and internally preoccupied   Suicidal Ideations none   Homicidal Ideations none   Mood:  hostile  and labile    Affect:  hostile, inappropriate and increased in intensity   Memory recent  impaired   Memory remote:  intact   Concentration/Attention:  distractable and hypervigilance   Fund of Knowledge below average   Insight:  poor   Reliability poor   Judgment:  poor          VITALS:     Patient Vitals for the past 24 hrs:   Temp Pulse BP   04/11/18 1439 97.8 °F (36.6 °C) 85 108/61     Wt Readings from Last 3 Encounters:   04/04/18 59 kg (130 lb 1.1 oz)   12/27/15 61.2 kg (135 lb)   11/09/14 65.8 kg (145 lb)     Temp Readings from Last 3 Encounters:   04/11/18 97.8 °F (36.6 °C)   12/27/15 98.7 °F (37.1 °C)   11/09/14 98.7 °F (37.1 °C)     BP Readings from Last 3 Encounters:   04/11/18 108/61   12/27/15 132/65   11/09/14 111/65     Pulse Readings from Last 3 Encounters:   04/11/18 85   12/27/15 76   11/09/14 65            DATA     LABORATORY DATA:(reviewed/updated 4/11/2018)  No results found for this or any previous visit (from the past 24 hour(s)).   No results found for: VALF2, VALAC, VALP, VALPR, DS6, CRBAM, CRBAMP, Cj Da Silva  No results found for: [facility]   RADIOLOGY REPORTS:(reviewed/updated 4/11/2018)  No results found.        MEDICATIONS     ALL MEDICATIONS:   Current Facility-Administered Medications   Medication Dose Route Frequency    haloperidol (HALDOL) tablet 10 mg  10 mg Oral BID    Or    haloperidol lactate (HALDOL) injection 5 mg +++COURT ORDERED MEDICATION FOR REFUSAL OF PO HALOPERIDOL+++  5 mg IntraMUSCular BID    benztropine (COGENTIN) tablet 0.5 mg  0.5 mg Oral BID    divalproex ER (DEPAKOTE ER) 24 hour tablet 1,000 mg  1,000 mg Oral QHS    Or    LORazepam (ATIVAN) injection 1 mg +++COURT ORDERED MEDICATION FOR REFUSAL OF PO DEPAKOTE+++  1 mg IntraMUSCular QHS    traZODone (DESYREL) tablet 100 mg  100 mg Oral QHS PRN    ziprasidone (GEODON) 20 mg in sterile water (preservative free) 1 mL injection  20 mg IntraMUSCular BID PRN    OLANZapine (ZyPREXA) tablet 5 mg  5 mg Oral Q6H PRN    benztropine (COGENTIN) tablet 2 mg  2 mg Oral BID PRN    benztropine (COGENTIN) injection 2 mg  2 mg IntraMUSCular BID PRN    LORazepam (ATIVAN) injection 2 mg  2 mg IntraMUSCular Q4H PRN    LORazepam (ATIVAN) tablet 1 mg  1 mg Oral Q4H PRN    acetaminophen (TYLENOL) tablet 650 mg  650 mg Oral Q4H PRN    ibuprofen (MOTRIN) tablet 400 mg  400 mg Oral Q8H PRN    magnesium hydroxide (MILK OF MAGNESIA) 400 mg/5 mL oral suspension 30 mL  30 mL Oral DAILY PRN    nicotine (NICODERM CQ) 21 mg/24 hr patch 1 Patch  1 Patch TransDERmal DAILY PRN      SCHEDULED MEDICATIONS:   Current Facility-Administered Medications   Medication Dose Route Frequency    haloperidol (HALDOL) tablet 10 mg  10 mg Oral BID    Or    haloperidol lactate (HALDOL) injection 5 mg +++COURT ORDERED MEDICATION FOR REFUSAL OF PO HALOPERIDOL+++  5 mg IntraMUSCular BID    benztropine (COGENTIN) tablet 0.5 mg  0.5 mg Oral BID    divalproex ER (DEPAKOTE ER) 24 hour tablet 1,000 mg  1,000 mg Oral QHS    Or    LORazepam (ATIVAN) injection 1 mg +++COURT ORDERED MEDICATION FOR REFUSAL OF PO DEPAKOTE+++  1 mg IntraMUSCular QHS          ASSESSMENT & PLAN     DIAGNOSES REQUIRING ACTIVE TREATMENT AND MONITORING: (reviewed/updated 4/11/2018)  Patient Active Hospital Problem List:    Schizoaffective disorder ([facility] [address] [facility] (3/30/2018)    Assessment:minimal change-  paranoid and Quaker delusion themes, agitation- poor insight and non compliance. Plan: Ct to adjust med- Haldol and Depakote, court order granted     Agitation (3/31/2018)    Assessment: acute due to psychosis. Prn med    Plan: prn med for now   4/4/18 continue medications   4/5/18 continue same medications    4/6/18 continue same medications     4/7/18: no significant change noted, still pyscotic  4/8/18: Refusing medications,recieving court order Haldol IM               Consider Long Acting injectable antipsychotic   4/9/18 contnue same medications   4/11- titrate Haldol and add Cogentin, consider changing Depakote to liquid    I will continue to monitor blood levels (Depakote, ---a drug with a narrow therapeutic index= NTI) and associated labs for drug therapy implemented that require intense monitoring for toxicity as deemed appropriate based on current medication side effects and pharmacodynamically determined drug 1/2 lives. In summary, Yisel Sparks, is a 61 y.o.  female who presents with a severe exacerbation of the principal diagnosis of [facility]  Patient's condition is worsening/not improving/not stable . Patient requires continued inpatient hospitalization for further stabilization, safety monitoring and medication management. I will continue to coordinate the provision of individual, milieu, occupational, group, and substance abuse therapies to address target symptoms/diagnoses as deemed appropriate for the individual patient.   A coordinated, multidisplinary treatment team round was conducted with the patient (this team consists of the nurse, psychiatric unit pharmcist,  and writer). Complete current electronic health record for patient has been reviewed today including consultant notes, ancillary staff notes, nurses and psychiatric tech notes. Suicide risk assessment completed and patient deemed to be of low risk for suicide at this time. The following regarding medications was addressed during rounds with patient:   the risks and benefits of the proposed medication. The patient was given the opportunity to ask questions. Informed consent given to the use of the above medications. Will continue to adjust psychiatric and non-psychiatric medications (see above \"medication\" section and orders section for details) as deemed appropriate & based upon diagnoses and response to treatment. I will continue to order blood tests/labs and diagnostic tests as deemed appropriate and review results as they become available (see orders for details and above listed lab/test results). I will order psychiatric records from previous [facility] hospitals to further elucidate the nature of patient's psychopathology and review once available. I will gather additional collateral information from friends, family and o/p treatment team to further elucidate the nature of patient's psychopathology and baselline level of psychiatric functioning. I certify that this patient's inpatient psychiatric hospital services furnished since the previous certification were, and continue to be, required for treatment that could reasonably be expected to improve the patient's condition, or for diagnostic study, and that the patient continues to need, on a daily basis, active treatment furnished directly by or requiring the supervision of inpatient psychiatric facility personnel. In addition, the hospital records show that services furnished were intensive treatment services, admission or related services, or equivalent services.     EXPECTED DISCHARGE DATE/DAY: TBD DISPOSITION: Home       Signed By:   Layne Geiger MD  4/11/2018

## 2018-04-11 NOTE — BH NOTES
Patient had a vomiting episode during breakfast this morning. Patient given ginger ale for nausea/vomitting. Continue to refuse to take po medications. Will continue to monitor patient and and assess needs.

## 2018-04-11 NOTE — BH NOTES
GROUP THERAPY PROGRESS NOTE    The patient Jannette lozano 61 y.o. female is participating in Reflections Group    Group time: 30 minutes    Personal goal for participation: To discuss the daily goals    Goal orientation: personal    Group therapy participation: passive    Therapeutic interventions reviewed and discussed:  Yes    Impression of participation: lulu Clay  4/10/2018  9:52 PM

## 2018-04-11 NOTE — BH NOTES
Restless all night in & out of room several times during the night,  door way starring up & down [place], slept 4-5 hrs.

## 2018-04-11 NOTE — BH NOTES
GROUP THERAPY PROGRESS NOTE    The patient Jaz lozano 61 y.o. female is participating in [employer]. Group time: 45 minutes    Personal goal for participation: To participate in healthy relationships bingo game     Goal orientation:  personal    Group therapy participation: active    Therapeutic interventions reviewed and discussed: things pertaining to relationships    Impression of participation:  The patient was attentive.     Maximino Newman  4/11/2018  2:20 PM

## 2018-04-11 NOTE — PROGRESS NOTES
Problem: Altered Thought Process (Adult/Pediatric)  Goal: *STG: Participates in treatment plan  Outcome: Not Progressing Towards Goal  Pt was calm and cooperative this shift. Pt was seen smiling and pacing from her room to the dayroom. Pt took PO haldol but refused PO Depakote due to the size of the pills. Pt reported she is willing to take liquid or an IM but will not take the large pill. Pt mood is labile but she is easily redirected. No medical concerns reported. Pt did not appear to respond to internal stimuli this shift. Staff will continue to monitor for health and safety.

## 2018-04-12 PROCEDURE — 65220000003 HC RM SEMIPRIVATE PSYCH

## 2018-04-12 PROCEDURE — 74011250637 HC RX REV CODE- 250/637: Performed by: PSYCHIATRY & NEUROLOGY

## 2018-04-12 PROCEDURE — 74011250636 HC RX REV CODE- 250/636: Performed by: PSYCHIATRY & NEUROLOGY

## 2018-04-12 RX ORDER — HALOPERIDOL 5 MG/ML
10 INJECTION INTRAMUSCULAR 2 TIMES DAILY
Status: DISCONTINUED | OUTPATIENT
Start: 2018-04-12 | End: 2018-04-23 | Stop reason: HOSPADM

## 2018-04-12 RX ORDER — HALOPERIDOL 5 MG/1
10 TABLET ORAL 2 TIMES DAILY
Status: DISCONTINUED | OUTPATIENT
Start: 2018-04-12 | End: 2018-04-23 | Stop reason: HOSPADM

## 2018-04-12 RX ORDER — LORAZEPAM 2 MG/ML
0.5 INJECTION INTRAMUSCULAR
Status: DISCONTINUED | OUTPATIENT
Start: 2018-04-12 | End: 2018-04-23 | Stop reason: HOSPADM

## 2018-04-12 RX ORDER — DIVALPROEX SODIUM 500 MG/1
1000 TABLET, EXTENDED RELEASE ORAL
Status: DISCONTINUED | OUTPATIENT
Start: 2018-04-12 | End: 2018-04-23 | Stop reason: HOSPADM

## 2018-04-12 RX ADMIN — HALOPERIDOL 10 MG: 5 TABLET ORAL at 21:53

## 2018-04-12 RX ADMIN — BENZTROPINE MESYLATE 0.5 MG: 1 TABLET ORAL at 21:53

## 2018-04-12 RX ADMIN — DIVALPROEX SODIUM 1000 MG: 500 TABLET, FILM COATED, EXTENDED RELEASE ORAL at 21:53

## 2018-04-12 RX ADMIN — HALOPERIDOL LACTATE 5 MG: 5 INJECTION, SOLUTION INTRAMUSCULAR at 09:41

## 2018-04-12 NOTE — PROGRESS NOTES
Problem: Altered Thought Process (Adult/Pediatric)  Goal: *STG: Participates in treatment plan  Outcome: Progressing Towards Goal  Pt refused scheduled medications. Pt received court-ordered medications. Will continue to monitor pt q15 minutes for safety and support.

## 2018-04-12 NOTE — BH NOTES
GROUP THERAPY PROGRESS NOTE    The patient Mckenzie Erickson a 61 y.o. female is participating in [employer]. Group time: 1 hour    Personal goal for participation:  To concentrate on selected task    Goal orientation: social    Group therapy participation: minimal    Therapeutic interventions reviewed and discussed: Crafts, games, music    Impression of participation: The patient was present-left early    [facility]  4/12/2018  5:25 PM

## 2018-04-12 NOTE — PROGRESS NOTES
Problem: Altered Thought Process (Adult/Pediatric)  Goal: *STG: Remains safe in hospital  Outcome: Progressing Towards Goal  Pt rested quietly in bed with eyes closed. No signs/symptoms of distress, agitation, or anxiety. Will monitor for changes. Q 15 minute checks continue.  Pt slept  6 hrs

## 2018-04-12 NOTE — BH NOTES
Client has been in and out of her room this pm.  Talking to herself. Very little interaction with others but stands and watches. So far has taken her po meds today, do not know about hs meds. Q 15 mn checks for safety continue.

## 2018-04-12 NOTE — PROGRESS NOTES
Follow up Nutrition Assessment:     INTERVENTIONS/RECOMMENDATIONS: ·   Meals/Snacks: General/healthful diet:  Continue regular diet. Enc po.     ASSESSMENT:   4/12:  Chart reviewed; med noted for schizoaffective disorder on admission. MST trigger for unsure weight loss. No new weight on admission so est needs based on prior admission. Per RN, pt refused to provide information regarding weight hx.     Diet Order: Regular  % Eaten:  85%.     Pertinent Medications: [x]Reviewed []Other  Pertinent Labs: [x]Reviewed []Other  Food Allergies: [x]None []Other   Last BM:                     [x]Active     []Hyperactive  []Hypoactive       [] Absent BS  Skin:               [x] Intact                        [] Incision                     [] Breakdown                                    [] Other:     Anthropometrics:   Height: 5' 1\" (154.9 cm)                   Weight:                       IBW (%IBW):   ( )                  UBW (%UBW):   (  %)   Last Weight Metrics:  Weight Loss Metrics 04/12/2018 11/9/2014 10/19/2013   Today's Wt 130 1.1 oz lb 145 lb 125 lb   BMI 24.58 kg/m2 26.51 kg/m2 22.86 kg/m2         BMI: There is no height or weight on file to calculate BMI. This BMI is indicative of:   []Underweight    [x]Normal    []Overweight    [] Obesity   [] Extreme Obesity (BMI>40)      Estimated Nutrition Needs (Based on):   1461 Kcals/day (BMR (1124) x 1. 3AF) , 61 g (1.0 g/kg bw) Protein  Carbohydrate: At Least 130 g/day  Fluids: 1500 mL/day (1ml/kcal)     NUTRITION DIAGNOSES:   Problem:  No nutritional diagnosis at this time      Etiology: related to       Signs/Symptoms: as evidenced by         NUTRITION INTERVENTIONS:  Meals/Snacks: General/healthful diet                   GOAL:   PO intake at least 50% of meals next 5-7 days.  Previous goal met.     LEARNING NEEDS (Diet, Food/Nutrient-Drug Interaction):    [x] None Identified   [] Identified and Education Provided/Documented   [] Identified and Pt declined/was not appropriate      Cultureal, Orthodox, OR Ethnic Dietary Needs:    [x] None Identified   [] Identified and Addressed      [x] Interdisciplinary Care Plan Reviewed/Documented    [x] Discharge Planning:   Continue regular diet     MONITORING /EVALUATION:   Food/Nutrient Intake Outcomes:  Total energy intake  Physical Signs/Symptoms Outcomes: Weight/weight change     NUTRITION RISK:    [] Patient At Nutritional Risk                         [] High              [] Moderate/Mild           []  Low                                       [x] Patient Not At Nutritional Risk     PT SEEN FOR:    []  MD Consult:           []Calorie Count                                                                     []Diabetic Diet Education                                                                                                                                    []Diet Education                                                                    []Electrolyte Management                                                                    []General Nutrition Management and Supplements                                                                    []Management of Tube Feeding                                                                    []TPN Recommendations    [x]  RN Referral:                       []MST score >=2                                                                    []Enteral/Parenteral Nutrition PTA                                                                    []Pregnant: Gestational DM or Multigestation                                                                    []Pressure Ulcer/Wound Care needs      []  Low BMI  []  COLLIN Galdamez, [address]

## 2018-04-12 NOTE — BH NOTES
GROUP THERAPY PROGRESS NOTE    The patient Maia lozano 61 y.o. female is participating in [address] [place] [address]. Group time: 45 minutes    Personal goal for participation: To participate in relaxation activity    Goal orientation:  relaxation    Group therapy participation: active    Therapeutic interventions reviewed and discussed: favorite ways to relax    Impression of participation:  The patient was attentive.     Mahad Mckoy  4/12/2018  2:09 PM

## 2018-04-12 NOTE — BH NOTES
PSYCHIATRIC PROGRESS NOTE         Patient Name  Eller Halsted   Date of Birth 1960   [facility] [id number]   Medical Record Number  [id number]      Age  61 y.o. PCP None   Admit date:  3/30/2018    Room Number  316/01  @ [facility]   Date of Service  4/12/2018           E & M PROGRESS NOTE:         HISTORY       CC:  \"leave me alone\"  HISTORY OF PRESENT ILLNESS/INTERVAL HISTORY:  (reviewed/updated 4/12/2018). per initial evaluation: The patient, Eller Halsted, is a 61 y.o. BLACK OR  female with a past psychiatric history significant for schizoaffective d/o, who presents at this time with complaints of (and/or evidence of) the following emotional symptoms: agitation and mood swings. Additional symptomatology include refusing to talk to team, irritable and angry mood and guarded. Per report pt has been deteriorating in the last 2 month and threatening to kill family members. She has been aggressive towards the , she has been breaking into neighbors apartment and religiously preoccupied. She has poor appetite and refusing to complete her ADL with poor hygiene. She has no insight and does not want to take medication. The above symptoms have been present for . These symptoms are of severe severity. These symptoms are constant in nature. The patient's condition has been precipitated by and psychosocial stressors ( ). Patient's condition made worse by treatment noncompliance. UDS: negative; BAL=0. Eller Halsted presents/reports/evidences the following emotional symptoms today, 4/12/2018:agitation and psychotic behavior. The above symptoms have been present for few month. These symptoms are of severe severity. The symptoms are constant in nature. Additional symptomatology and features include labile and angry mood, responding to internal stimuli, paranoid delusion and poor insight. Refusing to take medication, received prn for agitation. 4/2- left the room and refused to be evaluated. She was seen in the hallway yelling and being loud. Poor sleep, paranoid , responding to internal stimuli and anger outburst last night pt received prn med. Refused po med and has poor insight  4/3- remains labile, paranoid and with poor insight. Responding to internal stimuli. period of threatening behavior and anger outburst. Refusing to speak with the team.  4/4/18 she remains paranoid and she refusing to come to see her treatment team , she refused to take medications and she is court ordered to take medications    4/5/18 she still paranoid and not engaging well and refused to be seen   4/6/18 she still irritable and paranoid and she still refusing treatment team and still angry   4/7/18: patient refuse to come in the conference  room, when went to her room with staff she refuse to speak and left the room,she is still paranoid, refusing treatments,irritable, accepting meals. 04/8/18: Seen during morning round, she remains isolative, refusing po medications, received Haldol IM,paranoid, irritable, doesn not engage in conversation,acceptng meals,gets angry when questions asked or discuss about medications. 4/9/18 she still not want to give information and she still paranoid and still talking to herself and still paranoid  And she sleeping better and still refusing medications   4/11- refusing med selectively, poor insight but sl better affect and participating more in the groups. She is refusing to meet up with tx team. Guarded. 4/12- remains delusional and guarded. Refusing to talk with tx team, refusing po med and receiving court order med      SIDE EFFECTS: (reviewed/updated 4/12/2018)  None reported or admitted to.   No noted toxicity with use of current med   ALLERGIES:(reviewed/updated 4/12/2018)  No Known Allergies   MEDICATIONS PRIOR TO ADMISSION:(reviewed/updated 4/12/2018)  Prescriptions Prior to Admission   Medication Sig    albuterol (PROVENTIL HFA, VENTOLIN HFA, PROAIR HFA) 90 mcg/actuation inhaler Take 2 Puffs by inhalation three (3) times daily as needed for Wheezing or Shortness of Breath. PAST MEDICAL HISTORY: Past medical history from the initial psychiatric evaluation has been reviewed (reviewed/updated 4/12/2018) with no additional updates (I asked patient and no additional past medical history provided). Past Medical History:   Diagnosis Date    Asthma     Hepatitis C    No past surgical history on file. SOCIAL HISTORY: Social history from the initial psychiatric evaluation has been reviewed (reviewed/updated 4/12/2018) with no additional updates (I asked patient and no additional social history provided). Social History     Social History    Marital status: SINGLE     Spouse name: N/A    Number of children: N/A    Years of education: N/A     Occupational History    Not on file. Social History Main Topics    Smoking status: Current Every Day Smoker    Smokeless tobacco: Not on file    Alcohol use Yes    Drug use: Yes     Special: Marijuana    Sexual activity: Yes     Partners: Male     Birth control/ protection: None     Other Topics Concern    Not on file     Social History Narrative    No narrative on file      FAMILY HISTORY: Family history from the initial psychiatric evaluation has been reviewed (reviewed/updated 4/12/2018) with no additional updates (I asked patient and no additional family history provided). No family history on file.     REVIEW OF SYSTEMS: (reviewed/updated 4/12/2018)  Appetite:no change from normal   Sleep: 2 hrs  All other Review of Systems: Psychological ROS: positive for - behavioral disorder, concentration difficulties, hallucinations, hostility, mood swings and psychosis  Respiratory ROS: no cough, shortness of breath, or wheezing  Cardiovascular ROS: no chest pain or dyspnea on exertion  Neurological ROS: no TIA or stroke symptoms         [facility] (MSE):    MSE FINDINGS ARE WITHIN NORMAL LIMITS (WNL) UNLESS OTHERWISE STATED BELOW. ( ALL OF THE BELOW CATEGORIES OF THE MSE HAVE BEEN REVIEWED (reviewed 4/12/2018) AND UPDATED AS DEEMED APPROPRIATE )  General Presentation age appropriate and disheveled, evasive, uncooperative and unreliable   Orientation disorganized   Vital Signs  See below (reviewed 4/12/2018); Vital Signs (BP, Pulse, & Temp) are within normal limits if not listed below.    Gait and Station Stable/steady, no ataxia   Musculoskeletal System No extrapyramidal symptoms (EPS); no abnormal muscular movements or Tardive Dyskinesia (TD); muscle strength and tone are within normal limits   Language No aphasia or dysarthria   Speech:  loud and pressured   Thought Processes illogical; slow rate of thoughts; poor abstract reasoning/computation   Thought Associations tangential   Thought Content paranoid delusions, Adventism delusions, auditory hallucinations and internally preoccupied   Suicidal Ideations none   Homicidal Ideations none   Mood:  hostile  and labile    Affect:  hostile, inappropriate and increased in intensity   Memory recent  impaired   Memory remote:  intact   Concentration/Attention:  distractable and hypervigilance   Fund of Knowledge below average   Insight:  poor   Reliability poor   Judgment:  poor          VITALS:     Patient Vitals for the past 24 hrs:   Temp Pulse Resp BP   04/12/18 1646 98.2 °F (36.8 °C) 79 16 99/60   04/12/18 1456 98 °F (36.7 °C) 75 16 106/64     Wt Readings from Last 3 Encounters:   04/04/18 59 kg (130 lb 1.1 oz)   12/27/15 61.2 kg (135 lb)   11/09/14 65.8 kg (145 lb)     Temp Readings from Last 3 Encounters:   04/12/18 98.2 °F (36.8 °C)   12/27/15 98.7 °F (37.1 °C)   11/09/14 98.7 °F (37.1 °C)     BP Readings from Last 3 Encounters:   04/12/18 99/60   12/27/15 132/65   11/09/14 111/65     Pulse Readings from Last 3 Encounters:   04/12/18 79   12/27/15 76   11/09/14 65            DATA     LABORATORY DATA:(reviewed/updated 4/12/2018)  No results found for this or any previous visit (from the past 24 hour(s)). No results found for: VALF2, VALAC, VALP, VALPR, DS6, CRBAM, CRBAMP, CARB2, XCRBAM  No results found for: LITHM   RADIOLOGY REPORTS:(reviewed/updated 4/12/2018)  No results found.        MEDICATIONS     ALL MEDICATIONS:   Current Facility-Administered Medications   Medication Dose Route Frequency    haloperidol lactate (HALDOL) injection 10 mg +++COURT ORDERED MEDICATION FOR REFUSAL OF PO HALOPERIDOL+++  10 mg IntraMUSCular BID    Or    haloperidol (HALDOL) tablet 10 mg  10 mg Oral BID    benztropine (COGENTIN) tablet 0.5 mg  0.5 mg Oral BID    divalproex ER (DEPAKOTE ER) 24 hour tablet 1,000 mg  1,000 mg Oral QHS    Or    LORazepam (ATIVAN) injection 1 mg +++COURT ORDERED MEDICATION FOR REFUSAL OF PO DEPAKOTE+++  1 mg IntraMUSCular QHS    traZODone (DESYREL) tablet 100 mg  100 mg Oral QHS PRN    ziprasidone (GEODON) 20 mg in sterile water (preservative free) 1 mL injection  20 mg IntraMUSCular BID PRN    OLANZapine (ZyPREXA) tablet 5 mg  5 mg Oral Q6H PRN    benztropine (COGENTIN) tablet 2 mg  2 mg Oral BID PRN    benztropine (COGENTIN) injection 2 mg  2 mg IntraMUSCular BID PRN    LORazepam (ATIVAN) injection 2 mg  2 mg IntraMUSCular Q4H PRN    LORazepam (ATIVAN) tablet 1 mg  1 mg Oral Q4H PRN    acetaminophen (TYLENOL) tablet 650 mg  650 mg Oral Q4H PRN    ibuprofen (MOTRIN) tablet 400 mg  400 mg Oral Q8H PRN    magnesium hydroxide (MILK OF MAGNESIA) 400 mg/5 mL oral suspension 30 mL  30 mL Oral DAILY PRN    nicotine (NICODERM CQ) 21 mg/24 hr patch 1 Patch  1 Patch TransDERmal DAILY PRN      SCHEDULED MEDICATIONS:   Current Facility-Administered Medications   Medication Dose Route Frequency    haloperidol lactate (HALDOL) injection 10 mg +++COURT ORDERED MEDICATION FOR REFUSAL OF PO HALOPERIDOL+++  10 mg IntraMUSCular BID    Or    haloperidol (HALDOL) tablet 10 mg  10 mg Oral BID    benztropine (COGENTIN) tablet 0.5 mg  0.5 mg Oral BID    divalproex ER (DEPAKOTE ER) 24 hour tablet 1,000 mg  1,000 mg Oral QHS    Or    LORazepam (ATIVAN) injection 1 mg +++COURT ORDERED MEDICATION FOR REFUSAL OF PO DEPAKOTE+++  1 mg IntraMUSCular QHS          ASSESSMENT & PLAN     DIAGNOSES REQUIRING ACTIVE TREATMENT AND MONITORING: (reviewed/updated 4/12/2018)  Patient Active Hospital Problem List:    Schizoaffective disorder ([facility].) (3/30/2018)    Assessment:minimal change-  paranoid and Mormon delusion themes, agitation- poor insight and non compliance. Plan: Ct to adjust med- Haldol and Depakote, court order granted     Agitation (3/31/2018)    Assessment: acute due to psychosis. Prn med    Plan: prn med for now   4/4/18 continue medications   4/5/18 continue same medications    4/6/18 continue same medications     4/7/18: no significant change noted, still pyscotic  4/8/18: Refusing medications,recieving court order Haldol IM               Consider Long Acting injectable antipsychotic   4/9/18 contnue same medications   4/11- titrate Haldol and add Cogentin, consider changing Depakote to liquid  4/12- increase Haldol Im dose. Will dc Depakote    I will continue to monitor blood levels (Depakote, ---a drug with a narrow therapeutic index= NTI) and associated labs for drug therapy implemented that require intense monitoring for toxicity as deemed appropriate based on current medication side effects and pharmacodynamically determined drug 1/2 lives. In summary, Soraya Head, is a 61 y.o.  female who presents with a severe exacerbation of the principal diagnosis of Schizoaffective disorder ([facility].)  Patient's condition is worsening/not improving/not stable . Patient requires continued inpatient hospitalization for further stabilization, safety monitoring and medication management.   I will continue to coordinate the provision of individual, milieu, occupational, group, and substance abuse therapies to address target symptoms/diagnoses as deemed appropriate for the individual patient. A coordinated, multidisplinary treatment team round was conducted with the patient (this team consists of the nurse, psychiatric unit pharmcist,  and writer). Complete current electronic health record for patient has been reviewed today including consultant notes, ancillary staff notes, nurses and psychiatric tech notes. Suicide risk assessment completed and patient deemed to be of low risk for suicide at this time. The following regarding medications was addressed during rounds with patient:   the risks and benefits of the proposed medication. The patient was given the opportunity to ask questions. Informed consent given to the use of the above medications. Will continue to adjust psychiatric and non-psychiatric medications (see above \"medication\" section and orders section for details) as deemed appropriate & based upon diagnoses and response to treatment. I will continue to order blood tests/labs and diagnostic tests as deemed appropriate and review results as they become available (see orders for details and above listed lab/test results). I will order psychiatric records from previous [facility] hospitals to further elucidate the nature of patient's psychopathology and review once available. I will gather additional collateral information from friends, family and o/p treatment team to further elucidate the nature of patient's psychopathology and baselline level of psychiatric functioning.          I certify that this patient's inpatient psychiatric hospital services furnished since the previous certification were, and continue to be, required for treatment that could reasonably be expected to improve the patient's condition, or for diagnostic study, and that the patient continues to need, on a daily basis, active treatment furnished directly by or requiring the supervision of inpatient psychiatric facility personnel. In addition, the hospital records show that services furnished were intensive treatment services, admission or related services, or equivalent services.     EXPECTED DISCHARGE DATE/DAY: TBD     DISPOSITION: Home       Signed By:   Aileen Hayes MD  4/12/2018

## 2018-04-12 NOTE — BH NOTES
Pt is alert. She is mostly isolative to her room. Pt does not interact with staff and peers often. Pt denies any needs at this time. Will continue to monitor pt q15 minutes for safety and support.

## 2018-04-13 PROCEDURE — 74011250637 HC RX REV CODE- 250/637: Performed by: PSYCHIATRY & NEUROLOGY

## 2018-04-13 PROCEDURE — 65220000003 HC RM SEMIPRIVATE PSYCH

## 2018-04-13 RX ADMIN — HALOPERIDOL 10 MG: 5 TABLET ORAL at 11:23

## 2018-04-13 RX ADMIN — IBUPROFEN 400 MG: 400 TABLET ORAL at 15:52

## 2018-04-13 RX ADMIN — DIVALPROEX SODIUM 1000 MG: 500 TABLET, FILM COATED, EXTENDED RELEASE ORAL at 21:46

## 2018-04-13 RX ADMIN — HALOPERIDOL 10 MG: 5 TABLET ORAL at 21:46

## 2018-04-13 RX ADMIN — BENZTROPINE MESYLATE 0.5 MG: 1 TABLET ORAL at 11:23

## 2018-04-13 NOTE — BH NOTES
GROUP THERAPY PROGRESS NOTE    The patient Chao Wilkes a 61 y.o. female is participating in [employer]. Group time: 1 hour    Personal goal for participation: To concentrate on selected task    Goal orientation: social    Group therapy participation: active    Therapeutic interventions reviewed and discussed: Crafts, games, music    Impression of participation: The patient was attentive.     Angel Sampson  4/13/2018  5:26 PM

## 2018-04-13 NOTE — BH NOTES
PSYCHIATRIC PROGRESS NOTE         Patient Name  Sophia Kennedy   Date of Birth 1960   [facility] [id number]   Medical Record Number  [id number]      Age  61 y.o. PCP None   Admit date:  3/30/2018    Room Number  294/48  @ [facility]   Date of Service  4/13/2018           E & M PROGRESS NOTE:         HISTORY       CC:  \"leave me alone\"  HISTORY OF PRESENT ILLNESS/INTERVAL HISTORY:  (reviewed/updated 4/13/2018). per initial evaluation: The patient, Sophia Kennedy, is a 61 y.o. BLACK [place]  female with a past psychiatric history significant for schizoaffective d/o, who presents at this time with complaints of (and/or evidence of) the following emotional symptoms: agitation and mood swings. Additional symptomatology include refusing to talk to team, irritable and angry mood and guarded. Per report pt has been deteriorating in the last 2 month and threatening to kill family members. She has been aggressive towards the , she has been breaking into neighbors apartment and religiously preoccupied. She has poor appetite and refusing to complete her ADL with poor hygiene. She has no insight and does not want to take medication. The above symptoms have been present for . These symptoms are of severe severity. These symptoms are constant in nature. The patient's condition has been precipitated by and psychosocial stressors ( ). Patient's condition made worse by treatment noncompliance. UDS: negative; BAL=0. Sophia Kennedy presents/reports/evidences the following emotional symptoms today, 4/13/2018:agitation and psychotic behavior. The above symptoms have been present for few month. These symptoms are of severe severity. The symptoms are constant in nature. Additional symptomatology and features include labile and angry mood, responding to internal stimuli, paranoid delusion and poor insight. Refusing to take medication, received prn for agitation. 4/2- left the room and refused to be evaluated. She was seen in the hallway yelling and being loud. Poor sleep, paranoid , responding to internal stimuli and anger outburst last night pt received prn med. Refused po med and has poor insight  4/3- remains labile, paranoid and with poor insight. Responding to internal stimuli. period of threatening behavior and anger outburst. Refusing to speak with the team.  4/4/18 she remains paranoid and she refusing to come to see her treatment team , she refused to take medications and she is court ordered to take medications    4/5/18 she still paranoid and not engaging well and refused to be seen   4/6/18 she still irritable and paranoid and she still refusing treatment team and still angry   4/7/18: patient refuse to come in the conference  room, when went to her room with staff she refuse to speak and left the room,she is still paranoid, refusing treatments,irritable, accepting meals. 04/8/18: Seen during morning round, she remains isolative, refusing po medications, received Haldol IM,paranoid, irritable, doesn not engage in conversation,acceptng meals,gets angry when questions asked or discuss about medications. 4/9/18 she still not want to give information and she still paranoid and still talking to herself and still paranoid  And she sleeping better and still refusing medications   4/11- refusing med selectively, poor insight but sl better affect and participating more in the groups. She is refusing to meet up with tx team. Guarded. 4/12- remains delusional and guarded. Refusing to talk with tx team, refusing po med and receiving court order med  4/13- pt remains delusional and with poor insight, still refusing to speak to this provider, less agitated and socializing more in the group      SIDE EFFECTS: (reviewed/updated 4/13/2018)  None reported or admitted to.   No noted toxicity with use of current med   ALLERGIES:(reviewed/updated 4/13/2018)  No Known Allergies MEDICATIONS PRIOR TO ADMISSION:(reviewed/updated 4/13/2018)  Prescriptions Prior to Admission   Medication Sig    albuterol (PROVENTIL HFA, VENTOLIN HFA, PROAIR HFA) 90 mcg/actuation inhaler Take 2 Puffs by inhalation three (3) times daily as needed for Wheezing or Shortness of Breath. PAST MEDICAL HISTORY: Past medical history from the initial psychiatric evaluation has been reviewed (reviewed/updated 4/13/2018) with no additional updates (I asked patient and no additional past medical history provided). Past Medical History:   Diagnosis Date    Asthma     Hepatitis C    No past surgical history on file. SOCIAL HISTORY: Social history from the initial psychiatric evaluation has been reviewed (reviewed/updated 4/13/2018) with no additional updates (I asked patient and no additional social history provided). Social History     Social History    Marital status: SINGLE     Spouse name: N/A    Number of children: N/A    Years of education: N/A     Occupational History    Not on file. Social History Main Topics    Smoking status: Current Every Day Smoker    Smokeless tobacco: Not on file    Alcohol use Yes    Drug use: Yes     Special: Marijuana    Sexual activity: Yes     Partners: Male     Birth control/ protection: None     Other Topics Concern    Not on file     Social History Narrative    No narrative on file      FAMILY HISTORY: Family history from the initial psychiatric evaluation has been reviewed (reviewed/updated 4/13/2018) with no additional updates (I asked patient and no additional family history provided). No family history on file.     REVIEW OF SYSTEMS: (reviewed/updated 4/13/2018)  Appetite:no change from normal   Sleep: 2 hrs  All other Review of Systems: Psychological ROS: positive for - behavioral disorder, concentration difficulties, hallucinations, hostility, mood swings and psychosis  Respiratory ROS: no cough, shortness of breath, or wheezing  Cardiovascular ROS: no chest pain or dyspnea on exertion  Neurological ROS: no TIA or stroke symptoms         MENTAL STATUS EXAM & VITALS     MENTAL STATUS EXAM (MSE):    MSE FINDINGS ARE WITHIN NORMAL LIMITS (WNL) UNLESS OTHERWISE STATED BELOW. ( ALL OF THE BELOW CATEGORIES OF THE MSE HAVE BEEN REVIEWED (reviewed 4/13/2018) AND UPDATED AS DEEMED APPROPRIATE )  General Presentation age appropriate and disheveled, evasive, uncooperative and unreliable   Orientation disorganized   Vital Signs  See below (reviewed 4/13/2018); Vital Signs (BP, Pulse, & Temp) are within normal limits if not listed below. Gait and Station Stable/steady, no ataxia   Musculoskeletal System No extrapyramidal symptoms (EPS); no abnormal muscular movements or Tardive Dyskinesia (TD); muscle strength and tone are within normal limits   Language No aphasia or dysarthria   Speech:  loud and pressured   Thought Processes illogical; slow rate of thoughts; poor abstract reasoning/computation   Thought Associations tangential   Thought Content paranoid delusions, Baptist delusions, auditory hallucinations and internally preoccupied   Suicidal Ideations none   Homicidal Ideations none   Mood:  hostile  and labile    Affect:  hostile, inappropriate and increased in intensity   Memory recent  impaired   Memory remote:  intact   Concentration/Attention:  distractable and hypervigilance   Fund of Knowledge below average   Insight:  poor   Reliability poor   Judgment:  poor          VITALS:     No data found.     Wt Readings from Last 3 Encounters:   04/04/18 59 kg (130 lb 1.1 oz)   12/27/15 61.2 kg (135 lb)   11/09/14 65.8 kg (145 lb)     Temp Readings from Last 3 Encounters:   04/12/18 98.2 °F (36.8 °C)   12/27/15 98.7 °F (37.1 °C)   11/09/14 98.7 °F (37.1 °C)     BP Readings from Last 3 Encounters:   04/12/18 99/60   12/27/15 132/65   11/09/14 111/65     Pulse Readings from Last 3 Encounters:   04/12/18 79   12/27/15 76   11/09/14 65            DATA     LABORATORY DATA:(reviewed/updated 4/13/2018)  No results found for this or any previous visit (from the past 24 hour(s)). No results found for: VALF2, VALAC, VALP, VALPR, DS6, CRBAM, CRBAMP, CARB2, XCRBAM  No results found for: LITHM   RADIOLOGY REPORTS:(reviewed/updated 4/13/2018)  No results found.        MEDICATIONS     ALL MEDICATIONS:   Current Facility-Administered Medications   Medication Dose Route Frequency    haloperidol lactate (HALDOL) injection 10 mg +++COURT ORDERED MEDICATION FOR REFUSAL OF PO HALOPERIDOL+++  10 mg IntraMUSCular BID    Or    haloperidol (HALDOL) tablet 10 mg  10 mg Oral BID    LORazepam (ATIVAN) injection 0.5 mg +++COURT ORDERED MEDICATION FOR REFUSAL OF PO DEPAKOTE+++  0.5 mg IntraMUSCular QHS    Or    divalproex ER (DEPAKOTE ER) 24 hour tablet 1,000 mg  1,000 mg Oral QHS    benztropine (COGENTIN) tablet 0.5 mg  0.5 mg Oral BID    traZODone (DESYREL) tablet 100 mg  100 mg Oral QHS PRN    ziprasidone (GEODON) 20 mg in sterile water (preservative free) 1 mL injection  20 mg IntraMUSCular BID PRN    OLANZapine (ZyPREXA) tablet 5 mg  5 mg Oral Q6H PRN    benztropine (COGENTIN) tablet 2 mg  2 mg Oral BID PRN    benztropine (COGENTIN) injection 2 mg  2 mg IntraMUSCular BID PRN    LORazepam (ATIVAN) injection 2 mg  2 mg IntraMUSCular Q4H PRN    LORazepam (ATIVAN) tablet 1 mg  1 mg Oral Q4H PRN    acetaminophen (TYLENOL) tablet 650 mg  650 mg Oral Q4H PRN    ibuprofen (MOTRIN) tablet 400 mg  400 mg Oral Q8H PRN    magnesium hydroxide (MILK OF MAGNESIA) 400 mg/5 mL oral suspension 30 mL  30 mL Oral DAILY PRN    nicotine (NICODERM CQ) 21 mg/24 hr patch 1 Patch  1 Patch TransDERmal DAILY PRN      SCHEDULED MEDICATIONS:   Current Facility-Administered Medications   Medication Dose Route Frequency    haloperidol lactate (HALDOL) injection 10 mg +++COURT ORDERED MEDICATION FOR REFUSAL OF PO HALOPERIDOL+++  10 mg IntraMUSCular BID    Or    haloperidol (HALDOL) tablet 10 mg  10 mg Oral BID  LORazepam (ATIVAN) injection 0.5 mg +++COURT ORDERED MEDICATION FOR REFUSAL OF PO DEPAKOTE+++  0.5 mg IntraMUSCular QHS    Or    divalproex ER (DEPAKOTE ER) 24 hour tablet 1,000 mg  1,000 mg Oral QHS    benztropine (COGENTIN) tablet 0.5 mg  0.5 mg Oral BID          ASSESSMENT & PLAN     DIAGNOSES REQUIRING ACTIVE TREATMENT AND MONITORING: (reviewed/updated 4/13/2018)  Patient Active Hospital Problem List:    Schizoaffective disorder ([facility] (3/30/2018)    Assessment:minimal change-  paranoid and Presybeterian delusion themes, agitation- poor insight and non compliance. Plan: Ct to adjust med- Haldol and Depakote, court order granted     Agitation (3/31/2018)    Assessment: acute due to psychosis. Prn med    Plan: prn med for now   4/4/18 continue medications   4/5/18 continue same medications    4/6/18 continue same medications     4/7/18: no significant change noted, still pyscotic  4/8/18: Refusing medications,recieving court order Haldol IM               Consider Long Acting injectable antipsychotic   4/9/18 contnue same medications   4/11- titrate Haldol and add Cogentin, consider changing Depakote to liquid  4/12- increase Haldol Im dose. Will dc Depakote  4/13- ct with current medication    I will continue to monitor blood levels (Depakote, ---a drug with a narrow therapeutic index= NTI) and associated labs for drug therapy implemented that require intense monitoring for toxicity as deemed appropriate based on current medication side effects and pharmacodynamically determined drug 1/2 lives. In summary, Vonda Martinez, is a 61 y.o.  female who presents with a severe exacerbation of the principal diagnosis of Schizoaffective disorder ([facility]  Patient's condition is worsening/not improving/not stable . Patient requires continued inpatient hospitalization for further stabilization, safety monitoring and medication management.   I will continue to coordinate the provision of individual, milieu, occupational, group, and substance abuse therapies to address target symptoms/diagnoses as deemed appropriate for the individual patient. A coordinated, multidisplinary treatment team round was conducted with the patient (this team consists of the nurse, psychiatric unit pharmcist,  and writer). Complete current electronic health record for patient has been reviewed today including consultant notes, ancillary staff notes, nurses and psychiatric tech notes. Suicide risk assessment completed and patient deemed to be of low risk for suicide at this time. The following regarding medications was addressed during rounds with patient:   the risks and benefits of the proposed medication. The patient was given the opportunity to ask questions. Informed consent given to the use of the above medications. Will continue to adjust psychiatric and non-psychiatric medications (see above \"medication\" section and orders section for details) as deemed appropriate & based upon diagnoses and response to treatment. I will continue to order blood tests/labs and diagnostic tests as deemed appropriate and review results as they become available (see orders for details and above listed lab/test results). I will order psychiatric records from previous [facility] hospitals to further elucidate the nature of patient's psychopathology and review once available. I will gather additional collateral information from friends, family and o/p treatment team to further elucidate the nature of patient's psychopathology and baselline level of psychiatric functioning.          I certify that this patient's inpatient psychiatric hospital services furnished since the previous certification were, and continue to be, required for treatment that could reasonably be expected to improve the patient's condition, or for diagnostic study, and that the patient continues to need, on a daily basis, active treatment furnished directly by or requiring the supervision of inpatient psychiatric facility personnel. In addition, the hospital records show that services furnished were intensive treatment services, admission or related services, or equivalent services.     EXPECTED DISCHARGE DATE/DAY: TBD     DISPOSITION: Home       Signed By:   Layne Geiger MD  4/13/2018

## 2018-04-13 NOTE — BH NOTES
GROUP THERAPY PROGRESS NOTE    Sophia Marcia is participating in reflections.      Group time: 30 minutes    Personal goal for participation: participate in their treatment plan    Goal orientation: personal    Group therapy participation: Patient attended and participated in group this evening    Therapeutic interventions reviewed and discussed: yes    Impression of participation:Positive Participation

## 2018-04-13 NOTE — PROGRESS NOTES
Problem: Altered Thought Process (Adult/Pediatric)  Goal: *STG: Participates in treatment plan  Outcome: Progressing Towards Goal  Pt stated she was doing good, presented Writer with a smile, took all of her schedule medications (even though she expressed her discomfort with taking medication/pills) and had no concerns. Q15 safety monitoring continued.

## 2018-04-13 NOTE — BH NOTES
Holding scheduled morning meds until pt sees Dr. Elvin Sanchez. Endorses she would like medications moved to evening shift. Awaiting to hear back from Dr. Elvin Sanchez.

## 2018-04-13 NOTE — PROGRESS NOTES
Problem: Altered Thought Process (Adult/Pediatric)  Goal: *STG: Remains safe in hospital  Outcome: Progressing Towards Goal  Pt slept for 6 hours and had no signs or symptoms of distress. Pt had no issues through the night. Q15 safety monitoring rounds continued.

## 2018-04-13 NOTE — PROGRESS NOTES
Problem: Altered Thought Process (Adult/Pediatric)  Goal: *STG: Remains safe in hospital  Outcome: Progressing Towards Goal  Patient remains labile. Not participating in treatment team. Patient is took PO medications today. Patient is focused on discharge. remains preoccupied. Attending some groups. Will continue to monitor patient and assess needs.

## 2018-04-13 NOTE — BH NOTES
GROUP THERAPY PROGRESS NOTE    The patient Faisal Kumar a 61 y.o. female is participating in [address] [place] [address]. Group time: 45 minutes    Personal goal for participation:  To identify positive coping strategies a-z    Goal orientation:  personal    Group therapy participation: minimal    Therapeutic interventions reviewed and discussed: worksheet    Impression of participation:  The patient was present-arrived late    Socorro Velazquez  4/13/2018  2:17 PM

## 2018-04-13 NOTE — BH NOTES
Patient continues to speak little and did not communicate with the team again today (though the team saw the patient, she walked away when conversation was initiated). She did take her oral medications last night and this morning as well. Patient was told to keep doing this in order to eventually get discharged. Patient still won't give consent for worker to speak with a family member or anyone else. The patient is not known to Johanny Pacheco and it seems she hasn't had any psychiatric treatment since 1997 when she was last hospitalized. Will continue to provide support through treatment and discharge planning.       Luis Angel Brewer, THALIA

## 2018-04-14 PROCEDURE — 74011250637 HC RX REV CODE- 250/637: Performed by: PSYCHIATRY & NEUROLOGY

## 2018-04-14 PROCEDURE — 65220000003 HC RM SEMIPRIVATE PSYCH

## 2018-04-14 RX ADMIN — BENZTROPINE MESYLATE 0.5 MG: 1 TABLET ORAL at 21:20

## 2018-04-14 RX ADMIN — HALOPERIDOL 10 MG: 5 TABLET ORAL at 21:20

## 2018-04-14 RX ADMIN — DIVALPROEX SODIUM 1000 MG: 500 TABLET, FILM COATED, EXTENDED RELEASE ORAL at 21:20

## 2018-04-14 RX ADMIN — HALOPERIDOL 10 MG: 5 TABLET ORAL at 09:11

## 2018-04-14 NOTE — BH NOTES
PSYCHIATRIC PROGRESS NOTE         Patient Name  Oracio Arevalo   Date of Birth 1960   [facility] [id number]   Medical Record Number  [id number]      Age  61 y.o. PCP None   Admit date:  3/30/2018    Room Number  991/19  @ [facility]   Date of Service  4/14/2018           E & M PROGRESS NOTE:         HISTORY       CC:  \"leave me alone\"  HISTORY OF PRESENT ILLNESS/INTERVAL HISTORY:  (reviewed/updated 4/14/2018). per initial evaluation: The patient, Oracio Arevalo, is a 61 y.o. BLACK OR  female with a past psychiatric history significant for schizoaffective d/o, who presents at this time with complaints of (and/or evidence of) the following emotional symptoms: agitation and mood swings. Additional symptomatology include refusing to talk to team, irritable and angry mood and guarded. Per report pt has been deteriorating in the last 2 month and threatening to kill family members. She has been aggressive towards the , she has been breaking into neighbors apartment and religiously preoccupied. She has poor appetite and refusing to complete her ADL with poor hygiene. She has no insight and does not want to take medication. The above symptoms have been present for . These symptoms are of severe severity. These symptoms are constant in nature. The patient's condition has been precipitated by and psychosocial stressors ( ). Patient's condition made worse by treatment noncompliance. UDS: negative; BAL=0. Oracio Arevalo presents/reports/evidences the following emotional symptoms today, 4/14/2018:agitation and psychotic behavior. The above symptoms have been present for few month. These symptoms are of severe severity. The symptoms are constant in nature. Additional symptomatology and features include labile and angry mood, responding to internal stimuli, paranoid delusion and poor insight. Refusing to take medication, received prn for agitation. 4/2- left the room and refused to be evaluated. She was seen in the hallway yelling and being loud. Poor sleep, paranoid , responding to internal stimuli and anger outburst last night pt received prn med. Refused po med and has poor insight  4/3- remains labile, paranoid and with poor insight. Responding to internal stimuli. period of threatening behavior and anger outburst. Refusing to speak with the team.  4/4/18 she remains paranoid and she refusing to come to see her treatment team , she refused to take medications and she is court ordered to take medications    4/5/18 she still paranoid and not engaging well and refused to be seen   4/6/18 she still irritable and paranoid and she still refusing treatment team and still angry   4/7/18: patient refuse to come in the conference  room, when went to her room with staff she refuse to speak and left the room,she is still paranoid, refusing treatments,irritable, accepting meals. 04/8/18: Seen during morning round, she remains isolative, refusing po medications, received Haldol IM,paranoid, irritable, doesn not engage in conversation,acceptng meals,gets angry when questions asked or discuss about medications. 4/9/18 she still not want to give information and she still paranoid and still talking to herself and still paranoid  And she sleeping better and still refusing medications   4/11- refusing med selectively, poor insight but sl better affect and participating more in the groups. She is refusing to meet up with tx team. Guarded. 4/12- remains delusional and guarded. Refusing to talk with tx team, refusing po med and receiving court order med  4/13- pt remains delusional and with poor insight, still refusing to speak to this provider, less agitated and socializing more in the group  4/14- Pt is very unhappy to be in the hospital. Engages only briefly and wants to be discharged. Compliant with meds. No prn's were used.         SIDE EFFECTS: (reviewed/updated 4/14/2018)  None reported or admitted to. No noted toxicity with use of current med   ALLERGIES:(reviewed/updated 4/14/2018)  No Known Allergies   MEDICATIONS PRIOR TO ADMISSION:(reviewed/updated 4/14/2018)  Prescriptions Prior to Admission   Medication Sig    albuterol (PROVENTIL HFA, VENTOLIN HFA, PROAIR HFA) 90 mcg/actuation inhaler Take 2 Puffs by inhalation three (3) times daily as needed for Wheezing or Shortness of Breath. PAST MEDICAL HISTORY: Past medical history from the initial psychiatric evaluation has been reviewed (reviewed/updated 4/14/2018) with no additional updates (I asked patient and no additional past medical history provided). Past Medical History:   Diagnosis Date    Asthma     Hepatitis C    No past surgical history on file. SOCIAL HISTORY: Social history from the initial psychiatric evaluation has been reviewed (reviewed/updated 4/14/2018) with no additional updates (I asked patient and no additional social history provided). Social History     Social History    Marital status: SINGLE     Spouse name: N/A    Number of children: N/A    Years of education: N/A     Occupational History    Not on file. Social History Main Topics    Smoking status: Current Every Day Smoker    Smokeless tobacco: Not on file    Alcohol use Yes    Drug use: Yes     Special: Marijuana    Sexual activity: Yes     Partners: Male     Birth control/ protection: None     Other Topics Concern    Not on file     Social History Narrative    No narrative on file      FAMILY HISTORY: Family history from the initial psychiatric evaluation has been reviewed (reviewed/updated 4/14/2018) with no additional updates (I asked patient and no additional family history provided). No family history on file.     REVIEW OF SYSTEMS: (reviewed/updated 4/14/2018)  Appetite:no change from normal   Sleep: 2 hrs  All other Review of Systems: Psychological ROS: positive for - behavioral disorder, concentration difficulties, hallucinations, hostility, mood swings and psychosis  Respiratory ROS: no cough, shortness of breath, or wheezing  Cardiovascular ROS: no chest pain or dyspnea on exertion  Neurological ROS: no TIA or stroke symptoms         MENTAL STATUS EXAM & VITALS     MENTAL STATUS EXAM (MSE):    MSE FINDINGS ARE WITHIN NORMAL LIMITS (WNL) UNLESS OTHERWISE STATED BELOW. ( ALL OF THE BELOW CATEGORIES OF THE MSE HAVE BEEN REVIEWED (reviewed 4/14/2018) AND UPDATED AS DEEMED APPROPRIATE )  General Presentation age appropriate and disheveled, evasive, uncooperative and unreliable   Orientation disorganized   Vital Signs  See below (reviewed 4/14/2018); Vital Signs (BP, Pulse, & Temp) are within normal limits if not listed below.    Gait and Station Stable/steady, no ataxia   Musculoskeletal System No extrapyramidal symptoms (EPS); no abnormal muscular movements or Tardive Dyskinesia (TD); muscle strength and tone are within normal limits   Language No aphasia or dysarthria   Speech:  loud and pressured   Thought Processes illogical; slow rate of thoughts; poor abstract reasoning/computation   Thought Associations tangential   Thought Content paranoid delusions, Gnosticist delusions, auditory hallucinations and internally preoccupied   Suicidal Ideations none   Homicidal Ideations none   Mood:  irritable   Affect:  irritable, inappropriate and increased in intensity   Memory recent  impaired   Memory remote:  intact   Concentration/Attention:  distractable and hypervigilance   Fund of Knowledge below average   Insight:  poor   Reliability poor   Judgment:  poor          VITALS:     Patient Vitals for the past 24 hrs:   Temp Pulse Resp BP   04/14/18 1400 98.2 °F (36.8 °C) 75 16 101/57     Wt Readings from Last 3 Encounters:   04/04/18 59 kg (130 lb 1.1 oz)   12/27/15 61.2 kg (135 lb)   11/09/14 65.8 kg (145 lb)     Temp Readings from Last 3 Encounters:   04/14/18 98.2 °F (36.8 °C)   12/27/15 98.7 °F (37.1 °C)   11/09/14 98.7 °F (37.1 °C)     BP Readings from Last 3 Encounters:   04/14/18 101/57   12/27/15 132/65   11/09/14 111/65     Pulse Readings from Last 3 Encounters:   04/14/18 75   12/27/15 76   11/09/14 65            DATA     LABORATORY DATA:(reviewed/updated 4/14/2018)  No results found for this or any previous visit (from the past 24 hour(s)). No results found for: VALF2, VALAC, VALP, VALPR, DS6, CRBAM, CRBAMP, CARB2, XCRBAM  No results found for: LITHM   RADIOLOGY REPORTS:(reviewed/updated 4/14/2018)  No results found.        MEDICATIONS     ALL MEDICATIONS:   Current Facility-Administered Medications   Medication Dose Route Frequency    haloperidol lactate (HALDOL) injection 10 mg +++COURT ORDERED MEDICATION FOR REFUSAL OF PO HALOPERIDOL+++  10 mg IntraMUSCular BID    Or    haloperidol (HALDOL) tablet 10 mg  10 mg Oral BID    LORazepam (ATIVAN) injection 0.5 mg +++COURT ORDERED MEDICATION FOR REFUSAL OF PO DEPAKOTE+++  0.5 mg IntraMUSCular QHS    Or    divalproex ER (DEPAKOTE ER) 24 hour tablet 1,000 mg  1,000 mg Oral QHS    benztropine (COGENTIN) tablet 0.5 mg  0.5 mg Oral BID    traZODone (DESYREL) tablet 100 mg  100 mg Oral QHS PRN    ziprasidone (GEODON) 20 mg in sterile water (preservative free) 1 mL injection  20 mg IntraMUSCular BID PRN    OLANZapine (ZyPREXA) tablet 5 mg  5 mg Oral Q6H PRN    benztropine (COGENTIN) tablet 2 mg  2 mg Oral BID PRN    benztropine (COGENTIN) injection 2 mg  2 mg IntraMUSCular BID PRN    LORazepam (ATIVAN) injection 2 mg  2 mg IntraMUSCular Q4H PRN    LORazepam (ATIVAN) tablet 1 mg  1 mg Oral Q4H PRN    acetaminophen (TYLENOL) tablet 650 mg  650 mg Oral Q4H PRN    ibuprofen (MOTRIN) tablet 400 mg  400 mg Oral Q8H PRN    magnesium hydroxide (MILK OF MAGNESIA) 400 mg/5 mL oral suspension 30 mL  30 mL Oral DAILY PRN    nicotine (NICODERM CQ) 21 mg/24 hr patch 1 Patch  1 Patch TransDERmal DAILY PRN      SCHEDULED MEDICATIONS:   Current Facility-Administered Medications Medication Dose Route Frequency    haloperidol lactate (HALDOL) injection 10 mg +++COURT ORDERED MEDICATION FOR REFUSAL OF PO HALOPERIDOL+++  10 mg IntraMUSCular BID    Or    haloperidol (HALDOL) tablet 10 mg  10 mg Oral BID    LORazepam (ATIVAN) injection 0.5 mg +++COURT ORDERED MEDICATION FOR REFUSAL OF PO DEPAKOTE+++  0.5 mg IntraMUSCular QHS    Or    divalproex ER (DEPAKOTE ER) 24 hour tablet 1,000 mg  1,000 mg Oral QHS    benztropine (COGENTIN) tablet 0.5 mg  0.5 mg Oral BID          ASSESSMENT & PLAN     DIAGNOSES REQUIRING ACTIVE TREATMENT AND MONITORING: (reviewed/updated 4/14/2018)  Patient Active Hospital Problem List:    [facility] (3/30/2018)    Assessment:minimal change-  paranoid and Orthodox delusion themes, agitation- poor insight and non compliance. Plan: Ct to adjust med- Haldol and Depakote, court order granted     Agitation (3/31/2018)    Assessment: acute due to psychosis. Prn med    Plan: prn med for now   4/4/18 continue medications   4/5/18 continue same medications    4/6/18 continue same medications     4/7/18: no significant change noted, still pyscotic  4/8/18: Refusing medications,recieving court order Haldol IM               Consider Long Acting injectable antipsychotic   4/9/18 contnue same medications   4/11- titrate Haldol and add Cogentin, consider changing Depakote to liquid  4/12- increase Haldol Im dose. Will dc Depakote  4/13- ct with current medication    I will continue to monitor blood levels (Depakote, ---a drug with a narrow therapeutic index= NTI) and associated labs for drug therapy implemented that require intense monitoring for toxicity as deemed appropriate based on current medication side effects and pharmacodynamically determined drug 1/2 lives.     In summary, Jannette De La Rosa, is a 61 y.o.  female who presents with a severe exacerbation of the principal diagnosis of Schizoaffective disorder ([facility]  Patient's condition is worsening/not improving/not stable . Patient requires continued inpatient hospitalization for further stabilization, safety monitoring and medication management. I will continue to coordinate the provision of individual, milieu, occupational, group, and substance abuse therapies to address target symptoms/diagnoses as deemed appropriate for the individual patient. A coordinated, multidisplinary treatment team round was conducted with the patient (this team consists of the nurse, psychiatric unit pharmcist,  and writer). Complete current electronic health record for patient has been reviewed today including consultant notes, ancillary staff notes, nurses and psychiatric tech notes. Suicide risk assessment completed and patient deemed to be of low risk for suicide at this time. The following regarding medications was addressed during rounds with patient:   the risks and benefits of the proposed medication. The patient was given the opportunity to ask questions. Informed consent given to the use of the above medications. Will continue to adjust psychiatric and non-psychiatric medications (see above \"medication\" section and orders section for details) as deemed appropriate & based upon diagnoses and response to treatment. I will continue to order blood tests/labs and diagnostic tests as deemed appropriate and review results as they become available (see orders for details and above listed lab/test results). I will order psychiatric records from previous [facility] hospitals to further elucidate the nature of patient's psychopathology and review once available. I will gather additional collateral information from friends, family and o/p treatment team to further elucidate the nature of patient's psychopathology and baselline level of psychiatric functioning.          I certify that this patient's inpatient psychiatric hospital services furnished since the previous certification were, and continue to be, required for treatment that could reasonably be expected to improve the patient's condition, or for diagnostic study, and that the patient continues to need, on a daily basis, active treatment furnished directly by or requiring the supervision of inpatient psychiatric facility personnel. In addition, the hospital records show that services furnished were intensive treatment services, admission or related services, or equivalent services.     EXPECTED DISCHARGE DATE/DAY: TBD     DISPOSITION: Home       Signed By:   Ronak Winter MD  4/14/2018

## 2018-04-14 NOTE — BH NOTES
GROUP THERAPY PROGRESS NOTE    Deniz Fox is participating in [employer].      Group time: 30 minutes    Personal goal for participation:  Unit orientation    Goal orientation: Community    Group therapy participation: Pt die not attend    Therapeutic interventions reviewed and discussed: Yes    Impression of participation:N/A

## 2018-04-14 NOTE — BH NOTES
Patient was observed to be sleeping for 4 hours without any distress and even respirations. Q 15 minutes monitoring maintained for the safety and support.

## 2018-04-14 NOTE — PROGRESS NOTES
Problem: Altered Thought Process (Adult/Pediatric)  Goal: *STG: Seeks staff when feelings of anxiety and fear arise  Outcome: Progressing Towards Goal  Patient is alert and oriented. Stayed in her room for most of the shift, out only at meal time. Compliant with medication to this time today. Denies SI / HI, pain or discomfort. Continue to monitor the patient's status and assess needs.

## 2018-04-15 PROCEDURE — 74011250636 HC RX REV CODE- 250/636: Performed by: PSYCHIATRY & NEUROLOGY

## 2018-04-15 PROCEDURE — 65220000003 HC RM SEMIPRIVATE PSYCH

## 2018-04-15 PROCEDURE — 74011250637 HC RX REV CODE- 250/637: Performed by: PSYCHIATRY & NEUROLOGY

## 2018-04-15 RX ADMIN — LORAZEPAM 0.5 MG: 2 INJECTION, SOLUTION INTRAMUSCULAR; INTRAVENOUS at 22:15

## 2018-04-15 RX ADMIN — HALOPERIDOL 10 MG: 5 TABLET ORAL at 09:13

## 2018-04-15 RX ADMIN — IBUPROFEN 400 MG: 400 TABLET ORAL at 19:11

## 2018-04-15 RX ADMIN — BENZTROPINE MESYLATE 0.5 MG: 1 TABLET ORAL at 09:13

## 2018-04-15 RX ADMIN — BENZTROPINE MESYLATE 0.5 MG: 1 TABLET ORAL at 22:04

## 2018-04-15 RX ADMIN — HALOPERIDOL LACTATE 10 MG: 5 INJECTION, SOLUTION INTRAMUSCULAR at 22:14

## 2018-04-15 NOTE — BH NOTES
GROUP THERAPY PROGRESS NOTE    The patient Media Lax a 61 y.o. female is participating in [employer]    Group time: 30 minutes    Personal goal for participation: To discuss the daily goals    Goal orientation: personal    Group therapy participation: passive    Therapeutic interventions reviewed and discussed:  Yes    Impression of participation:lulu Liang  4/14/2018  10:14 PM

## 2018-04-15 NOTE — PROGRESS NOTES
Problem: Altered Thought Process (Adult/Pediatric)  Goal: *STG: Seeks staff when feelings of anxiety and fear arise  Outcome: Progressing Towards Goal  Patient is alert and oriented. Visible on the unit, interacting appropriately with no behavioral problems. Hesitant to take medication initially at [facility] but eventually accepted [facility] encouragement. Medication and meal compliant. Denies SI / HI, pain or discomfort. Continue to monitor the patient's status and assess needs.

## 2018-04-15 NOTE — PROGRESS NOTES
Problem: Altered Thought Process (Adult/Pediatric)  Goal: *STG: Attends activities and groups  Outcome: Progressing Towards Goal  Pt is alert. She is compliant with medications and mels. Pt is visible on the unit with appropriate interactions with staff and peers. Pt is focused on discharge. Pt denies A/V hallucinations and denies S/H ideations. Will continue to monitor pt q15 minutes for safety and support.

## 2018-04-15 NOTE — BH NOTES
GROUP THERAPY PROGRESS NOTE    The patient Deniz Fox is participating in [employer]. Group time: 30 minutes    Personal goal for participation: to orient the patient to the unit.     Goal orientation: successful adoption of unit rules    Group therapy participation: minimal    Therapeutic interventions reviewed and discussed: Yes    Impression of participation:minimal  Sg Tello  4/15/2018 2:12 PM

## 2018-04-15 NOTE — BH NOTES
PSYCHIATRIC PROGRESS NOTE         Patient Name  Deniz Fox   Date of Birth 1960   [facility] [id number]   Medical Record Number  [id number]      Age  61 y.o. PCP None   Admit date:  3/30/2018    Room Number  871/51  @ [facility]   Date of Service  4/15/2018           E & M PROGRESS NOTE:         HISTORY       CC:  \"leave me alone\"  HISTORY OF PRESENT ILLNESS/INTERVAL HISTORY:  (reviewed/updated 4/15/2018). per initial evaluation: The patient, Deniz Fox, is a 61 y.o. BLACK OR  female with a past psychiatric history significant for schizoaffective d/o, who presents at this time with complaints of (and/or evidence of) the following emotional symptoms: agitation and mood swings. Additional symptomatology include refusing to talk to team, irritable and angry mood and guarded. Per report pt has been deteriorating in the last 2 month and threatening to kill family members. She has been aggressive towards the , she has been breaking into neighbors apartment and religiously preoccupied. She has poor appetite and refusing to complete her ADL with poor hygiene. She has no insight and does not want to take medication. The above symptoms have been present for . These symptoms are of severe severity. These symptoms are constant in nature. The patient's condition has been precipitated by and psychosocial stressors ( ). Patient's condition made worse by treatment noncompliance. UDS: negative; BAL=0. Deniz Fox presents/reports/evidences the following emotional symptoms today, 4/15/2018:agitation and psychotic behavior. The above symptoms have been present for few month. These symptoms are of severe severity. The symptoms are constant in nature. Additional symptomatology and features include labile and angry mood, responding to internal stimuli, paranoid delusion and poor insight. Refusing to take medication, received prn for agitation. 4/2- left the room and refused to be evaluated. She was seen in the hallway yelling and being loud. Poor sleep, paranoid , responding to internal stimuli and anger outburst last night pt received prn med. Refused po med and has poor insight  4/3- remains labile, paranoid and with poor insight. Responding to internal stimuli. period of threatening behavior and anger outburst. Refusing to speak with the team.  4/4/18 she remains paranoid and she refusing to come to see her treatment team , she refused to take medications and she is court ordered to take medications    4/5/18 she still paranoid and not engaging well and refused to be seen   4/6/18 she still irritable and paranoid and she still refusing treatment team and still angry   4/7/18: patient refuse to come in the conference  room, when went to her room with staff she refuse to speak and left the room,she is still paranoid, refusing treatments,irritable, accepting meals. 04/8/18: Seen during morning round, she remains isolative, refusing po medications, received Haldol IM,paranoid, irritable, doesn not engage in conversation,acceptng meals,gets angry when questions asked or discuss about medications. 4/9/18 she still not want to give information and she still paranoid and still talking to herself and still paranoid  And she sleeping better and still refusing medications   4/11- refusing med selectively, poor insight but sl better affect and participating more in the groups. She is refusing to meet up with tx team. Guarded. 4/12- remains delusional and guarded. Refusing to talk with tx team, refusing po med and receiving court order med  4/13- pt remains delusional and with poor insight, still refusing to speak to this provider, less agitated and socializing more in the group  4/14- Pt is very unhappy to be in the hospital. Engages only briefly and wants to be discharged. Compliant with meds. No prn's were used. 4/15-Her focus remains on her discharge. Slept 7 hrs. No prn's were used. Mood/affect remains irritable No SI/HI      SIDE EFFECTS: (reviewed/updated 4/15/2018)  None reported or admitted to. No noted toxicity with use of current med   ALLERGIES:(reviewed/updated 4/15/2018)  No Known Allergies   MEDICATIONS PRIOR TO ADMISSION:(reviewed/updated 4/15/2018)  Prescriptions Prior to Admission   Medication Sig    albuterol (PROVENTIL HFA, VENTOLIN HFA, PROAIR HFA) 90 mcg/actuation inhaler Take 2 Puffs by inhalation three (3) times daily as needed for Wheezing or Shortness of Breath. PAST MEDICAL HISTORY: Past medical history from the initial psychiatric evaluation has been reviewed (reviewed/updated 4/15/2018) with no additional updates (I asked patient and no additional past medical history provided). Past Medical History:   Diagnosis Date    Asthma     Hepatitis C    No past surgical history on file. SOCIAL HISTORY: Social history from the initial psychiatric evaluation has been reviewed (reviewed/updated 4/15/2018) with no additional updates (I asked patient and no additional social history provided). Social History     Social History    Marital status: SINGLE     Spouse name: N/A    Number of children: N/A    Years of education: N/A     Occupational History    Not on file. Social History Main Topics    Smoking status: Current Every Day Smoker    Smokeless tobacco: Not on file    Alcohol use Yes    Drug use: Yes     Special: Marijuana    Sexual activity: Yes     Partners: Male     Birth control/ protection: None     Other Topics Concern    Not on file     Social History Narrative    No narrative on file      FAMILY HISTORY: Family history from the initial psychiatric evaluation has been reviewed (reviewed/updated 4/15/2018) with no additional updates (I asked patient and no additional family history provided). No family history on file.     REVIEW OF SYSTEMS: (reviewed/updated 4/15/2018)  Appetite:no change from normal   Sleep: 2 hrs  All other Review of Systems: Psychological ROS: positive for - behavioral disorder, concentration difficulties, hallucinations, hostility, mood swings and psychosis  Respiratory ROS: no cough, shortness of breath, or wheezing  Cardiovascular ROS: no chest pain or dyspnea on exertion  Neurological ROS: no TIA or stroke symptoms         [facility] (MSE):    MSE FINDINGS ARE WITHIN NORMAL LIMITS (WNL) UNLESS OTHERWISE STATED BELOW. ( ALL OF THE BELOW CATEGORIES OF THE MSE HAVE BEEN REVIEWED (reviewed 4/15/2018) AND UPDATED AS DEEMED APPROPRIATE )  General Presentation age appropriate and disheveled, evasive, uncooperative and unreliable   Orientation disorganized   Vital Signs  See below (reviewed 4/15/2018); Vital Signs (BP, Pulse, & Temp) are within normal limits if not listed below.    Gait and Station Stable/steady, no ataxia   Musculoskeletal System No extrapyramidal symptoms (EPS); no abnormal muscular movements or Tardive Dyskinesia (TD); muscle strength and tone are within normal limits   Language No aphasia or dysarthria   Speech:  loud and pressured   Thought Processes illogical; slow rate of thoughts; poor abstract reasoning/computation   Thought Associations tangential   Thought Content paranoid delusions, Yazidism delusions, auditory hallucinations and internally preoccupied   Suicidal Ideations none   Homicidal Ideations none   Mood:  irritable   Affect:  irritable, inappropriate and increased in intensity   Memory recent  impaired   Memory remote:  intact   Concentration/Attention:  distractable and hypervigilance   Fund of Knowledge below average   Insight:  poor   Reliability poor   Judgment:  poor          VITALS:     Patient Vitals for the past 24 hrs:   Temp Pulse Resp BP   04/15/18 1200 98.2 °F (36.8 °C) 74 16 109/55   04/14/18 2127 99 °F (37.2 °C) 77 16 128/67     Wt Readings from Last 3 Encounters:   04/04/18 59 kg (130 lb 1.1 oz)   12/27/15 61.2 kg (135 lb)   11/09/14 65.8 kg (145 lb)     Temp Readings from Last 3 Encounters:   04/15/18 98.2 °F (36.8 °C)   12/27/15 98.7 °F (37.1 °C)   11/09/14 98.7 °F (37.1 °C)     BP Readings from Last 3 Encounters:   04/15/18 109/55   12/27/15 132/65   11/09/14 111/65     Pulse Readings from Last 3 Encounters:   04/15/18 74   12/27/15 76   11/09/14 65            DATA     LABORATORY DATA:(reviewed/updated 4/15/2018)  No results found for this or any previous visit (from the past 24 hour(s)). No results found for: VALF2, VALAC, VALP, VALPR, DS6, CRBAM, CRBAMP, CARB2, XCRBAM  No results found for: LITHM   RADIOLOGY REPORTS:(reviewed/updated 4/15/2018)  No results found.        MEDICATIONS     ALL MEDICATIONS:   Current Facility-Administered Medications   Medication Dose Route Frequency    haloperidol lactate (HALDOL) injection 10 mg +++COURT ORDERED MEDICATION FOR REFUSAL OF PO HALOPERIDOL+++  10 mg IntraMUSCular BID    Or    haloperidol (HALDOL) tablet 10 mg  10 mg Oral BID    LORazepam (ATIVAN) injection 0.5 mg +++COURT ORDERED MEDICATION FOR REFUSAL OF PO DEPAKOTE+++  0.5 mg IntraMUSCular QHS    Or    divalproex ER (DEPAKOTE ER) 24 hour tablet 1,000 mg  1,000 mg Oral QHS    benztropine (COGENTIN) tablet 0.5 mg  0.5 mg Oral BID    traZODone (DESYREL) tablet 100 mg  100 mg Oral QHS PRN    ziprasidone (GEODON) 20 mg in sterile water (preservative free) 1 mL injection  20 mg IntraMUSCular BID PRN    OLANZapine (ZyPREXA) tablet 5 mg  5 mg Oral Q6H PRN    benztropine (COGENTIN) tablet 2 mg  2 mg Oral BID PRN    benztropine (COGENTIN) injection 2 mg  2 mg IntraMUSCular BID PRN    LORazepam (ATIVAN) injection 2 mg  2 mg IntraMUSCular Q4H PRN    LORazepam (ATIVAN) tablet 1 mg  1 mg Oral Q4H PRN    acetaminophen (TYLENOL) tablet 650 mg  650 mg Oral Q4H PRN    ibuprofen (MOTRIN) tablet 400 mg  400 mg Oral Q8H PRN    magnesium hydroxide (MILK OF MAGNESIA) 400 mg/5 mL oral suspension 30 mL  30 mL Oral DAILY PRN    nicotine (NICODERM CQ) 21 mg/24 hr patch 1 Patch  1 Patch TransDERmal DAILY PRN      SCHEDULED MEDICATIONS:   Current Facility-Administered Medications   Medication Dose Route Frequency    haloperidol lactate (HALDOL) injection 10 mg +++COURT ORDERED MEDICATION FOR REFUSAL OF PO HALOPERIDOL+++  10 mg IntraMUSCular BID    Or    haloperidol (HALDOL) tablet 10 mg  10 mg Oral BID    LORazepam (ATIVAN) injection 0.5 mg +++COURT ORDERED MEDICATION FOR REFUSAL OF PO DEPAKOTE+++  0.5 mg IntraMUSCular QHS    Or    divalproex ER (DEPAKOTE ER) 24 hour tablet 1,000 mg  1,000 mg Oral QHS    benztropine (COGENTIN) tablet 0.5 mg  0.5 mg Oral BID          ASSESSMENT & PLAN     DIAGNOSES REQUIRING ACTIVE TREATMENT AND MONITORING: (reviewed/updated 4/15/2018)  Patient Active Hospital Problem List:    Schizoaffective disorder ([facility] [address] (3/30/2018)    Assessment:minimal change-  paranoid and Lutheran delusion themes, agitation- poor insight and non compliance. Plan: Ct to adjust med- Haldol and Depakote, court order granted     Agitation (3/31/2018)    Assessment: acute due to psychosis. Prn med    Plan: prn med for now   4/4/18 continue medications   4/5/18 continue same medications    4/6/18 continue same medications     4/7/18: no significant change noted, still pyscotic  4/8/18: Refusing medications,recieving court order Haldol IM               Consider Long Acting injectable antipsychotic   4/9/18 contnue same medications   4/11- titrate Haldol and add Cogentin, consider changing Depakote to liquid  4/12- increase Haldol Im dose. Will dc Depakote  4/13- ct with current medication    I will continue to monitor blood levels (Depakote, ---a drug with a narrow therapeutic index= NTI) and associated labs for drug therapy implemented that require intense monitoring for toxicity as deemed appropriate based on current medication side effects and pharmacodynamically determined drug 1/2 lives.     In summaryEdilson is a 61 y.o.  female who presents with a severe exacerbation of the principal diagnosis of Schizoaffective disorder ([facility] [address]  Patient's condition is worsening/not improving/not stable . Patient requires continued inpatient hospitalization for further stabilization, safety monitoring and medication management. I will continue to coordinate the provision of individual, milieu, occupational, group, and substance abuse therapies to address target symptoms/diagnoses as deemed appropriate for the individual patient. A coordinated, multidisplinary treatment team round was conducted with the patient (this team consists of the nurse, psychiatric unit pharmcist,  and writer). Complete current electronic health record for patient has been reviewed today including consultant notes, ancillary staff notes, nurses and psychiatric tech notes. Suicide risk assessment completed and patient deemed to be of low risk for suicide at this time. The following regarding medications was addressed during rounds with patient:   the risks and benefits of the proposed medication. The patient was given the opportunity to ask questions. Informed consent given to the use of the above medications. Will continue to adjust psychiatric and non-psychiatric medications (see above \"medication\" section and orders section for details) as deemed appropriate & based upon diagnoses and response to treatment. I will continue to order blood tests/labs and diagnostic tests as deemed appropriate and review results as they become available (see orders for details and above listed lab/test results). I will order psychiatric records from previous [facility] hospitals to further elucidate the nature of patient's psychopathology and review once available.     I will gather additional collateral information from friends, family and o/p treatment team to further elucidate the nature of patient's psychopathology and baselline level of psychiatric functioning. I certify that this patient's inpatient psychiatric hospital services furnished since the previous certification were, and continue to be, required for treatment that could reasonably be expected to improve the patient's condition, or for diagnostic study, and that the patient continues to need, on a daily basis, active treatment furnished directly by or requiring the supervision of inpatient psychiatric facility personnel. In addition, the hospital records show that services furnished were intensive treatment services, admission or related services, or equivalent services.     EXPECTED DISCHARGE DATE/DAY: TBD     DISPOSITION: Home       Signed By:   Connie Hashimoto, MD  4/15/2018

## 2018-04-16 LAB
CHOLEST SERPL-MCNC: 134 MG/DL
EST. AVERAGE GLUCOSE BLD GHB EST-MCNC: 114 MG/DL
HBA1C MFR BLD: 5.6 % (ref 4.2–6.3)
HDLC SERPL-MCNC: 46 MG/DL
HDLC SERPL: 2.9 {RATIO} (ref 0–5)
LDLC SERPL CALC-MCNC: 75 MG/DL (ref 0–100)
LIPID PROFILE,FLP: NORMAL
TRIGL SERPL-MCNC: 65 MG/DL (ref ?–150)
TSH SERPL DL<=0.05 MIU/L-ACNC: 0.55 UIU/ML (ref 0.36–3.74)
VLDLC SERPL CALC-MCNC: 13 MG/DL

## 2018-04-16 PROCEDURE — 65220000003 HC RM SEMIPRIVATE PSYCH

## 2018-04-16 PROCEDURE — 84443 ASSAY THYROID STIM HORMONE: CPT | Performed by: PSYCHIATRY & NEUROLOGY

## 2018-04-16 PROCEDURE — 83036 HEMOGLOBIN GLYCOSYLATED A1C: CPT | Performed by: PSYCHIATRY & NEUROLOGY

## 2018-04-16 PROCEDURE — 74011250637 HC RX REV CODE- 250/637: Performed by: PSYCHIATRY & NEUROLOGY

## 2018-04-16 PROCEDURE — 80061 LIPID PANEL: CPT | Performed by: PSYCHIATRY & NEUROLOGY

## 2018-04-16 PROCEDURE — 74011250636 HC RX REV CODE- 250/636: Performed by: PSYCHIATRY & NEUROLOGY

## 2018-04-16 RX ORDER — HALOPERIDOL DECANOATE 100 MG/ML
100 INJECTION INTRAMUSCULAR
Status: DISCONTINUED | OUTPATIENT
Start: 2018-04-17 | End: 2018-04-23 | Stop reason: HOSPADM

## 2018-04-16 RX ADMIN — BENZTROPINE MESYLATE 0.5 MG: 1 TABLET ORAL at 21:02

## 2018-04-16 RX ADMIN — HALOPERIDOL 10 MG: 5 TABLET ORAL at 21:02

## 2018-04-16 RX ADMIN — DIVALPROEX SODIUM 1000 MG: 500 TABLET, FILM COATED, EXTENDED RELEASE ORAL at 21:02

## 2018-04-16 RX ADMIN — HALOPERIDOL LACTATE 10 MG: 5 INJECTION, SOLUTION INTRAMUSCULAR at 08:13

## 2018-04-16 NOTE — BH NOTES
Patient was observed to be sleeping for 6 hours without any distress and even respirations. Q 15 minutes monitoring maintained for the safety and support. Pt was cooperative with blood drawn for the labs.

## 2018-04-16 NOTE — BH NOTES
Alert and oriented to all spheres. Pt.'s self care skills and grooming habits are poor; dishelved. Pt. Is compliant with prescribed diet. During the shift this pt. have been visible in the milieu. Presents as being preoccupied, paranoid, and delusional.  Fixated on discharge. Pt. denies A/V hallucinations and S/H ideations. Encourages handling of personal hygiene skills. Offers support and anticipates needs. Q-15 minute checks maintained for safety.

## 2018-04-16 NOTE — BH NOTES
GROUP THERAPY PROGRESS NOTE    The patient Maia Bahena a 61 y.o. female is participating in [facility]    Group time: 30 minutes    Personal goal for participation: To discuss the daily goals    Goal orientation: personal    Group therapy participation: passive    Therapeutic interventions reviewed and discussed:  Yes    Impression of participation: lulu Malik  4/15/2018  10:00 PM

## 2018-04-16 NOTE — BH NOTES
GROUP THERAPY PROGRESS NOTE    Jose Lindsay is participating in [place].      Group time: 30 minutes    Personal goal for participation: daily orientation    Goal orientation: personal    Group therapy participation: active    Therapeutic interventions reviewed and discussed: yes    Impression of participation: Attend    Heath Villegas  4/16/2018

## 2018-04-16 NOTE — BH NOTES
Gillian Morales was down for breakfast and returned to her room afterwards and was resting this morning.

## 2018-04-16 NOTE — BH NOTES
GROUP THERAPY PROGRESS NOTE    The patient Jose lozano 61 y.o. female is participating in [employer]. Group time: 1 hour    Personal goal for participation: To concentrate on selected task    Goal orientation: social    Group therapy participation: active    Therapeutic interventions reviewed and discussed: Crafts, games, music    Impression of participation: The patient was attentive.     Supriya Palacio  4/16/2018  5:41 PM

## 2018-04-17 PROCEDURE — 74011250637 HC RX REV CODE- 250/637: Performed by: PSYCHIATRY & NEUROLOGY

## 2018-04-17 PROCEDURE — 74011250636 HC RX REV CODE- 250/636: Performed by: PSYCHIATRY & NEUROLOGY

## 2018-04-17 PROCEDURE — 65220000003 HC RM SEMIPRIVATE PSYCH

## 2018-04-17 RX ADMIN — HALOPERIDOL 10 MG: 5 TABLET ORAL at 21:04

## 2018-04-17 RX ADMIN — DIVALPROEX SODIUM 1000 MG: 500 TABLET, FILM COATED, EXTENDED RELEASE ORAL at 21:04

## 2018-04-17 RX ADMIN — HALOPERIDOL LACTATE 10 MG: 5 INJECTION, SOLUTION INTRAMUSCULAR at 09:16

## 2018-04-17 RX ADMIN — BENZTROPINE MESYLATE 0.5 MG: 1 TABLET ORAL at 21:05

## 2018-04-17 RX ADMIN — HALOPERIDOL DECANOATE 100 MG: 100 INJECTION INTRAMUSCULAR at 12:03

## 2018-04-17 NOTE — BH NOTES
GROUP THERAPY PROGRESS NOTE    The patient Faisal Kumar is participating in [employer]. Group time: 30 minutes    Personal goal for participation: to orient the patient to the unit.     Goal orientation: successful adoption of unit rules    Group therapy participation: minimal    Therapeutic interventions reviewed and discussed: Yes    Impression of participation: minimal    Km Cai  4/17/2018 10:46 AM

## 2018-04-17 NOTE — BH NOTES
PSYCHIATRIC PROGRESS NOTE         Patient Name  Analy Hoff   Date of Birth 1960   [facility] [id number]   Medical Record Number  [id number]      Age  61 y.o. PCP None   Admit date:  3/30/2018    Room Number  614/22  @ [facility]   Date of Service  4/17/2018           E & M PROGRESS NOTE:         HISTORY       CC:  \"leave me alone\"  HISTORY OF PRESENT ILLNESS/INTERVAL HISTORY:  (reviewed/updated 4/17/2018). per initial evaluation: The patient, Analy Hoff, is a 61 y.o. BLACK OR  female with a past psychiatric history significant for schizoaffective d/o, who presents at this time with complaints of (and/or evidence of) the following emotional symptoms: agitation and mood swings. Additional symptomatology include refusing to talk to team, irritable and angry mood and guarded. Per report pt has been deteriorating in the last 2 month and threatening to kill family members. She has been aggressive towards the , she has been breaking into neighbors apartment and religiously preoccupied. She has poor appetite and refusing to complete her ADL with poor hygiene. She has no insight and does not want to take medication. The above symptoms have been present for . These symptoms are of severe severity. These symptoms are constant in nature. The patient's condition has been precipitated by and psychosocial stressors ( ). Patient's condition made worse by treatment noncompliance. UDS: negative; BAL=0. Analy Hoff presents/reports/evidences the following emotional symptoms today, 4/17/2018:agitation and psychotic behavior. The above symptoms have been present for few month. These symptoms are of severe severity. The symptoms are constant in nature. Additional symptomatology and features include labile and angry mood, responding to internal stimuli, paranoid delusion and poor insight. Refusing to take medication, received prn for agitation. 4/17- irritable and angry affect,. Refusing to meet with tx team. Was unhappy that she is not dc as she took few days med. Poor insight and will not co operate with tx. SIDE EFFECTS: (reviewed/updated 4/17/2018)  None reported or admitted to. No noted toxicity with use of current med   ALLERGIES:(reviewed/updated 4/17/2018)  No Known Allergies   MEDICATIONS PRIOR TO ADMISSION:(reviewed/updated 4/17/2018)  Prescriptions Prior to Admission   Medication Sig    albuterol (PROVENTIL HFA, VENTOLIN HFA, PROAIR HFA) 90 mcg/actuation inhaler Take 2 Puffs by inhalation three (3) times daily as needed for Wheezing or Shortness of Breath. PAST MEDICAL HISTORY: Past medical history from the initial psychiatric evaluation has been reviewed (reviewed/updated 4/17/2018) with no additional updates (I asked patient and no additional past medical history provided). Past Medical History:   Diagnosis Date    Asthma     Hepatitis C    No past surgical history on file. SOCIAL HISTORY: Social history from the initial psychiatric evaluation has been reviewed (reviewed/updated 4/17/2018) with no additional updates (I asked patient and no additional social history provided). Social History     Social History    Marital status: SINGLE     Spouse name: N/A    Number of children: N/A    Years of education: N/A     Occupational History    Not on file. Social History Main Topics    Smoking status: Current Every Day Smoker    Smokeless tobacco: Not on file    Alcohol use Yes    Drug use: Yes     Special: Marijuana    Sexual activity: Yes     Partners: Male     Birth control/ protection: None     Other Topics Concern    Not on file     Social History Narrative    No narrative on file      FAMILY HISTORY: Family history from the initial psychiatric evaluation has been reviewed (reviewed/updated 4/17/2018) with no additional updates (I asked patient and no additional family history provided).  No family history on file.    REVIEW OF SYSTEMS: (reviewed/updated 4/17/2018)  Appetite:no change from normal   Sleep: 2 hrs  All other Review of Systems: Psychological ROS: positive for - behavioral disorder, concentration difficulties, hallucinations, hostility, mood swings and psychosis  Respiratory ROS: no cough, shortness of breath, or wheezing  Cardiovascular ROS: no chest pain or dyspnea on exertion  Neurological ROS: no TIA or stroke symptoms         [facility] ([facility] [place]):    MSE FINDINGS ARE WITHIN NORMAL LIMITS (WNL) UNLESS OTHERWISE STATED BELOW. ( ALL OF THE BELOW CATEGORIES OF THE MSE HAVE BEEN REVIEWED (reviewed 4/17/2018) AND UPDATED AS DEEMED APPROPRIATE )  General Presentation age appropriate and disheveled, evasive, uncooperative and unreliable   Orientation disorganized   Vital Signs  See below (reviewed 4/17/2018); Vital Signs (BP, Pulse, & Temp) are within normal limits if not listed below.    Gait and Station Stable/steady, no ataxia   Musculoskeletal System No extrapyramidal symptoms (EPS); no abnormal muscular movements or Tardive Dyskinesia (TD); muscle strength and tone are within normal limits   Language No aphasia or dysarthria   Speech:  loud and pressured   Thought Processes illogical; slow rate of thoughts; poor abstract reasoning/computation   Thought Associations tangential   Thought Content paranoid delusions, Spiritism delusions, auditory hallucinations and internally preoccupied   Suicidal Ideations none   Homicidal Ideations none   Mood:  irritable   Affect:  irritable, inappropriate and increased in intensity   Memory recent  impaired   Memory remote:  intact   Concentration/Attention:  distractable and hypervigilance   Fund of Knowledge below average   Insight:  poor   Reliability poor   Judgment:  poor          VITALS:     Patient Vitals for the past 24 hrs:   Temp Pulse Resp BP   04/16/18 1627 98 °F (36.7 °C) 63 18 111/56     Wt Readings from Last 3 Encounters: 04/04/18 59 kg (130 lb 1.1 oz)   12/27/15 61.2 kg (135 lb)   11/09/14 65.8 kg (145 lb)     Temp Readings from Last 3 Encounters:   04/16/18 98 °F (36.7 °C)   12/27/15 98.7 °F (37.1 °C)   11/09/14 98.7 °F (37.1 °C)     BP Readings from Last 3 Encounters:   04/16/18 111/56   12/27/15 132/65   11/09/14 111/65     Pulse Readings from Last 3 Encounters:   04/16/18 63   12/27/15 76   11/09/14 65            DATA     LABORATORY DATA:(reviewed/updated 4/17/2018)  No results found for this or any previous visit (from the past 24 hour(s)). No results found for: VALF2, VALAC, VALP, VALPR, DS6, CRBAM, CRBAMP, CARB2, XCRBAM  No results found for: LITHM   RADIOLOGY REPORTS:(reviewed/updated 4/17/2018)  No results found.        MEDICATIONS     ALL MEDICATIONS:   Current Facility-Administered Medications   Medication Dose Route Frequency    haloperidol decanoate (HALDOL DECANOATE) 100 mg/mL injection 100 mg  100 mg IntraMUSCular Q28D    haloperidol lactate (HALDOL) injection 10 mg +++COURT ORDERED MEDICATION FOR REFUSAL OF PO HALOPERIDOL+++  10 mg IntraMUSCular BID    Or    haloperidol (HALDOL) tablet 10 mg  10 mg Oral BID    LORazepam (ATIVAN) injection 0.5 mg +++COURT ORDERED MEDICATION FOR REFUSAL OF PO DEPAKOTE+++  0.5 mg IntraMUSCular QHS    Or    divalproex ER (DEPAKOTE ER) 24 hour tablet 1,000 mg  1,000 mg Oral QHS    benztropine (COGENTIN) tablet 0.5 mg  0.5 mg Oral BID    traZODone (DESYREL) tablet 100 mg  100 mg Oral QHS PRN    ziprasidone (GEODON) 20 mg in sterile water (preservative free) 1 mL injection  20 mg IntraMUSCular BID PRN    OLANZapine (ZyPREXA) tablet 5 mg  5 mg Oral Q6H PRN    benztropine (COGENTIN) tablet 2 mg  2 mg Oral BID PRN    benztropine (COGENTIN) injection 2 mg  2 mg IntraMUSCular BID PRN    LORazepam (ATIVAN) injection 2 mg  2 mg IntraMUSCular Q4H PRN    LORazepam (ATIVAN) tablet 1 mg  1 mg Oral Q4H PRN    acetaminophen (TYLENOL) tablet 650 mg  650 mg Oral Q4H PRN    ibuprofen (MOTRIN) tablet 400 mg  400 mg Oral Q8H PRN    magnesium hydroxide (MILK OF MAGNESIA) 400 mg/5 mL oral suspension 30 mL  30 mL Oral DAILY PRN    nicotine (NICODERM CQ) 21 mg/24 hr patch 1 Patch  1 Patch TransDERmal DAILY PRN      SCHEDULED MEDICATIONS:   Current Facility-Administered Medications   Medication Dose Route Frequency    haloperidol decanoate (HALDOL DECANOATE) 100 mg/mL injection 100 mg  100 mg IntraMUSCular Q28D    haloperidol lactate (HALDOL) injection 10 mg +++COURT ORDERED MEDICATION FOR REFUSAL OF PO HALOPERIDOL+++  10 mg IntraMUSCular BID    Or    haloperidol (HALDOL) tablet 10 mg  10 mg Oral BID    LORazepam (ATIVAN) injection 0.5 mg +++COURT ORDERED MEDICATION FOR REFUSAL OF PO DEPAKOTE+++  0.5 mg IntraMUSCular QHS    Or    divalproex ER (DEPAKOTE ER) 24 hour tablet 1,000 mg  1,000 mg Oral QHS    benztropine (COGENTIN) tablet 0.5 mg  0.5 mg Oral BID          ASSESSMENT & PLAN     DIAGNOSES REQUIRING ACTIVE TREATMENT AND MONITORING: (reviewed/updated 4/17/2018)  Patient Active Hospital Problem List:    [facility] (3/30/2018)    Assessment:minimal change-  paranoid delusion, poor insight     Plan: Ct to adjust med- Haldol and Depakote, court order granted- Haldol Dec today 100 mg IM- need to check baseline    Collateral info from daughter- please see sw report     Agitation (3/31/2018)    Assessment: acute due to psychosis. Prn med    Plan: prn med for now       I will continue to monitor blood levels (Depakote, ---a drug with a narrow therapeutic index= NTI) and associated labs for drug therapy implemented that require intense monitoring for toxicity as deemed appropriate based on current medication side effects and pharmacodynamically determined drug 1/2 lives.     In summary, Michelle Mcgraw, is a 61 y.o.  female who presents with a severe exacerbation of the principal diagnosis of Schizoaffective disorder [facility]  Patient's condition is worsening/not improving/not stable . Patient requires continued inpatient hospitalization for further stabilization, safety monitoring and medication management. I will continue to coordinate the provision of individual, milieu, occupational, group, and substance abuse therapies to address target symptoms/diagnoses as deemed appropriate for the individual patient. A coordinated, multidisplinary treatment team round was conducted with the patient (this team consists of the nurse, psychiatric unit pharmcist,  and writer). Complete current electronic health record for patient has been reviewed today including consultant notes, ancillary staff notes, nurses and psychiatric tech notes. Suicide risk assessment completed and patient deemed to be of low risk for suicide at this time. The following regarding medications was addressed during rounds with patient:   the risks and benefits of the proposed medication. The patient was given the opportunity to ask questions. Informed consent given to the use of the above medications. Will continue to adjust psychiatric and non-psychiatric medications (see above \"medication\" section and orders section for details) as deemed appropriate & based upon diagnoses and response to treatment. I will continue to order blood tests/labs and diagnostic tests as deemed appropriate and review results as they become available (see orders for details and above listed lab/test results). I will order psychiatric records from previous [facility] hospitals to further elucidate the nature of patient's psychopathology and review once available. I will gather additional collateral information from friends, family and o/p treatment team to further elucidate the nature of patient's psychopathology and baselline level of psychiatric functioning.          I certify that this patient's inpatient psychiatric hospital services furnished since the previous certification were, and continue to be, required for treatment that could reasonably be expected to improve the patient's condition, or for diagnostic study, and that the patient continues to need, on a daily basis, active treatment furnished directly by or requiring the supervision of inpatient psychiatric facility personnel. In addition, the hospital records show that services furnished were intensive treatment services, admission or related services, or equivalent services.     EXPECTED DISCHARGE DATE/DAY: TBD     DISPOSITION: Home       Signed By:   Jocelyne Cornejo MD  4/17/2018

## 2018-04-17 NOTE — BH NOTES
GROUP THERAPY PROGRESS NOTE    The patient Chao Wilkes a 61 y.o. female is participating in Reflections Group    Group time: 30 minutes    Personal goal for participation: To discuss the daily goals    Goal orientation: personal    Group therapy participation: minimal    Therapeutic interventions reviewed and discussed:  Yes    lulu Prater  4/16/2018  9:17 PM

## 2018-04-17 NOTE — BH NOTES
The patient continues to exhibit agitation, is uncooperative, and is inconsistent with medication compliance. She appeared to be doing a bit better by conversing more with staff and taking oral medication. However, she refused her oral pills today and was given [facility] medication via injection. Due to the patient not clearing much since her admission, having no collateral or other information about her, her continual psychosis and inability to make good decisions/poor judgement, the team decided worker should reach out to the only listed family member we have record of; Radha Mcclain [phone], the patient's daughter. Tesha Gastelum said she's been trying to call the unit for two weeks but since she doesn't have the patient's code she hasn't been able to speak with the patient. Per Tesha Gastelum the patient hasn't had an episode like this since 1997. At that time she was TDO'd for psychosis/delusions/grandiosity by going into stores on [address] [place] [address] and claiming she owned them. Tesha Diaztereza is not sure if the patient has been on psychotropic medications since, but knew she was seeing a PCP and was getting Trazodone in 2017 for sleep. She stopped the medication in the Fall and has been decompensating since. She became paranoid, wouldn't leave her home, put black sheets over her windows, said family members were going to die and wasn't logical/oriented. The patient has 6 children (5 sons 1 daughter) and 2 of the sons suffer with schizophrenia. One resides in a group home, the other has been psychiatrically hospitalized for 6 months. Tehsa Gastelum will come to visit the patient this Thursday evening and worker will request staff allow her to visit the patient in hopes of engaging her in treatment and build rapport so the team can appropriately treat her.       Dr. Mehnaz Perez (ph: [phone]; patient's PCP) the patient was seen by his practice for the first time in May 2016 and the only psychotropic medication they've ever prescribed was Trazodone 100mg for sleep. He's not known the patient to ever be psychiatrically symptomatic.       Torsten Burton LCSW

## 2018-04-17 NOTE — BH NOTES
PSYCHIATRIC PROGRESS NOTE         Patient Name  Lazarus Gowda   Date of Birth 1960   [facility] [id number]   Medical Record Number  [id number]      Age  61 y.o. PCP None   Admit date:  3/30/2018    Room Number  805/50  @ [facility]   Date of Service  4/16/2018           E & M PROGRESS NOTE:         HISTORY       CC:  \"leave me alone\"  HISTORY OF PRESENT ILLNESS/INTERVAL HISTORY:  (reviewed/updated 4/16/2018). per initial evaluation: The patient, Lazarus Gowda, is a 61 y.o. BLACK OR  female with a past psychiatric history significant for schizoaffective d/o, who presents at this time with complaints of (and/or evidence of) the following emotional symptoms: agitation and mood swings. Additional symptomatology include refusing to talk to team, irritable and angry mood and guarded. Per report pt has been deteriorating in the last 2 month and threatening to kill family members. She has been aggressive towards the , she has been breaking into neighbors apartment and religiously preoccupied. She has poor appetite and refusing to complete her ADL with poor hygiene. She has no insight and does not want to take medication. The above symptoms have been present for . These symptoms are of severe severity. These symptoms are constant in nature. The patient's condition has been precipitated by and psychosocial stressors ( ). Patient's condition made worse by treatment noncompliance. UDS: negative; BAL=0. Lazarus Gowda presents/reports/evidences the following emotional symptoms today, 4/16/2018:agitation and psychotic behavior. The above symptoms have been present for few month. These symptoms are of severe severity. The symptoms are constant in nature. Additional symptomatology and features include labile and angry mood, responding to internal stimuli, paranoid delusion and poor insight. Refusing to take medication, received prn for agitation. 4/2- left the room and refused to be evaluated. She was seen in the hallway yelling and being loud. Poor sleep, paranoid , responding to internal stimuli and anger outburst last night pt received prn med. Refused po med and has poor insight  4/3- remains labile, paranoid and with poor insight. Responding to internal stimuli. period of threatening behavior and anger outburst. Refusing to speak with the team.  4/4/18 she remains paranoid and she refusing to come to see her treatment team , she refused to take medications and she is court ordered to take medications    4/5/18 she still paranoid and not engaging well and refused to be seen   4/6/18 she still irritable and paranoid and she still refusing treatment team and still angry   4/7/18: patient refuse to come in the conference  room, when went to her room with staff she refuse to speak and left the room,she is still paranoid, refusing treatments,irritable, accepting meals. 04/8/18: Seen during morning round, she remains isolative, refusing po medications, received Haldol IM,paranoid, irritable, doesn not engage in conversation,acceptng meals,gets angry when questions asked or discuss about medications. 4/9/18 she still not want to give information and she still paranoid and still talking to herself and still paranoid  And she sleeping better and still refusing medications   4/11- refusing med selectively, poor insight but sl better affect and participating more in the groups. She is refusing to meet up with tx team. Guarded. 4/12- remains delusional and guarded. Refusing to talk with tx team, refusing po med and receiving court order med  4/13- pt remains delusional and with poor insight, still refusing to speak to this provider, less agitated and socializing more in the group  4/14- Pt is very unhappy to be in the hospital. Engages only briefly and wants to be discharged. Compliant with meds. No prn's were used. 4/15-Her focus remains on her discharge. Slept 7 hrs. No prn's were used. Mood/affect remains irritable No SI/HI  4/16- still refusing to take po and has poor insight. Irritable and angry affect but no physical aggression. SIDE EFFECTS: (reviewed/updated 4/16/2018)  None reported or admitted to. No noted toxicity with use of current med   ALLERGIES:(reviewed/updated 4/16/2018)  No Known Allergies   MEDICATIONS PRIOR TO ADMISSION:(reviewed/updated 4/16/2018)  Prescriptions Prior to Admission   Medication Sig    albuterol (PROVENTIL HFA, VENTOLIN HFA, PROAIR HFA) 90 mcg/actuation inhaler Take 2 Puffs by inhalation three (3) times daily as needed for Wheezing or Shortness of Breath. PAST MEDICAL HISTORY: Past medical history from the initial psychiatric evaluation has been reviewed (reviewed/updated ) with no additional updates (I asked patient and no additional past medical history provided). Past Medical History:   Diagnosis Date    Asthma     Hepatitis C    No past surgical history on file. SOCIAL HISTORY: Social history from the initial psychiatric evaluation has been reviewed (reviewed/updated ) with no additional updates (I asked patient and no additional social history provided). Social History     Social History    Marital status: SINGLE     Spouse name: N/A    Number of children: N/A    Years of education: N/A     Occupational History    Not on file. Social History Main Topics    Smoking status: Current Every Day Smoker    Smokeless tobacco: Not on file    Alcohol use Yes    Drug use: Yes     Special: Marijuana    Sexual activity: Yes     Partners: Male     Birth control/ protection: None     Other Topics Concern    Not on file     Social History Narrative    No narrative on file      FAMILY HISTORY: Family history from the initial psychiatric evaluation has been reviewed (reviewed/updated ) with no additional updates (I asked patient and no additional family history provided). No family history on file.     REVIEW OF SYSTEMS: (reviewed/updated )  Appetite:no change from normal   Sleep: 2 hrs  All other Review of Systems: Psychological ROS: positive for - behavioral disorder, concentration difficulties, hallucinations, hostility, mood swings and psychosis  Respiratory ROS: no cough, shortness of breath, or wheezing  Cardiovascular ROS: no chest pain or dyspnea on exertion  Neurological ROS: no TIA or stroke symptoms         [facility] [place]    MSE FINDINGS ARE WITHIN NORMAL LIMITS (WNL) UNLESS OTHERWISE STATED BELOW. ( ALL OF THE BELOW CATEGORIES OF THE MSE HAVE BEEN REVIEWED (reviewed 4/16/2018) AND UPDATED AS DEEMED APPROPRIATE )  General Presentation age appropriate and disheveled, evasive, uncooperative and unreliable   Orientation disorganized   Vital Signs  See below (reviewed 4/16/2018); Vital Signs (BP, Pulse, & Temp) are within normal limits if not listed below.    Gait and Station Stable/steady, no ataxia   Musculoskeletal System No extrapyramidal symptoms (EPS); no abnormal muscular movements or Tardive Dyskinesia (TD); muscle strength and tone are within normal limits   Language No aphasia or dysarthria   Speech:  loud and pressured   Thought Processes illogical; slow rate of thoughts; poor abstract reasoning/computation   Thought Associations tangential   Thought Content paranoid delusions, Islam delusions, auditory hallucinations and internally preoccupied   Suicidal Ideations none   Homicidal Ideations none   Mood:  irritable   Affect:  irritable, inappropriate and increased in intensity   Memory recent  impaired   Memory remote:  intact   Concentration/Attention:  distractable and hypervigilance   Fund of Knowledge below average   Insight:  poor   Reliability poor   Judgment:  poor          VITALS:     Patient Vitals for the past 24 hrs:   Temp Pulse Resp BP   04/16/18 1627 98 °F (36.7 °C) 63 18 111/56     Wt Readings from Last 3 Encounters:   04/04/18 59 kg (130 lb 1.1 oz)   12/27/15 61.2 kg (135 lb)   11/09/14 65.8 kg (145 lb)     Temp Readings from Last 3 Encounters:   04/16/18 98 °F (36.7 °C)   12/27/15 98.7 °F (37.1 °C)   11/09/14 98.7 °F (37.1 °C)     BP Readings from Last 3 Encounters:   04/16/18 111/56   12/27/15 132/65   11/09/14 111/65     Pulse Readings from Last 3 Encounters:   04/16/18 63   12/27/15 76   11/09/14 65            DATA     LABORATORY DATA:(reviewed/updated 4/16/2018)  No results found for this or any previous visit (from the past 24 hour(s)). No results found for: VALF2, VALAC, VALP, VALPR, DS6, CRBAM, CRBAMP, CARB2, XCRBAM  No results found for: LITHM   RADIOLOGY REPORTS:(reviewed/updated 4/16/2018)  No results found.        MEDICATIONS     ALL MEDICATIONS:   Current Facility-Administered Medications   Medication Dose Route Frequency    haloperidol decanoate (HALDOL DECANOATE) 100 mg/mL injection 100 mg  100 mg IntraMUSCular Q28D    haloperidol lactate (HALDOL) injection 10 mg +++COURT ORDERED MEDICATION FOR REFUSAL OF PO HALOPERIDOL+++  10 mg IntraMUSCular BID    Or    haloperidol (HALDOL) tablet 10 mg  10 mg Oral BID    LORazepam (ATIVAN) injection 0.5 mg +++COURT ORDERED MEDICATION FOR REFUSAL OF PO DEPAKOTE+++  0.5 mg IntraMUSCular QHS    Or    divalproex ER (DEPAKOTE ER) 24 hour tablet 1,000 mg  1,000 mg Oral QHS    benztropine (COGENTIN) tablet 0.5 mg  0.5 mg Oral BID    traZODone (DESYREL) tablet 100 mg  100 mg Oral QHS PRN    ziprasidone (GEODON) 20 mg in sterile water (preservative free) 1 mL injection  20 mg IntraMUSCular BID PRN    OLANZapine (ZyPREXA) tablet 5 mg  5 mg Oral Q6H PRN    benztropine (COGENTIN) tablet 2 mg  2 mg Oral BID PRN    benztropine (COGENTIN) injection 2 mg  2 mg IntraMUSCular BID PRN    LORazepam (ATIVAN) injection 2 mg  2 mg IntraMUSCular Q4H PRN    LORazepam (ATIVAN) tablet 1 mg  1 mg Oral Q4H PRN    acetaminophen (TYLENOL) tablet 650 mg  650 mg Oral Q4H PRN    ibuprofen (MOTRIN) tablet 400 mg  400 mg Oral Q8H PRN    magnesium hydroxide (MILK OF MAGNESIA) 400 mg/5 mL oral suspension 30 mL  30 mL Oral DAILY PRN    nicotine (NICODERM CQ) 21 mg/24 hr patch 1 Patch  1 Patch TransDERmal DAILY PRN      SCHEDULED MEDICATIONS:   Current Facility-Administered Medications   Medication Dose Route Frequency    haloperidol decanoate (HALDOL DECANOATE) 100 mg/mL injection 100 mg  100 mg IntraMUSCular Q28D    haloperidol lactate (HALDOL) injection 10 mg +++COURT ORDERED MEDICATION FOR REFUSAL OF PO HALOPERIDOL+++  10 mg IntraMUSCular BID    Or    haloperidol (HALDOL) tablet 10 mg  10 mg Oral BID    LORazepam (ATIVAN) injection 0.5 mg +++COURT ORDERED MEDICATION FOR REFUSAL OF PO DEPAKOTE+++  0.5 mg IntraMUSCular QHS    Or    divalproex ER (DEPAKOTE ER) 24 hour tablet 1,000 mg  1,000 mg Oral QHS    benztropine (COGENTIN) tablet 0.5 mg  0.5 mg Oral BID          ASSESSMENT & PLAN     DIAGNOSES REQUIRING ACTIVE TREATMENT AND MONITORING: (reviewed/updated 4/16/2018)  Patient Active Hospital Problem List:    Schizoaffective disorder ([facility] [address] (3/30/2018)    Assessment:minimal change-  paranoid and Scientologist delusion themes, agitation- poor insight and non compliance. Plan: Ct to adjust med- Haldol and Depakote, court order granted     Agitation (3/31/2018)    Assessment: acute due to psychosis. Prn med    Plan: prn med for now   4/4/18 continue medications   4/5/18 continue same medications    4/6/18 continue same medications     4/7/18: no significant change noted, still pyscotic  4/8/18: Refusing medications,recieving court order Haldol IM               Consider Long Acting injectable antipsychotic   4/9/18 contnue same medications   4/11- titrate Haldol and add Cogentin, consider changing Depakote to liquid  4/12- increase Haldol Im dose. Will dc Depakote  4/13- ct with current medication  4/16- ct with current med.     I will continue to monitor blood levels (Depakote, ---a drug with a narrow therapeutic index= NTI) and associated labs for drug therapy implemented that require intense monitoring for toxicity as deemed appropriate based on current medication side effects and pharmacodynamically determined drug 1/2 lives. In summary, Leydi Richmond, is a 61 y.o.  female who presents with a severe exacerbation of the principal diagnosis of Schizoaffective disorder ([facility] [address]  Patient's condition is worsening/not improving/not stable . Patient requires continued inpatient hospitalization for further stabilization, safety monitoring and medication management. I will continue to coordinate the provision of individual, milieu, occupational, group, and substance abuse therapies to address target symptoms/diagnoses as deemed appropriate for the individual patient. A coordinated, multidisplinary treatment team round was conducted with the patient (this team consists of the nurse, psychiatric unit pharmcist,  and writer). Complete current electronic health record for patient has been reviewed today including consultant notes, ancillary staff notes, nurses and psychiatric tech notes. Suicide risk assessment completed and patient deemed to be of low risk for suicide at this time. The following regarding medications was addressed during rounds with patient:   the risks and benefits of the proposed medication. The patient was given the opportunity to ask questions. Informed consent given to the use of the above medications. Will continue to adjust psychiatric and non-psychiatric medications (see above \"medication\" section and orders section for details) as deemed appropriate & based upon diagnoses and response to treatment. I will continue to order blood tests/labs and diagnostic tests as deemed appropriate and review results as they become available (see orders for details and above listed lab/test results).     I will order psychiatric records from previous [facility] hospitals to further elucidate the nature of patient's psychopathology and review once available. I will gather additional collateral information from friends, family and o/p treatment team to further elucidate the nature of patient's psychopathology and baselline level of psychiatric functioning. I certify that this patient's inpatient psychiatric hospital services furnished since the previous certification were, and continue to be, required for treatment that could reasonably be expected to improve the patient's condition, or for diagnostic study, and that the patient continues to need, on a daily basis, active treatment furnished directly by or requiring the supervision of inpatient psychiatric facility personnel. In addition, the hospital records show that services furnished were intensive treatment services, admission or related services, or equivalent services.     EXPECTED DISCHARGE DATE/DAY: TBD     DISPOSITION: Home       Signed By:   Cyndi López MD  4/17/2018

## 2018-04-17 NOTE — BH NOTES
Client visible w/ limited social  Interaction or conversation with peers and staff ,thought processing more alert or compliant  to treatment on medication this tour but insight on goal remain loose as to treatment goals on compliance after discharge with taking  medication . Will continue Q15 min.  Safety monitoring for safety

## 2018-04-17 NOTE — BH NOTES
GROUP THERAPY PROGRESS NOTE    The patient Nicole Geiger a 61 y.o. female is participating in [employer]. Group time: 1 hour    Personal goal for participation:  To concentrate on selected task    Goal orientation: social    Group therapy participation: minimal    Therapeutic interventions reviewed and discussed: Crafts, games, music    Impression of participation: The patient was present-left early    Andree Cabrera  4/17/2018  5:37 PM

## 2018-04-17 NOTE — BH NOTES
GROUP THERAPY PROGRESS NOTE    The patient Jannette lozano 61 y.o. female is participating in [address] [place] [address]. Group time: 45 minutes    Personal goal for participation: To watch relaxation DVD    Goal orientation:  relaxation    Group therapy participation: active    Therapeutic interventions reviewed and discussed: benefits of watching/other relaxation techniques    Impression of participation:  The patient was attentive.     Brittany Figueroa  4/17/2018  5:28 PM

## 2018-04-18 PROCEDURE — 65220000003 HC RM SEMIPRIVATE PSYCH

## 2018-04-18 PROCEDURE — 74011250637 HC RX REV CODE- 250/637: Performed by: PSYCHIATRY & NEUROLOGY

## 2018-04-18 RX ADMIN — HALOPERIDOL 10 MG: 5 TABLET ORAL at 21:04

## 2018-04-18 RX ADMIN — HALOPERIDOL 10 MG: 5 TABLET ORAL at 09:14

## 2018-04-18 RX ADMIN — BENZTROPINE MESYLATE 0.5 MG: 1 TABLET ORAL at 21:04

## 2018-04-18 RX ADMIN — DIVALPROEX SODIUM 1000 MG: 500 TABLET, FILM COATED, EXTENDED RELEASE ORAL at 21:04

## 2018-04-18 RX ADMIN — BENZTROPINE MESYLATE 0.5 MG: 1 TABLET ORAL at 09:13

## 2018-04-18 NOTE — BH NOTES
GROUP THERAPY PROGRESS NOTE    The patient Chao lozano 61 y.o. female is participating in [address] [place] [address]. Group time: 45 minutes    Personal goal for participation: To participate in relaxation activity    Goal orientation:  relaxation    Group therapy participation: active    Therapeutic interventions reviewed and discussed: favorite ways to relax    Impression of participation:  The patient was attentive.     Rufino Yeung  4/18/2018  1:54 PM

## 2018-04-18 NOTE — BH NOTES
GROUP THERAPY PROGRESS NOTE    Evette Chad is participating in [place].      Group time: 30 minutes    Personal goal for participation:  Unit orientation    Goal orientation: Community    Group therapy participation: active    Therapeutic interventions reviewed and discussed: Yes    Impression of participation:good

## 2018-04-18 NOTE — BH NOTES
PSYCHIATRIC PROGRESS NOTE         Patient Name  Wilber Andrade   Date of Birth 1960   [facility] [id number]   Medical Record Number  [id number]      Age  61 y.o. PCP None   Admit date:  3/30/2018    Room Number  398/58  @ [facility]   Date of Service  4/18/2018           E & M PROGRESS NOTE:         HISTORY       CC:  \"leave me alone\"  HISTORY OF PRESENT ILLNESS/INTERVAL HISTORY:  (reviewed/updated 4/18/2018). per initial evaluation: The patient, Wilber Andrade, is a 61 y.o. BLACK OR  female with a past psychiatric history significant for schizoaffective d/o, who presents at this time with complaints of (and/or evidence of) the following emotional symptoms: agitation and mood swings. Additional symptomatology include refusing to talk to team, irritable and angry mood and guarded. Per report pt has been deteriorating in the last 2 month and threatening to kill family members. She has been aggressive towards the , she has been breaking into neighbors apartment and religiously preoccupied. She has poor appetite and refusing to complete her ADL with poor hygiene. She has no insight and does not want to take medication. The above symptoms have been present for . These symptoms are of severe severity. These symptoms are constant in nature. The patient's condition has been precipitated by and psychosocial stressors ( ). Patient's condition made worse by treatment noncompliance. UDS: negative; BAL=0. Wilber Andrade presents/reports/evidences the following emotional symptoms today, 4/18/2018:agitation and psychotic behavior. The above symptoms have been present for few month. These symptoms are of severe severity. The symptoms are constant in nature. Additional symptomatology and features include labile and angry mood, responding to internal stimuli, paranoid delusion and poor insight. Refusing to take medication, received prn for agitation. 4/17- irritable and angry affect,. Refusing to meet with tx team. Was unhappy that she is not dc as she took few days med. Poor insight and will not co operate with tx.   4/18- pt managed to come to tx team meeting and discuss her dc planning. She is now accepting meed but ct to have irritable and angry mood. She does not trust anyone and remains guarded and preoccupied with dc. She left the meeting mid way when she understood she is not dc today. Poor insight      SIDE EFFECTS: (reviewed/updated 4/18/2018)  None reported or admitted to. No noted toxicity with use of current med   ALLERGIES:(reviewed/updated 4/18/2018)  No Known Allergies   MEDICATIONS PRIOR TO ADMISSION:(reviewed/updated 4/18/2018)  Prescriptions Prior to Admission   Medication Sig    albuterol (PROVENTIL HFA, VENTOLIN HFA, PROAIR HFA) 90 mcg/actuation inhaler Take 2 Puffs by inhalation three (3) times daily as needed for Wheezing or Shortness of Breath. PAST MEDICAL HISTORY: Past medical history from the initial psychiatric evaluation has been reviewed (reviewed/updated 4/18/2018) with no additional updates (I asked patient and no additional past medical history provided). Past Medical History:   Diagnosis Date    Asthma     Hepatitis C    No past surgical history on file. SOCIAL HISTORY: Social history from the initial psychiatric evaluation has been reviewed (reviewed/updated 4/18/2018) with no additional updates (I asked patient and no additional social history provided). Social History     Social History    Marital status: SINGLE     Spouse name: N/A    Number of children: N/A    Years of education: N/A     Occupational History    Not on file.      Social History Main Topics    Smoking status: Current Every Day Smoker    Smokeless tobacco: Not on file    Alcohol use Yes    Drug use: Yes     Special: Marijuana    Sexual activity: Yes     Partners: Male     Birth control/ protection: None     Other Topics Concern    Not on file     Social History Narrative    No narrative on file      FAMILY HISTORY: Family history from the initial psychiatric evaluation has been reviewed (reviewed/updated 4/18/2018) with no additional updates (I asked patient and no additional family history provided). No family history on file. REVIEW OF SYSTEMS: (reviewed/updated 4/18/2018)  Appetite:no change from normal   Sleep: 2 hrs  All other Review of Systems: Psychological ROS: positive for - behavioral disorder, concentration difficulties, hallucinations, hostility, mood swings and psychosis  Respiratory ROS: no cough, shortness of breath, or wheezing  Cardiovascular ROS: no chest pain or dyspnea on exertion  Neurological ROS: no TIA or stroke symptoms         [facility] (MSE):    MSE FINDINGS ARE WITHIN NORMAL LIMITS (WNL) UNLESS OTHERWISE STATED BELOW. ( ALL OF THE BELOW CATEGORIES OF THE MSE HAVE BEEN REVIEWED (reviewed 4/18/2018) AND UPDATED AS DEEMED APPROPRIATE )  General Presentation age appropriate and disheveled, evasive, uncooperative and unreliable   Orientation disorganized   Vital Signs  See below (reviewed 4/18/2018); Vital Signs (BP, Pulse, & Temp) are within normal limits if not listed below.    Gait and Station Stable/steady, no ataxia   Musculoskeletal System No extrapyramidal symptoms (EPS); no abnormal muscular movements or Tardive Dyskinesia (TD); muscle strength and tone are within normal limits   Language No aphasia or dysarthria   Speech:  loud and pressured   Thought Processes illogical; slow rate of thoughts; poor abstract reasoning/computation   Thought Associations tangential   Thought Content paranoid delusions, Scientology delusions, auditory hallucinations and internally preoccupied   Suicidal Ideations none   Homicidal Ideations none   Mood:  irritable   Affect:  irritable, inappropriate and increased in intensity   Memory recent  impaired   Memory remote:  intact Concentration/Attention:  distractable and hypervigilance   Fund of Knowledge below average   Insight:  poor   Reliability poor   Judgment:  poor          VITALS:     Patient Vitals for the past 24 hrs:   Temp Pulse Resp BP   04/18/18 1441 98.1 °F (36.7 °C) (!) 53 16 101/49     Wt Readings from Last 3 Encounters:   04/04/18 59 kg (130 lb 1.1 oz)   12/27/15 61.2 kg (135 lb)   11/09/14 65.8 kg (145 lb)     Temp Readings from Last 3 Encounters:   04/18/18 98.1 °F (36.7 °C)   12/27/15 98.7 °F (37.1 °C)   11/09/14 98.7 °F (37.1 °C)     BP Readings from Last 3 Encounters:   04/18/18 101/49   12/27/15 132/65   11/09/14 111/65     Pulse Readings from Last 3 Encounters:   04/18/18 (!) 53   12/27/15 76   11/09/14 65            DATA     LABORATORY DATA:(reviewed/updated 4/18/2018)  No results found for this or any previous visit (from the past 24 hour(s)). No results found for: VALF2, VALAC, VALP, VALPR, DS6, CRBAM, CRBAMP, CARB2, XCRBAM  No results found for: LITHM   RADIOLOGY REPORTS:(reviewed/updated 4/18/2018)  No results found.        MEDICATIONS     ALL MEDICATIONS:   Current Facility-Administered Medications   Medication Dose Route Frequency    haloperidol decanoate (HALDOL DECANOATE) 100 mg/mL injection 100 mg  100 mg IntraMUSCular Q28D    haloperidol lactate (HALDOL) injection 10 mg +++COURT ORDERED MEDICATION FOR REFUSAL OF PO HALOPERIDOL+++  10 mg IntraMUSCular BID    Or    haloperidol (HALDOL) tablet 10 mg  10 mg Oral BID    LORazepam (ATIVAN) injection 0.5 mg +++COURT ORDERED MEDICATION FOR REFUSAL OF PO DEPAKOTE+++  0.5 mg IntraMUSCular QHS    Or    divalproex ER (DEPAKOTE ER) 24 hour tablet 1,000 mg  1,000 mg Oral QHS    benztropine (COGENTIN) tablet 0.5 mg  0.5 mg Oral BID    traZODone (DESYREL) tablet 100 mg  100 mg Oral QHS PRN    ziprasidone (GEODON) 20 mg in sterile water (preservative free) 1 mL injection  20 mg IntraMUSCular BID PRN    OLANZapine (ZyPREXA) tablet 5 mg  5 mg Oral Q6H PRN    benztropine (COGENTIN) tablet 2 mg  2 mg Oral BID PRN    benztropine (COGENTIN) injection 2 mg  2 mg IntraMUSCular BID PRN    LORazepam (ATIVAN) injection 2 mg  2 mg IntraMUSCular Q4H PRN    LORazepam (ATIVAN) tablet 1 mg  1 mg Oral Q4H PRN    acetaminophen (TYLENOL) tablet 650 mg  650 mg Oral Q4H PRN    ibuprofen (MOTRIN) tablet 400 mg  400 mg Oral Q8H PRN    magnesium hydroxide (MILK OF MAGNESIA) 400 mg/5 mL oral suspension 30 mL  30 mL Oral DAILY PRN    nicotine (NICODERM CQ) 21 mg/24 hr patch 1 Patch  1 Patch TransDERmal DAILY PRN      SCHEDULED MEDICATIONS:   Current Facility-Administered Medications   Medication Dose Route Frequency    haloperidol decanoate (HALDOL DECANOATE) 100 mg/mL injection 100 mg  100 mg IntraMUSCular Q28D    haloperidol lactate (HALDOL) injection 10 mg +++COURT ORDERED MEDICATION FOR REFUSAL OF PO HALOPERIDOL+++  10 mg IntraMUSCular BID    Or    haloperidol (HALDOL) tablet 10 mg  10 mg Oral BID    LORazepam (ATIVAN) injection 0.5 mg +++COURT ORDERED MEDICATION FOR REFUSAL OF PO DEPAKOTE+++  0.5 mg IntraMUSCular QHS    Or    divalproex ER (DEPAKOTE ER) 24 hour tablet 1,000 mg  1,000 mg Oral QHS    benztropine (COGENTIN) tablet 0.5 mg  0.5 mg Oral BID          ASSESSMENT & PLAN     DIAGNOSES REQUIRING ACTIVE TREATMENT AND MONITORING: (reviewed/updated 4/18/2018)  Patient Active Hospital Problem List:    Schizoaffective disorder ([facility] [address] (3/30/2018)    Assessment:slow progress- less agitated and less  paranoid delusion, poor insight     Plan: Ct to adjust med- Haldol dec and Depakote. Ct with same    Collateral info from daughter- please see sw report     Agitation (3/31/2018)    Assessment: acute due to psychosis.  Prn med    Plan: prn med for now       I will continue to monitor blood levels (Depakote, ---a drug with a narrow therapeutic index= NTI) and associated labs for drug therapy implemented that require intense monitoring for toxicity as deemed appropriate based on current medication side effects and pharmacodynamically determined drug 1/2 lives. In summary, Ken Morris, is a 61 y.o.  female who presents with a severe exacerbation of the principal diagnosis of Schizoaffective disorder ([facility] [address]  Patient's condition is worsening/not improving/not stable . Patient requires continued inpatient hospitalization for further stabilization, safety monitoring and medication management. I will continue to coordinate the provision of individual, milieu, occupational, group, and substance abuse therapies to address target symptoms/diagnoses as deemed appropriate for the individual patient. A coordinated, multidisplinary treatment team round was conducted with the patient (this team consists of the nurse, psychiatric unit pharmcist,  and writer). Complete current electronic health record for patient has been reviewed today including consultant notes, ancillary staff notes, nurses and psychiatric tech notes. Suicide risk assessment completed and patient deemed to be of low risk for suicide at this time. The following regarding medications was addressed during rounds with patient:   the risks and benefits of the proposed medication. The patient was given the opportunity to ask questions. Informed consent given to the use of the above medications. Will continue to adjust psychiatric and non-psychiatric medications (see above \"medication\" section and orders section for details) as deemed appropriate & based upon diagnoses and response to treatment. I will continue to order blood tests/labs and diagnostic tests as deemed appropriate and review results as they become available (see orders for details and above listed lab/test results). I will order psychiatric records from previous [facility] hospitals to further elucidate the nature of patient's psychopathology and review once available.     I will gather additional collateral information from friends, family and o/p treatment team to further elucidate the nature of patient's psychopathology and baselline level of psychiatric functioning. I certify that this patient's inpatient psychiatric hospital services furnished since the previous certification were, and continue to be, required for treatment that could reasonably be expected to improve the patient's condition, or for diagnostic study, and that the patient continues to need, on a daily basis, active treatment furnished directly by or requiring the supervision of inpatient psychiatric facility personnel. In addition, the hospital records show that services furnished were intensive treatment services, admission or related services, or equivalent services.     EXPECTED DISCHARGE DATE/DAY: TBD     DISPOSITION: Home       Signed By:   America Wilson MD  4/18/2018

## 2018-04-18 NOTE — PROGRESS NOTES
Problem: Altered Thought Process (Adult/Pediatric)  Goal: *STG: Participates in treatment plan  Outcome: Progressing Towards Goal  Pt alert and visible on the unit. Pt calm and cooperative. Mood labile with congruent affect. Pt interactive with staff and peers. Pt took all medications PO. No PRNs given. Pt ate all meals offered. No medical complaints reported. Pt denies S/I, H/I, AV/H. She is focused on DC. Staff will continue to monitor for health and safety.

## 2018-04-18 NOTE — PROGRESS NOTES
Problem: Altered Thought Process (Adult/Pediatric)  Goal: *STG: Participates in treatment plan  Outcome: Progressing Towards Goal  Pt alert and visible on the unit for meals and spent time in the day room. Pt mood is labile but she is mostly pleasant. Pt took all medication PO this morning with encouragement. No medical concerns reported, no behavioral issues witnessed. Staff will continue to monitor for health and safety.

## 2018-04-18 NOTE — BH NOTES
The documentation for this period is being entered following the guidelines as defined in the [facility] policy by Dilan Ro RN.

## 2018-04-18 NOTE — BH NOTES
GROUP THERAPY PROGRESS NOTE    The patient Faisal Kumar a 61 y.o. female is participating in [employer]. Group time: 1 hour    Personal goal for participation: To concentrate on selected task    Goal orientation: social    Group therapy participation: active    Therapeutic interventions reviewed and discussed: Crafts, games, music    Impression of participation: The patient was attentive.     Socorro Velazquez  4/18/2018  5:34 PM

## 2018-04-19 PROCEDURE — 65220000003 HC RM SEMIPRIVATE PSYCH

## 2018-04-19 PROCEDURE — 74011250637 HC RX REV CODE- 250/637: Performed by: PSYCHIATRY & NEUROLOGY

## 2018-04-19 RX ADMIN — BENZTROPINE MESYLATE 0.5 MG: 1 TABLET ORAL at 09:49

## 2018-04-19 RX ADMIN — BENZTROPINE MESYLATE 0.5 MG: 1 TABLET ORAL at 21:07

## 2018-04-19 RX ADMIN — HALOPERIDOL 10 MG: 5 TABLET ORAL at 09:49

## 2018-04-19 RX ADMIN — HALOPERIDOL 10 MG: 5 TABLET ORAL at 21:07

## 2018-04-19 RX ADMIN — DIVALPROEX SODIUM 1000 MG: 500 TABLET, FILM COATED, EXTENDED RELEASE ORAL at 21:07

## 2018-04-19 NOTE — BH NOTES
GROUP THERAPY PROGRESS NOTE    The patient Eller Halsted a 61 y.o. female is participating in [address] [place] [address]. Group time: 45 minutes    Personal goal for participation: To participate in relaxation activity    Goal orientation:  relaxation    Group therapy participation: active    Therapeutic interventions reviewed and discussed: favorite ways to relax    Impression of participation:  The patient was attentive.     August Buss 4/19/2018  2:00 PM

## 2018-04-19 NOTE — BH NOTES
PSYCHIATRIC PROGRESS NOTE         Patient Name  Daija Louis   Date of Birth 1960   [facility] [id number]   Medical Record Number  [id number]      Age  61 y.o. PCP None   Admit date:  3/30/2018    Room Number  568/42  @ [facility]   Date of Service  4/19/2018           E & M PROGRESS NOTE:         HISTORY       CC:  \"leave me alone\"  HISTORY OF PRESENT ILLNESS/INTERVAL HISTORY:  (reviewed/updated 4/19/2018). per initial evaluation: The patient, Daija Louis, is a 61 y.o. BLACK OR  female with a past psychiatric history significant for schizoaffective d/o, who presents at this time with complaints of (and/or evidence of) the following emotional symptoms: agitation and mood swings. Additional symptomatology include refusing to talk to team, irritable and angry mood and guarded. Per report pt has been deteriorating in the last 2 month and threatening to kill family members. She has been aggressive towards the , she has been breaking into neighbors apartment and religiously preoccupied. She has poor appetite and refusing to complete her ADL with poor hygiene. She has no insight and does not want to take medication. The above symptoms have been present for . These symptoms are of severe severity. These symptoms are constant in nature. The patient's condition has been precipitated by and psychosocial stressors ( ). Patient's condition made worse by treatment noncompliance. UDS: negative; BAL=0. Daija Louis presents/reports/evidences the following emotional symptoms today, 4/19/2018:agitation and psychotic behavior. The above symptoms have been present for few month. These symptoms are of severe severity. The symptoms are constant in nature. Additional symptomatology and features include labile and angry mood, responding to internal stimuli, paranoid delusion and poor insight. Refusing to take medication, received prn for agitation. 4/17- irritable and angry affect,. Refusing to meet with tx team. Was unhappy that she is not dc as she took few days med. Poor insight and will not co operate with tx.   4/18- pt managed to come to tx team meeting and discuss her dc planning. She is now accepting meed but ct to have irritable and angry mood. She does not trust anyone and remains guarded and preoccupied with dc. She left the meeting mid way when she understood she is not dc today. Poor insight  4/19- pt affect is better and pleasant. Less guarded and now accepting med. She did agree for her daughter to visit her. No aggressive behavior. Insight  Is limited. Now she is talking to the tx team      SIDE EFFECTS: (reviewed/updated 4/19/2018)  None reported or admitted to. No noted toxicity with use of current med   ALLERGIES:(reviewed/updated 4/19/2018)  No Known Allergies   MEDICATIONS PRIOR TO ADMISSION:(reviewed/updated 4/19/2018)  Prescriptions Prior to Admission   Medication Sig    albuterol (PROVENTIL HFA, VENTOLIN HFA, PROAIR HFA) 90 mcg/actuation inhaler Take 2 Puffs by inhalation three (3) times daily as needed for Wheezing or Shortness of Breath. PAST MEDICAL HISTORY: Past medical history from the initial psychiatric evaluation has been reviewed (reviewed/updated 4/19/2018) with no additional updates (I asked patient and no additional past medical history provided). Past Medical History:   Diagnosis Date    Asthma     Hepatitis C    No past surgical history on file. SOCIAL HISTORY: Social history from the initial psychiatric evaluation has been reviewed (reviewed/updated 4/19/2018) with no additional updates (I asked patient and no additional social history provided). Social History     Social History    Marital status: SINGLE     Spouse name: N/A    Number of children: N/A    Years of education: N/A     Occupational History    Not on file.      Social History Main Topics    Smoking status: Current Every Day Smoker    Smokeless tobacco: Not on file    Alcohol use Yes    Drug use: Yes     Special: Marijuana    Sexual activity: Yes     Partners: Male     Birth control/ protection: None     Other Topics Concern    Not on file     Social History Narrative    No narrative on file      FAMILY HISTORY: Family history from the initial psychiatric evaluation has been reviewed (reviewed/updated 4/19/2018) with no additional updates (I asked patient and no additional family history provided). No family history on file. REVIEW OF SYSTEMS: (reviewed/updated 4/19/2018)  Appetite:no change from normal   Sleep: 2 hrs  All other Review of Systems: Psychological ROS: positive for - behavioral disorder, concentration difficulties, hallucinations, hostility, mood swings and psychosis  Respiratory ROS: no cough, shortness of breath, or wheezing  Cardiovascular ROS: no chest pain or dyspnea on exertion  Neurological ROS: no TIA or stroke symptoms         [facility] [place]    [facility] FINDINGS ARE WITHIN NORMAL LIMITS (WNL) UNLESS OTHERWISE STATED BELOW. ( ALL OF THE BELOW CATEGORIES OF THE MSE HAVE BEEN REVIEWED (reviewed 4/19/2018) AND UPDATED AS DEEMED APPROPRIATE )  General Presentation age appropriate and disheveled, evasive, uncooperative and unreliable   Orientation disorganized   Vital Signs  See below (reviewed 4/19/2018); Vital Signs (BP, Pulse, & Temp) are within normal limits if not listed below.    Gait and Station Stable/steady, no ataxia   Musculoskeletal System No extrapyramidal symptoms (EPS); no abnormal muscular movements or Tardive Dyskinesia (TD); muscle strength and tone are within normal limits   Language No aphasia or dysarthria   Speech:  loud and pressured   Thought Processes illogical; slow rate of thoughts; poor abstract reasoning/computation   Thought Associations tangential   Thought Content paranoid delusions, Temple delusions, auditory hallucinations and internally preoccupied Suicidal Ideations none   Homicidal Ideations none   Mood:  irritable   Affect:  irritable, inappropriate and increased in intensity   Memory recent  impaired   Memory remote:  intact   Concentration/Attention:  distractable and hypervigilance   Fund of Knowledge below average   Insight:  poor   Reliability poor   Judgment:  poor          VITALS:     No data found. Wt Readings from Last 3 Encounters:   04/04/18 59 kg (130 lb 1.1 oz)   12/27/15 61.2 kg (135 lb)   11/09/14 65.8 kg (145 lb)     Temp Readings from Last 3 Encounters:   04/18/18 98.1 °F (36.7 °C)   12/27/15 98.7 °F (37.1 °C)   11/09/14 98.7 °F (37.1 °C)     BP Readings from Last 3 Encounters:   04/18/18 101/49   12/27/15 132/65   11/09/14 111/65     Pulse Readings from Last 3 Encounters:   04/18/18 (!) 53   12/27/15 76   11/09/14 65            DATA     LABORATORY DATA:(reviewed/updated 4/19/2018)  No results found for this or any previous visit (from the past 24 hour(s)). No results found for: VALF2, VALAC, VALP, VALPR, DS6, CRBAM, CRBAMP, CARB2, XCRBAM  No results found for: LITHM   RADIOLOGY REPORTS:(reviewed/updated 4/19/2018)  No results found.        MEDICATIONS     ALL MEDICATIONS:   Current Facility-Administered Medications   Medication Dose Route Frequency    haloperidol decanoate (HALDOL DECANOATE) 100 mg/mL injection 100 mg  100 mg IntraMUSCular Q28D    haloperidol lactate (HALDOL) injection 10 mg +++COURT ORDERED MEDICATION FOR REFUSAL OF PO HALOPERIDOL+++  10 mg IntraMUSCular BID    Or    haloperidol (HALDOL) tablet 10 mg  10 mg Oral BID    LORazepam (ATIVAN) injection 0.5 mg +++COURT ORDERED MEDICATION FOR REFUSAL OF PO DEPAKOTE+++  0.5 mg IntraMUSCular QHS    Or    divalproex ER (DEPAKOTE ER) 24 hour tablet 1,000 mg  1,000 mg Oral QHS    benztropine (COGENTIN) tablet 0.5 mg  0.5 mg Oral BID    traZODone (DESYREL) tablet 100 mg  100 mg Oral QHS PRN    ziprasidone (GEODON) 20 mg in sterile water (preservative free) 1 mL injection 20 mg IntraMUSCular BID PRN    OLANZapine (ZyPREXA) tablet 5 mg  5 mg Oral Q6H PRN    benztropine (COGENTIN) tablet 2 mg  2 mg Oral BID PRN    benztropine (COGENTIN) injection 2 mg  2 mg IntraMUSCular BID PRN    LORazepam (ATIVAN) injection 2 mg  2 mg IntraMUSCular Q4H PRN    LORazepam (ATIVAN) tablet 1 mg  1 mg Oral Q4H PRN    acetaminophen (TYLENOL) tablet 650 mg  650 mg Oral Q4H PRN    ibuprofen (MOTRIN) tablet 400 mg  400 mg Oral Q8H PRN    magnesium hydroxide (MILK OF MAGNESIA) 400 mg/5 mL oral suspension 30 mL  30 mL Oral DAILY PRN    nicotine (NICODERM CQ) 21 mg/24 hr patch 1 Patch  1 Patch TransDERmal DAILY PRN      SCHEDULED MEDICATIONS:   Current Facility-Administered Medications   Medication Dose Route Frequency    haloperidol decanoate (HALDOL DECANOATE) 100 mg/mL injection 100 mg  100 mg IntraMUSCular Q28D    haloperidol lactate (HALDOL) injection 10 mg +++COURT ORDERED MEDICATION FOR REFUSAL OF PO HALOPERIDOL+++  10 mg IntraMUSCular BID    Or    haloperidol (HALDOL) tablet 10 mg  10 mg Oral BID    LORazepam (ATIVAN) injection 0.5 mg +++COURT ORDERED MEDICATION FOR REFUSAL OF PO DEPAKOTE+++  0.5 mg IntraMUSCular QHS    Or    divalproex ER (DEPAKOTE ER) 24 hour tablet 1,000 mg  1,000 mg Oral QHS    benztropine (COGENTIN) tablet 0.5 mg  0.5 mg Oral BID          ASSESSMENT & PLAN     DIAGNOSES REQUIRING ACTIVE TREATMENT AND MONITORING: (reviewed/updated 4/19/2018)  Patient Active Hospital Problem List:    Schizoaffective disorder ([facility] [address] (3/30/2018)    Assessment:slow progress- less agitated and less  paranoid delusion, sl better insight and agreeable to ct taking med    Plan: Ct to adjust med- Haldol dec and Depakote. Ct with same    Collateral info from daughter- please see sw report     Agitation (3/31/2018)    Assessment: acute due to psychosis.  Prn med    Plan: prn med for now       I will continue to monitor blood levels (Depakote, ---a drug with a narrow therapeutic index= NTI) and associated labs for drug therapy implemented that require intense monitoring for toxicity as deemed appropriate based on current medication side effects and pharmacodynamically determined drug 1/2 lives. In summary, Virgil Prater, is a 61 y.o.  female who presents with a severe exacerbation of the principal diagnosis of Schizoaffective disorder ([facility] [address]  Patient's condition is worsening/not improving/not stable . Patient requires continued inpatient hospitalization for further stabilization, safety monitoring and medication management. I will continue to coordinate the provision of individual, milieu, occupational, group, and substance abuse therapies to address target symptoms/diagnoses as deemed appropriate for the individual patient. A coordinated, multidisplinary treatment team round was conducted with the patient (this team consists of the nurse, psychiatric unit pharmcist,  and writer). Complete current electronic health record for patient has been reviewed today including consultant notes, ancillary staff notes, nurses and psychiatric tech notes. Suicide risk assessment completed and patient deemed to be of low risk for suicide at this time. The following regarding medications was addressed during rounds with patient:   the risks and benefits of the proposed medication. The patient was given the opportunity to ask questions. Informed consent given to the use of the above medications. Will continue to adjust psychiatric and non-psychiatric medications (see above \"medication\" section and orders section for details) as deemed appropriate & based upon diagnoses and response to treatment. I will continue to order blood tests/labs and diagnostic tests as deemed appropriate and review results as they become available (see orders for details and above listed lab/test results).     I will order psychiatric records from previous [facility] hospitals to further elucidate the nature of patient's psychopathology and review once available. I will gather additional collateral information from friends, family and o/p treatment team to further elucidate the nature of patient's psychopathology and baselline level of psychiatric functioning. I certify that this patient's inpatient psychiatric hospital services furnished since the previous certification were, and continue to be, required for treatment that could reasonably be expected to improve the patient's condition, or for diagnostic study, and that the patient continues to need, on a daily basis, active treatment furnished directly by or requiring the supervision of inpatient psychiatric facility personnel. In addition, the hospital records show that services furnished were intensive treatment services, admission or related services, or equivalent services.     EXPECTED DISCHARGE DATE/DAY: TBD     DISPOSITION: Home       Signed By:   Tarik Peña MD  4/19/2018

## 2018-04-19 NOTE — BH NOTES
GROUP THERAPY PROGRESS NOTE    The patient Leydi lozano 61 y.o. female is participating in [employer]. Group time: 1 hour    Personal goal for participation: To concentrate on selected task    Goal orientation: social    Group therapy participation: active    Therapeutic interventions reviewed and discussed: Crafts, games, music    Impression of participation: The patient was attentive.     Chris Flores  4/19/2018  5:32 PM

## 2018-04-19 NOTE — PROGRESS NOTES
Problem: Altered Thought Process (Adult/Pediatric)  Goal: *STG: Participates in treatment plan  Outcome: Progressing Towards Goal  Patient is calm and cooperative. visible on the unit. Patient is meal and medication complaint. Focused on discharge. Attending some groups. Will continue to monitor patient and assess needs.

## 2018-04-19 NOTE — BH NOTES
GROUP THERAPY PROGRESS NOTE    Yisel Sparks is participating in [employer].      Group time: 30 minutes    Personal goal for participation: daily orientation    Goal orientation: personal    Group therapy participation: active    Therapeutic interventions reviewed and discussed: yes    Impression of participation: ok

## 2018-04-19 NOTE — PROGRESS NOTES
Problem: Altered Thought Process (Adult/Pediatric)  Goal: *STG: Remains safe in hospital  Outcome: Progressing Towards Goal  Pt is oriented to Person, Place, Time and Situation. Pt affect euthymic and mood is euthymic. Speech is soft. Pt  denies SI/HI. Pt denies A/V hallucinations. Pt shows no evidence of impairment. Pt compliant with all meds and meals. Will continue to monitor pt for safety per hospital protocol. Will continue to encourage pt to participate in group therapy.

## 2018-04-20 LAB — VALPROATE SERPL-MCNC: 87 UG/ML (ref 50–100)

## 2018-04-20 PROCEDURE — 65220000003 HC RM SEMIPRIVATE PSYCH

## 2018-04-20 PROCEDURE — 74011250637 HC RX REV CODE- 250/637: Performed by: PSYCHIATRY & NEUROLOGY

## 2018-04-20 PROCEDURE — 36415 COLL VENOUS BLD VENIPUNCTURE: CPT | Performed by: PSYCHIATRY & NEUROLOGY

## 2018-04-20 PROCEDURE — 80164 ASSAY DIPROPYLACETIC ACD TOT: CPT | Performed by: PSYCHIATRY & NEUROLOGY

## 2018-04-20 RX ADMIN — HALOPERIDOL 10 MG: 5 TABLET ORAL at 21:00

## 2018-04-20 RX ADMIN — HALOPERIDOL 10 MG: 5 TABLET ORAL at 08:32

## 2018-04-20 RX ADMIN — DIVALPROEX SODIUM 1000 MG: 500 TABLET, FILM COATED, EXTENDED RELEASE ORAL at 21:01

## 2018-04-20 RX ADMIN — BENZTROPINE MESYLATE 0.5 MG: 1 TABLET ORAL at 21:00

## 2018-04-20 RX ADMIN — BENZTROPINE MESYLATE 0.5 MG: 1 TABLET ORAL at 08:32

## 2018-04-20 NOTE — PROGRESS NOTES
Problem: Altered Thought Process (Adult/Pediatric)  Goal: *STG: Remains safe in hospital  Outcome: Progressing Towards Goal  Pt has been calm cooperative spending most of time in room. She asked writer to assist in opening the blinds in her room.  No c/o discomfort Denied SI/HI and a/v hallucinations

## 2018-04-20 NOTE — BH NOTES
GROUP THERAPY PROGRESS NOTE    Deniz Fox is participating in [employer].      Group time: 30 minutes    Personal goal for participation:  Unit orientation    Goal orientation: Community    Group therapy participation: active    Therapeutic interventions reviewed and discussed: Yes    Impression of participation: good

## 2018-04-20 NOTE — PROGRESS NOTES
Problem: Altered Thought Process (Adult/Pediatric)  Goal: *STG: Remains safe in hospital  Client improved, laughing and joking with specific staff, laughing at the other clients acting out. Daughter visited and visit went well. Daughter braided her hair. And they left on good terms. Meal and med compliant, q 15 min checks for safety continue.

## 2018-04-20 NOTE — PROGRESS NOTES
Laboratory Monitoring for Valproic Acid    This patient is currently prescribed the following medication(s):   Current Facility-Administered Medications   Medication Dose Route Frequency    haloperidol decanoate (HALDOL DECANOATE) 100 mg/mL injection 100 mg  100 mg IntraMUSCular Q28D    haloperidol lactate (HALDOL) injection 10 mg +++COURT ORDERED MEDICATION FOR REFUSAL OF PO HALOPERIDOL+++  10 mg IntraMUSCular BID    Or    haloperidol (HALDOL) tablet 10 mg  10 mg Oral BID    LORazepam (ATIVAN) injection 0.5 mg +++COURT ORDERED MEDICATION FOR REFUSAL OF PO DEPAKOTE+++  0.5 mg IntraMUSCular QHS    Or    divalproex ER (DEPAKOTE ER) 24 hour tablet 1,000 mg  1,000 mg Oral QHS    benztropine (COGENTIN) tablet 0.5 mg  0.5 mg Oral BID       The following labs have been completed for monitoring of valproic acid:    Valproic Acid Serum Concentration  Lab Results   Component Value Date/Time    Valproic acid 87 04/20/2018 08:35 AM         Hepatic Function  Lab Results   Component Value Date/Time    Bilirubin, total 0.2 12/27/2015 12:00 PM    Protein, total 7.5 12/27/2015 12:00 PM    Albumin 3.9 12/27/2015 12:00 PM    Globulin 3.6 12/27/2015 12:00 PM    A-G Ratio 1.1 12/27/2015 12:00 PM    ALT (SGPT) 20 12/27/2015 12:00 PM    Alk. phosphatase 97 12/27/2015 12:00 PM       Hematology  Lab Results   Component Value Date/Time    WBC 6.0 12/27/2015 12:00 PM    RBC 4.05 12/27/2015 12:00 PM    HGB 13.1 12/27/2015 12:00 PM    HCT 38.3 12/27/2015 12:00 PM    MCV 94.6 12/27/2015 12:00 PM    MCH 32.3 12/27/2015 12:00 PM    MCHC 34.2 12/27/2015 12:00 PM    RDW 12.3 12/27/2015 12:00 PM    PLATELET 636 04/08/9587 12:00 PM       Assessment/Plan:  Valproic acid level 87 mcg/ml at 0835. Level drawn 11 hours after previous Depakote dose. Level drawn at steady state. No adjustments made to Depakote dose of 1000 mg/day.      Jimenez Syed, PharmD, PGY1 Pharmacy Resident  [phone]

## 2018-04-20 NOTE — BH NOTES
The patient appears to be getting better, but is still inconsistent in her cooperation of treatment and discharge planning. She was visited by her daughter last night and it appeared to go well. The team has projected discharge next week, possibly Monday. Worker called the patient's daughter and she said the patient is better, but she's not quite to baseline. She said the patient is inconsistent and was still making grandiose and delusional comments last night. She will visit the patient Kaleb night to determine if the patient is ready for discharge Monday.       Yobany Mei LCSW

## 2018-04-20 NOTE — BH NOTES
GROUP THERAPY PROGRESS NOTE    The patient Virgil lozano 61 y.o. female is participating in [employer]. Group time: 1 hour    Personal goal for participation: To concentrate on selected task    Goal orientation: social    Group therapy participation: active    Therapeutic interventions reviewed and discussed: Crafts, games, music    Impression of participation: The patient was attentive.     Ashley Mckenna  4/20/2018  5:51 PM

## 2018-04-20 NOTE — BH NOTES
GROUP THERAPY PROGRESS NOTE    The patient Jaz lozano 61 y.o. female is participating in [facility] [employer]. Group time: 45 minutes    Personal goal for participation: To participate in relaxation activity    Goal orientation:  relaxation    Group therapy participation: active    Therapeutic interventions reviewed and discussed: favorite ways to relax    Impression of participation:  The patient was attentive.     Maximino Newman  4/20/2018  5:38 PM

## 2018-04-20 NOTE — BH NOTES
GROUP THERAPY PROGRESS NOTE    The patient Jose lozano 61 y.o. female is participating in [employer]    Group time: 30 minutes    Personal goal for participation: To discuss the daily goals    Goal orientation: personal    Group therapy participation: minimal    Therapeutic interventions reviewed and discussed:  Yes    Impression of participation: lulu Ken  4/19/2018  10:18 PM

## 2018-04-21 PROCEDURE — 74011250637 HC RX REV CODE- 250/637: Performed by: PSYCHIATRY & NEUROLOGY

## 2018-04-21 PROCEDURE — 65220000003 HC RM SEMIPRIVATE PSYCH

## 2018-04-21 RX ORDER — TRAZODONE HYDROCHLORIDE 50 MG/1
50 TABLET ORAL
Status: DISCONTINUED | OUTPATIENT
Start: 2018-04-21 | End: 2018-04-23 | Stop reason: HOSPADM

## 2018-04-21 RX ADMIN — BENZTROPINE MESYLATE 0.5 MG: 1 TABLET ORAL at 21:05

## 2018-04-21 RX ADMIN — HALOPERIDOL 10 MG: 5 TABLET ORAL at 08:28

## 2018-04-21 RX ADMIN — HALOPERIDOL 10 MG: 5 TABLET ORAL at 21:08

## 2018-04-21 RX ADMIN — TRAZODONE HYDROCHLORIDE 50 MG: 50 TABLET ORAL at 21:08

## 2018-04-21 RX ADMIN — DIVALPROEX SODIUM 1000 MG: 500 TABLET, FILM COATED, EXTENDED RELEASE ORAL at 21:08

## 2018-04-21 RX ADMIN — BENZTROPINE MESYLATE 0.5 MG: 1 TABLET ORAL at 08:28

## 2018-04-21 NOTE — PROGRESS NOTES
Problem: Altered Thought Process (Adult/Pediatric)  Goal: *STG: Remains safe in hospital  Outcome: Progressing Towards Goal  Pt is alert. She is compliant with medications and meals. Pt is focused on discharge. She denies hallucinations and denies suicidal and homicidal ideations. Pt is visible on the unit, interacting with staff and peers. Will continue to monitor pt q15 minutes for safety and support.

## 2018-04-21 NOTE — BH NOTES
Pt medication and meals complaint Pt wasn't no problem alert on the unit Get along with others Pt will remain on Q 15 checks for safety.

## 2018-04-21 NOTE — BH NOTES
PSYCHIATRIC PROGRESS NOTE         Patient Name  Loulou Snow   Date of Birth 1960   [facility] [id number]   Medical Record Number  [id number]      Age  61 y.o. PCP None   Admit date:  3/30/2018    Room Number  562/40  @ [facility]   Date of Service  4/21/2018           E & M PROGRESS NOTE:         HISTORY       CC:  \"leave me alone\"  HISTORY OF PRESENT ILLNESS/INTERVAL HISTORY:  (reviewed/updated 4/21/2018). per initial evaluation: The patient, Loulou Snow, is a 61 y.o. BLACK OR  female with a past psychiatric history significant for schizoaffective d/o, who presents at this time with complaints of (and/or evidence of) the following emotional symptoms: agitation and mood swings. Additional symptomatology include refusing to talk to team, irritable and angry mood and guarded. Per report pt has been deteriorating in the last 2 month and threatening to kill family members. She has been aggressive towards the , she has been breaking into neighbors apartment and religiously preoccupied. She has poor appetite and refusing to complete her ADL with poor hygiene. She has no insight and does not want to take medication. The above symptoms have been present for . These symptoms are of severe severity. These symptoms are constant in nature. The patient's condition has been precipitated by and psychosocial stressors ( ). Patient's condition made worse by treatment noncompliance. UDS: negative; BAL=0. Loulou Snow presents/reports/evidences the following emotional symptoms today, 4/21/2018:agitation and psychotic behavior. The above symptoms have been present for few month. These symptoms are of severe severity. The symptoms are constant in nature. Additional symptomatology and features include labile and angry mood, responding to internal stimuli, paranoid delusion and poor insight. Refusing to take medication, received prn for agitation. 4/17- irritable and angry affect,. Refusing to meet with tx team. Was unhappy that she is not dc as she took few days med. Poor insight and will not co operate with tx.   4/18- pt managed to come to tx team meeting and discuss her dc planning. She is now accepting meed but ct to have irritable and angry mood. She does not trust anyone and remains guarded and preoccupied with dc. She left the meeting mid way when she understood she is not dc today. Poor insight  4/19- pt affect is better and pleasant. Less guarded and now accepting med. She did agree for her daughter to visit her. No aggressive behavior. Insight  Is limited. Now she is talking to the tx team  4/20- remains paranoid but affect is brighter. She allowed her daughter to visit but ct to believe she is dead and stated Meg Ruiz is daughter like\". Accepting med but poor insight  4/21- affect is better, want trazodone for sleep despite report that she slept well and for 6 hrs. Limited insight but accepting med. Less delusional and less guarded      SIDE EFFECTS: (reviewed/updated 4/21/2018)  None reported or admitted to. No noted toxicity with use of current med   ALLERGIES:(reviewed/updated 4/21/2018)  No Known Allergies   MEDICATIONS PRIOR TO ADMISSION:(reviewed/updated 4/21/2018)  Prescriptions Prior to Admission   Medication Sig    albuterol (PROVENTIL HFA, VENTOLIN HFA, PROAIR HFA) 90 mcg/actuation inhaler Take 2 Puffs by inhalation three (3) times daily as needed for Wheezing or Shortness of Breath. PAST MEDICAL HISTORY: Past medical history from the initial psychiatric evaluation has been reviewed (reviewed/updated 4/21/2018) with no additional updates (I asked patient and no additional past medical history provided). Past Medical History:   Diagnosis Date    Asthma     Hepatitis C    No past surgical history on file.    SOCIAL HISTORY: Social history from the initial psychiatric evaluation has been reviewed (reviewed/updated 4/21/2018) with no additional updates (I asked patient and no additional social history provided). Social History     Social History    Marital status: SINGLE     Spouse name: N/A    Number of children: N/A    Years of education: N/A     Occupational History    Not on file. Social History Main Topics    Smoking status: Current Every Day Smoker    Smokeless tobacco: Not on file    Alcohol use Yes    Drug use: Yes     Special: Marijuana    Sexual activity: Yes     Partners: Male     Birth control/ protection: None     Other Topics Concern    Not on file     Social History Narrative    No narrative on file      FAMILY HISTORY: Family history from the initial psychiatric evaluation has been reviewed (reviewed/updated 4/21/2018) with no additional updates (I asked patient and no additional family history provided). No family history on file. REVIEW OF SYSTEMS: (reviewed/updated 4/21/2018)  Appetite:no change from normal   Sleep: 2 hrs  All other Review of Systems: Psychological ROS: positive for - behavioral disorder, concentration difficulties, hallucinations, hostility, mood swings and psychosis  Respiratory ROS: no cough, shortness of breath, or wheezing  Cardiovascular ROS: no chest pain or dyspnea on exertion  Neurological ROS: no TIA or stroke symptoms         [facility] (MSE):    MSE FINDINGS ARE WITHIN NORMAL LIMITS (WNL) UNLESS OTHERWISE STATED BELOW. ( ALL OF THE BELOW CATEGORIES OF THE MSE HAVE BEEN REVIEWED (reviewed 4/21/2018) AND UPDATED AS DEEMED APPROPRIATE )  General Presentation age appropriate and disheveled, evasive, uncooperative and unreliable   Orientation disorganized   Vital Signs  See below (reviewed 4/21/2018); Vital Signs (BP, Pulse, & Temp) are within normal limits if not listed below.    Gait and Station Stable/steady, no ataxia   Musculoskeletal System No extrapyramidal symptoms (EPS); no abnormal muscular movements or Tardive Dyskinesia (TD); muscle strength and tone are within normal limits   Language No aphasia or dysarthria   Speech:  loud and pressured   Thought Processes illogical; slow rate of thoughts; poor abstract reasoning/computation   Thought Associations tangential   Thought Content paranoid delusions, Gnosticist delusions, auditory hallucinations and internally preoccupied   Suicidal Ideations none   Homicidal Ideations none   Mood:  irritable   Affect:  irritable, inappropriate and increased in intensity   Memory recent  impaired   Memory remote:  intact   Concentration/Attention:  distractable and hypervigilance   Fund of Knowledge below average   Insight:  poor   Reliability poor   Judgment:  poor          VITALS:     Patient Vitals for the past 24 hrs:   Temp Pulse Resp BP   04/21/18 0518 98.2 °F (36.8 °C) 65 16 105/47     Wt Readings from Last 3 Encounters:   04/04/18 59 kg (130 lb 1.1 oz)   12/27/15 61.2 kg (135 lb)   11/09/14 65.8 kg (145 lb)     Temp Readings from Last 3 Encounters:   04/21/18 98.2 °F (36.8 °C)   12/27/15 98.7 °F (37.1 °C)   11/09/14 98.7 °F (37.1 °C)     BP Readings from Last 3 Encounters:   04/21/18 105/47   12/27/15 132/65   11/09/14 111/65     Pulse Readings from Last 3 Encounters:   04/21/18 65   12/27/15 76   11/09/14 65            DATA     LABORATORY DATA:(reviewed/updated 4/21/2018)  No results found for this or any previous visit (from the past 24 hour(s)). Lab Results   Component Value Date/Time    Valproic acid 87 04/20/2018 08:35 AM     No results found for: LITHM   RADIOLOGY REPORTS:(reviewed/updated 4/21/2018)  No results found.        MEDICATIONS     ALL MEDICATIONS:   Current Facility-Administered Medications   Medication Dose Route Frequency    traZODone (DESYREL) tablet 50 mg  50 mg Oral QHS    haloperidol decanoate (HALDOL DECANOATE) 100 mg/mL injection 100 mg  100 mg IntraMUSCular Q28D    haloperidol lactate (HALDOL) injection 10 mg +++COURT ORDERED MEDICATION FOR REFUSAL OF PO HALOPERIDOL+++  10 mg IntraMUSCular BID    Or    haloperidol (HALDOL) tablet 10 mg  10 mg Oral BID    LORazepam (ATIVAN) injection 0.5 mg +++COURT ORDERED MEDICATION FOR REFUSAL OF PO DEPAKOTE+++  0.5 mg IntraMUSCular QHS    Or    divalproex ER (DEPAKOTE ER) 24 hour tablet 1,000 mg  1,000 mg Oral QHS    benztropine (COGENTIN) tablet 0.5 mg  0.5 mg Oral BID    ziprasidone (GEODON) 20 mg in sterile water (preservative free) 1 mL injection  20 mg IntraMUSCular BID PRN    OLANZapine (ZyPREXA) tablet 5 mg  5 mg Oral Q6H PRN    benztropine (COGENTIN) tablet 2 mg  2 mg Oral BID PRN    benztropine (COGENTIN) injection 2 mg  2 mg IntraMUSCular BID PRN    LORazepam (ATIVAN) injection 2 mg  2 mg IntraMUSCular Q4H PRN    LORazepam (ATIVAN) tablet 1 mg  1 mg Oral Q4H PRN    acetaminophen (TYLENOL) tablet 650 mg  650 mg Oral Q4H PRN    ibuprofen (MOTRIN) tablet 400 mg  400 mg Oral Q8H PRN    magnesium hydroxide (MILK OF MAGNESIA) 400 mg/5 mL oral suspension 30 mL  30 mL Oral DAILY PRN    nicotine (NICODERM CQ) 21 mg/24 hr patch 1 Patch  1 Patch TransDERmal DAILY PRN      SCHEDULED MEDICATIONS:   Current Facility-Administered Medications   Medication Dose Route Frequency    traZODone (DESYREL) tablet 50 mg  50 mg Oral QHS    haloperidol decanoate (HALDOL DECANOATE) 100 mg/mL injection 100 mg  100 mg IntraMUSCular Q28D    haloperidol lactate (HALDOL) injection 10 mg +++COURT ORDERED MEDICATION FOR REFUSAL OF PO HALOPERIDOL+++  10 mg IntraMUSCular BID    Or    haloperidol (HALDOL) tablet 10 mg  10 mg Oral BID    LORazepam (ATIVAN) injection 0.5 mg +++COURT ORDERED MEDICATION FOR REFUSAL OF PO DEPAKOTE+++  0.5 mg IntraMUSCular QHS    Or    divalproex ER (DEPAKOTE ER) 24 hour tablet 1,000 mg  1,000 mg Oral QHS    benztropine (COGENTIN) tablet 0.5 mg  0.5 mg Oral BID          ASSESSMENT & PLAN     DIAGNOSES REQUIRING ACTIVE TREATMENT AND MONITORING: (reviewed/updated 4/21/2018)  Patient Active Hospital Problem List:    [facility] (3/30/2018)    Assessment:slow progress- no agitation. less  paranoid delusion, affect is improving. Denies si/hi/avh    Plan: Ct to adjust med- Haldol dec and Depakote. Ct with same. need close follow up in community    Collateral info from daughter- please see sw report     Agitation (3/31/2018)    Assessment: acute due to psychosis. Prn med    Plan: prn med for now       I will continue to monitor blood levels (Depakote, ---a drug with a narrow therapeutic index= NTI) and associated labs for drug therapy implemented that require intense monitoring for toxicity as deemed appropriate based on current medication side effects and pharmacodynamically determined drug 1/2 lives. - 80 therapeutic    In summary, Maia Bahena, is a 61 y.o.  female who presents with a severe exacerbation of the principal diagnosis of Schizoaffective disorder ([facility].)  Patient's condition is worsening/not improving/not stable . Patient requires continued inpatient hospitalization for further stabilization, safety monitoring and medication management. I will continue to coordinate the provision of individual, milieu, occupational, group, and substance abuse therapies to address target symptoms/diagnoses as deemed appropriate for the individual patient. A coordinated, multidisplinary treatment team round was conducted with the patient (this team consists of the nurse, psychiatric unit pharmcist,  and writer). Complete current electronic health record for patient has been reviewed today including consultant notes, ancillary staff notes, nurses and psychiatric tech notes. Suicide risk assessment completed and patient deemed to be of low risk for suicide at this time. The following regarding medications was addressed during rounds with patient:   the risks and benefits of the proposed medication. The patient was given the opportunity to ask questions.  Informed consent given to the use of the above medications. Will continue to adjust psychiatric and non-psychiatric medications (see above \"medication\" section and orders section for details) as deemed appropriate & based upon diagnoses and response to treatment. I will continue to order blood tests/labs and diagnostic tests as deemed appropriate and review results as they become available (see orders for details and above listed lab/test results). I will order psychiatric records from previous [facility] hospitals to further elucidate the nature of patient's psychopathology and review once available. I will gather additional collateral information from friends, family and o/p treatment team to further elucidate the nature of patient's psychopathology and baselline level of psychiatric functioning. I certify that this patient's inpatient psychiatric hospital services furnished since the previous certification were, and continue to be, required for treatment that could reasonably be expected to improve the patient's condition, or for diagnostic study, and that the patient continues to need, on a daily basis, active treatment furnished directly by or requiring the supervision of inpatient psychiatric facility personnel. In addition, the hospital records show that services furnished were intensive treatment services, admission or related services, or equivalent services. EXPECTED DISCHARGE DATE/DAY:  Monday     DISPOSITION: Home       Signed By:   Molly Schwartz MD  4/21/2018

## 2018-04-21 NOTE — BH NOTES
PSYCHIATRIC PROGRESS NOTE         Patient Name  Michelle Mcgraw   Date of Birth 1960   [facility] [id number]   Medical Record Number  [id number]      Age  61 y.o. PCP None   Admit date:  3/30/2018    Room Number  160/04  @ [facility]   Date of Service  4/20/2018           E & M PROGRESS NOTE:         HISTORY       CC:  \"leave me alone\"  HISTORY OF PRESENT ILLNESS/INTERVAL HISTORY:  (reviewed/updated 4/20/2018). per initial evaluation: The patient, Michelle Mcgraw, is a 61 y.o. BLACK OR  female with a past psychiatric history significant for schizoaffective d/o, who presents at this time with complaints of (and/or evidence of) the following emotional symptoms: agitation and mood swings. Additional symptomatology include refusing to talk to team, irritable and angry mood and guarded. Per report pt has been deteriorating in the last 2 month and threatening to kill family members. She has been aggressive towards the , she has been breaking into neighbors apartment and religiously preoccupied. She has poor appetite and refusing to complete her ADL with poor hygiene. She has no insight and does not want to take medication. The above symptoms have been present for . These symptoms are of severe severity. These symptoms are constant in nature. The patient's condition has been precipitated by and psychosocial stressors ( ). Patient's condition made worse by treatment noncompliance. UDS: negative; BAL=0. Michelle Mcgraw presents/reports/evidences the following emotional symptoms today, 4/20/2018:agitation and psychotic behavior. The above symptoms have been present for few month. These symptoms are of severe severity. The symptoms are constant in nature. Additional symptomatology and features include labile and angry mood, responding to internal stimuli, paranoid delusion and poor insight. Refusing to take medication, received prn for agitation. 4/17- irritable and angry affect,. Refusing to meet with tx team. Was unhappy that she is not dc as she took few days med. Poor insight and will not co operate with tx.   4/18- pt managed to come to tx team meeting and discuss her dc planning. She is now accepting meed but ct to have irritable and angry mood. She does not trust anyone and remains guarded and preoccupied with dc. She left the meeting mid way when she understood she is not dc today. Poor insight  4/19- pt affect is better and pleasant. Less guarded and now accepting med. She did agree for her daughter to visit her. No aggressive behavior. Insight  Is limited. Now she is talking to the tx team  4/20- remains paranoid but affect is brighter. She allowed her daughter to visit but ct to believe she is dead and stated Abeba Valencia is daughter like\". Accepting med but poor insight      SIDE EFFECTS: (reviewed/updated 4/20/2018)  None reported or admitted to. No noted toxicity with use of current med   ALLERGIES:(reviewed/updated 4/20/2018)  No Known Allergies   MEDICATIONS PRIOR TO ADMISSION:(reviewed/updated 4/20/2018)  Prescriptions Prior to Admission   Medication Sig    albuterol (PROVENTIL HFA, VENTOLIN HFA, PROAIR HFA) 90 mcg/actuation inhaler Take 2 Puffs by inhalation three (3) times daily as needed for Wheezing or Shortness of Breath. PAST MEDICAL HISTORY: Past medical history from the initial psychiatric evaluation has been reviewed (reviewed/updated 4/20/2018) with no additional updates (I asked patient and no additional past medical history provided). Past Medical History:   Diagnosis Date    Asthma     Hepatitis C    No past surgical history on file. SOCIAL HISTORY: Social history from the initial psychiatric evaluation has been reviewed (reviewed/updated 4/20/2018) with no additional updates (I asked patient and no additional social history provided).    Social History     Social History    Marital status: SINGLE     Spouse name: N/A   [facility] Number of children: N/A    Years of education: N/A     Occupational History    Not on file. Social History Main Topics    Smoking status: Current Every Day Smoker    Smokeless tobacco: Not on file    Alcohol use Yes    Drug use: Yes     Special: Marijuana    Sexual activity: Yes     Partners: Male     Birth control/ protection: None     Other Topics Concern    Not on file     Social History Narrative    No narrative on file      FAMILY HISTORY: Family history from the initial psychiatric evaluation has been reviewed (reviewed/updated 4/20/2018) with no additional updates (I asked patient and no additional family history provided). No family history on file. REVIEW OF SYSTEMS: (reviewed/updated 4/20/2018)  Appetite:no change from normal   Sleep: 2 hrs  All other Review of Systems: Psychological ROS: positive for - behavioral disorder, concentration difficulties, hallucinations, hostility, mood swings and psychosis  Respiratory ROS: no cough, shortness of breath, or wheezing  Cardiovascular ROS: no chest pain or dyspnea on exertion  Neurological ROS: no TIA or stroke symptoms         [facility] ([facility]):    [facility] FINDINGS ARE WITHIN NORMAL LIMITS (WNL) UNLESS OTHERWISE STATED BELOW. ( ALL OF THE BELOW CATEGORIES OF THE [facility] HAVE BEEN REVIEWED (reviewed 4/20/2018) AND UPDATED AS DEEMED APPROPRIATE )  General Presentation age appropriate and disheveled, evasive, uncooperative and unreliable   Orientation disorganized   Vital Signs  See below (reviewed 4/20/2018); Vital Signs (BP, Pulse, & Temp) are within normal limits if not listed below.    Gait and Station Stable/steady, no ataxia   Musculoskeletal System No extrapyramidal symptoms (EPS); no abnormal muscular movements or Tardive Dyskinesia (TD); muscle strength and tone are within normal limits   Language No aphasia or dysarthria   Speech:  loud and pressured   Thought Processes illogical; slow rate of thoughts; poor abstract reasoning/computation   Thought Associations tangential   Thought Content paranoid delusions, Confucianism delusions, auditory hallucinations and internally preoccupied   Suicidal Ideations none   Homicidal Ideations none   Mood:  irritable   Affect:  irritable, inappropriate and increased in intensity   Memory recent  impaired   Memory remote:  intact   Concentration/Attention:  distractable and hypervigilance   Fund of Knowledge below average   Insight:  poor   Reliability poor   Judgment:  poor          VITALS:     Patient Vitals for the past 24 hrs:   Temp Pulse Resp BP   04/20/18 1219 97.9 °F (36.6 °C) 77 16 94/58     Wt Readings from Last 3 Encounters:   04/04/18 59 kg (130 lb 1.1 oz)   12/27/15 61.2 kg (135 lb)   11/09/14 65.8 kg (145 lb)     Temp Readings from Last 3 Encounters:   04/20/18 97.9 °F (36.6 °C)   12/27/15 98.7 °F (37.1 °C)   11/09/14 98.7 °F (37.1 °C)     BP Readings from Last 3 Encounters:   04/20/18 94/58   12/27/15 132/65   11/09/14 111/65     Pulse Readings from Last 3 Encounters:   04/20/18 77   12/27/15 76   11/09/14 65            DATA     LABORATORY DATA:(reviewed/updated 4/20/2018)  Recent Results (from the past 24 hour(s))   VALPROIC ACID    Collection Time: 04/20/18  8:35 AM   Result Value Ref Range    Valproic acid 87 50 - 100 ug/ml     Lab Results   Component Value Date/Time    Valproic acid 87 04/20/2018 08:35 AM     No results found for: LITHM   RADIOLOGY REPORTS:(reviewed/updated 4/20/2018)  No results found.        MEDICATIONS     ALL MEDICATIONS:   Current Facility-Administered Medications   Medication Dose Route Frequency    haloperidol decanoate (HALDOL DECANOATE) 100 mg/mL injection 100 mg  100 mg IntraMUSCular Q28D    haloperidol lactate (HALDOL) injection 10 mg +++COURT ORDERED MEDICATION FOR REFUSAL OF PO HALOPERIDOL+++  10 mg IntraMUSCular BID    Or    haloperidol (HALDOL) tablet 10 mg  10 mg Oral BID    LORazepam (ATIVAN) injection 0.5 mg +++COURT ORDERED MEDICATION FOR REFUSAL OF PO DEPAKOTE+++  0.5 mg IntraMUSCular QHS    Or    divalproex ER (DEPAKOTE ER) 24 hour tablet 1,000 mg  1,000 mg Oral QHS    benztropine (COGENTIN) tablet 0.5 mg  0.5 mg Oral BID    traZODone (DESYREL) tablet 100 mg  100 mg Oral QHS PRN    ziprasidone (GEODON) 20 mg in sterile water (preservative free) 1 mL injection  20 mg IntraMUSCular BID PRN    OLANZapine (ZyPREXA) tablet 5 mg  5 mg Oral Q6H PRN    benztropine (COGENTIN) tablet 2 mg  2 mg Oral BID PRN    benztropine (COGENTIN) injection 2 mg  2 mg IntraMUSCular BID PRN    LORazepam (ATIVAN) injection 2 mg  2 mg IntraMUSCular Q4H PRN    LORazepam (ATIVAN) tablet 1 mg  1 mg Oral Q4H PRN    acetaminophen (TYLENOL) tablet 650 mg  650 mg Oral Q4H PRN    ibuprofen (MOTRIN) tablet 400 mg  400 mg Oral Q8H PRN    magnesium hydroxide (MILK OF MAGNESIA) 400 mg/5 mL oral suspension 30 mL  30 mL Oral DAILY PRN    nicotine (NICODERM CQ) 21 mg/24 hr patch 1 Patch  1 Patch TransDERmal DAILY PRN      SCHEDULED MEDICATIONS:   Current Facility-Administered Medications   Medication Dose Route Frequency    haloperidol decanoate (HALDOL DECANOATE) 100 mg/mL injection 100 mg  100 mg IntraMUSCular Q28D    haloperidol lactate (HALDOL) injection 10 mg +++COURT ORDERED MEDICATION FOR REFUSAL OF PO HALOPERIDOL+++  10 mg IntraMUSCular BID    Or    haloperidol (HALDOL) tablet 10 mg  10 mg Oral BID    LORazepam (ATIVAN) injection 0.5 mg +++COURT ORDERED MEDICATION FOR REFUSAL OF PO DEPAKOTE+++  0.5 mg IntraMUSCular QHS    Or    divalproex ER (DEPAKOTE ER) 24 hour tablet 1,000 mg  1,000 mg Oral QHS    benztropine (COGENTIN) tablet 0.5 mg  0.5 mg Oral BID          ASSESSMENT & PLAN     DIAGNOSES REQUIRING ACTIVE TREATMENT AND MONITORING: (reviewed/updated 4/20/2018)  Patient Active Hospital Problem List:    Schizoaffective disorder ([address] (3/30/2018)    Assessment:slow progress- less agitated and less  paranoid delusion, sl better insight and agreeable to ct taking med    Plan: Ct to adjust med- Haldol dec and Depakote. Ct with same. need close follow up in community    Collateral info from daughter- please see sw report     Agitation (3/31/2018)    Assessment: acute due to psychosis. Prn med    Plan: prn med for now       I will continue to monitor blood levels (Depakote, ---a drug with a narrow therapeutic index= NTI) and associated labs for drug therapy implemented that require intense monitoring for toxicity as deemed appropriate based on current medication side effects and pharmacodynamically determined drug 1/2 lives. - 80 therapeutic    In summary, Loulou Snow, is a 61 y.o.  female who presents with a severe exacerbation of the principal diagnosis of Schizoaffective disorder ([facility] [address]  Patient's condition is worsening/not improving/not stable . Patient requires continued inpatient hospitalization for further stabilization, safety monitoring and medication management. I will continue to coordinate the provision of individual, milieu, occupational, group, and substance abuse therapies to address target symptoms/diagnoses as deemed appropriate for the individual patient. A coordinated, multidisplinary treatment team round was conducted with the patient (this team consists of the nurse, psychiatric unit pharmcist,  and writer). Complete current electronic health record for patient has been reviewed today including consultant notes, ancillary staff notes, nurses and psychiatric tech notes. Suicide risk assessment completed and patient deemed to be of low risk for suicide at this time. The following regarding medications was addressed during rounds with patient:   the risks and benefits of the proposed medication. The patient was given the opportunity to ask questions. Informed consent given to the use of the above medications.  Will continue to adjust psychiatric and non-psychiatric medications (see above \"medication\" section and orders section for details) as deemed appropriate & based upon diagnoses and response to treatment. I will continue to order blood tests/labs and diagnostic tests as deemed appropriate and review results as they become available (see orders for details and above listed lab/test results). I will order psychiatric records from previous [facility] hospitals to further elucidate the nature of patient's psychopathology and review once available. I will gather additional collateral information from friends, family and o/p treatment team to further elucidate the nature of patient's psychopathology and baselline level of psychiatric functioning. I certify that this patient's inpatient psychiatric hospital services furnished since the previous certification were, and continue to be, required for treatment that could reasonably be expected to improve the patient's condition, or for diagnostic study, and that the patient continues to need, on a daily basis, active treatment furnished directly by or requiring the supervision of inpatient psychiatric facility personnel. In addition, the hospital records show that services furnished were intensive treatment services, admission or related services, or equivalent services. EXPECTED DISCHARGE DATE/DAY:  Monday     DISPOSITION: Home       Signed By:   Kenna Varela MD  4/20/2018

## 2018-04-22 PROCEDURE — 74011250637 HC RX REV CODE- 250/637: Performed by: PSYCHIATRY & NEUROLOGY

## 2018-04-22 PROCEDURE — 65220000003 HC RM SEMIPRIVATE PSYCH

## 2018-04-22 RX ADMIN — DIVALPROEX SODIUM 1000 MG: 500 TABLET, FILM COATED, EXTENDED RELEASE ORAL at 21:10

## 2018-04-22 RX ADMIN — HALOPERIDOL 10 MG: 5 TABLET ORAL at 09:12

## 2018-04-22 RX ADMIN — TRAZODONE HYDROCHLORIDE 50 MG: 50 TABLET ORAL at 21:10

## 2018-04-22 RX ADMIN — BENZTROPINE MESYLATE 0.5 MG: 1 TABLET ORAL at 09:12

## 2018-04-22 RX ADMIN — BENZTROPINE MESYLATE 0.5 MG: 1 TABLET ORAL at 21:10

## 2018-04-22 RX ADMIN — HALOPERIDOL 10 MG: 5 TABLET ORAL at 21:10

## 2018-04-22 NOTE — BH NOTES
GROUP THERAPY PROGRESS NOTE    The patient Leydi Richmond is participating in [employer]. Group time: 30 minutes    Personal goal for participation: to orient the patient to the unit.     Goal orientation: successful adoption of unit rules    Group therapy participation: minimal    Therapeutic interventions reviewed and discussed: Yes    Impression of participation:lulu Guerrero  4/22/2018 2:29 PM

## 2018-04-22 NOTE — PROGRESS NOTES
Problem: Altered Thought Process (Adult/Pediatric)  Goal: *STG: Remains safe in hospital  Outcome: Progressing Towards Goal  Patient is focused on discharge. Visible in the milieu. Medication and meal compliant. Increased appetite. No behavioral issues. Denies SI/HI. Denies A/V hallucinations. Will continue to monitor patient and assess needs.

## 2018-04-23 VITALS
WEIGHT: 130.07 LBS | DIASTOLIC BLOOD PRESSURE: 52 MMHG | SYSTOLIC BLOOD PRESSURE: 91 MMHG | BODY MASS INDEX: 24.56 KG/M2 | RESPIRATION RATE: 16 BRPM | HEART RATE: 57 BPM | HEIGHT: 61 IN | TEMPERATURE: 98.5 F

## 2018-04-23 PROCEDURE — 74011250637 HC RX REV CODE- 250/637: Performed by: PSYCHIATRY & NEUROLOGY

## 2018-04-23 RX ORDER — HALOPERIDOL DECANOATE 100 MG/ML
100 INJECTION INTRAMUSCULAR
Qty: 1 ML | Refills: 0 | Status: SHIPPED | OUTPATIENT
Start: 2018-05-15 | End: 2018-06-14

## 2018-04-23 RX ORDER — BENZTROPINE MESYLATE 0.5 MG/1
0.5 TABLET ORAL 2 TIMES DAILY
Qty: 28 TAB | Refills: 0 | Status: SHIPPED | OUTPATIENT
Start: 2018-04-23 | End: 2018-05-07

## 2018-04-23 RX ORDER — DIVALPROEX SODIUM 500 MG/1
1000 TABLET, EXTENDED RELEASE ORAL
Qty: 28 TAB | Refills: 0 | Status: SHIPPED | OUTPATIENT
Start: 2018-04-23 | End: 2018-05-07

## 2018-04-23 RX ORDER — HALOPERIDOL 10 MG/1
10 TABLET ORAL 2 TIMES DAILY
Qty: 28 TAB | Refills: 0 | Status: SHIPPED | OUTPATIENT
Start: 2018-04-23 | End: 2018-05-07

## 2018-04-23 RX ORDER — TRAZODONE HYDROCHLORIDE 50 MG/1
50 TABLET ORAL
Qty: 14 TAB | Refills: 0 | Status: SHIPPED | OUTPATIENT
Start: 2018-04-23 | End: 2018-05-07

## 2018-04-23 RX ADMIN — HALOPERIDOL 10 MG: 5 TABLET ORAL at 08:58

## 2018-04-23 RX ADMIN — BENZTROPINE MESYLATE 0.5 MG: 1 TABLET ORAL at 08:58

## 2018-04-23 NOTE — BH NOTES
Pt is alert and oriented x person, place and time. Pt denies any SI/HI or AV hallucinations. Pt denies any depression. Pt plans to return home with daughter. Discharge information/Prescriptions reviewed with patient. Pt verbalizes understanding. Pt's belongings/valuables returned. Pt to be transported home by daughter.

## 2018-04-23 NOTE — DISCHARGE SUMMARY
PSYCHIATRIC DISCHARGE SUMMARY         IDENTIFICATION:    Patient Name  Lazarus Gowda   Date of Birth 1960   [facility] [id number]   Medical Record Number  [id number]      Age  61 y.o. PCP None   Admit date:  3/30/2018    Discharge date: 4/23/2018   Room Number  316/02  @ [address]   Date of Service  4/23/2018            TYPE OF DISCHARGE: REGULAR               CONDITION AT DISCHARGE: fair       PROVISIONAL & DISCHARGE DIAGNOSES:    Problem List  Never Reviewed          Codes Class    Agitation ICD-10-CM: R45.1  ICD-9-CM: 307.9         * (Principal)Schizoaffective disorder ([facility] [address] ICD-10-CM: F25.9  ICD-9-CM: 295.70               Active Hospital Problems    Agitation      *Schizoaffective disorder ([facility] [address]        DISCHARGE DIAGNOSIS:   Axis I:  SEE ABOVE  Axis II: SEE ABOVE  Axis III: SEE ABOVE  Axis IV:  lack of structure  Axis V:  20 on admission, 60 on discharge 60(baseline)       CC & HISTORY OF PRESENT ILLNESS:  The patient, Lazarus Gowda, is a 61 y.o. BLACK OR  female with a past psychiatric history significant for schizoaffective d/o, who presents at this time with complaints of (and/or evidence of) the following emotional symptoms: agitation and mood swings. Additional symptomatology include refusing to talk to team, irritable and angry mood and guarded. Per report pt has been deteriorating in the last 2 month and threatening to kill family members. She has been aggressive towards the , she has been breaking into neighbors apartment and religiously preoccupied. She has poor appetite and refusing to complete her ADL with poor hygiene. She has no insight and does not want to take medication. The above symptoms have been present for . These symptoms are of severe severity. These symptoms are constant in nature. The patient's condition has been precipitated by and psychosocial stressors ( ). Patient's condition made worse by treatment noncompliance. UDS: negative; BAL=0.        SOCIAL HISTORY:    Social History     Social History    Marital status: SINGLE     Spouse name: N/A    Number of children: N/A    Years of education: N/A     Occupational History    Not on file. Social History Main Topics    Smoking status: Current Every Day Smoker    Smokeless tobacco: Not on file    Alcohol use Yes    Drug use: Yes     Special: Marijuana    Sexual activity: Yes     Partners: Male     Birth control/ protection: None     Other Topics Concern    Not on file     Social History Narrative    No narrative on file      FAMILY HISTORY:   No family history on file. HOSPITALIZATION COURSE:    Zabrina Rayo was admitted to the inpatient psychiatric unit [facility] [place] for acute psychiatric stabilization in regards to symptomatology as described in the HPI above. The differential diagnosis at time of admission included: bipolar dis vs. schizoaffective vs. Schizophrenia. While on the unit Zabrina Rayo was involved in individual, group, occupational and milieu therapy. Psychiatric medications were adjusted during this hospitalization including Haldol and Depakote. Zabrina Rayo demonstrated a slow, but progressive improvement in overall condition. Much of patient's depression appeared to be related to situational stressors,  and psychological factors. Please see individual progress notes for more specific details regarding patient's hospitalization course. At time of discharge, Zabrina Rayo is without significant problems of depression psychosis  sam. Patient free of suicidal and homicidal ideations (appears to be at very low risk of suicide or homicide) and reports many positive predictive factors in terms of not attempting suicide or homicide. Patient with request for discharge today. There are no grounds to seek a TDO.  Patient has maximized benefit to be derived from acute inpatient psychiatric treatment. All members of the treatment team concur with each other in regards to plans for discharge today per patient's request.  Patient and family are aware and in agreement with discharge and discharge plan. Per my last note:   preoccupied with dc. No delusional thinking but limited insight- now accepting med and compliant with tx plan. No agitation. Sleep is better    Schizoaffective disorder ([address] (3/30/2018)    Assessment:slow progress- no agitation. denies paranoid delusion, affect is improving. Denies si/hi/avh    Plan: Ct to adjust med- Haldol dec and Depakote. Ct with same. need close follow up in [facility]:    Labs Reviewed   LIPID PANEL   TSH 3RD GENERATION   HEMOGLOBIN A1C WITH EAG   VALPROIC ACID     Lab Results   Component Value Date/Time    Valproic acid 87 04/20/2018 08:35 AM     Admission on 03/30/2018   Component Date Value Ref Range Status    LIPID PROFILE 04/16/2018        Final    Cholesterol, total 04/16/2018 134  <200 MG/DL Final    Triglyceride 04/16/2018 65  <150 MG/DL Final    HDL Cholesterol 04/16/2018 46  MG/DL Final    LDL, calculated 04/16/2018 75  0 - 100 MG/DL Final    VLDL, calculated 04/16/2018 13  MG/DL Final    CHOL/HDL Ratio 04/16/2018 2.9  0 - 5.0   Final    TSH 04/16/2018 0.55  0.36 - 3.74 uIU/mL Final    Hemoglobin A1c 04/16/2018 5.6  4.2 - 6.3 % Final    Est. average glucose 04/16/2018 114  mg/dL Final    Valproic acid 04/20/2018 87  50 - 100 ug/ml Final     No results found. DISPOSITION:    Home. Patient to f/u with psychiatric, and psychotherapy appointments. Patient is to f/u with internist as directed. Patient should have a depakote level and associated labs checked within the next 1-2 weeks by patient's o/p psychiatrist/internist.               FOLLOW-UP CARE:    Activity as tolerated  Low fat, Low cholesterol  Wound Care: none needed.   Follow-up Information     Follow up With Details Comments Contact Info [address]  Please follow up with RB by accessing walk-in Mon-Fri 8:30am-4:30pm  [address] [place] [id number]  [phone]    Haloperidol decanoate 100 mg IM injection  On 5/14/2018 Haloperidol decanoate 100 mg IM injection given on 4/17/18. Next injection is due on 5/14/18. [employer]     None   None (395) Patient stated that they have no PCP                   PROGNOSIS:    Guarded / Poor---- based on nature of patient's pathology/ies and treatment compliance issues. Prognosis is greatly dependent upon patient's ability to follow up with psychiatric/psychotherapy appointments as well as to comply with psychiatric medications as prescribed. DISCHARGE MEDICATIONS:     Informed consent given for the use of following psychotropic medications:  Current Discharge Medication List      START taking these medications    Details   benztropine (COGENTIN) 0.5 mg tablet Take 1 Tab by mouth two (2) times a day for 14 days. Indications: extrapyramidal disease  Qty: 28 Tab, Refills: 0      divalproex ER (DEPAKOTE ER) 500 mg ER tablet Take 2 Tabs by mouth nightly for 14 days. Indications: MIXED BIPOLAR I DISORDER  Qty: 28 Tab, Refills: 0      haloperidol (HALDOL) 10 mg tablet Take 1 Tab by mouth two (2) times a day for 14 days. Indications: Schizoaffective disorder  Qty: 28 Tab, Refills: 0      haloperidol decanoate (HALDOL DECANOATE) 100 mg/mL injection 1 mL by IntraMUSCular route every twenty-eight (28) days for 30 days. Indications: Schizoaffective disorder  Qty: 1 mL, Refills: 0      traZODone (DESYREL) 50 mg tablet Take 1 Tab by mouth nightly for 14 days.  Indications: insomnia associated with depression  Qty: 14 Tab, Refills: 0         CONTINUE these medications which have NOT CHANGED    Details   albuterol (PROVENTIL HFA, VENTOLIN HFA, PROAIR HFA) 90 mcg/actuation inhaler Take 2 Puffs by inhalation three (3) times daily as needed for Wheezing or Shortness of Breath. A coordinated, multidisplinary treatment team round was conducted with Jerad Simeon---this is done daily here at [facility]. This team consists of the nurse, psychiatric unit pharmcist,  and writer. I have spent greater than 35 minutes on discharge work.     Signed:  Hussein Solorio MD  4/23/2018

## 2018-04-23 NOTE — PROGRESS NOTES
Problem: Altered Thought Process (Adult/Pediatric)  Goal: *STG: Remains safe in hospital  Outcome: Progressing Towards Goal  Pt slept 4 hours. Pt had uneventful night and required no PRN's. Pt had no complaints or signs of distress throughout night. Respirations were unlabored. Continuing to monitor with q15 min rounds for safety.

## 2018-04-23 NOTE — BH NOTES
PSYCHIATRIC PROGRESS NOTE         Patient Name  Deniz Fox   Date of Birth 1960   [facility] [id number]   Medical Record Number  [id number]      Age  61 y.o. PCP None   Admit date:  3/30/2018    Room Number  397/86  @ [facility]   Date of Service  4/22/2018           E & M PROGRESS NOTE:         HISTORY       CC:  \"leave me alone\"  HISTORY OF PRESENT ILLNESS/INTERVAL HISTORY:  (reviewed/updated 4/22/2018). per initial evaluation: The patient, Deniz Fox, is a 61 y.o. BLACK OR  female with a past psychiatric history significant for schizoaffective d/o, who presents at this time with complaints of (and/or evidence of) the following emotional symptoms: agitation and mood swings. Additional symptomatology include refusing to talk to team, irritable and angry mood and guarded. Per report pt has been deteriorating in the last 2 month and threatening to kill family members. She has been aggressive towards the , she has been breaking into neighbors apartment and religiously preoccupied. She has poor appetite and refusing to complete her ADL with poor hygiene. She has no insight and does not want to take medication. The above symptoms have been present for . These symptoms are of severe severity. These symptoms are constant in nature. The patient's condition has been precipitated by and psychosocial stressors ( ). Patient's condition made worse by treatment noncompliance. UDS: negative; BAL=0. Deniz Fox presents/reports/evidences the following emotional symptoms today, 4/22/2018:agitation and psychotic behavior. The above symptoms have been present for few month. These symptoms are of severe severity. The symptoms are constant in nature. Additional symptomatology and features include labile and angry mood, responding to internal stimuli, paranoid delusion and poor insight. Refusing to take medication, received prn for agitation. 4/17- irritable and angry affect,. Refusing to meet with tx team. Was unhappy that she is not dc as she took few days med. Poor insight and will not co operate with tx.   4/18- pt managed to come to tx team meeting and discuss her dc planning. She is now accepting meed but ct to have irritable and angry mood. She does not trust anyone and remains guarded and preoccupied with dc. She left the meeting mid way when she understood she is not dc today. Poor insight  4/19- pt affect is better and pleasant. Less guarded and now accepting med. She did agree for her daughter to visit her. No aggressive behavior. Insight  Is limited. Now she is talking to the tx team  4/20- remains paranoid but affect is brighter. She allowed her daughter to visit but ct to believe she is dead and stated Nena Robertson is daughter like\". Accepting med but poor insight  4/21- affect is better, want trazodone for sleep despite report that she slept well and for 6 hrs. Limited insight but accepting med. Less delusional and less guarded  4/22- preoccupied with dc. No delusional thinking but limited insight- now accepting med and compliant with tx plan. No agitation. Sleep is better      SIDE EFFECTS: (reviewed/updated 4/22/2018)  None reported or admitted to. No noted toxicity with use of current med   ALLERGIES:(reviewed/updated 4/22/2018)  No Known Allergies   MEDICATIONS PRIOR TO ADMISSION:(reviewed/updated 4/22/2018)  Prescriptions Prior to Admission   Medication Sig    albuterol (PROVENTIL HFA, VENTOLIN HFA, PROAIR HFA) 90 mcg/actuation inhaler Take 2 Puffs by inhalation three (3) times daily as needed for Wheezing or Shortness of Breath. PAST MEDICAL HISTORY: Past medical history from the initial psychiatric evaluation has been reviewed (reviewed/updated 4/22/2018) with no additional updates (I asked patient and no additional past medical history provided).    Past Medical History:   Diagnosis Date    Asthma     Hepatitis C    No past surgical history on file. SOCIAL HISTORY: Social history from the initial psychiatric evaluation has been reviewed (reviewed/updated 4/22/2018) with no additional updates (I asked patient and no additional social history provided). Social History     Social History    Marital status: SINGLE     Spouse name: N/A    Number of children: N/A    Years of education: N/A     Occupational History    Not on file. Social History Main Topics    Smoking status: Current Every Day Smoker    Smokeless tobacco: Not on file    Alcohol use Yes    Drug use: Yes     Special: Marijuana    Sexual activity: Yes     Partners: Male     Birth control/ protection: None     Other Topics Concern    Not on file     Social History Narrative    No narrative on file      FAMILY HISTORY: Family history from the initial psychiatric evaluation has been reviewed (reviewed/updated 4/22/2018) with no additional updates (I asked patient and no additional family history provided). No family history on file. REVIEW OF SYSTEMS: (reviewed/updated 4/22/2018)  Appetite:no change from normal   Sleep: 2 hrs  All other Review of Systems: Psychological ROS: positive for - behavioral disorder, concentration difficulties, hallucinations, hostility, mood swings and psychosis  Respiratory ROS: no cough, shortness of breath, or wheezing  Cardiovascular ROS: no chest pain or dyspnea on exertion  Neurological ROS: no TIA or stroke symptoms         [facility] (MSE):    MSE FINDINGS ARE WITHIN NORMAL LIMITS (WNL) UNLESS OTHERWISE STATED BELOW. ( ALL OF THE BELOW CATEGORIES OF THE MSE HAVE BEEN REVIEWED (reviewed 4/22/2018) AND UPDATED AS DEEMED APPROPRIATE )  General Presentation age appropriate and disheveled, evasive, uncooperative and unreliable   Orientation disorganized   Vital Signs  See below (reviewed 4/22/2018); Vital Signs (BP, Pulse, & Temp) are within normal limits if not listed below.    Gait and Station Stable/steady, no ataxia   Musculoskeletal System No extrapyramidal symptoms (EPS); no abnormal muscular movements or Tardive Dyskinesia (TD); muscle strength and tone are within normal limits   Language No aphasia or dysarthria   Speech:  loud and pressured   Thought Processes illogical; slow rate of thoughts; poor abstract reasoning/computation   Thought Associations tangential   Thought Content paranoid delusions, Religion delusions, auditory hallucinations and internally preoccupied   Suicidal Ideations none   Homicidal Ideations none   Mood:  irritable   Affect:  irritable, inappropriate and increased in intensity   Memory recent  impaired   Memory remote:  intact   Concentration/Attention:  distractable and hypervigilance   Fund of Knowledge below average   Insight:  poor   Reliability poor   Judgment:  poor          VITALS:     Patient Vitals for the past 24 hrs:   Temp Pulse Resp BP   04/22/18 1406 98.5 °F (36.9 °C) 73 18 115/64   04/22/18 0651 - (!) 57 16 (!) 89/53     Wt Readings from Last 3 Encounters:   04/04/18 59 kg (130 lb 1.1 oz)   12/27/15 61.2 kg (135 lb)   11/09/14 65.8 kg (145 lb)     Temp Readings from Last 3 Encounters:   04/22/18 98.5 °F (36.9 °C)   12/27/15 98.7 °F (37.1 °C)   11/09/14 98.7 °F (37.1 °C)     BP Readings from Last 3 Encounters:   04/22/18 115/64   12/27/15 132/65   11/09/14 111/65     Pulse Readings from Last 3 Encounters:   04/22/18 73   12/27/15 76   11/09/14 65            DATA     LABORATORY DATA:(reviewed/updated 4/22/2018)  No results found for this or any previous visit (from the past 24 hour(s)). Lab Results   Component Value Date/Time    Valproic acid 87 04/20/2018 08:35 AM     No results found for: LITHM   RADIOLOGY REPORTS:(reviewed/updated 4/22/2018)  No results found.        MEDICATIONS     ALL MEDICATIONS:   Current Facility-Administered Medications   Medication Dose Route Frequency    traZODone (DESYREL) tablet 50 mg  50 mg Oral QHS    haloperidol decanoate (HALDOL DECANOATE) 100 mg/mL injection 100 mg  100 mg IntraMUSCular Q28D    haloperidol lactate (HALDOL) injection 10 mg +++COURT ORDERED MEDICATION FOR REFUSAL OF PO HALOPERIDOL+++  10 mg IntraMUSCular BID    Or    haloperidol (HALDOL) tablet 10 mg  10 mg Oral BID    LORazepam (ATIVAN) injection 0.5 mg +++COURT ORDERED MEDICATION FOR REFUSAL OF PO DEPAKOTE+++  0.5 mg IntraMUSCular QHS    Or    divalproex ER (DEPAKOTE ER) 24 hour tablet 1,000 mg  1,000 mg Oral QHS    benztropine (COGENTIN) tablet 0.5 mg  0.5 mg Oral BID    ziprasidone (GEODON) 20 mg in sterile water (preservative free) 1 mL injection  20 mg IntraMUSCular BID PRN    OLANZapine (ZyPREXA) tablet 5 mg  5 mg Oral Q6H PRN    benztropine (COGENTIN) tablet 2 mg  2 mg Oral BID PRN    benztropine (COGENTIN) injection 2 mg  2 mg IntraMUSCular BID PRN    LORazepam (ATIVAN) injection 2 mg  2 mg IntraMUSCular Q4H PRN    LORazepam (ATIVAN) tablet 1 mg  1 mg Oral Q4H PRN    acetaminophen (TYLENOL) tablet 650 mg  650 mg Oral Q4H PRN    ibuprofen (MOTRIN) tablet 400 mg  400 mg Oral Q8H PRN    magnesium hydroxide (MILK OF MAGNESIA) 400 mg/5 mL oral suspension 30 mL  30 mL Oral DAILY PRN    nicotine (NICODERM CQ) 21 mg/24 hr patch 1 Patch  1 Patch TransDERmal DAILY PRN      SCHEDULED MEDICATIONS:   Current Facility-Administered Medications   Medication Dose Route Frequency    traZODone (DESYREL) tablet 50 mg  50 mg Oral QHS    haloperidol decanoate (HALDOL DECANOATE) 100 mg/mL injection 100 mg  100 mg IntraMUSCular Q28D    haloperidol lactate (HALDOL) injection 10 mg +++COURT ORDERED MEDICATION FOR REFUSAL OF PO HALOPERIDOL+++  10 mg IntraMUSCular BID    Or    haloperidol (HALDOL) tablet 10 mg  10 mg Oral BID    LORazepam (ATIVAN) injection 0.5 mg +++COURT ORDERED MEDICATION FOR REFUSAL OF PO DEPAKOTE+++  0.5 mg IntraMUSCular QHS    Or    divalproex ER (DEPAKOTE ER) 24 hour tablet 1,000 mg  1,000 mg Oral QHS    benztropine (COGENTIN) tablet 0.5 mg  0.5 mg Oral BID          ASSESSMENT & PLAN     DIAGNOSES REQUIRING ACTIVE TREATMENT AND MONITORING: (reviewed/updated 4/22/2018)  Patient Active Hospital Problem List:    Schizoaffective disorder ([facility] [address] (3/30/2018)    Assessment:slow progress- no agitation. denies paranoid delusion, affect is improving. Denies si/hi/avh    Plan: Ct to adjust med- Haldol dec and Depakote. Ct with same. need close follow up in community    Collateral info from daughter- please see sw report     Agitation (3/31/2018)    Assessment: acute due to psychosis. Prn med    Plan: prn med for now       I will continue to monitor blood levels (Depakote, ---a drug with a narrow therapeutic index= NTI) and associated labs for drug therapy implemented that require intense monitoring for toxicity as deemed appropriate based on current medication side effects and pharmacodynamically determined drug 1/2 lives. - 80 therapeutic    In summary, Shahla Dupree, is a 61 y.o.  female who presents with a severe exacerbation of the principal diagnosis of Schizoaffective disorder ([facility] [address]  Patient's condition is worsening/not improving/not stable . Patient requires continued inpatient hospitalization for further stabilization, safety monitoring and medication management. I will continue to coordinate the provision of individual, milieu, occupational, group, and substance abuse therapies to address target symptoms/diagnoses as deemed appropriate for the individual patient. A coordinated, multidisplinary treatment team round was conducted with the patient (this team consists of the nurse, psychiatric unit pharmcist,  and writer). Complete current electronic health record for patient has been reviewed today including consultant notes, ancillary staff notes, nurses and psychiatric tech notes. Suicide risk assessment completed and patient deemed to be of low risk for suicide at this time.      The following regarding medications was addressed during rounds with patient:   the risks and benefits of the proposed medication. The patient was given the opportunity to ask questions. Informed consent given to the use of the above medications. Will continue to adjust psychiatric and non-psychiatric medications (see above \"medication\" section and orders section for details) as deemed appropriate & based upon diagnoses and response to treatment. I will continue to order blood tests/labs and diagnostic tests as deemed appropriate and review results as they become available (see orders for details and above listed lab/test results). I will order psychiatric records from previous [facility] hospitals to further elucidate the nature of patient's psychopathology and review once available. I will gather additional collateral information from friends, family and o/p treatment team to further elucidate the nature of patient's psychopathology and baselline level of psychiatric functioning. I certify that this patient's inpatient psychiatric hospital services furnished since the previous certification were, and continue to be, required for treatment that could reasonably be expected to improve the patient's condition, or for diagnostic study, and that the patient continues to need, on a daily basis, active treatment furnished directly by or requiring the supervision of inpatient psychiatric facility personnel. In addition, the hospital records show that services furnished were intensive treatment services, admission or related services, or equivalent services. EXPECTED DISCHARGE DATE/DAY:  Monday     DISPOSITION: Home       Signed By:   Clarisa Stroud MD  4/22/2018

## 2018-04-23 NOTE — BH NOTES
Behavioral Health Transition Record to Provider    Patient Name: Holli Augustin  YOB: 1960  Medical Record Number: [id number]  Date of Admission: 3/30/2018  Date of Discharge: 4/23/18    Attending Provider: Duane Cheng MD  Discharging Provider: Dr. Jenn Asif  To contact this individual call [phone] and ask the  to page. If unavailable, ask to be transferred to [facility] Provider on call. [facility] Provider will be available on call 24/7 and during holidays. Primary Care Provider: None    No Known Allergies    Reason for Admission: Psychosis    Admission Diagnosis: schizoaffective d/o  Schizoaffective disorder ([facility] [address]    * No surgery found *    Results for orders placed or performed during the hospital encounter of 03/30/18   LIPID PANEL   Result Value Ref Range    LIPID PROFILE          Cholesterol, total 134 <200 MG/DL    Triglyceride 65 <150 MG/DL    HDL Cholesterol 46 MG/DL    LDL, calculated 75 0 - 100 MG/DL    VLDL, calculated 13 MG/DL    CHOL/HDL Ratio 2.9 0 - 5.0     TSH 3RD GENERATION   Result Value Ref Range    TSH 0.55 0.36 - 3.74 uIU/mL   HEMOGLOBIN A1C WITH EAG   Result Value Ref Range    Hemoglobin A1c 5.6 4.2 - 6.3 %    Est. average glucose 114 mg/dL   VALPROIC ACID   Result Value Ref Range    Valproic acid 87 50 - 100 ug/ml       Immunizations administered during this encounter: There is no immunization history on file for this patient. Screening for Metabolic Disorders for Patients on Antipsychotic Medications  (Data obtained from the EMR)    Estimated Body Mass Index  Estimated body mass index is 24.58 kg/(m^2) as calculated from the following:    Height as of this encounter: 5' 1\" (1.549 m). Weight as of this encounter: 59 kg (130 lb 1.1 oz).      Vital Signs/Blood Pressure  Visit Vitals    BP 91/52    Pulse (!) 57    Temp 98.5 °F (36.9 °C)    Resp 16    Ht 5' 1\" (1.549 m)    Wt 59 kg (130 lb 1.1 oz)    BMI 24.58 kg/m2       Blood Glucose/Hemoglobin A1c  Lab Results   Component Value Date/Time    Glucose 90 12/27/2015 12:00 PM       Lab Results   Component Value Date/Time    Hemoglobin A1c 5.6 04/16/2018 04:48 AM        Lipid Panel  Lab Results   Component Value Date/Time    Cholesterol, total 134 04/16/2018 04:48 AM    HDL Cholesterol 46 04/16/2018 04:48 AM    LDL, calculated 75 04/16/2018 04:48 AM    Triglyceride 65 04/16/2018 04:48 AM    CHOL/HDL Ratio 2.9 04/16/2018 04:48 AM        Discharge Diagnosis: Schizoaffective disorder    Discharge Plan: The patient is no longer having any negative symptoms. She is more logical, rational, linear in thought, has been taking her medication, agrees to get follow up and exhibits no signs/symptoms of being a harm to herself or others. The patient's daughter visited her last night and she said the patient still has some grandiosity, but it is manageable and she agrees with her being discharged today. She will [facility] up at [facility] and continuing care paperwork was faxed. Discharge Medication List and Instructions:   Current Discharge Medication List      START taking these medications    Details   benztropine (COGENTIN) 0.5 mg tablet Take 1 Tab by mouth two (2) times a day for 14 days. Indications: extrapyramidal disease  Qty: 28 Tab, Refills: 0      divalproex ER (DEPAKOTE ER) 500 mg ER tablet Take 2 Tabs by mouth nightly for 14 days. Indications: MIXED BIPOLAR I DISORDER  Qty: 28 Tab, Refills: 0      haloperidol (HALDOL) 10 mg tablet Take 1 Tab by mouth two (2) times a day for 14 days. Indications: Schizoaffective disorder  Qty: 28 Tab, Refills: 0      haloperidol decanoate (HALDOL DECANOATE) 100 mg/mL injection 1 mL by IntraMUSCular route every twenty-eight (28) days for 30 days. Indications: Schizoaffective disorder  Qty: 1 mL, Refills: 0      traZODone (DESYREL) 50 mg tablet Take 1 Tab by mouth nightly for 14 days.  Indications: insomnia associated with depression  Qty: 14 Tab, Refills: 0 CONTINUE these medications which have NOT CHANGED    Details   albuterol (PROVENTIL HFA, VENTOLIN HFA, PROAIR HFA) 90 mcg/actuation inhaler Take 2 Puffs by inhalation three (3) times daily as needed for Wheezing or Shortness of Breath. Unresulted Labs     None        To obtain results of studies pending at discharge, please contact N/A    Follow-up Information     Follow up With Details Comments [address] [place] [address]  Please follow up with [facility] by accessing walk-in Mon-Fri 8:30am-4:30pm  [address] [place] [id number]  [phone]    Haloperidol decanoate 100 mg IM injection  On 5/14/2018 Haloperidol decanoate 100 mg IM injection given on 4/17/18. Next injection is due on 5/14/18. [address]     None   None (919) Patient stated that they have no PCP            Advanced Directive:   Does the patient have an appointed surrogate decision maker? No  Does the patient have a Medical Advance Directive? No  Does the patient have a Psychiatric Advance Directive? No  If the patient does not have a surrogate or Medical Advance Directive AND Psychiatric Advance Directive, the patient was offered information on these advance directives Other No    Patient Instructions: Please continue all medications until otherwise directed by physician. Tobacco Cessation Discharge Plan:   Is the patient a smoker and needs referral for smoking cessation? No  Patient referred to the following for smoking cessation with an appointment? No  Patient was offered medication to assist with smoking cessation at discharge? No  Was education for smoking cessation added to the discharge instructions? No    Alcohol/Substance Abuse Discharge Plan:   Does the patient have a history of substance/alcohol abuse and requires a referral for treatment? No  Patient referred to the following for substance/alcohol abuse treatment with an appointment?  No  Patient was offered medication to assist with alcohol cessation at discharge? No  Was education for substance/alcohol abuse added to discharge instructions?  No    Patient discharged to Home; discussed with patient/caregiver

## 2019-03-16 NOTE — BH NOTES
GROUP THERAPY PROGRESS NOTE    The patient Leydi lozano 61 y.o. female is participating in Reflections Group    Group time: 30 minutes    Personal goal for participation: To discuss the daily goals    Goal orientation: personal    Group therapy participation: passive    Therapeutic interventions reviewed and discussed:  Yes    Impression of participation:lulu Marti  4/4/2018  10:13 PM supervision

## 2019-07-13 ENCOUNTER — HOSPITAL ENCOUNTER (OUTPATIENT)
Dept: MAMMOGRAPHY | Age: 59
Discharge: HOME | End: 2019-07-13
Attending: INTERNAL MEDICINE
Payer: COMMERCIAL

## 2019-07-13 DIAGNOSIS — Z12.39 SCREENING FOR BREAST CANCER: ICD-10-CM

## 2019-07-13 PROCEDURE — 77067 SCR MAMMO BI INCL CAD: CPT

## 2020-01-28 ENCOUNTER — HOSPITAL ENCOUNTER (EMERGENCY)
Age: 60
Discharge: HOME | End: 2020-01-28
Attending: EMERGENCY MEDICINE
Payer: MEDICAID

## 2020-01-28 ENCOUNTER — APPOINTMENT (OUTPATIENT)
Dept: GENERAL RADIOLOGY | Age: 60
End: 2020-01-28
Attending: EMERGENCY MEDICINE
Payer: MEDICAID

## 2020-01-28 VITALS
BODY MASS INDEX: 26.43 KG/M2 | RESPIRATION RATE: 16 BRPM | OXYGEN SATURATION: 97 % | SYSTOLIC BLOOD PRESSURE: 134 MMHG | WEIGHT: 140 LBS | DIASTOLIC BLOOD PRESSURE: 71 MMHG | HEART RATE: 86 BPM | TEMPERATURE: 97.9 F | HEIGHT: 61 IN

## 2020-01-28 DIAGNOSIS — J20.9 ACUTE BRONCHITIS, UNSPECIFIED ORGANISM: Primary | ICD-10-CM

## 2020-01-28 LAB
FLUAV AG NPH QL IA: NEGATIVE
FLUBV AG NOSE QL IA: NEGATIVE

## 2020-01-28 PROCEDURE — 99283 EMERGENCY DEPT VISIT LOW MDM: CPT

## 2020-01-28 PROCEDURE — 71045 X-RAY EXAM CHEST 1 VIEW: CPT

## 2020-01-28 PROCEDURE — 87804 INFLUENZA ASSAY W/OPTIC: CPT

## 2020-01-28 PROCEDURE — 74011250637 HC RX REV CODE- 250/637: Performed by: EMERGENCY MEDICINE

## 2020-01-28 RX ORDER — TRAZODONE HYDROCHLORIDE 100 MG/1
100 TABLET ORAL
Status: ON HOLD | COMMUNITY
End: 2022-04-21

## 2020-01-28 RX ORDER — AMOXICILLIN 500 MG/1
500 TABLET, FILM COATED ORAL 3 TIMES DAILY
Qty: 30 TAB | Refills: 0 | Status: ON HOLD | OUTPATIENT
Start: 2020-01-28 | End: 2020-03-12

## 2020-01-28 RX ORDER — IBUPROFEN 800 MG/1
800 TABLET ORAL
Qty: 30 TAB | Refills: 0 | Status: ON HOLD | OUTPATIENT
Start: 2020-01-28 | End: 2020-03-12

## 2020-01-28 RX ORDER — IBUPROFEN 400 MG/1
800 TABLET ORAL ONCE
Status: COMPLETED | OUTPATIENT
Start: 2020-01-28 | End: 2020-01-28

## 2020-01-28 RX ADMIN — IBUPROFEN 800 MG: 400 TABLET ORAL at 08:29

## 2020-01-28 NOTE — ED PROVIDER NOTES
EMERGENCY DEPARTMENT HISTORY AND PHYSICAL EXAM      Date: 1/28/2020  Patient Name: Ariane Balderas    History of Presenting Illness     Chief Complaint   Patient presents with    Cough       History Provided By: Patient    HPI: Ariane Balderas, 60 y.o. female with PMHx significant for cough, presents by private vehicle to the ED with cc of cough and congestion. This is a 59-year-old female with cough for the past 2 to 3 weeks. She states that she thought she may have had a flu at the beginning of this and now her cough continues. She has off and on fever during this time but no fever today. She has no chest pain and no palpitations. There are no other complaints, changes, or physical findings at this time. PCP: None    Current Outpatient Medications   Medication Sig Dispense Refill    traZODone (DESYREL) 100 mg tablet Take 100 mg by mouth nightly.  amoxicillin 500 mg tab Take 500 mg by mouth three (3) times daily. 30 Tab 0    ibuprofen (MOTRIN) 800 mg tablet Take 1 Tab by mouth every eight (8) hours as needed for Pain. 30 Tab 0    albuterol (PROVENTIL HFA, VENTOLIN HFA, PROAIR HFA) 90 mcg/actuation inhaler Take 2 Puffs by inhalation three (3) times daily as needed for Wheezing or Shortness of Breath. Past History     Past Medical History:  Past Medical History:   Diagnosis Date    Asthma     Hepatitis C        Past Surgical History:  History reviewed. No pertinent surgical history. Family History:  History reviewed. No pertinent family history. Social History:  Social History     Tobacco Use    Smoking status: Former Smoker    Smokeless tobacco: Never Used   Substance Use Topics    Alcohol use: Not Currently    Drug use: Yes     Types: Marijuana       Allergies:  No Known Allergies      Review of Systems   Review of Systems   Constitutional: Negative for chills and fever. HENT: Negative for congestion, rhinorrhea, sneezing and sore throat.     Respiratory: Positive for cough. Negative for shortness of breath. Cardiovascular: Negative for chest pain. Gastrointestinal: Negative for abdominal pain, nausea and vomiting. Musculoskeletal: Negative for back pain, myalgias and neck stiffness. Skin: Negative for rash. Neurological: Negative for dizziness, weakness and headaches. All other systems reviewed and are negative. Physical Exam   Physical Exam  Vitals signs and nursing note reviewed. Constitutional:       Appearance: Normal appearance. She is well-developed. HENT:      Head: Normocephalic and atraumatic. Eyes:      Conjunctiva/sclera: Conjunctivae normal.   Neck:      Musculoskeletal: Full passive range of motion without pain, normal range of motion and neck supple. Cardiovascular:      Rate and Rhythm: Normal rate and regular rhythm. Pulses: Normal pulses. Heart sounds: Normal heart sounds, S1 normal and S2 normal. No murmur. Pulmonary:      Effort: Pulmonary effort is normal. No respiratory distress. Breath sounds: Normal breath sounds. No wheezing. Abdominal:      General: Bowel sounds are normal. There is no distension. Palpations: Abdomen is soft. Tenderness: There is no abdominal tenderness. There is no rebound. Musculoskeletal: Normal range of motion. Skin:     General: Skin is warm and dry. Findings: No rash. Neurological:      Mental Status: She is alert and oriented to person, place, and time. Psychiatric:         Speech: Speech normal.         Behavior: Behavior normal.         Thought Content:  Thought content normal.         Judgment: Judgment normal.         Diagnostic Study Results     Labs -     Recent Results (from the past 12 hour(s))   INFLUENZA A & B AG (RAPID TEST)    Collection Time: 01/28/20  8:23 AM   Result Value Ref Range    Influenza A Antigen NEGATIVE  NEG      Influenza B Antigen NEGATIVE  NEG         Radiologic Studies -   XR CHEST PORT   Final Result   IMPRESSION: No evidence of active lung disease. CT Results  (Last 48 hours)    None        CXR Results  (Last 48 hours)               01/28/20 0838  XR CHEST PORT Final result    Impression:  IMPRESSION: No evidence of active lung disease. Narrative:  Portable chest 2 views dated 1/28/2020       Comparison chest dated 11/9/2014       History is cough       2 portable AP upright views of the chest were obtained. The cardiac silhouette   is normal in size. There is hyperaeration of the lungs. No areas of lobar   consolidation are identified. The costophrenic angles are sharp in appearance. Medical Decision Making   I am the first provider for this patient. I reviewed the vital signs, available nursing notes, past medical history, past surgical history, family history and social history. Vital Signs-Reviewed the patient's vital signs. Patient Vitals for the past 12 hrs:   Temp Pulse Resp BP SpO2   01/28/20 0759 97.9 °F (36.6 °C) 86 16 134/71 97 %         Records Reviewed: Nursing Notes    Provider Notes (Medical Decision Making):   Bronchitis versus URI versus pneumonia. Chest x-ray today is normal influenza tests are negative patient will be discharged home with amoxicillin. ED Course:   Initial assessment performed. The patients presenting problems have been discussed, and they are in agreement with the care plan formulated and outlined with them. I have encouraged them to ask questions as they arise throughout their visit. Disposition:  Patient informed of results of workup and is comfortable with discharge to home to follow up with PCP. They are instructed to return as needed for worsening condition. PLAN:  1. Current Discharge Medication List      START taking these medications    Details   amoxicillin 500 mg tab Take 500 mg by mouth three (3) times daily.   Qty: 30 Tab, Refills: 0      ibuprofen (MOTRIN) 800 mg tablet Take 1 Tab by mouth every eight (8) hours as needed for Pain. Qty: 30 Tab, Refills: 0           2. Follow-up Information     Follow up With Specialties Details Why [address] [facility] EMERGENCY DEPT Emergency Medicine  As needed, If symptoms worsen Fernanda Singh    [facility]  Schedule an appointment as soon as possible for a visit  [address] [place] [address] Drive  [phone]        Return to ED if worse     Diagnosis     Clinical Impression:   1.  Acute bronchitis, unspecified organism

## 2020-01-28 NOTE — ED NOTES
Emergency Department Nursing Plan of Care       The Nursing Plan of Care is developed from the Nursing assessment and Emergency Department Attending provider initial evaluation. The plan of care may be reviewed in the ED Provider note.     The Plan of Care was developed with the following considerations:   Patient / Family readiness to learn indicated by:verbalized understanding  Persons(s) to be included in education: patient  Barriers to Learning/Limitations:No    [address] [facility]    1/28/2020   8:04 AM

## 2020-01-28 NOTE — ED NOTES
Pt arrives in the ED with complaints of productive cough producing yellow sputum with back pain and headache x 2 weeks. Pt states \"I think I have pneumonia\". Reports chills and hot sweats.

## 2020-01-28 NOTE — DISCHARGE INSTRUCTIONS
Patient Education        Bronchitis: Care Instructions  Your Care Instructions    Bronchitis is inflammation of the bronchial tubes, which carry air to the lungs. The tubes swell and produce mucus, or phlegm. The mucus and inflamed bronchial tubes make you cough. You may have trouble breathing. Most cases of bronchitis are caused by viruses like those that cause colds. Antibiotics usually do not help and they may be harmful. Bronchitis usually develops rapidly and lasts about 2 to 3 weeks in otherwise healthy people. Follow-up care is a key part of your treatment and safety. Be sure to make and go to all appointments, and call your doctor if you are having problems. It's also a good idea to know your test results and keep a list of the medicines you take. How can you care for yourself at home? · Take all medicines exactly as prescribed. Call your doctor if you think you are having a problem with your medicine. · Get some extra rest.  · Take an over-the-counter pain medicine, such as acetaminophen (Tylenol), ibuprofen (Advil, Motrin), or naproxen (Aleve) to reduce fever and relieve body aches. Read and follow all instructions on the label. · Do not take two or more pain medicines at the same time unless the doctor told you to. Many pain medicines have acetaminophen, which is Tylenol. Too much acetaminophen (Tylenol) can be harmful. · Take an over-the-counter cough medicine that contains dextromethorphan to help quiet a dry, hacking cough so that you can sleep. Avoid cough medicines that have more than one active ingredient. Read and follow all instructions on the label. · Breathe moist air from a humidifier, hot shower, or sink filled with hot water. The heat and moisture will thin mucus so you can cough it out. · Do not smoke. Smoking can make bronchitis worse. If you need help quitting, talk to your doctor about stop-smoking programs and medicines.  These can increase your chances of quitting for good.  When should you call for help? Call 911 anytime you think you may need emergency care. For example, call if:    · You have severe trouble breathing.    Call your doctor now or seek immediate medical care if:    · You have new or worse trouble breathing.     · You cough up dark brown or bloody mucus (sputum).     · You have a new or higher fever.     · You have a new rash.    Watch closely for changes in your health, and be sure to contact your doctor if:    · You cough more deeply or more often, especially if you notice more mucus or a change in the color of your mucus.     · You are not getting better as expected. Where can you learn more? Go to http://kaity-amor.info/. Enter H333 in the search box to learn more about \"Bronchitis: Care Instructions. \"  Current as of: June 9, 2019  Content Version: 12.2  © 3211-2823 [employer]. Care instructions adapted under license by [employer] (which disclaims liability or warranty for this information). If you have questions about a medical condition or this instruction, always ask your healthcare professional. [address] any warranty or liability for your use of this information.

## 2020-02-29 NOTE — PROGRESS NOTES
Problem: Altered Thought Process (Adult/Pediatric)  Goal: *STG: Participates in treatment plan  Outcome: Progressing Towards Goal  Patient is calm and cooperative. Patient took PO haldol but refused cogentin. visible on the unit. Less agitation. Attending groups. Focused on discharge. Will continue to monitor patient and assess needs. Statement Selected

## 2020-03-11 ENCOUNTER — HOSPITAL ENCOUNTER (INPATIENT)
Age: 60
LOS: 25 days | Discharge: HOME | DRG: 750 | End: 2020-04-06
Attending: EMERGENCY MEDICINE | Admitting: PSYCHIATRY & NEUROLOGY
Payer: MEDICAID

## 2020-03-11 DIAGNOSIS — R44.0 AUDITORY HALLUCINATION: ICD-10-CM

## 2020-03-11 DIAGNOSIS — F25.9 SCHIZOAFFECTIVE DISORDER, UNSPECIFIED TYPE (HCC): Primary | ICD-10-CM

## 2020-03-11 DIAGNOSIS — Z91.14 H/O MEDICATION NONCOMPLIANCE: ICD-10-CM

## 2020-03-11 LAB
ANION GAP SERPL CALC-SCNC: 12 MMOL/L (ref 5–15)
BASOPHILS # BLD: 0 K/UL (ref 0–0.1)
BASOPHILS NFR BLD: 0 % (ref 0–1)
BUN SERPL-MCNC: 8 MG/DL (ref 6–20)
BUN/CREAT SERPL: 9 (ref 12–20)
CALCIUM SERPL-MCNC: 9.1 MG/DL (ref 8.5–10.1)
CHLORIDE SERPL-SCNC: 107 MMOL/L (ref 97–108)
CO2 SERPL-SCNC: 22 MMOL/L (ref 21–32)
CREAT SERPL-MCNC: 0.89 MG/DL (ref 0.55–1.02)
DIFFERENTIAL METHOD BLD: ABNORMAL
EOSINOPHIL # BLD: 0 K/UL (ref 0–0.4)
EOSINOPHIL NFR BLD: 1 % (ref 0–7)
ERYTHROCYTE [DISTWIDTH] IN BLOOD BY AUTOMATED COUNT: 12.2 % (ref 11.5–14.5)
ETHANOL SERPL-MCNC: <10 MG/DL
GLUCOSE SERPL-MCNC: 100 MG/DL (ref 65–100)
HCT VFR BLD AUTO: 35.8 % (ref 35–47)
HGB BLD-MCNC: 11.9 G/DL (ref 11.5–16)
IMM GRANULOCYTES # BLD AUTO: 0.1 K/UL (ref 0–0.04)
IMM GRANULOCYTES NFR BLD AUTO: 1 % (ref 0–0.5)
LYMPHOCYTES # BLD: 1.7 K/UL (ref 0.8–3.5)
LYMPHOCYTES NFR BLD: 34 % (ref 12–49)
MCH RBC QN AUTO: 31.1 PG (ref 26–34)
MCHC RBC AUTO-ENTMCNC: 33.2 G/DL (ref 30–36.5)
MCV RBC AUTO: 93.5 FL (ref 80–99)
MONOCYTES # BLD: 0.5 K/UL (ref 0–1)
MONOCYTES NFR BLD: 9 % (ref 5–13)
NEUTS SEG # BLD: 2.8 K/UL (ref 1.8–8)
NEUTS SEG NFR BLD: 55 % (ref 32–75)
NRBC # BLD: 0 K/UL (ref 0–0.01)
NRBC BLD-RTO: 0 PER 100 WBC
PLATELET # BLD AUTO: 381 K/UL (ref 150–400)
PMV BLD AUTO: 8.9 FL (ref 8.9–12.9)
POTASSIUM SERPL-SCNC: 3.1 MMOL/L (ref 3.5–5.1)
RBC # BLD AUTO: 3.83 M/UL (ref 3.8–5.2)
SODIUM SERPL-SCNC: 141 MMOL/L (ref 136–145)
WBC # BLD AUTO: 5.1 K/UL (ref 3.6–11)

## 2020-03-11 PROCEDURE — 80048 BASIC METABOLIC PNL TOTAL CA: CPT

## 2020-03-11 PROCEDURE — 99285 EMERGENCY DEPT VISIT HI MDM: CPT

## 2020-03-11 PROCEDURE — 80307 DRUG TEST PRSMV CHEM ANLYZR: CPT

## 2020-03-11 PROCEDURE — 85025 COMPLETE CBC W/AUTO DIFF WBC: CPT

## 2020-03-11 PROCEDURE — 36415 COLL VENOUS BLD VENIPUNCTURE: CPT

## 2020-03-11 RX ORDER — ZIPRASIDONE MESYLATE 20 MG/ML
20 INJECTION, POWDER, LYOPHILIZED, FOR SOLUTION INTRAMUSCULAR
Status: ACTIVE | OUTPATIENT
Start: 2020-03-11 | End: 2020-03-12

## 2020-03-11 RX ORDER — POTASSIUM CHLORIDE 750 MG/1
40 TABLET, FILM COATED, EXTENDED RELEASE ORAL
Status: DISPENSED | OUTPATIENT
Start: 2020-03-11 | End: 2020-03-12

## 2020-03-11 NOTE — ED PROVIDER NOTES
58-year-old female with a history of schizophrenia, asthma, hepatitis C presents via EMS with chief complaint of change in behavior, and noncompliance with schizophrenic medications. Family states that she has been off her psych meds for weeks. They called 911 because she wasv acting differently and stating she has been hearing voices that were scaring her, stating [place] are going to kill me. \" EMS arrived to find her locked in the bathroom. She has been nonverbal with paramedics and police. She was initially cooperative but then became combative in the ambulance. Limited history because patient is refusing to talk. We are initiating paperless ECO and pursuing crisis evaluation for possible TDO. Mental Health Problem    Associated symptoms include agitation and hallucinations. Pertinent negatives include no confusion. Past Medical History:   Diagnosis Date    Asthma     Hepatitis C        History reviewed. No pertinent surgical history. History reviewed. No pertinent family history.     Social History     Socioeconomic History    Marital status: SINGLE     Spouse name: Not on file    Number of children: Not on file    Years of education: Not on file    Highest education level: Not on file   Occupational History    Not on file   Social Needs    Financial resource strain: Not on file    Food insecurity     Worry: Not on file     Inability: Not on file    Transportation needs     Medical: Not on file     Non-medical: Not on file   Tobacco Use    Smoking status: Former Smoker    Smokeless tobacco: Never Used   Substance and Sexual Activity    Alcohol use: Not Currently    Drug use: Yes     Types: Marijuana    Sexual activity: Yes     Partners: Male     Birth control/protection: None   Lifestyle    Physical activity     Days per week: Not on file     Minutes per session: Not on file    Stress: Not on file   Relationships    Social connections     Talks on phone: Not on file     Gets together: Not on file     Attends [employer] service: Not on file     Active member of club or organization: Not on file     Attends meetings of clubs or organizations: Not on file     Relationship status: Not on file    Intimate partner violence     Fear of current or ex partner: Not on file     Emotionally abused: Not on file     Physically abused: Not on file     Forced sexual activity: Not on file   Other Topics Concern    Not on file   Social History Narrative    Not on file         ALLERGIES: Patient has no known allergies. Review of Systems   Unable to perform ROS: Psychiatric disorder   Constitutional: Negative. Negative for chills, fever and unexpected weight change. HENT: Negative. Negative for congestion and trouble swallowing. Eyes: Negative for discharge. Respiratory: Negative. Negative for cough, chest tightness and shortness of breath. Cardiovascular: Negative. Negative for chest pain. Gastrointestinal: Negative. Negative for abdominal distention, abdominal pain, constipation, diarrhea and nausea. Endocrine: Negative. Genitourinary: Negative. Negative for difficulty urinating, dysuria, frequency and urgency. Musculoskeletal: Negative. Negative for arthralgias and myalgias. Skin: Negative. Negative for color change. Allergic/Immunologic: Negative. Neurological: Negative. Negative for dizziness, speech difficulty and headaches. Hematological: Negative. Psychiatric/Behavioral: Positive for agitation, behavioral problems and hallucinations. Negative for confusion. All other systems reviewed and are negative. Vitals:    03/11/20 1855 03/11/20 1900   BP: 144/68 132/63   Pulse: 99    Resp: 18    Temp: 99.5 °F (37.5 °C)    SpO2: 98% 98%            Physical Exam  Vitals signs reviewed. Constitutional:       Appearance: She is well-developed.       Comments: Patient in four-point soft restraints at time of my exam.   HENT:      Head: Normocephalic and atraumatic. Eyes:      Conjunctiva/sclera: Conjunctivae normal.   Neck:      Musculoskeletal: Neck supple. Cardiovascular:      Rate and Rhythm: Normal rate and regular rhythm. Pulmonary:      Effort: Pulmonary effort is normal. No respiratory distress. Abdominal:      Palpations: Abdomen is soft. Tenderness: There is no abdominal tenderness. Musculoskeletal: Normal range of motion. General: No deformity. Skin:     General: Skin is warm and dry. Neurological:      Mental Status: She is alert and oriented to person, place, and time. Psychiatric:         Speech: She is noncommunicative. Behavior: Behavior is uncooperative. MDM  Number of Diagnoses or Management Options  Diagnosis management comments: Schizophrenia, med noncompliance, auditory hallucinations. Plan: Psych evaluation and med clearance. ED Course as of Mar 12 0718   Wed Mar 11, 2020   2055 Patient has seen by our CTC evaluator and the plan is psychiatric admission.    [SS]   Thu Mar 12, 2020   0227 STILL awaiting urine for med clearance. [SS]   [id number] Urine and psych placement still pending.   Signing out care to Dr. Veronica Cuenca.    [SS]      ED Course User Index  [SS] Roger Bull MD       Procedures        LABORATORY TESTS:  Recent Results (from the past 12 hour(s))   METABOLIC PANEL, BASIC    Collection Time: 03/11/20  7:44 PM   Result Value Ref Range    Sodium 141 136 - 145 mmol/L    Potassium 3.1 (L) 3.5 - 5.1 mmol/L    Chloride 107 97 - 108 mmol/L    CO2 22 21 - 32 mmol/L    Anion gap 12 5 - 15 mmol/L    Glucose 100 65 - 100 mg/dL    BUN 8 6 - 20 MG/DL    Creatinine 0.89 0.55 - 1.02 MG/DL    BUN/Creatinine ratio 9 (L) 12 - 20      GFR est AA >60 >60 ml/min/1.73m2    GFR est non-AA >60 >60 ml/min/1.73m2    Calcium 9.1 8.5 - 10.1 MG/DL   CBC WITH AUTOMATED DIFF    Collection Time: 03/11/20  7:44 PM   Result Value Ref Range    WBC 5.1 3.6 - 11.0 K/uL    RBC 3.83 3.80 - 5.20 M/uL    HGB 11.9 11.5 - 16.0 g/dL    HCT 35.8 35.0 - 47.0 %    MCV 93.5 80.0 - 99.0 FL    MCH 31.1 26.0 - 34.0 PG    MCHC 33.2 30.0 - 36.5 g/dL    RDW 12.2 11.5 - 14.5 %    PLATELET 704 212 - 527 K/uL    MPV 8.9 8.9 - 12.9 FL    NRBC 0.0 0  WBC    ABSOLUTE NRBC 0.00 0.00 - 0.01 K/uL    NEUTROPHILS 55 32 - 75 %    LYMPHOCYTES 34 12 - 49 %    MONOCYTES 9 5 - 13 %    EOSINOPHILS 1 0 - 7 %    BASOPHILS 0 0 - 1 %    IMMATURE GRANULOCYTES 1 (H) 0.0 - 0.5 %    ABS. NEUTROPHILS 2.8 1.8 - 8.0 K/UL    ABS. LYMPHOCYTES 1.7 0.8 - 3.5 K/UL    ABS. MONOCYTES 0.5 0.0 - 1.0 K/UL    ABS. EOSINOPHILS 0.0 0.0 - 0.4 K/UL    ABS. BASOPHILS 0.0 0.0 - 0.1 K/UL    ABS. IMM. GRANS. 0.1 (H) 0.00 - 0.04 K/UL    DF AUTOMATED     ETHYL ALCOHOL    Collection Time: 03/11/20  7:44 PM   Result Value Ref Range    ALCOHOL(ETHYL),SERUM <10 <10 MG/DL       IMAGING RESULTS:  No orders to display       MEDICATIONS GIVEN:  Medications   ziprasidone (GEODON) injection 20 mg (0 mg IntraMUSCular Held 3/11/20 1921)   potassium chloride SR (KLOR-CON 10) tablet 40 mEq (10 mEq Oral Refused 3/11/20 2137)       IMPRESSION:  1. Schizoaffective disorder, unspecified type ([facility] [address]    2. H/O medication noncompliance    3. Auditory hallucination        PLAN:  1. Current Discharge Medication List        2.    Follow-up Information    None       Return to ED if worse

## 2020-03-11 NOTE — ED NOTES
Pt arrives in ED via EMS in soft restraints for psychiatric problem. Upon arrival, pt was not cooperative and would not speak with RN or provider. Information was received via daughter. Daughter reports that pt has been off of psych medications x 1 month and has been trying to convince pt to come to hospital.  Daughter reports that pt is hearing voices telling her that people are going to kill her. Pt also locked herself in the bathroom for unknown period of time and finally opened the door when responders arrived.

## 2020-03-12 PROBLEM — F29 PSYCHOSIS (HCC): Status: RESOLVED | Noted: 2020-03-12 | Resolved: 2020-03-12

## 2020-03-12 PROBLEM — F29 PSYCHOSIS (HCC): Status: ACTIVE | Noted: 2020-03-12

## 2020-03-12 LAB
AMPHET UR QL SCN: NEGATIVE
APPEARANCE UR: ABNORMAL
BACTERIA URNS QL MICRO: NEGATIVE /HPF
BARBITURATES UR QL SCN: NEGATIVE
BENZODIAZ UR QL: NEGATIVE
BILIRUB UR QL: NEGATIVE
CANNABINOIDS UR QL SCN: NEGATIVE
COCAINE UR QL SCN: NEGATIVE
COLOR UR: ABNORMAL
DRUG SCRN COMMENT,DRGCM: NORMAL
EPITH CASTS URNS QL MICRO: NORMAL /LPF
GLUCOSE UR STRIP.AUTO-MCNC: NEGATIVE MG/DL
HGB UR QL STRIP: NEGATIVE
KETONES UR QL STRIP.AUTO: ABNORMAL MG/DL
LEUKOCYTE ESTERASE UR QL STRIP.AUTO: ABNORMAL
METHADONE UR QL: NEGATIVE
NITRITE UR QL STRIP.AUTO: NEGATIVE
OPIATES UR QL: NEGATIVE
PCP UR QL: NEGATIVE
PH UR STRIP: 6 [PH] (ref 5–8)
PROT UR STRIP-MCNC: NEGATIVE MG/DL
RBC #/AREA URNS HPF: NORMAL /HPF (ref 0–5)
SP GR UR REFRACTOMETRY: 1.01 (ref 1–1.03)
UROBILINOGEN UR QL STRIP.AUTO: 1 EU/DL (ref 0.2–1)
WBC URNS QL MICRO: NORMAL /HPF (ref 0–4)

## 2020-03-12 PROCEDURE — 65220000003 HC RM SEMIPRIVATE PSYCH

## 2020-03-12 PROCEDURE — 81001 URINALYSIS AUTO W/SCOPE: CPT

## 2020-03-12 PROCEDURE — 80307 DRUG TEST PRSMV CHEM ANLYZR: CPT

## 2020-03-12 RX ORDER — DIPHENHYDRAMINE HYDROCHLORIDE 50 MG/ML
50 INJECTION, SOLUTION INTRAMUSCULAR; INTRAVENOUS
Status: DISCONTINUED | OUTPATIENT
Start: 2020-03-12 | End: 2020-04-06 | Stop reason: HOSPADM

## 2020-03-12 RX ORDER — TRAZODONE HYDROCHLORIDE 50 MG/1
50 TABLET ORAL
Status: DISCONTINUED | OUTPATIENT
Start: 2020-03-12 | End: 2020-04-06 | Stop reason: HOSPADM

## 2020-03-12 RX ORDER — LORAZEPAM 2 MG/ML
1 INJECTION INTRAMUSCULAR
Status: DISCONTINUED | OUTPATIENT
Start: 2020-03-12 | End: 2020-04-06 | Stop reason: HOSPADM

## 2020-03-12 RX ORDER — BENZTROPINE MESYLATE 1 MG/1
1 TABLET ORAL
Status: DISCONTINUED | OUTPATIENT
Start: 2020-03-12 | End: 2020-04-06 | Stop reason: HOSPADM

## 2020-03-12 RX ORDER — HALOPERIDOL 5 MG/ML
5 INJECTION INTRAMUSCULAR
Status: DISCONTINUED | OUTPATIENT
Start: 2020-03-12 | End: 2020-04-06 | Stop reason: HOSPADM

## 2020-03-12 RX ORDER — ALBUTEROL SULFATE 90 UG/1
1 AEROSOL, METERED RESPIRATORY (INHALATION)
Status: DISCONTINUED | OUTPATIENT
Start: 2020-03-12 | End: 2020-04-06 | Stop reason: HOSPADM

## 2020-03-12 RX ORDER — ACETAMINOPHEN 325 MG/1
650 TABLET ORAL
Status: DISCONTINUED | OUTPATIENT
Start: 2020-03-12 | End: 2020-04-06 | Stop reason: HOSPADM

## 2020-03-12 RX ORDER — HYDROXYZINE 25 MG/1
50 TABLET, FILM COATED ORAL
Status: DISCONTINUED | OUTPATIENT
Start: 2020-03-12 | End: 2020-04-06 | Stop reason: HOSPADM

## 2020-03-12 RX ORDER — OMEPRAZOLE 20 MG/1
20 CAPSULE, DELAYED RELEASE ORAL DAILY
Status: ON HOLD | COMMUNITY
End: 2020-03-12

## 2020-03-12 RX ORDER — ADHESIVE BANDAGE
30 BANDAGE TOPICAL DAILY PRN
Status: DISCONTINUED | OUTPATIENT
Start: 2020-03-12 | End: 2020-04-06 | Stop reason: HOSPADM

## 2020-03-12 RX ORDER — OLANZAPINE 5 MG/1
5 TABLET ORAL
Status: DISCONTINUED | OUTPATIENT
Start: 2020-03-12 | End: 2020-04-06 | Stop reason: HOSPADM

## 2020-03-12 NOTE — ED NOTES
At patient bedside to attempt to obtain urine sample. Patient awoke from sleep and states \"just cath me. \" Patient offered to try on her own in the bedside commode; patient refuses. Patient reports she will be cooperative. Patient offered gown to change into.

## 2020-03-12 NOTE — ED NOTES
Pt still refusing to give a urine sample, pt resting in bed and appears to be in no distress, will continue to monitor pt.

## 2020-03-12 NOTE — ED NOTES
Pt resting in bed in 4 point restraints, pt appears to be in no distress, will continue to monitor pt.

## 2020-03-12 NOTE — ED NOTES
TRANSFER - OUT REPORT:    Verbal report given to [facility] (name) on Lara Rowland  being transferred to [facility] (unit) for routine progression of care       Report consisted of patients Situation, Background, Assessment and   Recommendations(SBAR). Information from the following report(s) SBAR was reviewed with the receiving nurse. Lines:       Opportunity for questions and clarification was provided.       Patient transported with:   Mary Villalta

## 2020-03-12 NOTE — PROGRESS NOTES
[facility] Admission Pharmacy Medication Reconciliation - IN PROGRESS    Recommendations/Findings:   1) Per chart review & TDO pre-screening, patient has been non-compliant with medications. The medications listed below were filled in February of this year. Patient also brought a full bottle of omeprazole (with potentially other pills in it) but it was last filled in October 2019 so suspect she is not taking that. 2) Per [facility] records, she was admitted to [facility] 3/30-4/23/18 (24 days). At that time, she was initially refusing oral medications and had to be placed on court-ordered medications. She was discharged on the below medications at that time:   Albuterol inhaler   Benztropine 0.5 mg BID  Divalproex  mg tablets: two tablets (1000 mg) nightly  Steady-state level was 87 mcg/mL (range  mcg/mL)  Haloperidol 10 mg tablet: 1 tablet BID  Haloperidol decanoate 100 mg IM every 28 days  Trazodone 50 mg nightly   3) Per TDO pre-screening, she was admitted to [facility] in July 2019. It may be helpful to obtain those records if possible. 4) Pharmacist will update medication list/progress note if more information becomes available. The following changes were made to the PTA medication list:   Additions:   None  Modifications:   Frequency for albuterol inhaler  Deletions:   Amoxicillin  Omeprazole  Ibuprofen    Total Time Spent: 20 minutes    Information obtained from: RxQuery, patient's medication bottles    Patient allergies: Allergies as of 03/11/2020    (No Known Allergies)       Prior to Admission Medications   Prescriptions Last Dose Informant Patient Reported? Taking? albuterol (PROVENTIL HFA, VENTOLIN HFA, PROAIR HFA) 90 mcg/actuation inhaler  Other Yes Yes   Sig: Take 2 Puffs by inhalation three (3) times daily as needed for Wheezing or Shortness of Breath. traZODone (DESYREL) 100 mg tablet  Other Yes Yes   Sig: Take 100 mg by mouth nightly.  Indications: insomnia Facility-Administered Medications: None       Thank you,  Barbi December, [facility]  Desk: [phone] ([phone]  Pharmacy: [phone] ([phone])

## 2020-03-12 NOTE — ED NOTES
Verbal shift change report given to Alice Hinton RN (oncoming nurse) by Paul Schneider RN (offgoing nurse). Report included the following information SBAR and ED Summary.

## 2020-03-12 NOTE — ED NOTES
Pt resting in bed in four point restraints, pt appears to stare in room not responding to anything. Will continue to monitor pt.

## 2020-03-12 NOTE — ED NOTES
Pt continuing to refuse to void and to refuse straight cath. Withdraws every time nurse gets near, still exhibiting paranoid delusions. Refusing all care. Placed food and drink at bedside to encourage PO intake.

## 2020-03-12 NOTE — ED NOTES
CTC faxing results to [facility] for possible admission. Pt resting quietly at this time.
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
## 2020-03-12 NOTE — ED NOTES
Assumed care of patient, awaiting urine for medical clearance. Meal tray ordered, pt refusing urine and straight cath at this time.

## 2020-03-12 NOTE — ED NOTES
Pt continues to demonstrate paranoid delusions and hallucinations, pt does not follow commands but is alert and oriented x 4. Refusing to urinate at this time. Denies SI, denies HI.

## 2020-03-12 NOTE — ED NOTES
Pt eating crackers at this time, continuing to encourage PO intake. Refusing to void, refusing straight cath. Sat on the bedside commode for 2 minutes and then she refused, no urine. Provider made aware and will be speaking with Crisis.

## 2020-03-12 NOTE — GROUP NOTE
DORETHA  GROUP DOCUMENTATION INDIVIDUAL Group Therapy Note Date: 3/12/2020 Group Start Time: 1400 Group End Time: 1500 Group Topic: Recreational/Music Therapy [facility] [place] [address] ACUTE BEHAV [facility] [place], [address] GROUP DOCUMENTATION GROUP Group Therapy Note Attendees: 8 Attendance: Did not attend Patient's Goal: Interventions/techniques:  
Kevyn Aviles

## 2020-03-12 NOTE — H&P
Hospitalist Admission Note    NAME: Rosetta Urena   :  1960   MRN:  [id number]   Room Number: 301/29  @ [facility]     Date/Time:  3/12/2020 11:24 AM    Patient PCP: None  ______________________________________________________________________  Given the patient's current clinical presentation, I have a high level of concern for decompensation if discharged from the emergency department. Complex decision making was performed, which includes reviewing the patient's available past medical records, laboratory results, and x-ray films. My assessment of this patient's clinical condition and my plan of care is as follows. Assessment / Plan:       Principal Problem:    Schizoaffective disorder ([facility] 75.) (3/30/2018)    Active Problems:    Psychosis ([facility] 75.) (3/12/2020)      Asthma  Hepatitis C   Medical Clearance for psychiatric admission      -Psychiatric treatment and management of health issues,Defer to psychiatrist for further management.  -Continue home meds. -Medically stable at this time. We will follow up on a p.r.n. basis.  -No VTE prophylaxis indicated or warranted at this time. Subjective:   CHIEF COMPLAINT: auditory hallucinations    HISTORY OF PRESENT ILLNESS:     Rosetta Urena is a 60 y.o.   female with PMH of schizophrenia, asthma, hepatitis C  who presents to ED with c/o auditory hallucinations. Patient has been non compliant with medications. Family noted her acting odd and called 911. EMS arrived to find her locked in the bathroom and combative in the ambulance. Patient is a limited historian. Past Medical History:   Diagnosis Date    Asthma     Hepatitis C         History reviewed. No pertinent surgical history. Social History     Tobacco Use    Smoking status: Former Smoker    Smokeless tobacco: Never Used   Substance Use Topics    Alcohol use: Not Currently        History reviewed. No pertinent family history.   No Known Allergies     Prior to Admission medications    Medication Sig Start Date End Date Taking? Authorizing Provider   traZODone (DESYREL) 100 mg tablet Take 100 mg by mouth nightly. Other, MD Osmani   albuterol (PROVENTIL HFA, VENTOLIN HFA, PROAIR HFA) 90 mcg/actuation inhaler Take 2 Puffs by inhalation three (3) times daily as needed for Wheezing or Shortness of Breath. Provider, Historical       REVIEW OF SYSTEMS:        I am not able to complete the review of systems because:  Patient does not interact. Objective:   VITALS:    Visit Vitals  /78   Pulse 87   Temp 98.7 °F (37.1 °C)   Resp 16   SpO2 100%       PHYSICAL EXAM:  Patient did not consent for physical examination. ______________________________________________________________________    Care Plan discussed with:  Patient/Family    Expected  Disposition: per primary attending   ________________________________________________________________________  TOTAL TIME:  20 Minutes    Critical Care Provided     Minutes non procedure based      Comments     Reviewed previous records   >50% of visit spent in counseling and coordination of care  Discussion with patient and/or family and questions answered       ________________________________________________________________________  Signed: Sheila Villegsa MD    Procedures: see electronic medical records for all procedures/Xrays and details which were not copied into this note but were reviewed prior to creation of Plan.     LAB DATA REVIEWED:    Recent Results (from the past 24 hour(s))   METABOLIC PANEL, BASIC    Collection Time: 03/11/20  7:44 PM   Result Value Ref Range    Sodium 141 136 - 145 mmol/L    Potassium 3.1 (L) 3.5 - 5.1 mmol/L    Chloride 107 97 - 108 mmol/L    CO2 22 21 - 32 mmol/L    Anion gap 12 5 - 15 mmol/L    Glucose 100 65 - 100 mg/dL    BUN 8 6 - 20 MG/DL    Creatinine 0.89 0.55 - 1.02 MG/DL    BUN/Creatinine ratio 9 (L) 12 - 20      GFR est AA >60 >60 ml/min/1.73m2 GFR est non-AA >60 >60 ml/min/1.73m2    Calcium 9.1 8.5 - 10.1 MG/DL   CBC WITH AUTOMATED DIFF    Collection Time: 03/11/20  7:44 PM   Result Value Ref Range    WBC 5.1 3.6 - 11.0 K/uL    RBC 3.83 3.80 - 5.20 M/uL    HGB 11.9 11.5 - 16.0 g/dL    HCT 35.8 35.0 - 47.0 %    MCV 93.5 80.0 - 99.0 FL    MCH 31.1 26.0 - 34.0 PG    MCHC 33.2 30.0 - 36.5 g/dL    RDW 12.2 11.5 - 14.5 %    PLATELET 854 040 - 332 K/uL    MPV 8.9 8.9 - 12.9 FL    NRBC 0.0 0  WBC    ABSOLUTE NRBC 0.00 0.00 - 0.01 K/uL    NEUTROPHILS 55 32 - 75 %    LYMPHOCYTES 34 12 - 49 %    MONOCYTES 9 5 - 13 %    EOSINOPHILS 1 0 - 7 %    BASOPHILS 0 0 - 1 %    IMMATURE GRANULOCYTES 1 (H) 0.0 - 0.5 %    ABS. NEUTROPHILS 2.8 1.8 - 8.0 K/UL    ABS. LYMPHOCYTES 1.7 0.8 - 3.5 K/UL    ABS. MONOCYTES 0.5 0.0 - 1.0 K/UL    ABS. EOSINOPHILS 0.0 0.0 - 0.4 K/UL    ABS. BASOPHILS 0.0 0.0 - 0.1 K/UL    ABS. IMM.  GRANS. 0.1 (H) 0.00 - 0.04 K/UL    DF AUTOMATED     ETHYL ALCOHOL    Collection Time: 03/11/20  7:44 PM   Result Value Ref Range    ALCOHOL(ETHYL),SERUM <10 <10 MG/DL   URINALYSIS W/ RFLX MICROSCOPIC    Collection Time: 03/12/20  8:38 AM   Result Value Ref Range    Color YELLOW/STRAW      Appearance CLOUDY (A) CLEAR      Specific gravity 1.015 1.003 - 1.030      pH (UA) 6.0 5.0 - 8.0      Protein NEGATIVE  NEG mg/dL    Glucose NEGATIVE  NEG mg/dL    Ketone TRACE (A) NEG mg/dL    Bilirubin NEGATIVE  NEG      Blood NEGATIVE  NEG      Urobilinogen 1.0 0.2 - 1.0 EU/dL    Nitrites NEGATIVE  NEG      Leukocyte Esterase TRACE (A) NEG     DRUG SCREEN, URINE    Collection Time: 03/12/20  8:38 AM   Result Value Ref Range    AMPHETAMINES NEGATIVE  NEG      BARBITURATES NEGATIVE  NEG      BENZODIAZEPINES NEGATIVE  NEG      COCAINE NEGATIVE  NEG      METHADONE NEGATIVE  NEG      OPIATES NEGATIVE  NEG      PCP(PHENCYCLIDINE) NEGATIVE  NEG      THC (TH-CANNABINOL) NEGATIVE  NEG      Drug screen comment (NOTE)    URINE MICROSCOPIC ONLY    Collection Time: 03/12/20  8:38 AM Result Value Ref Range    WBC 0-4 0 - 4 /hpf    RBC 0-5 0 - 5 /hpf    Epithelial cells FEW FEW /lpf    Bacteria NEGATIVE  NEG /hpf

## 2020-03-12 NOTE — ED NOTES
Patient not yet changed into gown. Patient not speaking to nurse. Patient informed she needs to make a decision on which way should would prefer to give urine; use a bedside commode or cath.

## 2020-03-12 NOTE — GROUP NOTE
DORETHA  GROUP DOCUMENTATION INDIVIDUAL Group Therapy Note Date: 3/12/2020 Group Start Time: 1000 Group End Time: 1100 Group Topic: Topic Group [facility] 3 ACUTE BEHAV [facility], [address] [facility] GROUP DOCUMENTATION GROUP Group Therapy Note Attendees: 9 Attendance: Did not attend Patient's Goal: Interventions/technique Chana Gregory

## 2020-03-12 NOTE — ED NOTES
Refusing to urinate, refusing straight cath at this time. Given gingerale and encouraging fluid intake.

## 2020-03-12 NOTE — BH NOTES
GROUP THERAPY PROGRESS NOTE    Patient did not participate in Process Group.      Valerai Brower LPC LSATP CSAC

## 2020-03-12 NOTE — ED NOTES
Bedside shift change report given to Misty Glaser (oncoming nurse) by Lizbeth Hernandez (offgoing nurse). Report included the following information SBAR.

## 2020-03-12 NOTE — BH NOTES
TRANSFER - IN REPORT:    Verbal report received from Ericka Camejo on Estefany Odor  being received from ER for routine progression of care      Report consisted of patients Situation, Background, Assessment and   Recommendations(SBAR). Information from the following report(s) Kardex, Intake/Output and Recent Results was reviewed with the receiving nurse. Opportunity for questions and clarification was provided. Assessment completed upon patients arrival to unit and care assumed.

## 2020-03-13 PROCEDURE — 65220000003 HC RM SEMIPRIVATE PSYCH

## 2020-03-13 NOTE — H&P
[address] [facility] HISTORY AND PHYSICAL    Name:  Thang Olson  MR#:  [id number]  :  1960  ACCOUNT #:  [de-identified]  ADMIT DATE:  2020    PSYCHIATRIC INTAKE NOTE    CHIEF COMPLAINT:  \"Leave me alone. \"    HISTORY OF PRESENT ILLNESS:  This is a 18-year-old [facility] American female with history of schizoaffective disorder, brought to the emergency department with auditory hallucinations, noncompliance with medications, acting bizarre, odd, and indifferent to her family. They called 911. When EMS came, she was locked in the bathroom, combative, and disorganized. She is a poor historian. The aforementioned is gathered from records. In the interview, she just says \"leave me alone\" and appears to swat at the air and look out the window, somewhat restless, pacing, and disorganized appearing, uncooperative with interview. PAST PSYCHIATRIC HISTORY:  Schizoaffective disorder, prior treatments and hospitalizations as suspected, unclear from interview. PAST MEDICAL HISTORY:  Asthma, hepatitis C. ALLERGIES:  NONE KNOWN. SOCIAL HISTORY:  Lives with her family. Former smoker. No alcohol use currently. UDS is negative for drugs of abuse. MENTAL STATUS EXAMINATION:  Adult female, alert, oriented to herself, pacing the room, irritable. Appears frustrated and disheveled. Does not respond directly to questions, just says \"leave me alone. \"  No behaviors indicative of self-harm or of threatening to others, staring out the window. Here for management of her condition. ASSESSMENT:  This is an adult female with psychotic condition, noncompliance and degeneration of her behaviors. DIAGNOSIS:  Schizoaffective disorder, acute exacerbation, bipolar type likely. PLAN:  Admit for safety and stabilization, medication modification as needed, court ordered meds, group therapy, individual therapy. ESTIMATED LENGTH OF STAY:  7-10 days.     DISPOSITION:  Planning with Social Work.    STRENGTHS:  Supportive family. DISABILITIES:  Unwillingness for treatment, compliance issues. SURAJ Paniagua MD      [id number]  D:  03/13/2020 12:03  T:  03/13/2020 15:45  JOB #:  [id number]

## 2020-03-13 NOTE — PROGRESS NOTES
Problem: Discharge Planning  Goal: *Discharge to safe environment  Outcome: Not Progressing Towards Goal  Note: Patient arrived from her own private residence. Patient refuses to engage with treatment team.  It is unclear if Patient is able to return home. Goal: *Knowledge of medication management  Outcome: Not Progressing Towards Goal  Note: Patient does not verbalize understanding of medication regimen. Patient does not agree to take medications as prescribed. Goal: *Knowledge of discharge instructions  Outcome: Not Progressing Towards Goal  Note: Patient does not verbalize understanding of goals for treatment and safe discharge.

## 2020-03-13 NOTE — GROUP NOTE
DORETHA  GROUP DOCUMENTATION INDIVIDUAL Group Therapy Note Date: 3/13/2020 Group Start Time: 1500 Group End Time: 3952 Group Topic: Recreational/Music Therapy [facility] [place] [address] ACUTE BEHAV [facility] [place], [address] GROUP DOCUMENTATION GROUP Group Therapy Note Attendees: 4 Attendance: Did not attend Patient's Goal: Interventions/techniques: 
Ashlyn Lu

## 2020-03-13 NOTE — BH NOTES
1900 Received report from off going shift. Patient non-compliant with treatment. Refuses to answer any questions. Patient is standing up in her room at this time. Continue with safety precautions. 2100 Upon rounds offer patient some HS snacks, she is refusing at this time. NAD noted. Patient remains in her room, standing by the window. 2300 Laying in bed, eyes closed, appears to be sleep. NAD noted. Continue safety precautions. 0100 Laying in bed, eyes closed, appears to be sleep. NAD noted. Continue safety precautions. 0300 Patient in bed, eyes closed, appears to be asleep. Continue safety precautions. 0500 Laying in bed, eyes closed, appears to be sleep. NAD noted. Continue safety precautions. 1395-1085 Report given to oncoming nurse. Hourly rounds completed. Shift unremarkable. Continue with safety precautions.

## 2020-03-13 NOTE — BH NOTES
PSYCHOSOCIAL ASSESSMENT  :Patient identifying info:  Joelle Pool is a 60 y.o., female admitted 3/11/2020  6:54 PM     Presenting problem and precipitating factors: Patient was brought to [facility] ED via EMS under an ECO after Pt's daughter called police due to Pt appearing paranoid and acting bizarre. Pt was found by her daughter and police to be locked in her bathroom. Pt appears to have not taken her psychiatric medications for at least 1 month. She refused to speak with them, except for saying, \"go away. \"  Evidence at home showed Pt had not been sleeping or eating for several days; Pt's daughter reported it appeared as if Pt had lost weight. Pt was combative in the ED and placed under TDO. Pt refused to cooperate with assessment and would not answer questions. On the unit, Pt refused to participate in skin assessment or psychosocial interview. She appears irritable, paranoid, and delusional.  She was involuntarily committed at her TDO hearing. Mental status assessment: Alert, oriented to self, refusing to engage with treatment team, irritable    Strengths: Supportive family    Collateral information: Ewa Pena ([phone])    Current psychiatric /substance abuse providers and contact info: Unknown    Previous psychiatric/substance abuse providers and response to treatment: Multiple previous inpatient psychiatric hospitalizations [facility] 4/2018; [facility] 7/2019, 12/2018; [place] 1997)    Family history of mental illness or substance abuse: Unknown    Substance abuse history:  None indicated  Social History     Tobacco Use    Smoking status: Former Smoker    Smokeless tobacco: Never Used   Substance Use Topics    Alcohol use: Not Currently       History of biomedical complications associated with substance abuse:  Denies    Patient's current acceptance of treatment or motivation for change: Poor    Family constellation: Daughter    Is significant other involved?   No      Describe support system: Daughter    Describe living arrangements and home environment: Patient lives alone.     Health issues:   Hospital Problems  Never Reviewed          Codes Class Noted POA    * (Principal) Sch[facility]ffect[facility] disorder ([facility] ICD-10-CM: F25.9  ICD-9-CM: 295.70  3/30/2018 Unknown              Trauma history: None indicated    Legal issues: None indicated    History of  service: No    Financial status: SSI    Hindu/cultural factors: Yazidi    Education/work history: Unemployed    Have you been licensed as a health care professional (current or ): No    Leisure and recreation preferences: None indicated    Describe coping skills: Limited and poor judgement    Reza Yo  3/13/2020

## 2020-03-13 NOTE — BH NOTES
[employer] conducted TDO hearing this morning. The ending result of hearing, patient committed.

## 2020-03-13 NOTE — GROUP NOTE
DORETHA  GROUP DOCUMENTATION INDIVIDUAL Group Therapy Note Date: 3/13/2020 Group Start Time: 1100 Group End Time: 1200 Group Topic: Topic Group [facility] [place] 3 ACUTE BEHAV [facility] [place], [address] [facility] GROUP DOCUMENTATION GROUP Group Therapy Note Attendees: 5 Attendance: Did not attend Patient's Goal: Interventions/techniques: 
Pierre Thorne

## 2020-03-13 NOTE — PROGRESS NOTES
Problem: Psychosis  Goal: *STG: Decreased hallucinations  Outcome: Not Progressing Towards Goal  Goal: *STG: Decreased delusional thinking  Outcome: Not Progressing Towards Goal  Goal: *STG: Remains safe in hospital  Outcome: Progressing Towards Goal  Goal: *STG: Seeks staff when feelings of self harm or harm towards others arise  Outcome: Not Progressing Towards Goal  Goal: *STG: Patient will verbalize areas in need of boundary recognition and limit setting  Outcome: Not Progressing Towards Goal  Goal: *STG: Patient will develop strategies to regulate emotions and corresponding behaviors  Outcome: Not Progressing Towards Goal  Goal: *STG: Accept constructive criticism without injury or isolation  Outcome: Not Progressing Towards Goal  Goal: *STG: Participates in individual and group therapy  Outcome: Not Progressing Towards Goal  Goal: *STG: Develop safety plan for times when triggered in stressful situations  Outcome: Not Progressing Towards Goal  Goal: *STG: Patient will verbalize issues that get in their way of progress (i.e.: anger, fear etc.)  Outcome: Not Progressing Towards Goal  Goal: *STG/LTG: Complies with medication therapy  Outcome: Not Progressing Towards Goal  Goal: *STG/LTG: Demonstrates improved thought patterns as evidenced by logical and coherent speech  Outcome: Not Progressing Towards Goal  Goal: *STG/LTG: Demonstrates improved social functioning by responding appropriately to staff  Outcome: Not Progressing Towards Goal  Goal: Interventions  Outcome: Progressing Towards Goal    0718 This writer received report from the outgoing nurse. 0800 Patient refused to have her vitals assessed. 3706 Patient in room resting quietly. Patient refused to answer shift  assessment questions from this writer. Patient is selectively mute. Patient later stated \"leave me alone. \" This writer encouraged the patient to communicate her thoughts and needs to better assist the staff with helping the patient. Patient continued to refuse to answer admission assessment questions. Patient ate most of her breakfast. Patient presents with an uncooperative attitude, flat/dull affect, and irritable mood. 1005 Patient has been isolative to the room throughout most of the morning. 1105 Patient refused to allow this writer to assess her vitals. Patient made a motion with her hand and arms, motioning for staff to leave. 1205 Patient committed from TDO hearing. Patient ate most of her lunch. Patient refusing to speak to this writer. 1335 Patient in room resting quietly. Patient continues to refuse to speak to this writer. 1550 Patient in room keeping to self. Patient is preoccupied. Patient pacing room and making various hand gestures. Patient is selective mute. Patient stated \"No no,\" when this writer attempted to engage in conversation with the patient. 1701 Patient in dayroom eating dinner. Patient keeping to self. [id number] Patient in room pacing. Patient has been isolative to the room throughout most of the shift. Sarah 1978 Patient in room sitting on the bed. Patient is preoccupied, refusing to talk to [facility] staff.

## 2020-03-13 NOTE — INTERDISCIPLINARY ROUNDS
Behavioral Health Interdisciplinary Rounds Patient Name: Maria Hamm  Age: 60 y.o. Room/Bed:  313/01 Primary Diagnosis: <principal problem not specified> Admission Status: Involuntary Commitment Readmission within 30 days: no 
Power of  in place: no 
Patient requires a blocked bed: no           
Reason for blocked bed: NA Order for blocked bed obtained: no    
 
Sleep hours: 9 Morning Labs completed per orders:  no      
Participation in Care/Groups:  no 
Medication Compliant?: No scheduled medication ordered PRNS (last 24 hours): None Restraints (last 24 hours):  no 
Substance Abuse:  no   
24 hour chart check complete: yes Patient goal(s) for today: Meet with treatment team; meet with TDO hearing team 
Treatment team focus/goals: Complete psychosocial and care plan; MD to restart medications Progress note: Patient presents irritable and agitated under TDO. She appears paranoid and unwilling to speak with treatment team members. LOS:  1  Expected LOS: TBD Financial concerns/prescription coverage: Cydney Walker Family contact: 3/13 SW spoke to daughter Family requesting physician contact today: No 
Discharge plan: TBD Access to weapons: No 
Outpatient provider(s): TBD Patient's preferred phone number for follow up call: [phone] Participating treatment team members: Maria Hamm, CARLY Abraham; Dr. Ban Ortiz MD

## 2020-03-14 PROCEDURE — 65220000003 HC RM SEMIPRIVATE PSYCH

## 2020-03-14 PROCEDURE — 74011250637 HC RX REV CODE- 250/637: Performed by: PSYCHIATRY & NEUROLOGY

## 2020-03-14 NOTE — BH NOTES
Assumed care of patient and given report by off going nurse CHRIS Polanco RN. Pt was in her room when coming to assess her. She had her dinner tray on the other bed and was moving things around on her tray. She did allow me to remove it and when attempting to introduce myself and assess her she made a swatting motion with her hands to leave and then gave me the middle finger twice. Pt appears to be responding to internal stimuli, is in her room pacing, swatting at air when she makes eye contact with staff or other peers to get away and is looking out the window. She did come out in dayroom to get snack when I told her and brought it back to her room and ate. She otherwise has remained isolative to her room. Unable to assess for any SI/HI or AVH due to non compliance. She would only say get out or leave, remains irritable. She had no scheduled meds for me tonight and had no prn's so far. We did attempt to give her prn's tonight with multiple attempts but she refused. Pt did not attend groups today. She remains on 15 min safety checks. Will continue to monitor and treat. Slept 1 hour.

## 2020-03-14 NOTE — BH NOTES
0700-Report received from Linda Chan. [id number] continues to be irritable  When staff entered the room, she denies SI and HI when asked but was dismissive when asked about auditory or visual hallucinations. , patient had no scheduled mediations at this time, she was out of room to dayroom for breakfast, appetite is good at this time. 1000-1200-Patient still self isolating in her room , out to get her  Lunch tray then back to her room, she continues to be irritable  And dismissive at this time. 1200-1400-Patient in room for quiet time then back to her room at this time. 1431-3237-Bo change in patient condition, she remains in the room lyin in the bed at this time. 3863-1713 patient in no acute distress , she continues to be quiet  And self isolating at this time, no safety or behavior issues at this time.

## 2020-03-14 NOTE — BH NOTES
Psychiatric Progress Note    Patient: Amos Moran MRN: [id number]  SSN: [id number]    YOB: 1960  Age: 60 y.o. Sex: female      Admit Date: 3/11/2020    LOS: 2 days     Subjective:     Amos Moran  Requested to be left alone again today. Peaked out from under her covers briefly before going back under. Moods are irritable and dismissive. No aggression or violence. Isolative and refusing treamtents. Eating ok and sleeping well.     Objective:     Vitals:    03/12/20 0758 03/12/20 1020 03/12/20 2225 03/14/20 0733   BP: 122/78 118/69 123/76 147/73   Pulse: 87 87 92 77   Resp: 16 16 20 16   Temp:    97.3 °F (36.3 °C)   SpO2: 100% 100% 99% 100%        Mental Status Exam:   Sensorium  confused   Relations guarded and uncooperative   Eye Contact poor   Appearance:  age appropriate   Speech:  hypoverbal, normal volume and non-pressured   Thought Process: goal directed   Thought Content internally preoccupied    Suicidal ideations won't answer   Mood:  hostile   Affect:  constricted   Memory   MARISA   Concentration:  impaired   Insight:  poor   Judgment:  impaired due to condfition       MEDICATIONS:  Current Facility-Administered Medications   Medication Dose Route Frequency    OLANZapine (ZyPREXA) tablet 5 mg  5 mg Oral Q6H PRN    haloperidol lactate (HALDOL) injection 5 mg  5 mg IntraMUSCular Q6H PRN    benztropine (COGENTIN) tablet 1 mg  1 mg Oral BID PRN    diphenhydrAMINE (BENADRYL) injection 50 mg  50 mg IntraMUSCular BID PRN    hydrOXYzine HCL (ATARAX) tablet 50 mg  50 mg Oral TID PRN    LORazepam (ATIVAN) injection 1 mg  1 mg IntraMUSCular Q4H PRN    traZODone (DESYREL) tablet 50 mg  50 mg Oral QHS PRN    acetaminophen (TYLENOL) tablet 650 mg  650 mg Oral Q4H PRN    magnesium hydroxide (MILK OF MAGNESIA) 400 mg/5 mL oral suspension 30 mL  30 mL Oral DAILY PRN    albuterol (PROVENTIL HFA, VENTOLIN HFA, PROAIR HFA) inhaler 1 Puff  1 Puff Inhalation Q4H PRN DISCUSSION:   the risks and benefits of the proposed medication  patient given opportunity to ask questions    Lab/Data Review: All lab results for the last 24 hours reviewed.      No major concerns    Assessment:     Principal Problem:    Schizoaffective disorder ([facility] (3/30/2018)        Plan:     Continue current care  May need to court order medications  Collateral information  Disposition planning with social work    Signed By: Chelo Santiago MD     March 14, 2020

## 2020-03-14 NOTE — PROGRESS NOTES
Problem: Psychosis  Goal: *STG: Remains safe in hospital  Outcome: Progressing Towards Goal     Problem: Psychosis  Goal: *STG: Decreased hallucinations  Outcome: Not Progressing Towards Goal  Goal: *STG: Decreased delusional thinking  Outcome: Not Progressing Towards Goal

## 2020-03-14 NOTE — PROGRESS NOTES
Problem: Psychosis  Goal: *STG: Remains safe in hospital  Outcome: Progressing Towards Goal     Problem: Psychosis  Goal: *STG: Seeks staff when feelings of self harm or harm towards others arise  Outcome: Not Progressing Towards Goal

## 2020-03-14 NOTE — BH NOTES
Behavioral Health Interdisciplinary Rounds      Patient Name: Leopold Henry   Age: 60 y.o.   Room/Bed:  [facility]/  Primary Diagnosis: Schizoaffective d/o      Admission Status: Involuntary Commitment                            Readmission within 30 days: no  Power of  in place: no  Patient requires a blocked bed: no            Reason for blocked bed: NA  Order for blocked bed obtained: no        Sleep hours: 1  Morning Labs completed per orders:  none ordered                                  Participation in Care/Groups:  no  Medication Compliant?: No scheduled medication ordered  PRNS (last 24 hours): None                 Restraints (last 24 hours):  no  Substance Abuse:  no               24 hour chart check complete: yes

## 2020-03-15 PROCEDURE — 65220000003 HC RM SEMIPRIVATE PSYCH

## 2020-03-15 PROCEDURE — 74011250636 HC RX REV CODE- 250/636: Performed by: PSYCHIATRY & NEUROLOGY

## 2020-03-15 RX ADMIN — HALOPERIDOL LACTATE 5 MG: 5 INJECTION, SOLUTION INTRAMUSCULAR at 08:17

## 2020-03-15 NOTE — PROGRESS NOTES
Problem: Psychosis  Goal: *STG: Remains safe in hospital  Outcome: Progressing Towards Goal     Problem: Psychosis  Goal: *STG: Decreased delusional thinking  Outcome: Not Progressing Towards Goal

## 2020-03-15 NOTE — BH NOTES
Psychiatric Progress Note    Patient: Gage Kathleen MRN: [id number]  SSN: [id number]    YOB: 1960  Age: 60 y.o. Sex: female      Admit Date: 3/11/2020    LOS: 3 days     Subjective:     Gage Kathleen  Requested to be left alone again today. Peaked out from under her covers briefly before going back under. Moods are irritable and dismissive. No aggression or violence. Isolative and refusing treamtents. Eating ok and sleeping well. 3/15 - Gage Kathleen is no different from the previous day, waving me off saying, \"Leave me alone\" as answers to every question. Was pacing the unit becoming irate earlier per staff. Refusing medications well. Eating and sleeping fairly.     Objective:     Vitals:    03/12/20 1020 03/12/20 2225 03/14/20 0733 03/14/20 2209   BP: 118/69 123/76 147/73 119/90   Pulse: 87 92 77 87   Resp: 16 20 16 20   Temp:   97.3 °F (36.3 °C) 97.8 °F (36.6 °C)   SpO2: 100% 99% 100% 99%        Mental Status Exam:   Sensorium  confused   Relations guarded and uncooperative   Eye Contact poor   Appearance:  age appropriate   Speech:  hypoverbal, normal volume and non-pressured   Thought Process: goal directed   Thought Content internally preoccupied    Suicidal ideations won't answer   Mood:  hostile   Affect:  constricted   Memory   MARISA   Concentration:  impaired   Insight:  poor   Judgment:  impaired due to condfition       MEDICATIONS:  Current Facility-Administered Medications   Medication Dose Route Frequency    OLANZapine (ZyPREXA) tablet 5 mg  5 mg Oral Q6H PRN    haloperidol lactate (HALDOL) injection 5 mg  5 mg IntraMUSCular Q6H PRN    benztropine (COGENTIN) tablet 1 mg  1 mg Oral BID PRN    diphenhydrAMINE (BENADRYL) injection 50 mg  50 mg IntraMUSCular BID PRN    hydrOXYzine HCL (ATARAX) tablet 50 mg  50 mg Oral TID PRN    LORazepam (ATIVAN) injection 1 mg  1 mg IntraMUSCular Q4H PRN    traZODone (DESYREL) tablet 50 mg  50 mg Oral QHS PRN    acetaminophen (TYLENOL) tablet 650 mg  650 mg Oral Q4H PRN    magnesium hydroxide (MILK OF MAGNESIA) 400 mg/5 mL oral suspension 30 mL  30 mL Oral DAILY PRN    albuterol (PROVENTIL HFA, VENTOLIN HFA, PROAIR HFA) inhaler 1 Puff  1 Puff Inhalation Q4H PRN      DISCUSSION:   the risks and benefits of the proposed medication  patient given opportunity to ask questions    Lab/Data Review: All lab results for the last 24 hours reviewed.      No major concerns    Assessment:     Principal Problem:    Schizoaffective disorder ([facility] (3/30/2018)        Plan:     Continue current care  Court order medications  Collateral information  Disposition planning with social work    Signed By: Domenick Bumpers, MD     March 15, 2020

## 2020-03-15 NOTE — BH NOTES
1900-Assumed care and received report from off going staff.     1925-At the beginning of the shift patient pacing in room on the left side of the bed  Near the window. Unable to ask questions at thus time, and stating \" I am alright\". No meds to include PRN's given    Didn't want a snack during schedule snack break.     Approx 2330 patient came out of room requesting a snack at this time.     Q15 minutes rounds continue for safety     Will continue to observe for any changes or concerns    Approx 8 hours of sleep

## 2020-03-15 NOTE — BH NOTES
0700-Report received from [facility]    [id number] continues to be irritable  When staff entered the room, she denies SI and HI when asked but was dismissive when asked about auditory or visual hallucinations. .  She  Had increased anxiety this am with her roommate and was given medication at this time see MAR.   1000-1200-Patient in no acute distress at this time, she is  Lying on the bed resting quietly at this time  Staff will continue to monitor. 1200-1400-Patient in  No acute distress at this time in bed resting at this time  8575-1519- Patient  In her room resting quietly at this time, she is quiet, self isolating and not speaking much this shift staff will continue to monitor at this time.   1600-1800-Patient resting quietly in her room at this time, no acute distress noted, she continue to be irritable and dismissive at times

## 2020-03-15 NOTE — INTERDISCIPLINARY ROUNDS
Behavioral Health Interdisciplinary Rounds Patient Name: Narda Angel  Age: 60 y.o. Room/Bed:  313/01 Primary Diagnosis: [facility] Admission Status: Voluntary Readmission within 30 days: 
Power of  in place:  
Patient requires a blocked bed:  
Reason for blocked bed:  
 
Sleep hours: 8 Participation in Care/Groups:   
Medication Compliant?: N/A 
PRNS (last 24 hours): N/A Restraints (last 24 hours):   
Substance Abuse:    
24 hour chart check complete: Yes Patient goal(s) for today:  
Treatment team focus/goals:  
Progress note: LOS:  3  Expected LOS:  
 
Financial concerns/prescription coverage:   
Family contact:    
Family requesting physician contact today:   
Discharge plan: Access to weapons: Outpatient provider(s):  
Patient's preferred phone number for follow up call:  
 
Participating treatment team members: Narda Angel (assigned SW),

## 2020-03-16 PROCEDURE — 65220000003 HC RM SEMIPRIVATE PSYCH

## 2020-03-16 NOTE — BH NOTES
0700-Report received from [address] [place] [address]  0800-1000-Patient continues to be irritable  When staff entered the room, she denies SI and HI when asked but was dismissive when asked about auditory or visual hallucinations. .  She continues to self isolate at this time in the room lying on the bed with head covered with the blanket. 9525-1158- Patient in no acute distress at this time she out of room to get tray and eat in her room at this time, patient in no acute distress, at  This time. 1200-1400-Patient in no acute distress at this time she remains in the room self isolating at this time staff will continue to monitor. 1400-1600-Patient still in the room at this time no acute distress. 1600-1800-Patient in no acute distress she continues to self isolate at this time , patient in no acute distress at this time.

## 2020-03-16 NOTE — GROUP NOTE
DORETHA  GROUP DOCUMENTATION INDIVIDUAL Group Therapy Note Date: 3/16/2020 Group Start Time: 1100 Group End Time: 1200 Group Topic: Topic Group [facility] [place] 3 ACUTE BEHAV [facility] [place], [address] [facility] GROUP DOCUMENTATION GROUP Group Therapy Note Attendees: 7 Attendance: Did not attend Patient's Goal: Interventions/techniques Daryl Moreno

## 2020-03-16 NOTE — PROGRESS NOTES
Problem: Psychosis  Goal: *STG/LTG: Demonstrates improved social functioning by responding appropriately to staff  Outcome: Progressing Towards Goal     Problem: Psychosis  Goal: *STG/LTG: Demonstrates improved thought patterns as evidenced by logical and coherent speech  Outcome: Not Progressing Towards Goal

## 2020-03-16 NOTE — INTERDISCIPLINARY ROUNDS
Behavioral Health Interdisciplinary Rounds  
  
Patient Name: Lara Rowland                      Age: 60 y.o. Room/Bed:  313/01 Primary [facility]: [facility] Admission Status: Voluntary Readmission within 30 days: 
Power of  in place:  
Patient requires a blocked bed:  
Reason for blocked bed:  
  
Sleep hours:    11 hrs. Participation in Care/Groups:   
Medication Compliant?: N/A 
PRNS (last 24 hours): N/A Restraints (last 24 hours):   
Substance Abuse:                    
24 hour chart check complete: Yes 
  
Patient goal(s) for today: Follow staff direction; take medications as prescribed Treatment team focus/goals: MD to petition for [facility] Progress note: Patient continues to refuse medications and appears internally stimulated. Did not speak to treatment team, but waved hand as to gesture \"go away\" or dismissal. 
 
LOS:  4  Expected LOS: TBD Financial concerns/prescription coverage: Cydney Walker Family contact: 3/13 SW spoke to daughter Family requesting physician contact today: No 
Discharge plan: TBD Access to weapons: No 
Outpatient provider(s): TBD Patient's preferred phone number for follow up call: [phone] Participating treatment team members: Lupe Schmitz MSW; Dr. Marc Garcia MD

## 2020-03-16 NOTE — PROGRESS NOTES
Problem: Psychosis  Goal: *STG: Remains safe in hospital  Outcome: Progressing Towards Goal     Problem: Psychosis  Goal: *STG: Seeks staff when feelings of self harm or harm towards others arise  Outcome: Not Progressing Towards Goal     Problem: Psychosis  Goal: *STG: Patient will verbalize areas in need of boundary recognition and limit setting  Outcome: Not Progressing Towards Goal     Problem: Psychosis  Goal: *STG: Participates in individual and group therapy  Outcome: Not Progressing Towards Goal   1905- Report given by [place] [facility] I assume care of the patient. Patient is lying in bed under the blankets . Patient seems to be in no distress. Patient refuse to answer questions. Patient is refusing vitals but manage to take it. Patient is isolative and she is under the blanket refusing interactions with peers and staff. 2010- Patient awaken and she doesn't want any snacks. Patient went back to sleep. 2100- Patient is lying in bed quiet and resting. 2300- Patient is lying in bed resting quietly. 0010- 0400- Patient is resting in bed.    0600- Patient slept apprx. 11 hrs.

## 2020-03-16 NOTE — GROUP NOTE
DORETHA  GROUP DOCUMENTATION INDIVIDUAL Group Therapy Note Date: 3/16/2020 Group Start Time: 1500 Group End Time: 9763 Group Topic: Recreational/Music Therapy [facility] [place] [address] ACUTE BEHAV [facility], [address] GROUP DOCUMENTATION GROUP Group Therapy Note Attendees: 6 Attendance: Did not attend Patient's Goal: Interventions/techniques: 
Sue Campos

## 2020-03-16 NOTE — BH NOTES
Psychiatric Progress Note    Patient: Rosetta Urena MRN: [id number]  SSN: [id number]    YOB: 1960  Age: 60 y.o. Sex: female      Admit Date: 3/11/2020    LOS: 4 days     Subjective:     Rosetta Urena  Requested to be left alone again today. Peaked out from under her covers briefly before going back under. Moods are irritable and dismissive. No aggression or violence. Isolative and refusing treamtents. Eating ok and sleeping well. 3/15 - Rosetta Urena is no different from the previous day, waving me off saying, \"Leave me alone\" as answers to every question. Was pacing the unit becoming irate earlier per staff. Refusing medications well. Eating and sleeping fairly. 3/16 - Rosetta Urena was less dismissive today. Seemed preoccupied and paced from her bed to the hallway multiple times but would not speak to this interviewer or others in the room. Appears less irritable. No aggression or violence. Not interactive and dismissive. Refusing medications. Eating and sleeping fairly.     Objective:     Vitals:    03/15/20 0813 03/15/20 2106 03/15/20 2112 03/16/20 0708   BP: 120/70  164/80 150/72   Pulse: 88  94 88   Resp: 20  18 18   Temp: 97 °F (36.1 °C)  97.2 °F (36.2 °C) 97 °F (36.1 °C)   SpO2: 100% 100% 99% 99%        Mental Status Exam:   Sensorium  confused   Relations guarded and uncooperative   Eye Contact poor   Appearance:  age appropriate   Speech:  hypoverbal, normal volume and non-pressured   Thought Process: goal directed   Thought Content internally preoccupied    Suicidal ideations won't answer   Mood:  hostile   Affect:  constricted   Memory   MARISA   Concentration:  impaired   Insight:  poor   Judgment:  impaired due to condfition       MEDICATIONS:  Current Facility-Administered Medications   Medication Dose Route Frequency    OLANZapine (ZyPREXA) tablet 5 mg  5 mg Oral Q6H PRN    haloperidol lactate (HALDOL) injection 5 mg  5 mg IntraMUSCular Q6H PRN    benztropine (COGENTIN) tablet 1 mg  1 mg Oral BID PRN    diphenhydrAMINE (BENADRYL) injection 50 mg  50 mg IntraMUSCular BID PRN    hydrOXYzine HCL (ATARAX) tablet 50 mg  50 mg Oral TID PRN    LORazepam (ATIVAN) injection 1 mg  1 mg IntraMUSCular Q4H PRN    traZODone (DESYREL) tablet 50 mg  50 mg Oral QHS PRN    acetaminophen (TYLENOL) tablet 650 mg  650 mg Oral Q4H PRN    magnesium hydroxide (MILK OF MAGNESIA) 400 mg/5 mL oral suspension 30 mL  30 mL Oral DAILY PRN    albuterol (PROVENTIL HFA, VENTOLIN HFA, PROAIR HFA) inhaler 1 Puff  1 Puff Inhalation Q4H PRN      DISCUSSION:   the risks and benefits of the proposed medication  patient given opportunity to ask questions    Lab/Data Review: All lab results for the last 24 hours reviewed.      No major concerns    Assessment:     Principal Problem:    Schizoaffective disorder ([facility] (3/30/2018)        Plan:     Continue current care  Court order medications  Collateral information  Disposition planning with social work    Signed By: June Colon MD     March 16, 2020

## 2020-03-17 PROCEDURE — 65220000003 HC RM SEMIPRIVATE PSYCH

## 2020-03-17 PROCEDURE — 74011250636 HC RX REV CODE- 250/636: Performed by: PSYCHIATRY & NEUROLOGY

## 2020-03-17 RX ORDER — RISPERIDONE 1 MG/1
1 TABLET, FILM COATED ORAL 2 TIMES DAILY
Status: DISCONTINUED | OUTPATIENT
Start: 2020-03-17 | End: 2020-03-19

## 2020-03-17 RX ORDER — HALOPERIDOL 5 MG/ML
5 INJECTION INTRAMUSCULAR 2 TIMES DAILY
Status: DISCONTINUED | OUTPATIENT
Start: 2020-03-17 | End: 2020-03-17

## 2020-03-17 RX ORDER — HALOPERIDOL 5 MG/ML
5 INJECTION INTRAMUSCULAR 2 TIMES DAILY
Status: DISCONTINUED | OUTPATIENT
Start: 2020-03-17 | End: 2020-03-19

## 2020-03-17 RX ORDER — RISPERIDONE 1 MG/1
1 TABLET, FILM COATED ORAL 2 TIMES DAILY
Status: DISCONTINUED | OUTPATIENT
Start: 2020-03-17 | End: 2020-03-17

## 2020-03-17 RX ADMIN — HALOPERIDOL LACTATE 5 MG: 5 INJECTION, SOLUTION INTRAMUSCULAR at 17:00

## 2020-03-17 NOTE — BH NOTES
GROUP THERAPY PROGRESS NOTE    Patient did not participate in Coping Skills group.      Km Stacy [facility]

## 2020-03-17 NOTE — BH NOTES
Psychiatric Progress Note    Patient: Christiane Haro MRN: [id number]  SSN: [id number]    YOB: 1960  Age: 60 y.o. Sex: female      Admit Date: 3/11/2020    LOS: 5 days     Subjective:     Chrsitiane Haro  Requested to be left alone again today. Peaked out from under her covers briefly before going back under. Moods are irritable and dismissive. No aggression or violence. Isolative and refusing treamtents. Eating ok and sleeping well. 3/15 - Christiane Haro is no different from the previous day, waving me off saying, \"Leave me alone\" as answers to every question. Was pacing the unit becoming irate earlier per staff. Refusing medications well. Eating and sleeping fairly. 3/16 - Christiane Haro was less dismissive today. Seemed preoccupied and paced from her bed to the hallway multiple times but would not speak to this interviewer or others in the room. Appears less irritable. No aggression or violence. Not interactive and dismissive. Refusing medications. Eating and sleeping fairly. 3/17 - Christiane Haro is paranoid and dismissive still. Today she told me to leave her alone and one other statement moods are irritable. Denies SI/HI/AH/VH. No aggression or violence. Appropriately interactive and aware. Tolerating medications well. Eating and sleeping fairly (6hrs).     Objective:     Vitals:    03/15/20 0813 03/15/20 2106 03/15/20 2112 03/16/20 0708   BP: 120/70  164/80 150/72   Pulse: 88  94 88   Resp: 20  18 18   Temp: 97 °F (36.1 °C)  97.2 °F (36.2 °C) 97 °F (36.1 °C)   SpO2: 100% 100% 99% 99%        Mental Status Exam:   Sensorium  confused   Relations guarded and uncooperative   Eye Contact poor   Appearance:  age appropriate   Speech:  hypoverbal, normal volume and non-pressured   Thought Process: goal directed   Thought Content internally preoccupied    Suicidal ideations won't answer   Mood:  hostile   Affect:  constricted   Memory   MARISA Concentration:  impaired   Insight:  poor   Judgment:  impaired due to condfition       MEDICATIONS:  Current Facility-Administered Medications   Medication Dose Route Frequency    OLANZapine (ZyPREXA) tablet 5 mg  5 mg Oral Q6H PRN    haloperidol lactate (HALDOL) injection 5 mg  5 mg IntraMUSCular Q6H PRN    benztropine (COGENTIN) tablet 1 mg  1 mg Oral BID PRN    diphenhydrAMINE (BENADRYL) injection 50 mg  50 mg IntraMUSCular BID PRN    hydrOXYzine HCL (ATARAX) tablet 50 mg  50 mg Oral TID PRN    LORazepam (ATIVAN) injection 1 mg  1 mg IntraMUSCular Q4H PRN    traZODone (DESYREL) tablet 50 mg  50 mg Oral QHS PRN    acetaminophen (TYLENOL) tablet 650 mg  650 mg Oral Q4H PRN    magnesium hydroxide (MILK OF MAGNESIA) 400 mg/5 mL oral suspension 30 mL  30 mL Oral DAILY PRN    albuterol (PROVENTIL HFA, VENTOLIN HFA, PROAIR HFA) inhaler 1 Puff  1 Puff Inhalation Q4H PRN      DISCUSSION:   the risks and benefits of the proposed medication  patient given opportunity to ask questions    Lab/Data Review: All lab results for the last 24 hours reviewed.      No major concerns    Assessment:     Principal Problem:    Schizoaffective disorder ([facility] (3/30/2018)        Plan:     Continue current care  Court order medications Risperdal and haldol  Collateral information  Disposition planning with social work    Signed By: Canelo Benavides MD     March 17, 2020

## 2020-03-17 NOTE — BH NOTES
GROUP THERAPY PROGRESS NOTE    Patient did not participate in Substance abuse group. She did come and grabbed a snack but went to her room to eat.     Pola Urbina LPC [facility]ATP [facility]

## 2020-03-17 NOTE — BH NOTES
Patient in her room in bed awake would not make eye contact and no proper responses when the Patient was spoken  To.   Slept 6 hours

## 2020-03-17 NOTE — BH NOTES
0730  Received report from NAZARIO Cevallos, RN. Assumed care of the patient. Pt did not answer any assessment questions at this time. Pt in room with covers over her head. Pt in [place] [facility] for breakfast.    1605-2076  Pt isolative to room for most of the morning    1200  Pt refused to come out to the [place] for lunch; pt's tray was brought to her room    1330  Attempted to get pt's height and weight, pt stated, \"no, leave me alone! \"  Asked pt if she could just tell me and pt stated, \"no\". 1430  Attempted to get pt's height and weight again and pt stated, \"no, no, no!\"  Pt refusing     1440  Pt came out to the day [place] to get an afternoon snack and then immediately took it back to her room; refused group, pt continues to be isolative to her room    1500  Went to pt's room to give her a message that her sister called and pt stated, \"no, leave me alone. \"  Told her she has court ordered medication scheduled for later and asked her if she is going to take it by mouth. Pt stated, \"no. \"  Informed pt that she needs to get an injection since she is refusing to take it by mouth      1700  Pt refused po Risperdal so received IM haldol 5mg court ordered med    1705  Pt eating dinner in her room    1800  Pt has been isolative to her room almost the entire shift; refuses vital signs and refuses to answer assessment questions, refused po court ordered meds, so received IM; refused all groups; isolative with angry affect

## 2020-03-17 NOTE — PROGRESS NOTES
Problem: Falls - Risk of  Goal: *Absence of Falls  Description: Document Radha Jimenez Fall Risk and appropriate interventions in the flowsheet.   Outcome: Progressing Towards Goal  Note: Fall Risk Interventions:

## 2020-03-17 NOTE — PROGRESS NOTES
Problem: Discharge Planning  Goal: *Discharge to safe environment  Outcome: Not Progressing Towards Goal  Note: Patient lives in her own home, but is refusing to speak with staff about safety of discharge plan. It is unclear if Patient is able to return home upon discharge. Goal: *Knowledge of medication management  Outcome: Not Progressing Towards Goal  Note: Patient does not verbalize understanding of medication regimen. Patient is refusing to take medications as prescribed. Court-ordered meds to be administered per [facility]. Goal: *Knowledge of discharge instructions  Outcome: Not Progressing Towards Goal  Note: Patient does not verbalize understanding of goals for treatment or safe discharge.

## 2020-03-17 NOTE — INTERDISCIPLINARY ROUNDS
Behavioral Health Interdisciplinary Rounds Patient Name: Soham Quinones  Age: 60 y.o. Room/Bed:  309/01 Primary Diagnosis: Schizoaffective disorder ([facility] Admission Status: Involuntary Commitment Readmission within 30 days: no 
Power of  in place: no 
Patient requires a blocked bed: no           
Reason for blocked bed: na 
Order for blocked bed obtained: no    
 
Sleep hours: 6 Morning Labs completed per orders:  na      
Participation in Care/Groups:  no 
Medication Compliant?: Refusing Psychiatric Medications and Refusing Medical Medications PRNS (last 24 hours): None Restraints (last 24 hours):  no 
Substance Abuse:  no   
24 hour chart check complete: yes Patient goal(s) for today: Take medications as prescribed; follow staff direction Treatment team focus/goals: Order forced meds, per court order Progress note: Patient remains internally preoccupied and responding to stimuli. She speaks only to tell treatment team to \"get out\" or \"leave me alone. \"   
 
LOS:  5  Expected LOS: TBD Financial concerns/prescription coverage: Cydney Walker Family contact: 3/17 SW spoke to daughter Family requesting physician contact today: No 
Discharge plan: TBD Access to weapons: No 
Outpatient provider(s): To be linked to new provider Patient's preferred phone number for follow up call: [phone] Participating treatment team members: Hussain Sotomayor MSW; Dr. Benjie Gannon MD; Pascual Grigsby, ChelseaD

## 2020-03-17 NOTE — PROGRESS NOTES
Laboratory monitoring for mood stabilizer and antipsychotics:    Recommended baseline monitoring has been completed based on this patient's current medication regimen. The patient is currently taking the following medication(s):   Current Facility-Administered Medications   Medication Dose Route Frequency    risperiDONE (RisperDAL) tablet 2 mg  2 mg Oral BID    Or    haloperidol lactate (HALDOL) injection 10 mg  10 mg IntraMUSCular BID       Serum Drug Level(s)  N/A      Height, Weight, BMI Estimation  Estimated body mass index is 30.51 kg/m² as calculated from the following:    Height as of this encounter: 147.3 cm (58\"). Weight as of this encounter: 66.2 kg (146 lb). Renal Function, Hepatic Function and Chemistry  Estimated Creatinine Clearance: 58 mL/min (by C-G formula based on SCr of 0.89 mg/dL). Lab Results   Component Value Date/Time    Sodium 141 03/11/2020 07:44 PM    Potassium 3.1 (L) 03/11/2020 07:44 PM    Chloride 107 03/11/2020 07:44 PM    CO2 22 03/11/2020 07:44 PM    Anion gap 12 03/11/2020 07:44 PM    Glucose 100 03/11/2020 07:44 PM    BUN 8 03/11/2020 07:44 PM    Creatinine 0.89 03/11/2020 07:44 PM    BUN/Creatinine ratio 9 (L) 03/11/2020 07:44 PM    GFR est AA >60 03/11/2020 07:44 PM    GFR est non-AA >60 03/11/2020 07:44 PM    Calcium 9.1 03/11/2020 07:44 PM    ALT (SGPT) 22 03/22/2020 06:01 AM    AST (SGOT) 50 (H) 03/22/2020 06:01 AM    Alk.  phosphatase 84 03/22/2020 06:01 AM    Protein, total 6.8 03/22/2020 06:01 AM    Albumin 3.2 (L) 03/22/2020 06:01 AM    Globulin 3.6 03/22/2020 06:01 AM    A-G Ratio 0.9 (L) 03/22/2020 06:01 AM    Bilirubin, total 0.3 03/22/2020 06:01 AM       Lab Results   Component Value Date/Time    Glucose 100 03/11/2020 07:44 PM       Lab Results   Component Value Date/Time    Hemoglobin A1c 5.7 (H) 03/22/2020 06:01 AM       Hematology  Lab Results   Component Value Date/Time    WBC 5.1 03/11/2020 07:44 PM    HGB 11.9 03/11/2020 07:44 PM    HCT 35.8 03/11/2020 07:44 PM    PLATELET 861 25/22/3544 07:44 PM    MCV 93.5 03/11/2020 07:44 PM       Lipids  Lab Results   Component Value Date/Time    Cholesterol, total 170 03/22/2020 06:01 AM    HDL Cholesterol 62 03/22/2020 06:01 AM    LDL, calculated 96.6 03/22/2020 06:01 AM    Triglyceride 57 03/22/2020 06:01 AM    CHOL/HDL Ratio 2.7 03/22/2020 06:01 AM       Thyroid Function    Lab Results   Component Value Date/Time    TSH 0.55 04/16/2018 04:48 AM     Vitals  Visit Vitals  BP (!) 121/97 (BP 1 Location: Right arm, BP Patient Position: Sitting)   Pulse 77   Temp 98.2 °F (36.8 °C)   Resp 16   Ht 147.3 cm (58\")   Wt 66.2 kg (146 lb)   SpO2 98%   BMI 30.51 kg/m²       Thank you,    Lorraine Elliott, PHARMD, BCPS  Contact: [phone]

## 2020-03-17 NOTE — GROUP NOTE
DORETHA  GROUP DOCUMENTATION INDIVIDUAL Group Therapy Note Date: 3/17/2020 Group Start Time: 1100 Group End Time: 1200 Group Topic: Topic Group [facility] 3 ACUTE BEHAV [facility] [place], [address] [facility] GROUP DOCUMENTATION GROUP Group Therapy Note Attendees: 9 Attendance: Did not attend Patient's Goal: Interventions/techniques: 
Carson Mancia

## 2020-03-17 NOTE — GROUP NOTE
DORETHA  GROUP DOCUMENTATION INDIVIDUAL Group Therapy Note Date: 3/17/2020 Group Start Time: 1500 Group End Time: 6796 Group Topic: Recreational/Music Therapy [facility] [place] [address] ACUTE BEHAV [facility] [place], [address] GROUP DOCUMENTATION GROUP Group Therapy Note Attendees: 4 Attendance: Did not attend Patient's Goal: Interventions/techniques:Whitney Yeung

## 2020-03-18 PROCEDURE — 74011250636 HC RX REV CODE- 250/636: Performed by: PSYCHIATRY & NEUROLOGY

## 2020-03-18 PROCEDURE — 65220000003 HC RM SEMIPRIVATE PSYCH

## 2020-03-18 RX ADMIN — HALOPERIDOL LACTATE 5 MG: 5 INJECTION, SOLUTION INTRAMUSCULAR at 08:25

## 2020-03-18 RX ADMIN — HALOPERIDOL LACTATE 5 MG: 5 INJECTION, SOLUTION INTRAMUSCULAR at 16:49

## 2020-03-18 NOTE — GROUP NOTE
DORETHA  GROUP DOCUMENTATION INDIVIDUAL Group Therapy Note Date: 3/18/2020 Group Start Time: 1100 Group End Time: 1200 Group Topic: Topic Group [facility] 3 ACUTE BEHAV [facility] [place], [address] [facility] GROUP DOCUMENTATION GROUP Group Therapy Note Attendees: 4 Attendance: Did not attend Patient's Goal: Interventions/techniquesWhitney Yeung

## 2020-03-18 NOTE — PROGRESS NOTES
Problem: Psychosis  Goal: *STG: Decreased delusional thinking  Outcome: Not Progressing Towards Goal  Goal: *STG: Remains safe in hospital  Outcome: Progressing Towards Goal

## 2020-03-18 NOTE — GROUP NOTE
DORETHA  GROUP DOCUMENTATION INDIVIDUAL Group Therapy Note Date: 3/18/2020 Group Start Time: 1400 Group End Time: 1500 Group Topic: Recreational/Music Therapy [facility] [place] [address] ACUTE BEHAV [facility] [place], [address] GROUP DOCUMENTATION GROUP Group Therapy Note Attendees: 3 Attendance: Did not attend Patient's Goal: Interventions/techniques:Whitney Yeung

## 2020-03-18 NOTE — BH NOTES
1900-Assumed care and received report from off going staff.     1925-At the beginning of the shift patient in her room in the bed with the coverers pulled over her head. Choose not to answer questions. Vitals was taken with resistance. Uncooperative about answering the assessment questions. No med's to include PRN's given.     Ate snack in the room     Q15 minutes rounds continue for safety    Patient refused her labs this am.    Approx 10 hours of sleep     Will continue to observe for any changes or concerns

## 2020-03-18 NOTE — BH NOTES
0700-Report received from Blaire Paredes LPN.  [id number] continues to be irritable  When staff entered the room, she denies SI and HI when asked, she refused her medication and had to received court ordered medication at this time. Luke Patel continues to self isolate at this time in the room lying on the bed with head covered with the blanket. She was  Up out of her room to get  Breakfast at this time. [id number]- Patient in no acute distress  Ambulating in the hallway  Then back to her room . She ate lunch, 100% . Then back to her room sitting on the bed  1200-1400-Patient in room lying on the bed at this time. 1400-1600-Patient  In no acute distress at this time she continues to rest quiet in the room  At  this time.   1600-1800-Patient in no acute distress at this time she continues to self isolate and refuse her court ordered medication at this time she was given injections at this time

## 2020-03-18 NOTE — PROGRESS NOTES
Problem: Psychosis  Goal: *STG: Participates in individual and group therapy  Outcome: Not Progressing Towards Goal

## 2020-03-18 NOTE — BH NOTES
Psychiatric Progress Note    Patient: Soham Quinones MRN: [id number]  SSN: [id number]    YOB: 1960  Age: 60 y.o. Sex: female      Admit Date: 3/11/2020    LOS: 6 days     Subjective:     Soham Quinones  Requested to be left alone again today. Peaked out from under her covers briefly before going back under. Moods are irritable and dismissive. No aggression or violence. Isolative and refusing treamtents. Eating ok and sleeping well. 3/15 - Soham Quinones is no different from the previous day, waving me off saying, \"Leave me alone\" as answers to every question. Was pacing the unit becoming irate earlier per staff. Refusing medications well. Eating and sleeping fairly. 3/16 - Soham Quinones was less dismissive today. Seemed preoccupied and paced from her bed to the hallway multiple times but would not speak to this interviewer or others in the room. Appears less irritable. No aggression or violence. Not interactive and dismissive. Refusing medications. Eating and sleeping fairly. 3/17 - Soham Quinones is paranoid and dismissive still. Today she told me to leave her alone and one other statement moods are irritable. Denies SI/HI/AH/VH. No aggression or violence. Appropriately interactive and aware. Tolerating medications well. Eating and sleeping fairly (6hrs). 3/18 - Soham Quinones is less irritable per staff. Still did not speak today. Denies SI/HI/AH/VH. No aggression or violence. Minimally interactive and aware. Tolerating medications well. Eating and sleeping fairly.     Objective:     Vitals:    03/15/20 2112 03/16/20 0708 03/17/20 1950 03/18/20 0829   BP: 164/80 150/72 132/72 132/70   Pulse: 94 88 (!) 107 89   Resp: 18 18 16 18   Temp:  97 °F (36.1 °C) 98.8 °F (37.1 °C) 98 °F (36.7 °C)   SpO2: 99% 99% 100% 100%        Mental Status Exam:   Sensorium  confused   Relations guarded and uncooperative   Eye Contact poor Appearance:  age appropriate   Speech:  hypoverbal, normal volume and non-pressured   Thought Process: goal directed   Thought Content internally preoccupied    Suicidal ideations won't answer   Mood:  hostile   Affect:  constricted   Memory   MARISA   Concentration:  impaired   Insight:  poor   Judgment:  impaired due to condfition       MEDICATIONS:  Current Facility-Administered Medications   Medication Dose Route Frequency    risperiDONE (RisperDAL) tablet 1 mg  1 mg Oral BID    Or    haloperidol lactate (HALDOL) injection 5 mg +++COURT ORDERED MEDICATION FOR REFUSAL OF RISPERIDONE+++  5 mg IntraMUSCular BID    OLANZapine (ZyPREXA) tablet 5 mg  5 mg Oral Q6H PRN    haloperidol lactate (HALDOL) injection 5 mg  5 mg IntraMUSCular Q6H PRN    benztropine (COGENTIN) tablet 1 mg  1 mg Oral BID PRN    diphenhydrAMINE (BENADRYL) injection 50 mg  50 mg IntraMUSCular BID PRN    hydrOXYzine HCL (ATARAX) tablet 50 mg  50 mg Oral TID PRN    LORazepam (ATIVAN) injection 1 mg  1 mg IntraMUSCular Q4H PRN    traZODone (DESYREL) tablet 50 mg  50 mg Oral QHS PRN    acetaminophen (TYLENOL) tablet 650 mg  650 mg Oral Q4H PRN    magnesium hydroxide (MILK OF MAGNESIA) 400 mg/5 mL oral suspension 30 mL  30 mL Oral DAILY PRN    albuterol (PROVENTIL HFA, VENTOLIN HFA, PROAIR HFA) inhaler 1 Puff  1 Puff Inhalation Q4H PRN      DISCUSSION:   the risks and benefits of the proposed medication  patient given opportunity to ask questions    Lab/Data Review: All lab results for the last 24 hours reviewed.      No major concerns    Assessment:     Principal Problem:    Schizoaffective disorder ([facility] (3/30/2018)        Plan:     Continue current care  Court order medications Risperdal and haldol  Collateral information  Disposition planning with social work    Signed By: Rex Osgood, MD     March 18, 2020

## 2020-03-18 NOTE — INTERDISCIPLINARY ROUNDS
Behavioral Health Interdisciplinary Rounds Patient Name: Rosetta Urena  Age: 60 y.o. Room/Bed:  309/01 Primary Diagnosis: [facility] Admission Status: Committed Readmission within 30 days:  
Power of  in place:  
Patient requires a blocked bed:  
Reason for blocked bed:  
 
Sleep hours: 10 Participation in Care/Groups:  No 
Medication Compliant?: No 
PRNS (last 24 hours): None Restraints (last 24 hours):   
Substance Abuse:     
24 hour chart check complete: Yes Patient goal(s) for today: Take medications as prescribed; engage in treatment team meeting Treatment team focus/goals: Continue medication regimen Progress note: Patient refused to engage with treatment team.  She had the covers over her head and did not acknowledge questions answered. LOS:  6  Expected LOS: TBD Financial concerns/prescription coverage:  Cydney Walker Family contact: 3/17 SW spoke to daughter Family requesting physician contact today:  No 
Discharge plan: TBD Access to weapons: No 
Outpatient provider(s): To be linked to new provider Patient's preferred phone number for follow up call: [phone] Participating treatment team members: Rosetta Urena, CARLY Norris; Dr. Zahira Calvillo MD; Rich Krishnan, ChelseaD

## 2020-03-19 PROCEDURE — 74011250636 HC RX REV CODE- 250/636: Performed by: PSYCHIATRY & NEUROLOGY

## 2020-03-19 PROCEDURE — 65220000003 HC RM SEMIPRIVATE PSYCH

## 2020-03-19 RX ORDER — HALOPERIDOL 5 MG/ML
3 INJECTION INTRAMUSCULAR 2 TIMES DAILY
Status: DISCONTINUED | OUTPATIENT
Start: 2020-03-19 | End: 2020-03-20

## 2020-03-19 RX ORDER — RISPERIDONE 1 MG/1
1 TABLET, FILM COATED ORAL 2 TIMES DAILY
Status: DISCONTINUED | OUTPATIENT
Start: 2020-03-19 | End: 2020-03-20

## 2020-03-19 RX ADMIN — HALOPERIDOL LACTATE 3 MG: 5 INJECTION, SOLUTION INTRAMUSCULAR at 16:55

## 2020-03-19 RX ADMIN — HALOPERIDOL LACTATE 5 MG: 5 INJECTION, SOLUTION INTRAMUSCULAR at 08:52

## 2020-03-19 NOTE — PROGRESS NOTES
Problem: Psychosis  Goal: *STG: Patient will verbalize areas in need of boundary recognition and limit setting  Outcome: Not Progressing Towards Goal

## 2020-03-19 NOTE — BH NOTES
GROUP THERAPY PROGRESS NOTE    Patient did not participate in Coping Skills group.      [facility]

## 2020-03-19 NOTE — INTERDISCIPLINARY ROUNDS
Behavioral Health Interdisciplinary Rounds Patient Name: Randi Mi  Age: 60 y.o. Room/Bed:  309/01 Primary Diagnosis: Schizoaffective disorder ([email or web address] Admission Status: Committed Readmission within 30 days:  
Power of  in place:  
Patient requires a blocked bed: 
Reason for blocked bed:  
 
Sleep hours:  9 Participation in Care/Groups:   
Medication Compliant?: No-IM Haldol PRNS (last 24 hours): No   
Restraints (last 24 hours):   
Substance Abuse:     
24 hour chart check complete:  
 
Patient goal(s) for today: Taking medications as prescribed; follow staff direction Treatment team focus/goals: Continue medication regimen Progress note: Patient continues to refuse to engage with treatment team.  Staff are \"shoo-ed\" away or ignored. Still requiring forced medications. LOS:  7  Expected LOS: TBD Financial concerns/prescription coverage:  Cydney Walker Family contact: 3/17 SW spoke to daughter Family requesting physician contact today:  No 
Discharge plan:TBD Access to weapons:   No     
Outpatient provider(s): To be linked to new provider Patient's preferred phone number for follow up call: [phone] Participating treatment team members: Jason Gamboa MSW; Dr. Live Best MD

## 2020-03-19 NOTE — PROGRESS NOTES
Problem: Psychosis  Goal: *STG: Participates in individual and group therapy  Outcome: Not Progressing Towards Goal  Variance Patient Uncooperative  Impact: Moderate     Problem: Psychosis  Goal: *STG/LTG: Demonstrates improved social functioning by responding appropriately to staff  Outcome: Not Progressing Towards Goal  Variance Patient slowly responding  Impact: Moderate     Patient alert. Selectively mute. Isolative to room, except for meals. Refuses court ordered PO medications. IM alternative administered without incident. Encouraged to communicate with staff, however, remains uncooperative. Does not interact with roommate. Q 15 minute checks continue for safety.

## 2020-03-19 NOTE — BH NOTES
1900-Assumed care of patient from off going staff.    1922- At the beginning of the shift patient resting in bed, with eyes close and covers pulled over head. Uncooperative, with staff when was trying to do vitals and evaluation. Unable to assess for SI,HI and AVH. 2030- Accepted bedtime snack    Q15 minutes room checks continue for safety    Approx. 9 hours of sleep    Will continue to observe for the remainder of the shift.

## 2020-03-20 PROCEDURE — 65220000003 HC RM SEMIPRIVATE PSYCH

## 2020-03-20 PROCEDURE — 74011250636 HC RX REV CODE- 250/636: Performed by: PSYCHIATRY & NEUROLOGY

## 2020-03-20 RX ORDER — HALOPERIDOL 5 MG/ML
10 INJECTION INTRAMUSCULAR 2 TIMES DAILY
Status: DISCONTINUED | OUTPATIENT
Start: 2020-03-20 | End: 2020-03-24

## 2020-03-20 RX ORDER — RISPERIDONE 1 MG/1
1 TABLET, FILM COATED ORAL 2 TIMES DAILY
Status: DISCONTINUED | OUTPATIENT
Start: 2020-03-20 | End: 2020-03-24

## 2020-03-20 RX ADMIN — HALOPERIDOL LACTATE 3 MG: 5 INJECTION, SOLUTION INTRAMUSCULAR at 08:48

## 2020-03-20 RX ADMIN — HALOPERIDOL LACTATE 10 MG: 5 INJECTION, SOLUTION INTRAMUSCULAR at 16:32

## 2020-03-20 NOTE — BH NOTES
Assumed care of patient and given report by off going nurse FRANCISCA Guerrero RN. Pt was in her room when coming on shift and continues to isolate to her room. Pt was irritable and uncooperative, would not engage in assessment. She denies SI/HI or AVH and did not appear in discomfort. Pt did not have scheduled medications and had no prn's so far this shift. She did not come out for snack. She has not been participating in groups. She continues to receive court ordered meds. Noted labs ordered for 18th at 0600 were refused by patient. Will update RN in am to see if they want to have those reordered. She remains on 15 min safety checks. Will continue to monitor and treat.

## 2020-03-20 NOTE — GROUP NOTE
DORETHA  GROUP DOCUMENTATION INDIVIDUAL Group Therapy Note Date: 3/20/2020 Group Start Time: 1500 Group End Time: 3375 Group Topic: Recreational/Music Therapy [facility] [place] [address] ACUTE BEHAV [facility] [place], [address] GROUP DOCUMENTATION GROUP Group Therapy Note Attendees: 2 Attendance: Did not attend Patient's Goal: Interventions/techniques: 
Zelda Thornton

## 2020-03-20 NOTE — GROUP NOTE
DORETHA  GROUP DOCUMENTATION INDIVIDUAL Group Therapy Note Date: 3/20/2020 Group Start Time: 1100 Group End Time: 1200 Group Topic: Topic Group [facility] [place] 3 ACUTE BEHAV [facility] [place], [address] [facility] GROUP DOCUMENTATION GROUP Group Therapy Note Attendees: 4 Attendance: Did not attend Patient's Goal: Interventions/techniquesWhitney Yeung

## 2020-03-20 NOTE — PROGRESS NOTES
0800 Sitting up in bed. Selectively mute. Not willing to take PO medication. IM Haldol given as scheduled, pt unwilling at first but eventually was cooperative. Ate breakfast. \"Can I get more coffee?\"    1030 Wandering into the wrong bedrooms. Redirectable. 1230 Ate lunch then moved back to sitting up in bed.    1500 Wandering into the hallway occasionally. Nonverbal.    1800 Not cooperative with PO medication. IM medication given as ordered without issue. Pt ate dinner then back to room.

## 2020-03-20 NOTE — GROUP NOTE
DORETHA  GROUP DOCUMENTATION INDIVIDUAL Group Therapy Note Date: 3/20/2020 Group Start Time: 1000 Group End Time: 1100 Group Topic: Discharge Planning [facility] Loly COYNE  GROUP DOCUMENTATION GROUP Group Therapy Note Attendees: 2 Attendance: Did not attend Patient's Goal: Interventions/techniques: Follows Directions:  
 
Interactions:  
 
Mental Status:  
 
Behavior/appearance:  
 
Goals Achieved: Additional Notes:   
 
Tony Boyd

## 2020-03-20 NOTE — PROGRESS NOTES
Problem: Psychosis  Goal: *STG: Remains safe in hospital  Outcome: Progressing Towards Goal  Goal: *STG: Accept constructive criticism without injury or isolation  Outcome: Progressing Towards Goal     Problem: Falls - Risk of  Goal: *Absence of Falls  Description: Document Shraddha Fall Risk and appropriate interventions in the flowsheet. Outcome: Progressing Towards Goal    Progressing.

## 2020-03-20 NOTE — BH NOTES
Behavioral Health Interdisciplinary Rounds      Patient Name: Joelle Pool    Age: 60 y.o. Room/Bed:  309/01  Primary Diagnosis: Schizoaffective disorder ([facility]         Admission Status: Committed                                      Readmission within 30 days: No  Power of  in place: Unknown  Patient requires a blocked bed:  No  Reason for blocked bed: n/a     Sleep hours: 8.75 hours  Morning Labs completed per orders: None for morning. Noted labs ordered for 18th at 0600 were refused by patient. Will update RN in am to see if they want to have those reordered. Participation in Care/Groups: No   Medication Compliant?: No -none scheduled for tonight, continues with court orders meds, had IM Haldol during day  PRNS (last 24 hours): No                     Restraints (last 24 hours): No   Substance Abuse: Yes                    24 hour chart check complete: Yes    Patient goal(s) for today: Take medications as prescribed; follow staff direction; engage with treatment  Treatment team focus/goals: Continue medication regime  Progress note: Patient continues to refuse to engage with treatment team    LOS:  8  Expected LOS: TBD    Financial concerns/prescription coverage: [address] Medicaid  Family contact: 3/17 SW spoke to daughter  Family requesting physician contact today: No  Discharge plan: TBD  Access to weapons: No  Outpatient provider(s):  To be linked to new provider  Patient's preferred phone number for follow up call: [phone]    Participating treatment team members: Kian Merrlil MSW; Dr. Augustine Heard MD; Chelsea ValeroD

## 2020-03-20 NOTE — BH NOTES
Psychiatric Progress Note    Patient: Leopold Henry MRN: [id number]  SSN: [id number]    YOB: 1960  Age: 60 y.o. Sex: female      Admit Date: 3/11/2020    LOS: 8 days     Subjective:     Leopold Henry  Requested to be left alone again today. Peaked out from under her covers briefly before going back under. Moods are irritable and dismissive. No aggression or violence. Isolative and refusing treamtents. Eating ok and sleeping well. 3/15 - Leopold Henry is no different from the previous day, waving me off saying, \"Leave me alone\" as answers to every question. Was pacing the unit becoming irate earlier per staff. Refusing medications well. Eating and sleeping fairly. 3/16 - Leopold Henry was less dismissive today. Seemed preoccupied and paced from her bed to the hallway multiple times but would not speak to this interviewer or others in the room. Appears less irritable. No aggression or violence. Not interactive and dismissive. Refusing medications. Eating and sleeping fairly. 3/17 - Leopold Henry is paranoid and dismissive still. Today she told me to leave her alone and one other statement moods are irritable. Denies SI/HI/AH/VH. No aggression or violence. Appropriately interactive and aware. Tolerating medications well. Eating and sleeping fairly (6hrs). 3/18 - Leopold Henry is less irritable per staff. Still did not speak today. Denies SI/HI/AH/VH. No aggression or violence. Minimally interactive and aware. Tolerating medications well. Eating and sleeping fairly. 3/19 - Silvestre is no longer acting out, however she has been isolative to her room and breaths heavily to let the interviewer know that she is alive, but does not respond verbally or engage in discussion. Receiving Court ordered treatments.     3/20 - Leopold Henry per nursing report comes out to eat and is compliant with rolling over to get IM shots, but continues to refuse PO meds. Dismissive attitude. Denies SI/HI/AH/VH. No aggression or violence. Isolative and aware. Tolerating medications well. Eating and sleeping fairly. History of stabilizing on 10 mg IM 2 years ago. Similar presentation.       Objective:     Vitals:    03/17/20 1950 03/18/20 0829 03/18/20 2001 03/19/20 1921   BP:  132/70 117/76 126/64   Pulse: (!) 107 89 81 87   Resp: 16 18 18 18   Temp:  98 °F (36.7 °C) 98.2 °F (36.8 °C) 98.1 °F (36.7 °C)   SpO2: 100% 100% 98% 99%        Mental Status Exam:   Sensorium  confused   Relations guarded and uncooperative   Eye Contact poor   Appearance:  age appropriate   Speech:  hypoverbal, normal volume and non-pressured   Thought Process: goal directed   Thought Content internally preoccupied    Suicidal ideations won't answer   Mood:  hostile   Affect:  constricted   Memory   MARISA   Concentration:  impaired   Insight:  poor   Judgment:  impaired due to condfition       MEDICATIONS:  Current Facility-Administered Medications   Medication Dose Route Frequency    Haloperidol lactate (HALDOL) injection 3 mg +++COURT ORDERED MEDICATION FOR REFUSAL OF RISPERIDONE+++  3 mg IntraMUSCular BID    Or    risperiDONE (RisperDAL) tablet 1 mg  1 mg Oral BID    OLANZapine (ZyPREXA) tablet 5 mg  5 mg Oral Q6H PRN    haloperidol lactate (HALDOL) injection 5 mg  5 mg IntraMUSCular Q6H PRN    benztropine (COGENTIN) tablet 1 mg  1 mg Oral BID PRN    diphenhydrAMINE (BENADRYL) injection 50 mg  50 mg IntraMUSCular BID PRN    hydrOXYzine HCL (ATARAX) tablet 50 mg  50 mg Oral TID PRN    LORazepam (ATIVAN) injection 1 mg  1 mg IntraMUSCular Q4H PRN    traZODone (DESYREL) tablet 50 mg  50 mg Oral QHS PRN    acetaminophen (TYLENOL) tablet 650 mg  650 mg Oral Q4H PRN    magnesium hydroxide (MILK OF MAGNESIA) 400 mg/5 mL oral suspension 30 mL  30 mL Oral DAILY PRN    albuterol (PROVENTIL HFA, VENTOLIN HFA, PROAIR HFA) inhaler 1 Puff  1 Puff Inhalation Q4H PRN DISCUSSION:   the risks and benefits of the proposed medication  patient given opportunity to ask questions    Lab/Data Review: All lab results for the last 24 hours reviewed.      No major concerns    Assessment:     Principal Problem:    Schizoaffective disorder ([facility] [address] (3/30/2018)        Plan:     Continue current care  Court order medications Risperdal and haldol  Increase haldol dose 10mg   Collateral information  Disposition planning with social work    Signed By: Tiara Centeno MD     March 20, 2020

## 2020-03-20 NOTE — GROUP NOTE
DORETHA  GROUP DOCUMENTATION INDIVIDUAL Group Therapy Note Date: 3/20/2020 Group Start Time: 1400 Group End Time: 1500 Group Topic: Process Group - Inpatient [facility] Alissa COYNE  GROUP DOCUMENTATION GROUP Group Therapy Note Attendees: 5 Attendance: Did not attend Patient's Goal: Interventions/techniques: Follows Directions:  
 
Interactions:  
 
Mental Status:  
 
Behavior/appearance:  
 
Goals Achieved: Additional Notes:   
 
Cheryle Burns

## 2020-03-20 NOTE — PROGRESS NOTES
Problem: Discharge Planning  Goal: *Knowledge of medication management  Outcome: Progressing Towards Goal     Problem: Discharge Planning  Goal: *Knowledge of discharge instructions  Outcome: Progressing Towards Goal     Problem: Falls - Risk of  Goal: *Absence of Falls  Description: Document Shraddha Fall Risk and appropriate interventions in the flowsheet.   Outcome: Progressing Towards Goal  Note: Fall Risk Interventions:

## 2020-03-21 PROCEDURE — 74011250636 HC RX REV CODE- 250/636: Performed by: PSYCHIATRY & NEUROLOGY

## 2020-03-21 PROCEDURE — 65220000003 HC RM SEMIPRIVATE PSYCH

## 2020-03-21 RX ADMIN — HALOPERIDOL LACTATE 10 MG: 5 INJECTION, SOLUTION INTRAMUSCULAR at 17:14

## 2020-03-21 RX ADMIN — HALOPERIDOL LACTATE 10 MG: 5 INJECTION, SOLUTION INTRAMUSCULAR at 09:16

## 2020-03-21 NOTE — INTERDISCIPLINARY ROUNDS
Behavioral Health Interdisciplinary Rounds Patient Name: Christiane Haro  Age: 60 y.o. Room/Bed:  309/01 Primary [facility]: [facility] Admission Status: Involuntary Commitment Readmission within 30 days: no 
Power of  in place: Unknown Patient requires a blocked bed: no           
Reason for blocked bed: not requried Order for blocked bed obtained: no    
 
Sleep hours: 10.5 Morning Labs completed per orders:  No patient refused Participation in Care/Groups:  no 
Medication Compliant?: Refusing Psychiatric Medications/court ordered PRNS (last 24 hours): None Restraints (last 24 hours):  no 
Substance Abuse:  yes 24 hour chart check complete: yes

## 2020-03-21 NOTE — BH NOTES
0715  Report received from [address] [place] [address]  Pt was resting in bed with her head covered. She did not respond when spoken to by this writer. She did get up for her tray but ate in her room. 0900  Pt did not respond when offered PO medications. She allowed this writer to give IM Haldol per order. 1230  Pt ate dinner in her room. She rested in bed with the blankets over her head. 36  Pt stated she is ready to go home. She was informed she needed to see her MD and that would be on Monday. She was encouraged to take her medications by mouth. Pt declined. 1715  Pt sat for her injection but then tried to grab it. She was able to get the injection with staff support. Pt then requested her dinner. She was again encouraged to take PO medication. Will continue to monitor and will document as needed. 1830  Pt was sitting on her bed. She stated she wanted to eat. She stated she did not like her dinner but did not fill out her menu. Pt was encouraged to fill out her menu in the morning. Will continue to monitor and will document as needed.

## 2020-03-21 NOTE — GROUP NOTE
DORETHA  GROUP DOCUMENTATION INDIVIDUAL Group Therapy Note Date: 3/21/2020 Group Start Time: 1000 Group End Time: 6305 Group Topic: Social Work Group [facility] Gini COYNE  GROUP DOCUMENTATION GROUP Group Therapy Note Attendees: 1 Attendance: Did not attend Patient's Goal: Interventions/techniques: Follows Directions:  
 
Interactions:  
 
Mental Status:  
 
Behavior/appearance:  
 
Goals Achieved: Additional Notes:   
 
Silvano Orta

## 2020-03-21 NOTE — BH NOTES
1900  Assumed care of patient from [address]. found to be resting quietly in bed. Head covered up with blankets. Dismissive to this RN's questions, but does continue to move her legs under the blanket. She did refuse vital signs, selectively would not come out from under the blankets. Advised patient she is to have blood work drawn in the morning and she, again, refused to acknowledge this writer. 2100  Patient denies the need for any PRN medications at this time    2240  Patient resting in bed at this time. Respirations are even and non labored. Will continue to monitor per patient plan of care. Patient appears to be sleeping at this time    0100  Patient resting quietly at this time    0204  Patient continues to rest quietly at this time    0400  Patient resting quietly at this time    0500  Patient refused morning lab draw. 1170  Patient resting quietly throughout the night.  Has slept approximately 10.5 hours

## 2020-03-22 LAB
ALBUMIN SERPL-MCNC: 3.2 G/DL (ref 3.5–5)
ALBUMIN/GLOB SERPL: 0.9 {RATIO} (ref 1.1–2.2)
ALP SERPL-CCNC: 84 U/L (ref 45–117)
ALT SERPL-CCNC: 22 U/L (ref 12–78)
AST SERPL-CCNC: 50 U/L (ref 15–37)
BILIRUB DIRECT SERPL-MCNC: 0.1 MG/DL (ref 0–0.2)
BILIRUB SERPL-MCNC: 0.3 MG/DL (ref 0.2–1)
CHOLEST SERPL-MCNC: 170 MG/DL
EST. AVERAGE GLUCOSE BLD GHB EST-MCNC: 117 MG/DL
GLOBULIN SER CALC-MCNC: 3.6 G/DL (ref 2–4)
HBA1C MFR BLD: 5.7 % (ref 4–5.6)
HDLC SERPL-MCNC: 62 MG/DL
HDLC SERPL: 2.7 {RATIO} (ref 0–5)
LDLC SERPL CALC-MCNC: 96.6 MG/DL (ref 0–100)
LIPID PROFILE,FLP: NORMAL
PROT SERPL-MCNC: 6.8 G/DL (ref 6.4–8.2)
TRIGL SERPL-MCNC: 57 MG/DL (ref ?–150)
VLDLC SERPL CALC-MCNC: 11.4 MG/DL

## 2020-03-22 PROCEDURE — 36415 COLL VENOUS BLD VENIPUNCTURE: CPT

## 2020-03-22 PROCEDURE — 83036 HEMOGLOBIN GLYCOSYLATED A1C: CPT

## 2020-03-22 PROCEDURE — 80061 LIPID PANEL: CPT

## 2020-03-22 PROCEDURE — 80076 HEPATIC FUNCTION PANEL: CPT

## 2020-03-22 PROCEDURE — 74011250636 HC RX REV CODE- 250/636: Performed by: PSYCHIATRY & NEUROLOGY

## 2020-03-22 PROCEDURE — 65220000003 HC RM SEMIPRIVATE PSYCH

## 2020-03-22 RX ADMIN — HALOPERIDOL LACTATE 10 MG: 5 INJECTION, SOLUTION INTRAMUSCULAR at 08:44

## 2020-03-22 RX ADMIN — HALOPERIDOL LACTATE 10 MG: 5 INJECTION, SOLUTION INTRAMUSCULAR at 17:00

## 2020-03-22 NOTE — BH NOTES
Assumed care for the patient. Patient was isolative to her room. Patient appeared withdrawn and was selectively mute. Patient refused vitals and shift assessments. Patient had an episode of vomiting. Patient was assessed and there was nothing remarkable and patient kept on saying , \"I am fine, I am fine\" and refused any kind of care . Patient observed to be sleeping with no sign distress when checked later. No aggressive behavior noted. Will continue to monitor. Patient slept for about 8. 5 hours. Patient agreed for vitals this morning. Labs drawn and sent this morning.

## 2020-03-22 NOTE — BH NOTES
0715  Report received from SAMAN [address]  Pt was resting in bed this morning. She did not respond when spoken to by this writer. 0830  Pt ate her breakfast in her room. 0845  Pt refused PO medications. She was cooperative with CONY Davison. 1030  Pt has been in her room laying on her bed with the covers over her head. 200  Pt was out for her lunch tray. She took it to her room then brought it out. She stated she could not eat it. She mumbled as she went to her room but did not respond directly to questions. Will continue to monitor and will document as needed. 1550  Pt reported she was ready to eat. She was informed of dinner time. Pt declined PO evening medication. 1700  Pt was cooperative with CONY Davison    1745  Pt remains isolative to her room. Select interactions.

## 2020-03-22 NOTE — PROGRESS NOTES
Livia Rodney is alert upon presentation. She is not very talkative to NP and walks away during interview. However, she denied SI/HI/AVH, she reports no issues with her medications. She reports sleeping and eating well.      Plan: continue current treatment plan

## 2020-03-22 NOTE — PROGRESS NOTES
Problem: Psychosis  Goal: *STG: Remains safe in hospital  Outcome: Progressing Towards Goal  Goal: *STG: Patient will develop strategies to regulate emotions and corresponding behaviors  Outcome: Not Progressing Towards Goal  Goal: *STG/LTG: Demonstrates improved thought patterns as evidenced by logical and coherent speech  Outcome: Not Progressing Towards Goal  Goal: *STG/LTG: Demonstrates improved social functioning by responding appropriately to staff  Outcome: Not Progressing Towards Goal

## 2020-03-22 NOTE — BH NOTES
Assumed care for the patient. Patient was isolative to her room. Patient was observed sleeping, when an attempt was made to wake her up, she did not get up. Vitals were within the normal limits. Patient refused shift assessments. Patient was up for bed time snacks. She ate snacks and went back to sleep again. Patient was again approached later, patient did not respond. Patient was non verbal. No aggressive behavior noted. Will continue to monitor. No PRNs required. Patient slept for about 9 hours.

## 2020-03-23 PROCEDURE — 74011250636 HC RX REV CODE- 250/636: Performed by: PSYCHIATRY & NEUROLOGY

## 2020-03-23 PROCEDURE — 65220000003 HC RM SEMIPRIVATE PSYCH

## 2020-03-23 RX ADMIN — HALOPERIDOL LACTATE 10 MG: 5 INJECTION, SOLUTION INTRAMUSCULAR at 09:26

## 2020-03-23 RX ADMIN — HALOPERIDOL LACTATE 10 MG: 5 INJECTION, SOLUTION INTRAMUSCULAR at 17:02

## 2020-03-23 NOTE — BH NOTES
Renato Boyd is observed lying in her bed she is slightly more talkative today during the interview. She denies SI/HI, she denies AVH, she denies medications side effects. She reports no issues with sleep or appetite.     Plan: continue current treatment plan

## 2020-03-23 NOTE — GROUP NOTE
DORETHA  GROUP DOCUMENTATION INDIVIDUAL Group Therapy Note Date: 3/23/2020 Group Start Time: 1500 Group End Time: 5336 Group Topic: Recreational/Music Therapy [facility] 3 ACUTE BEHAV [facility] [place], [address] GROUP DOCUMENTATION GROUP Group Therapy Note Attendees:4 Attendance: Did not attend Patient's Goal: Interventions/techniquesWhitney Yeung

## 2020-03-23 NOTE — GROUP NOTE
DORETHA  GROUP DOCUMENTATION INDIVIDUAL Group Therapy Note Date: 3/23/2020 Group Start Time: 1100 Group End Time: 1200 Group Topic: Topic Group [facility] 3 ACUTE BEHAV [facility] [place], [address], [address]. \Hospital Has a New [facility] and Outlook.\"" GROUP DOCUMENTATION GROUP Group Therapy Note Attendees: 5 Attendance: Did not attend Patient's Goal: Interventions/techniques:Whitney Yeung

## 2020-03-23 NOTE — BH NOTES
0900: Received patient resting in bed with covers over her head. Respirations even and unlabored. She does not respond to verbal stimuli but per report she is selectively nonverbal. Ms Simona Rowland initially refused vitals but passively allowed VS assessment which were found to be WNL. She did not respond to offer of oral medication. 2 other staff utilized to identify patient as she did not have armband and would not respond. She did not fight but laid passively in bed during IM medication administration. She did not respond to offer to place armband on her arm but she did take it from staff and then lay back down and cover her head. 1100: Ms Simona Rowland has continued resting in bed with even respirations. 1300: Ms Simona Rowland ate lunch in room without incident. She continues primarily selectively nonverbal although she makes facial expressions or rolls over and covers head when approached by staff. 1500: Ms Simona Rowland has remained isolated in room without incident. 1700: Ms Simona Rowland has remained in room isolative. She ate her dinner in room. She does not answer any questions. She refused po medication and court ordered IM will be given at this time. Will continue to monitor and give shift report to oncoming nurse.

## 2020-03-23 NOTE — INTERDISCIPLINARY ROUNDS
Behavioral Health Interdisciplinary Rounds  
  
Patient Name: Katie Zacarias                  Age: 31 y.o. Room/Bed:  308/01 Primary Diagnosis: Schizoaffective disorder ([facility] Admission Status: Committed. Readmission within 30 days: About 9 hours. Power of  in place: Unknown Patient requires a blocked bed: No         
Reason for blocked bed: NA Order for blocked bed obtained: Not required 
  
Sleep hours: About 9 hours. Morning Labs completed per orders:  None ordered Participation in Care/Groups: NA Medication Compliant?: Patient received court ordered medicine for refusal of PO 
PRNS (last 24 hours): None Restraints (last 24 hours): None Substance Abuse: Yes                      
24 hour chart check complete:  
  
Patient goal(s) for today: Take medications as prescribed; engage in treatment team 
Treatment team focus/goals: Continue medication regimen Progress note: Patient ignored treatment team members when they attempted to engage her in conversation. LOS:  11  Expected LOS: TBD Financial concerns/prescription coverage: Cydney Walker Family contact: 3/17 SW spoke to daughter Family requesting physician contact today: No 
Discharge plan: TBD Access to weapons: No 
Outpatient provider(s): To be linked to new provider Patient's preferred phone number for follow up call: [phone] Participating treatment team members: Catherine Zacarias MSW; Dr. Yani Eller MD

## 2020-03-23 NOTE — BH NOTES
Psychiatric Progress Note    Patient: Nanette Augustine MRN: [id number]  SSN: [id number]    YOB: 1960  Age: 60 y.o. Sex: female      Admit Date: 3/11/2020    LOS: 11 days     Subjective:     Nanette Augustine  Requested to be left alone again today. Peaked out from under her covers briefly before going back under. Moods are irritable and dismissive. No aggression or violence. Isolative and refusing treamtents. Eating ok and sleeping well. 3/15 - Nanette Augustine is no different from the previous day, waving me off saying, \"Leave me alone\" as answers to every question. Was pacing the unit becoming irate earlier per staff. Refusing medications well. Eating and sleeping fairly. 3/16 - Nanette Augustine was less dismissive today. Seemed preoccupied and paced from her bed to the hallway multiple times but would not speak to this interviewer or others in the room. Appears less irritable. No aggression or violence. Not interactive and dismissive. Refusing medications. Eating and sleeping fairly. 3/17 - Nanette Augustine is paranoid and dismissive still. Today she told me to leave her alone and one other statement moods are irritable. Denies SI/HI/AH/VH. No aggression or violence. Appropriately interactive and aware. Tolerating medications well. Eating and sleeping fairly (6hrs). 3/18 - Nanette Augustine is less irritable per staff. Still did not speak today. Denies SI/HI/AH/VH. No aggression or violence. Minimally interactive and aware. Tolerating medications well. Eating and sleeping fairly. 3/19 - Silvestre is no longer acting out, however she has been isolative to her room and breaths heavily to let the interviewer know that she is alive, but does not respond verbally or engage in discussion. Receiving Court ordered treatments.     3/20 - Nanette Augustine per nursing report comes out to eat and is compliant with rolling over to get IM shots, but continues to refuse PO meds. Dismissive attitude. Denies SI/HI/AH/VH. No aggression or violence. Isolative and aware. Tolerating medications well. Eating and sleeping fairly. History of stabilizing on 10 mg IM 2 years ago. Similar presentation. 3/21 Char Luther is alert upon presentation. She is not very talkative to NP and walks away during interview. However, she denied SI/HI/AVH, she reports no issues with her medications. She reports sleeping and eating well. 3/22 - Lacey Luther is observed lying in her bed she is slightly more talkative today during the interview. She denies SI/HI, she denies AVH, she denies medications side effects. She reports no issues with sleep or appetite. 3/23 - 45 Located within [facility] to dismiss this interviewer, but has been speaking more to staff and other people. She is resting well. No aggression or violence. Poorly interactive. Tolerating medications well. Eating and sleeping fairly.     Objective:     Vitals:    03/21/20 1950 03/22/20 0602 03/22/20 1948 03/23/20 0921   BP:  100/52 123/68 121/53   Pulse:  82 80 80   Resp: 16 16 20 16   Temp:  97.1 °F (36.2 °C) 97.8 °F (36.6 °C) 98.4 °F (36.9 °C)   SpO2:  99% 99%         Mental Status Exam:   Sensorium  sedated   Relations guarded and uncooperative   Eye Contact poor   Appearance:  age appropriate   Speech:  Non verbal   Thought Process: goal directed   Thought Content internally preoccupied    Suicidal ideations won't answer   Mood:  hostile   Affect:  constricted   Memory   MARISA   Concentration:  impaired   Insight:  poor   Judgment:  impaired due to condfition       MEDICATIONS:  Current Facility-Administered Medications   Medication Dose Route Frequency    haloperidol lactate (HALDOL) injection 10 mg +++COURT ORDERED MEDICATION FOR REFUSAL OF RISPERIDONE+++  10 mg IntraMUSCular BID    Or    risperiDONE (RisperDAL) tablet 1 mg  1 mg Oral BID    OLANZapine (ZyPREXA) tablet 5 mg  5 mg Oral Q6H PRN    haloperidol lactate (HALDOL) injection 5 mg  5 mg IntraMUSCular Q6H PRN    benztropine (COGENTIN) tablet 1 mg  1 mg Oral BID PRN    diphenhydrAMINE (BENADRYL) injection 50 mg  50 mg IntraMUSCular BID PRN    hydrOXYzine HCL (ATARAX) tablet 50 mg  50 mg Oral TID PRN    LORazepam (ATIVAN) injection 1 mg  1 mg IntraMUSCular Q4H PRN    traZODone (DESYREL) tablet 50 mg  50 mg Oral QHS PRN    acetaminophen (TYLENOL) tablet 650 mg  650 mg Oral Q4H PRN    magnesium hydroxide (MILK OF MAGNESIA) 400 mg/5 mL oral suspension 30 mL  30 mL Oral DAILY PRN    albuterol (PROVENTIL HFA, VENTOLIN HFA, PROAIR HFA) inhaler 1 Puff  1 Puff Inhalation Q4H PRN      DISCUSSION:   the risks and benefits of the proposed medication  patient given opportunity to ask questions    Lab/Data Review: All lab results for the last 24 hours reviewed.      No major concerns    Assessment:     Principal Problem:    Schizoaffective disorder ([facility] [address] (3/30/2018)        Plan:     Continue current care  Court order medications Risperdal and haldol  Collateral information  Disposition planning with social work    Signed By: Linda Pollock MD     March 23, 2020

## 2020-03-23 NOTE — PROGRESS NOTES
Problem: Psychosis  Goal: *STG: Remains safe in hospital  Outcome: Progressing Towards Goal  Goal: *STG: Patient will verbalize areas in need of boundary recognition and limit setting  Outcome: Not Progressing Towards Goal  Goal: *STG: Patient will verbalize issues that get in their way of progress (i.e.: anger, fear etc.)  Outcome: Not Progressing Towards Goal  Goal: *STG/LTG: Demonstrates improved thought patterns as evidenced by logical and coherent speech  Outcome: Not Progressing Towards Goal

## 2020-03-24 PROCEDURE — 65220000003 HC RM SEMIPRIVATE PSYCH

## 2020-03-24 PROCEDURE — 74011250636 HC RX REV CODE- 250/636: Performed by: PSYCHIATRY & NEUROLOGY

## 2020-03-24 RX ORDER — RISPERIDONE 1 MG/1
2 TABLET, FILM COATED ORAL 2 TIMES DAILY
Status: DISPENSED | OUTPATIENT
Start: 2020-03-25 | End: 2020-04-03

## 2020-03-24 RX ORDER — HALOPERIDOL 5 MG/ML
10 INJECTION INTRAMUSCULAR 2 TIMES DAILY
Status: DISPENSED | OUTPATIENT
Start: 2020-03-25 | End: 2020-04-03

## 2020-03-24 RX ADMIN — HALOPERIDOL LACTATE 10 MG: 5 INJECTION, SOLUTION INTRAMUSCULAR at 09:11

## 2020-03-24 RX ADMIN — HALOPERIDOL LACTATE 10 MG: 5 INJECTION, SOLUTION INTRAMUSCULAR at 16:50

## 2020-03-24 NOTE — GROUP NOTE
DORETHA  GROUP DOCUMENTATION INDIVIDUAL Group Therapy Note Date: 3/24/2020 Group Start Time: 1100 Group End Time: 1200 Group Topic: Topic Group [facility] 3 ACUTE BEHAV [facility] [place], [address] [facility] GROUP DOCUMENTATION GROUP Group Therapy Note Attendees: 5 Attendance: Did not attend Patient's Goal: Interventions/techniques: 
Reather Seal

## 2020-03-24 NOTE — BH NOTES
Assumed care of patient and given report by off going nurse GEOFFREY Black RN. Pt was in her room when coming on shift and remained so most of the evening. She did speak to me more than she normally does but when I start to ask her question, of course she shuts down again and won't answer. She did not have any scheduled medications tonight and had no prn's so far this shift. She had her armband on the bottom of her bed and did not allow me to put it on her but wanted me to lay it back down on the bottom of her bed. She appears comfortable and does not state she has any pain. Pt does not appear to be responding to stimuli at this time. She had evening snack. She continues not to participate in groups. Pt had no verbal or physical altercations this shift. She remains on 15 min safety checks per policy. Will continue to monitor and treat.

## 2020-03-24 NOTE — PROGRESS NOTES
Problem: Psychosis  Goal: *STG: Accept constructive criticism without injury or isolation  Outcome: Not Progressing Towards Goal  Goal: *STG: Participates in individual and group therapy  Outcome: Not Progressing Towards Goal  Goal: *STG/LTG: Complies with medication therapy  Outcome: Not Progressing Towards Goal

## 2020-03-24 NOTE — GROUP NOTE
DORETHA  GROUP DOCUMENTATION INDIVIDUAL Group Therapy Note Date: 3/24/2020 Group Start Time: 1500 Group End Time: 5167 Group Topic: Recreational/Music Therapy [facility] [place] [address] ACUTE BEHAV [facility] [place], [address] GROUP DOCUMENTATION GROUP Group Therapy Note Attendees: 6 Attendance: Did not attend Patient's Goal: Interventions/techniques: 
Kevyn Aviles

## 2020-03-24 NOTE — BH NOTES
0730  Received report from MADINA RN. Assumed care of the patient. Pt in room with cover over her head. Refuses to answer all assessment questions. Refused vital signs. Pt continues to be uncooperative and selectively mute. Pt withdrawn and isolative to room.     0815  Pt took breakfast tray back to room    0911 Pt refused po risperdal so received IM haldol in L deltoid    1100  Pt in room this am; continues to be isolative to her room, refuses group therapy    1145  Pt in hallway to get tray for lunch and then back to her room    [id number]  Pt in room for quiet hour    1430  Pt in room, refuses group therapy and afternoon snack at this time    1515  Pt up in her room, refuses afternoon group therapy    1600  Pt awake in room    1630  Pt in hallway to get dinner tray and then back to her room to eat    1650  Pt refused po risperdal so received IM haldol 10mg in R deltoid    1800  Pt in room

## 2020-03-24 NOTE — BH NOTES
GROUP THERAPY PROGRESS NOTE    Patient did not participate in Coping Skills group.      Jorja Cheadle LPC LSATP [facility]

## 2020-03-24 NOTE — BH NOTES
Behavioral Health Interdisciplinary Rounds      Patient Fredo Aminrth: 31 y. o.                 Room/Bed:  308/01  Primary Diagnosis: Schizoaffective disorder (HCC)         Admission Status: Committed                             Readmission within 30 days: Yes  Power of  in place: Unknown  Patient requires a blocked bed: No          Reason for blocked bed: NA  Order for blocked bed obtained: N/A     Sleep hours: 9.75  Morning Labs completed per orders:  None ordered              Participation in Care/Groups: No  Medication Compliant?: Patient received court ordered medicine for refusal of PO today. None scheduled for tonight. PRNS (last 24 hours): None                 Restraints (last 24 hours): No  Substance Abuse: Yes                       24 hour chart check complete: Yes    Patient goal(s) for today: Take medications as prescribed; engage in treatment team  Treatment team focus/goals: Continue medication regimen. Progress note: Patient continues to ignore treatment team members when they try to engage her in conversation. LOS:  12  Expected LOS: TBD    Financial concerns/prescription coverage: [place] [employer]  Family contact: 3/17 SW spoke to daughter  Family requesting physician contact today: No  Discharge plan: TBD  Access to weapons: No  Outpatient provider(s):  To be linked to new provider  Patient's preferred phone number for follow up call: [phone]    Participating treatment team members: Jerod Eldridge Cera, MSW; Dr. Caesar Lee MD; Jeana Cordero, ChelseaD

## 2020-03-24 NOTE — PROGRESS NOTES
Problem: Discharge Planning  Goal: *Knowledge of medication management  Outcome: Not Progressing Towards Goal     Problem: Falls - Risk of  Goal: *Absence of Falls  Description: Document Bard  Fall Risk and appropriate interventions in the flowsheet.   Outcome: Progressing Towards Goal  Note: Fall Risk Interventions:

## 2020-03-25 PROCEDURE — 65220000003 HC RM SEMIPRIVATE PSYCH

## 2020-03-25 PROCEDURE — 74011250636 HC RX REV CODE- 250/636: Performed by: PSYCHIATRY & NEUROLOGY

## 2020-03-25 RX ADMIN — HALOPERIDOL LACTATE 10 MG: 5 INJECTION, SOLUTION INTRAMUSCULAR at 09:12

## 2020-03-25 RX ADMIN — HALOPERIDOL LACTATE 10 MG: 5 INJECTION, SOLUTION INTRAMUSCULAR at 17:17

## 2020-03-25 NOTE — BH NOTES
Psychiatric Progress Note    Patient: Joelle Pool MRN: [id number]  SSN: [id number]    YOB: 1960  Age: 60 y.o. Sex: female      Admit Date: 3/11/2020    LOS: 12 days     Subjective:     Joelle Pool  Requested to be left alone again today. Peaked out from under her covers briefly before going back under. Moods are irritable and dismissive. No aggression or violence. Isolative and refusing treamtents. Eating ok and sleeping well. 3/15 - Joelle Pool is no different from the previous day, waving me off saying, \"Leave me alone\" as answers to every question. Was pacing the unit becoming irate earlier per staff. Refusing medications well. Eating and sleeping fairly. 3/16 - Joelle Pool was less dismissive today. Seemed preoccupied and paced from her bed to the hallway multiple times but would not speak to this interviewer or others in the room. Appears less irritable. No aggression or violence. Not interactive and dismissive. Refusing medications. Eating and sleeping fairly. 3/17 - Joelle Pool is paranoid and dismissive still. Today she told me to leave her alone and one other statement moods are irritable. Denies SI/HI/AH/VH. No aggression or violence. Appropriately interactive and aware. Tolerating medications well. Eating and sleeping fairly (6hrs). 3/18 - Joelle Pool is less irritable per staff. Still did not speak today. Denies SI/HI/AH/VH. No aggression or violence. Minimally interactive and aware. Tolerating medications well. Eating and sleeping fairly. 3/19 - Silvestre is no longer acting out, however she has been isolative to her room and breaths heavily to let the interviewer know that she is alive, but does not respond verbally or engage in discussion. Receiving Court ordered treatments.     3/20 - Joelle Pool per nursing report comes out to eat and is compliant with rolling over to get IM shots, but continues to refuse PO meds. Dismissive attitude. Denies SI/HI/AH/VH. No aggression or violence. Isolative and aware. Tolerating medications well. Eating and sleeping fairly. History of stabilizing on 10 mg IM 2 years ago. Similar presentation. 3/21 - Milana Bullock is alert upon presentation. She is not very talkative to NP and walks away during interview. However, she denied SI/HI/AVH, she reports no issues with her medications. She reports sleeping and eating well. 3/22 - Milana Bullock is observed lying in her bed she is slightly more talkative today during the interview. She denies SI/HI, she denies AVH, she denies medications side effects. She reports no issues with sleep or appetite. 3/23 - 45 [facility] to dismiss this interviewer, but has been speaking more to staff and other people. She is resting well. No aggression or violence. Poorly interactive. Tolerating medications well. Eating and sleeping fairly. 3/24 - Salo Navarro goes to breakfast then returns to her room and ignores interviewers. Moods are dismissive. No aggression or violence. Isolative and evasive. Receiving court ordered medications well. Eating and sleeping only.       Objective:     Vitals:    03/23/20 0921 03/23/20 1915 03/24/20 0830 03/24/20 2033   BP: 121/53 122/62  123/77   Pulse: 80 77  85   Resp: 16 17 16 16   Temp: 98.4 °F (36.9 °C) 98.9 °F (37.2 °C)  98.7 °F (37.1 °C)   SpO2:  99%  100%        Mental Status Exam:   Sensorium  sedated   Relations guarded and uncooperative   Eye Contact poor   Appearance:  age appropriate   Speech:  Non verbal   Thought Process: goal directed   Thought Content internally preoccupied    Suicidal ideations won't answer   Mood:  hostile   Affect:  constricted   Memory   MARISA   Concentration:  impaired   Insight:  poor   Judgment:  impaired due to condfition       MEDICATIONS:  Current Facility-Administered Medications   Medication Dose Route Frequency    haloperidol lactate (HALDOL) injection 10 mg +++COURT ORDERED MEDICATION FOR REFUSAL OF RISPERIDONE+++  10 mg IntraMUSCular BID    Or    risperiDONE (RisperDAL) tablet 1 mg  1 mg Oral BID    OLANZapine (ZyPREXA) tablet 5 mg  5 mg Oral Q6H PRN    haloperidol lactate (HALDOL) injection 5 mg  5 mg IntraMUSCular Q6H PRN    benztropine (COGENTIN) tablet 1 mg  1 mg Oral BID PRN    diphenhydrAMINE (BENADRYL) injection 50 mg  50 mg IntraMUSCular BID PRN    hydrOXYzine HCL (ATARAX) tablet 50 mg  50 mg Oral TID PRN    LORazepam (ATIVAN) injection 1 mg  1 mg IntraMUSCular Q4H PRN    traZODone (DESYREL) tablet 50 mg  50 mg Oral QHS PRN    acetaminophen (TYLENOL) tablet 650 mg  650 mg Oral Q4H PRN    magnesium hydroxide (MILK OF MAGNESIA) 400 mg/5 mL oral suspension 30 mL  30 mL Oral DAILY PRN    albuterol (PROVENTIL HFA, VENTOLIN HFA, PROAIR HFA) inhaler 1 Puff  1 Puff Inhalation Q4H PRN      DISCUSSION:   the risks and benefits of the proposed medication  patient given opportunity to ask questions    Lab/Data Review: All lab results for the last 24 hours reviewed.      No major concerns    Assessment:     Principal Problem:    Schizoaffective disorder ([facility] (3/30/2018)        Plan:     Continue current care  Court order medications Risperdal and haldol  Increase Risperdal 2mg PO BID  Collateral information  Disposition planning with social work    Signed By: Linda Pollock MD     March 24, 2020

## 2020-03-25 NOTE — PROGRESS NOTES
Problem: Discharge Planning  Goal: *Discharge to safe environment  Outcome: Not Progressing Towards Goal  Note: Patient refuses to engage in conversation regarding discharge planning. Patient will not identify her discharge plan. Goal: *Knowledge of medication management  Outcome: Not Progressing Towards Goal  Note: Patient does not verbalize understanding of medication regimen. Patient is not taking medications as prescribed. Patient being given court-ordered forced medications. Goal: *Knowledge of discharge instructions  Outcome: Not Progressing Towards Goal  Note: Patient does not verbalize understanding of goals for treatment or safe discharge.

## 2020-03-25 NOTE — BH NOTES
Patient has been isolative to her room and is in bed and does not respond to assessment questions  Slept 8 hours

## 2020-03-25 NOTE — GROUP NOTE
DORETHA  GROUP DOCUMENTATION INDIVIDUAL Group Therapy Note Date: 3/25/2020 Group Start Time: 1400 Group End Time: 1500 Group Topic: Recreational/Music Therapy [facility] [place] [address] ACUTE BEHAV [facility] [place], [address] GROUP DOCUMENTATION GROUP Group Therapy Note Attendees: 6 Attendance: Did not attend Patient's Goal: Interventions/techniques:Whitney Yeung

## 2020-03-25 NOTE — BH NOTES
GROUP THERAPY PROGRESS NOTE    Patient did not participate in Coping Skills group.      Sho Barber LPC LSATP CSAC

## 2020-03-25 NOTE — INTERDISCIPLINARY ROUNDS
Behavioral Health Interdisciplinary Rounds Patient Name: Arletta Boeck  Age: 60 y.o. Room/Bed:  309/01 Primary [facility]: [facility] Admission Status: Involuntary Commitment Readmission within 30 days: no 
Power of  in place: no 
Patient requires a blocked bed: no           
Reason for blocked bed: na 
Order for blocked bed obtained: no    
 
Sleep hours: 8 Morning Labs completed per orders:  na      
Participation in Care/Groups:  no 
Medication Compliant?: Yes PRNS (last 24 hours): None Restraints (last 24 hours):  no 
Substance Abuse:  no   
24 hour chart check complete: yes Patient goal(s) for today: Take medications as prescribed; engage in treatment team 
Treatment team focus/goals: Continue medication regimen Progress note: Patient continues to ignore treatment team members. Both MD and EDDI attempted to engage Pt in conversation. She was in bed and rolled so she was not facing treatment team while they attempted to engage her. LOS:  13  Expected LOS: TBD Financial concerns/prescription coverage: Cydney Walker Family contact: 3/17 SW spoke to daughter Family requesting physician contact today: No 
Discharge plan: TBD Access to weapons: No 
Outpatient provider(s): To be linked to new provider Patient's preferred phone number for follow up call: [phone] Participating treatment team members: Arletta Boeck, Zenia Ditch, MSW; Dr. Raciel Aponte MD

## 2020-03-25 NOTE — PROGRESS NOTES
Problem: Psychosis  Goal: *STG/LTG: Complies with medication therapy  Outcome: Not Progressing Towards Goal     Problem: Psychosis  Goal: *STG/LTG: Demonstrates improved social functioning by responding appropriately to staff  Outcome: Not Progressing Towards Goal     Patient alert. Selectively mute. Isolative to room. Does not interact with staff or roommate. Allowed assessment of vital signs this am. Continues to refuse PO court ordered medication. Will verbalize the word no to refuse PO meds, however, accepts IM haldol without incident. Isolative to room, except during meal time to collect tray. Eats in room. Does not actively participate in treatment team. Encouraged to communicate with staff, however, remains uncooperative. Q 15 minute checks continue for safety.

## 2020-03-25 NOTE — BH NOTES
Psychiatric Progress Note    Patient: Salo Navarro MRN: [id number]  SSN: [id number]    YOB: 1960  Age: 60 y.o. Sex: female      Admit Date: 3/11/2020    LOS: 13 days     Subjective:     Salo Navarro  Requested to be left alone again today. Peaked out from under her covers briefly before going back under. Moods are irritable and dismissive. No aggression or violence. Isolative and refusing treamtents. Eating ok and sleeping well. 3/15 - Salo Navarro is no different from the previous day, waving me off saying, \"Leave me alone\" as answers to every question. Was pacing the unit becoming irate earlier per staff. Refusing medications well. Eating and sleeping fairly. 3/16 - Salo Navarro was less dismissive today. Seemed preoccupied and paced from her bed to the hallway multiple times but would not speak to this interviewer or others in the room. Appears less irritable. No aggression or violence. Not interactive and dismissive. Refusing medications. Eating and sleeping fairly. 3/17 - Salo Navarro is paranoid and dismissive still. Today she told me to leave her alone and one other statement moods are irritable. Denies SI/HI/AH/VH. No aggression or violence. Appropriately interactive and aware. Tolerating medications well. Eating and sleeping fairly (6hrs). 3/18 - Salo Navarro is less irritable per staff. Still did not speak today. Denies SI/HI/AH/VH. No aggression or violence. Minimally interactive and aware. Tolerating medications well. Eating and sleeping fairly. 3/19 - Silvestre is no longer acting out, however she has been isolative to her room and breaths heavily to let the interviewer know that she is alive, but does not respond verbally or engage in discussion. Receiving Court ordered treatments.     3/20 - Salo Navarro per nursing report comes out to eat and is compliant with rolling over to get IM shots, but continues to refuse PO meds. Dismissive attitude. Denies SI/HI/AH/VH. No aggression or violence. Isolative and aware. Tolerating medications well. Eating and sleeping fairly. History of stabilizing on 10 mg IM 2 years ago. Similar presentation. 3/21 - Ally Luther is alert upon presentation. She is not very talkative to NP and walks away during interview. However, she denied SI/HI/AVH, she reports no issues with her medications. She reports sleeping and eating well. 3/22 - Ally Luther is observed lying in her bed she is slightly more talkative today during the interview. She denies SI/HI, she denies AVH, she denies medications side effects. She reports no issues with sleep or appetite. 3/23 - 45 [facility] to dismiss this interviewer, but has been speaking more to staff and other people. She is resting well. No aggression or violence. Poorly interactive. Tolerating medications well. Eating and sleeping fairly. 3/24 - Yasmine Sales goes to breakfast then returns to her room and ignores interviewers. Moods are dismissive. No aggression or violence. Isolative and evasive. Receiving court ordered medications well. Eating and sleeping only. 3/25 - Yasmine Pricerose marie allowed staff to take vitals today spoke with staff today, but refused treatments. Gave brief eye contact then turned away from this interviewer. No aggression or violence. Interactive and aware. Tolerating medications well. Eating and sleeping fairly.       Objective:     Vitals:    03/23/20 1915 03/24/20 0830 03/24/20 2033 03/25/20 0731   BP: 122/62  123/77 98/59   Pulse: 77  85 65   Resp: 17 16 16 16   Temp: 98.9 °F (37.2 °C)  98.7 °F (37.1 °C) 98 °F (36.7 °C)   SpO2: 99%  100% 100%        Mental Status Exam:   Sensorium  Feigned sleep   Relations guarded   Eye Contact Brief   Appearance:  age appropriate   Speech:  Non verbal   Thought Process: goal directed   Thought Content internally preoccupied Suicidal ideations none and No intentions perceived   Mood:  Docile   Affect:  constricted   Memory   MARISA   Concentration:  adequate   Insight:  poor   Judgment:  impaired due to condfition       MEDICATIONS:  Current Facility-Administered Medications   Medication Dose Route Frequency    risperiDONE (RisperDAL) tablet 2 mg  2 mg Oral BID    Or    haloperidol lactate (HALDOL) injection 10 mg  10 mg IntraMUSCular BID    OLANZapine (ZyPREXA) tablet 5 mg  5 mg Oral Q6H PRN    haloperidol lactate (HALDOL) injection 5 mg  5 mg IntraMUSCular Q6H PRN    benztropine (COGENTIN) tablet 1 mg  1 mg Oral BID PRN    diphenhydrAMINE (BENADRYL) injection 50 mg  50 mg IntraMUSCular BID PRN    hydrOXYzine HCL (ATARAX) tablet 50 mg  50 mg Oral TID PRN    LORazepam (ATIVAN) injection 1 mg  1 mg IntraMUSCular Q4H PRN    traZODone (DESYREL) tablet 50 mg  50 mg Oral QHS PRN    acetaminophen (TYLENOL) tablet 650 mg  650 mg Oral Q4H PRN    magnesium hydroxide (MILK OF MAGNESIA) 400 mg/5 mL oral suspension 30 mL  30 mL Oral DAILY PRN    albuterol (PROVENTIL HFA, VENTOLIN HFA, PROAIR HFA) inhaler 1 Puff  1 Puff Inhalation Q4H PRN      DISCUSSION:   the risks and benefits of the proposed medication  patient given opportunity to ask questions    Lab/Data Review: All lab results for the last 24 hours reviewed.      No major concerns    Assessment:     Principal Problem:    Schizoaffective disorder ([facility] (3/30/2018)        Plan:     Continue current care  Court order medications Risperdal and haldol  Continue Risperdal 2mg PO BID  Collateral information  Disposition planning with social work    Signed By: Ailene Soulier, MD     March 25, 2020

## 2020-03-25 NOTE — GROUP NOTE
DORETHA  GROUP DOCUMENTATION INDIVIDUAL Group Therapy Note Date: 3/25/2020 Group Start Time: 1000 Group End Time: 1100 Group Topic: Topic Group [facility] 3 ACUTE BEHAV [facility] [place], [address] [facility] GROUP DOCUMENTATION GROUP Group Therapy Note Attendees: 5 Attendance: Did not attend Patient's Goal: Interventions/techniquesWhitney Yeung

## 2020-03-26 PROCEDURE — 65220000003 HC RM SEMIPRIVATE PSYCH

## 2020-03-26 PROCEDURE — 74011250637 HC RX REV CODE- 250/637: Performed by: PSYCHIATRY & NEUROLOGY

## 2020-03-26 RX ADMIN — RISPERIDONE 2 MG: 1 TABLET ORAL at 08:49

## 2020-03-26 RX ADMIN — RISPERIDONE 2 MG: 1 TABLET ORAL at 16:22

## 2020-03-26 NOTE — GROUP NOTE
DORETHA  GROUP DOCUMENTATION INDIVIDUAL Group Therapy Note Date: 3/26/2020 Group Start Time: 1000 Group End Time: 1100 Group Topic: Topic Group [facility] 3 ACUTE BEHAV [facility] [place], [address] [facility] GROUP DOCUMENTATION GROUP Group Therapy Note Attendees: 6 Attendance: Did not attend Patient's Goal: Interventions/techniquesWhitney Yeung

## 2020-03-26 NOTE — INTERDISCIPLINARY ROUNDS
Behavioral Health Interdisciplinary Rounds Patient Name: Amos Moran  Age: 60 y.o. Room/Bed:  309/01 Primary Diagnosis: [facility] Admission Status: Involuntary Commitment Readmission within 30 days: no 
Power of  in place: no 
Patient requires a blocked bed: no           
Reason for blocked bed: na 
Order for blocked bed obtained: no    
 
Sleep hours: 9 Morning Labs completed per orders:  na      
Participation in Care/Groups:  no 
Medication Compliant?: Refusing Psychiatric Medications PRNS (last 24 hours): None Restraints (last 24 hours):  no 
Substance Abuse:  no   
24 hour chart check complete: yes Patient goal(s) for today: Take medications as prescribed; engage in treatment team 
Treatment team focus/goals: Continue medication regimen Progress note: Patient continues to ignore treatment team members. LOS:  14  Expected LOS: TBD Financial concerns/prescription coverage: Cydney Walker Family contact: 3/17 SW spoke to daughter Family requesting physician contact today: No 
Discharge plan: TBD Access to weapons: No 
Outpatient provider(s): To be linked to new provider Patient's preferred phone number for follow up call: [phone] Participating treatment team members: Jeremias Bender MSW; Dr. Link Brunner, MD

## 2020-03-26 NOTE — BH NOTES
0700-Report received from Adelita oTscano RN. [id number] in no acute distress, she remains quiet and lying in the bed  She was compliant  with schedule medication and did not need the court ordered medication, she  Denies SI/HI  At this  Time. Staff will continue to monitor at this time. 1000-1200-Patient remain in her room most of the am , refused to attends groups, she ate her lunch meal in her room at this time she denies pain or discomfort when asked. Mood continues to be flat and  Cooperative.

## 2020-03-26 NOTE — GROUP NOTE
DORETHA  GROUP DOCUMENTATION INDIVIDUAL Group Therapy Note Date: 3/26/2020 Group Start Time: 1400 Group End Time: 1500 Group Topic: Recreational/Music Therapy [facility] [place] [address] ACUTE BEHAV [facility] [place], [address] GROUP DOCUMENTATION GROUP Group Therapy Note Attendees:  
 
  
 
Attendance: attended Patient's Goal:  To concentrate on selected task Interventions/techniques: Supported-crafts,games,music Follows Directions: Followed directions Interactions: Interacted appropriately Mental Status: Calm and Flat Behavior/appearance: Needed prompting Goals Achieved: Able to engage in interactions and Able to listen to others Additional Notes:  Pleasant upon approach-left session early Brynn Díaz

## 2020-03-26 NOTE — BH NOTES
Patient has been in her room still not socializing or answering questions states she does not want to be bothered  Slept 9 hours

## 2020-03-26 NOTE — BH NOTES
GROUP THERAPY PROGRESS NOTE    Patient did not participate in Coping Skills group.      Azalea Garcia LPC LSATP CSAC

## 2020-03-26 NOTE — BH NOTES
Psychiatric Progress Note    Patient: Beth Allison MRN: [id number]  SSN: [id number]    YOB: 1960  Age: 60 y.o. Sex: female      Admit Date: 3/11/2020    LOS: 14 days     Subjective:     Beth Allison  Requested to be left alone again today. Peaked out from under her covers briefly before going back under. Moods are irritable and dismissive. No aggression or violence. Isolative and refusing treamtents. Eating ok and sleeping well. 3/15 - Beth Allison is no different from the previous day, waving me off saying, \"Leave me alone\" as answers to every question. Was pacing the unit becoming irate earlier per staff. Refusing medications well. Eating and sleeping fairly. 3/16 - Beth Allison was less dismissive today. Seemed preoccupied and paced from her bed to the hallway multiple times but would not speak to this interviewer or others in the room. Appears less irritable. No aggression or violence. Not interactive and dismissive. Refusing medications. Eating and sleeping fairly. 3/17 - Beth Allison is paranoid and dismissive still. Today she told me to leave her alone and one other statement moods are irritable. Denies SI/HI/AH/VH. No aggression or violence. Appropriately interactive and aware. Tolerating medications well. Eating and sleeping fairly (6hrs). 3/18 - Beth Allison is less irritable per staff. Still did not speak today. Denies SI/HI/AH/VH. No aggression or violence. Minimally interactive and aware. Tolerating medications well. Eating and sleeping fairly. 3/19 - Silvestre is no longer acting out, however she has been isolative to her room and breaths heavily to let the interviewer know that she is alive, but does not respond verbally or engage in discussion. Receiving Court ordered treatments.     3/20 - Beth Allison per nursing report comes out to eat and is compliant with rolling over to get IM shots, but continues to refuse PO meds. Dismissive attitude. Denies SI/HI/AH/VH. No aggression or violence. Isolative and aware. Tolerating medications well. Eating and sleeping fairly. History of stabilizing on 10 mg IM 2 years ago. Similar presentation. 3/21 - Russell Pa is alert upon presentation. She is not very talkative to NP and walks away during interview. However, she denied SI/HI/AVH, she reports no issues with her medications. She reports sleeping and eating well. 3/22 - Russell Pa is observed lying in her bed she is slightly more talkative today during the interview. She denies SI/HI, she denies AVH, she denies medications side effects. She reports no issues with sleep or appetite. 3/23 - 45 [facility] to dismiss this interviewer, but has been speaking more to staff and other people. She is resting well. No aggression or violence. Poorly interactive. Tolerating medications well. Eating and sleeping fairly. 3/24 - Amos Moran goes to breakfast then returns to her room and ignores interviewers. Moods are dismissive. No aggression or violence. Isolative and evasive. Receiving court ordered medications well. Eating and sleeping only. 3/25 - Amos Moran allowed staff to take vitals today spoke with staff today, but refused treatments. Gave brief eye contact then turned away from this interviewer. No aggression or violence. Interactive and aware. Tolerating medications well. Eating and sleeping fairly. 3/26 - 45 [facility] to receive court ordered medications due to refusal of PO treatment. She gets up for meals and appears confused or distant when seen. No aggression or violence. Isolative with impaired awareness. Tolerating medications well. Eating and sleeping fairly.     Objective:     Vitals:    03/25/20 0731 03/25/20 2024 03/26/20 0900   BP: 98/59 117/63 101/64   Pulse: 65 79 63   Resp: 16 16 18   Temp: 98 °F (36.7 °C) 98.5 °F (36.9 °C) 98.7 °F (37.1 °C)   SpO2: 100% 100% 99%        Mental Status Exam:   Sensorium  Feigned sleep   Relations guarded   Eye Contact Brief   Appearance:  age appropriate   Speech:  Non verbal   Thought Process: goal directed   Thought Content internally preoccupied    Suicidal ideations none and No intentions perceived   Mood:  Docile   Affect:  constricted   Memory   MARISA   Concentration:  adequate   Insight:  poor   Judgment:  impaired due to condfition       MEDICATIONS:  Current Facility-Administered Medications   Medication Dose Route Frequency    risperiDONE (RisperDAL) tablet 2 mg  2 mg Oral BID    Or    haloperidol lactate (HALDOL) injection 10 mg  10 mg IntraMUSCular BID    OLANZapine (ZyPREXA) tablet 5 mg  5 mg Oral Q6H PRN    haloperidol lactate (HALDOL) injection 5 mg  5 mg IntraMUSCular Q6H PRN    benztropine (COGENTIN) tablet 1 mg  1 mg Oral BID PRN    diphenhydrAMINE (BENADRYL) injection 50 mg  50 mg IntraMUSCular BID PRN    hydrOXYzine HCL (ATARAX) tablet 50 mg  50 mg Oral TID PRN    LORazepam (ATIVAN) injection 1 mg  1 mg IntraMUSCular Q4H PRN    traZODone (DESYREL) tablet 50 mg  50 mg Oral QHS PRN    acetaminophen (TYLENOL) tablet 650 mg  650 mg Oral Q4H PRN    magnesium hydroxide (MILK OF MAGNESIA) 400 mg/5 mL oral suspension 30 mL  30 mL Oral DAILY PRN    albuterol (PROVENTIL HFA, VENTOLIN HFA, PROAIR HFA) inhaler 1 Puff  1 Puff Inhalation Q4H PRN      DISCUSSION:   the risks and benefits of the proposed medication  patient given opportunity to ask questions    Lab/Data Review: All lab results for the last 24 hours reviewed.      No major concerns    Assessment:     Principal Problem:    Schizoaffective disorder ([facility] [address] [facility] (3/30/2018)        Plan:     Continue current care  Court order medications Risperdal and haldol  Continue Risperdal 2mg PO BID  Collateral information  Disposition planning with social work    Signed By: Jacinta Coello MD     March 26, 2020

## 2020-03-27 PROCEDURE — 65220000003 HC RM SEMIPRIVATE PSYCH

## 2020-03-27 PROCEDURE — 74011250637 HC RX REV CODE- 250/637: Performed by: PSYCHIATRY & NEUROLOGY

## 2020-03-27 RX ADMIN — RISPERIDONE 2 MG: 1 TABLET ORAL at 16:59

## 2020-03-27 RX ADMIN — RISPERIDONE 2 MG: 1 TABLET ORAL at 08:30

## 2020-03-27 NOTE — PROGRESS NOTES
Problem: Non-Violent Restraints  Goal: *Patient's dignity will be maintained  Outcome: Progressing Towards Goal     Problem: Psychosis  Goal: *STG: Decreased delusional thinking  Outcome: Progressing Towards Goal

## 2020-03-27 NOTE — BH NOTES
Patient isolative to her room with little response back to questions. Patient is calm and quiet with no scheduled medications this shift  Slept 6 hours

## 2020-03-27 NOTE — PROGRESS NOTES
Psychiatric Progress Note    Patient: Rosetta Urena MRN: [id number]  SSN: [id number]    YOB: 1960  Age: 60 y.o. Sex: female      Admit Date: 3/11/2020    LOS: 15 days     Subjective:     Rosetta Urena  Requested to be left alone again today. Peaked out from under her covers briefly before going back under. Moods are irritable and dismissive. No aggression or violence. Isolative and refusing treamtents. Eating ok and sleeping well. 3/15 - Rosetta Urena is no different from the previous day, waving me off saying, \"Leave me alone\" as answers to every question. Was pacing the unit becoming irate earlier per staff. Refusing medications well. Eating and sleeping fairly. 3/16 - Rosetta Urena was less dismissive today. Seemed preoccupied and paced from her bed to the hallway multiple times but would not speak to this interviewer or others in the room. Appears less irritable. No aggression or violence. Not interactive and dismissive. Refusing medications. Eating and sleeping fairly. 3/17 - Rosetta Urena is paranoid and dismissive still. Today she told me to leave her alone and one other statement moods are irritable. Denies SI/HI/AH/VH. No aggression or violence. Appropriately interactive and aware. Tolerating medications well. Eating and sleeping fairly (6hrs). 3/18 - Rosetta Urena is less irritable per staff. Still did not speak today. Denies SI/HI/AH/VH. No aggression or violence. Minimally interactive and aware. Tolerating medications well. Eating and sleeping fairly. 3/19 - Silvestre is no longer acting out, however she has been isolative to her room and breaths heavily to let the interviewer know that she is alive, but does not respond verbally or engage in discussion. Receiving Court ordered treatments.     3/20 - Rosetta Urena per nursing report comes out to eat and is compliant with rolling over to get IM shots, but continues to refuse PO meds. Dismissive attitude. Denies SI/HI/AH/VH. No aggression or violence. Isolative and aware. Tolerating medications well. Eating and sleeping fairly. History of stabilizing on 10 mg IM 2 years ago. Similar presentation. 3/21 - Tyrell Obando is alert upon presentation. She is not very talkative to NP and walks away during interview. However, she denied SI/HI/AVH, she reports no issues with her medications. She reports sleeping and eating well. 3/22 - Tyrell Obando is observed lying in her bed she is slightly more talkative today during the interview. She denies SI/HI, she denies AVH, she denies medications side effects. She reports no issues with sleep or appetite. 3/23 - 45 [facility] to dismiss this interviewer, but has been speaking more to staff and other people. She is resting well. No aggression or violence. Poorly interactive. Tolerating medications well. Eating and sleeping fairly. 3/24 - Arjessicaa Boeck goes to breakfast then returns to her room and ignores interviewers. Moods are dismissive. No aggression or violence. Isolative and evasive. Receiving court ordered medications well. Eating and sleeping only. 3/25 - Arletta Boeck allowed staff to take vitals today spoke with staff today, but refused treatments. Gave brief eye contact then turned away from this interviewer. No aggression or violence. Interactive and aware. Tolerating medications well. Eating and sleeping fairly. 3/26 - 45 [facility] to receive court ordered medications due to refusal of PO treatment. She gets up for meals and appears confused or distant when seen. No aggression or violence. Isolative with impaired awareness. Tolerating medications well. Eating and sleeping fairly. 3/27 - 112 [address] today and reports feeling well and moods are fine.  Notes taking care of herself at home and though still isolates to room, states she can do so again. Denies SI/HI/AH/VH. No aggression or violence. More interactive and aware. Tolerating medications well. Eating and sleeping fairly. Objective:     Vitals:    03/26/20 0900 03/26/20 2009 03/27/20 0710   BP: 101/64 121/68 106/60   Pulse: 63 80 (!) 59   Resp: 18 20 18   Temp: 98.7 °F (37.1 °C) 97.9 °F (36.6 °C) 97.8 °F (36.6 °C)   SpO2: 99% 99% 99%        Mental Status Exam:   Sensorium  Oriented to self location and situation   Relations guarded   Eye Contact poor   Appearance:  age appropriate   Speech:  Clear and coherent   Thought Process: goal directed   Thought Content About taking care of herself   Suicidal ideations none   Mood:  Fine   Affect:  constricted   Memory   adequate   Concentration:  adequate   Insight:  Limited/ poor   Judgment:  impaired due to condfition       MEDICATIONS:  Current Facility-Administered Medications   Medication Dose Route Frequency    Height & weight needed  1 Each Other ONCE    risperiDONE (RisperDAL) tablet 2 mg  2 mg Oral BID    Or    haloperidol lactate (HALDOL) injection 10 mg  10 mg IntraMUSCular BID    OLANZapine (ZyPREXA) tablet 5 mg  5 mg Oral Q6H PRN    haloperidol lactate (HALDOL) injection 5 mg  5 mg IntraMUSCular Q6H PRN    benztropine (COGENTIN) tablet 1 mg  1 mg Oral BID PRN    diphenhydrAMINE (BENADRYL) injection 50 mg  50 mg IntraMUSCular BID PRN    hydrOXYzine HCL (ATARAX) tablet 50 mg  50 mg Oral TID PRN    LORazepam (ATIVAN) injection 1 mg  1 mg IntraMUSCular Q4H PRN    traZODone (DESYREL) tablet 50 mg  50 mg Oral QHS PRN    acetaminophen (TYLENOL) tablet 650 mg  650 mg Oral Q4H PRN    magnesium hydroxide (MILK OF MAGNESIA) 400 mg/5 mL oral suspension 30 mL  30 mL Oral DAILY PRN    albuterol (PROVENTIL HFA, VENTOLIN HFA, PROAIR HFA) inhaler 1 Puff  1 Puff Inhalation Q4H PRN      DISCUSSION:   the risks and benefits of the proposed medication  patient given opportunity to ask questions    Lab/Data Review:   All lab results for the last 24 hours reviewed.      No major concerns    Assessment:     Principal Problem:    Schizoaffective disorder ([facility] [address] (3/30/2018)    Improving condition slowly    Plan:     Continue current care  Court order medications Risperdal and haldol  Continue Risperdal 2mg PO BID  Collateral information  Disposition planning with social work    Signed By: Isela Chamberlain MD     March 27, 2020

## 2020-03-27 NOTE — GROUP NOTE
[facility]  GROUP DOCUMENTATION INDIVIDUAL Group Therapy Note Date: 3/27/2020 Group Start Time: 1000 Group End Time: 0183 Group Topic: Discharge Planning [facility] Dangelo COYNE  GROUP DOCUMENTATION GROUP Group Therapy Note Attendees: 7 Attendance: Did not attend Patient's Goal: Interventions/techniques: Follows Directions:  
 
Interactions:  
 
Mental Status:  
 
Behavior/appearance:  
 
Goals Achieved: Additional Notes:   
 
Brendan Marsh

## 2020-03-27 NOTE — GROUP NOTE
[facility]  GROUP DOCUMENTATION INDIVIDUAL Group Therapy Note Date: 3/27/2020 Group Start Time: 1100 Group End Time: 1200 Group Topic: Topic Group [facility] [place] 3 ACUTE BEHAV [facility] [place], [address] [facility] [place] GROUP DOCUMENTATION GROUP Group Therapy Note Attendees: 5 Attendance: Did not attend Patient's Goal: Interventions/techniques:Whitney Yeung

## 2020-03-27 NOTE — BH NOTES
Report received from off going nurse, assumed care of resident. Currently resident is alert and oriented. She is verbal and answers questions appropriately. Denies SI/HI, AVH at this time. Accepted PO medications without issues. Completed breakfast, denies all pain and discomfort. Will continue to monitor.

## 2020-03-27 NOTE — INTERDISCIPLINARY ROUNDS
Behavioral Health Interdisciplinary Rounds Patient Name: Joelle Pool  Age: 60 y.o. Room/Bed:  309/01 Primary [facility]: [facility] Admission Status: Voluntary Commitment Readmission within 30 days: no 
Power of  in place: no 
Patient requires a blocked bed: no           
Reason for blocked bed:  
na 
Order for blocked bed obtained: no    
 
Sleep hours: 6 Morning Labs completed per orders:  na      
Participation in Care/Groups:  no 
Medication Compliant?: Yes PRNS (last 24 hours): None Restraints (last 24 hours):  no 
Substance Abuse:  no   
24 hour chart check complete: yes

## 2020-03-27 NOTE — GROUP NOTE
[facility]  GROUP DOCUMENTATION INDIVIDUAL Group Therapy Note Date: 3/27/2020 Group Start Time: 1500 Group End Time: 9408 Group Topic: Recreational/Music Therapy [facility] [place] [address] ACUTE BEHAV [facility] [place], [address] GROUP DOCUMENTATION GROUP Group Therapy Note Attendees: 2 Attendance: Did not attend Patient's Goal: Interventions/techniques Asiya La

## 2020-03-28 PROCEDURE — 74011250637 HC RX REV CODE- 250/637: Performed by: PSYCHIATRY & NEUROLOGY

## 2020-03-28 PROCEDURE — 65220000003 HC RM SEMIPRIVATE PSYCH

## 2020-03-28 RX ADMIN — RISPERIDONE 2 MG: 1 TABLET ORAL at 17:15

## 2020-03-28 RX ADMIN — RISPERIDONE 2 MG: 1 TABLET ORAL at 08:15

## 2020-03-28 NOTE — PROGRESS NOTES
Problem: Psychosis  Goal: *STG: Remains safe in hospital  Outcome: Progressing Towards Goal  Goal: *STG/LTG: Complies with medication therapy  Outcome: Progressing Towards Goal

## 2020-03-28 NOTE — INTERDISCIPLINARY ROUNDS
Behavioral Health Interdisciplinary Rounds Patient Name: Anahi Puentes  Age: 60 y.o. Room/Bed:  309/01 Primary [facility]: [facility] Admission Status: Voluntary Commitment Readmission within 30 days: no 
Power of  in place: no 
Patient requires a blocked bed: no           
Reason for blocked bed: n/a Order for blocked bed obtained: no    
 
Sleep hours: 7 Morning Labs completed per orders:  no      
Participation in Care/Groups:  no 
Medication Compliant?: Yes PRNS (last 24 hours): None Restraints (last 24 hours):  no 
Substance Abuse:  no   
24 hour chart check complete: yes

## 2020-03-28 NOTE — GROUP NOTE
[facility] GROUP DOCUMENTATION INDIVIDUAL Group Therapy Note Date: 3/28/2020 Group Start Time: 1500 Group End Time: 9842 Group Topic: Process Group - [facility] [place] Guo [facility] GROUP DOCUMENTATION GROUP Group Therapy Note Attendees: 6 Attendance: Attended Patient's Goal:  Patient will practice positive coping skills with peers in a safe and encouraging environment. Patient will identify their strengths and values, and be able to process positive messages said about them from others. Patient will be able to identify and process their emotions as they practice positive self-talk in various ways. Interventions/techniques: Informed, Validated and Promoted peer support Follows Directions: Followed directions Interactions: Interacted appropriately Mental Status: Calm and Congruent Behavior/appearance: Attentive Goals Achieved: Able to listen to others Additional Notes:  Patient listened to group discussion and activity but declined to participate. Patient interacted appropriately with group, but stated she \"didn't want to answer any questions right now. \"  
 
Rock Burton

## 2020-03-28 NOTE — PROGRESS NOTES
0800 Standing in the hallway. Asking for towels and a gown. \"I'm fine. \" When asked if anything was bothering her, she replied, \"No.\" Denies S/I, H/I, AVH, depression, and anxiety. Ate breakfast in room quietly. Cooperative with medication. 1000 Resting in bed with eyes closed. 1230 Occasionally up in the hallway to ask for juice, towels, etc. Not interested in talking about how she feels. [id number] Resting in bed with eyes closed. Took a shower. 1800 Cooperative with evening medication. Minimal speech. Pleasant mood.

## 2020-03-28 NOTE — GROUP NOTE
[facility] GROUP DOCUMENTATION INDIVIDUAL Group Therapy Note Date: 3/28/2020 Group Start Time: 1100 Group End Time: 2044 Group Topic: Social Work Group [facility] Dangelo Mccormick [facility] GROUP DOCUMENTATION GROUP Group Therapy Note Attendees: 6 Attendance: Attended Patient's Goal:  Patient will be able to identify their goals using the 5-5-5 worksheet. Patient will identify 5 things they love/are excited about, 5 things they want to work on post-discharge, and 5 things they can do to make healthy habits to affect small positive changes in their daily life. Interventions/techniques: Informed, Validated and Promoted peer support Follows Directions: Followed directions Interactions: Interacted appropriately Mental Status: Calm Behavior/appearance: Attentive Goals Achieved: Able to listen to others Additional Notes:  Patient was present for group, but declined to participate verbally when prompted. Patient did appear to be actively listening to discussion. Brendan Marsh

## 2020-03-28 NOTE — PROGRESS NOTES
Problem: Psychosis  Goal: *STG: Decreased hallucinations  Outcome: Progressing Towards Goal  Goal: *STG: Decreased delusional thinking  Outcome: Progressing Towards Goal  Goal: *STG: Remains safe in hospital  Outcome: Progressing Towards Goal  Goal: *STG: Accept constructive criticism without injury or isolation  Outcome: Progressing Towards Goal  Goal: *STG/LTG: Complies with medication therapy  Outcome: Progressing Towards Goal     Progressing.

## 2020-03-28 NOTE — BH NOTES
[id number] Assumed care of patient    [address] with patient while doing vitals, Patient denies SI/HI AVH; patient was smiling while speaking with her and she was sitting up in her bed.  Patient very pleasant     2030-Patient received snack and went wit her room    Patient has been resting for this shift    Hourly Rounds completed, patient slept approx 7

## 2020-03-28 NOTE — PROGRESS NOTES
Diet as tolerated. Pt noted to refuse some meals. Hx notable for obesity, hep C.     Ht: 4'10\"  Wt: 146 lb  BMI: 30.51 kg/(m^2) c/w obesity grade I  Est energy needs: 1610 kcal, 60 g protein, 1 mL/kcal fluids  Pt will consume > 50% of meals at follow up 7-10 days  LOS

## 2020-03-28 NOTE — PROGRESS NOTES
Chief Complaint:  \"I am fine\"  Length of Stay: 16 Days    Interval History:  Patient is calm and pleasant during assessment. Denies SI/HI/AVH and denies any adverse reactions to her medications at this time. Patient states, Teresa Nava brought me here because I was having some issues I guess and I wouldn't come out of the bathroom. \" Patient did not want to go into detail on why she would not come out of the bathroom. She is compliant with her meds and staff. No behaviors noted at this time. She is eating and sleeping well. Past Medical History:  Past Medical History:   Diagnosis Date    Asthma     Hepatitis C            Labs:  Lab Results   Component Value Date/Time    WBC 5.1 03/11/2020 07:44 PM    HGB 11.9 03/11/2020 07:44 PM    HCT 35.8 03/11/2020 07:44 PM    PLATELET 031 32/33/9064 07:44 PM    MCV 93.5 03/11/2020 07:44 PM      Lab Results   Component Value Date/Time    Sodium 141 03/11/2020 07:44 PM    Potassium 3.1 (L) 03/11/2020 07:44 PM    Chloride 107 03/11/2020 07:44 PM    CO2 22 03/11/2020 07:44 PM    Anion gap 12 03/11/2020 07:44 PM    Glucose 100 03/11/2020 07:44 PM    BUN 8 03/11/2020 07:44 PM    Creatinine 0.89 03/11/2020 07:44 PM    BUN/Creatinine ratio 9 (L) 03/11/2020 07:44 PM    GFR est AA >60 03/11/2020 07:44 PM    GFR est non-AA >60 03/11/2020 07:44 PM    Calcium 9.1 03/11/2020 07:44 PM    Bilirubin, total 0.3 03/22/2020 06:01 AM    AST (SGOT) 50 (H) 03/22/2020 06:01 AM    Alk.  phosphatase 84 03/22/2020 06:01 AM    Protein, total 6.8 03/22/2020 06:01 AM    Albumin 3.2 (L) 03/22/2020 06:01 AM    Globulin 3.6 03/22/2020 06:01 AM    A-G Ratio 0.9 (L) 03/22/2020 06:01 AM    ALT (SGPT) 22 03/22/2020 06:01 AM      Vitals:    03/27/20 0710 03/27/20 0938 03/27/20 1952 03/28/20 0745   BP: 106/60  122/58 (!) 121/97   Pulse: (!) 59  84 77   Resp: 18  17    Temp: 97.8 °F (36.6 °C)  98 °F (36.7 °C) 98.2 °F (36.8 °C)   SpO2: 99%  98% 98%   Weight:  66.2 kg (146 lb)     Height:  4' 10\" (1.473 m) Current Facility-Administered Medications   Medication Dose Route Frequency Provider Last Rate Last Dose    risperiDONE (RisperDAL) tablet 2 mg  2 mg Oral BID Jareth Heard MD   2 mg at 03/28/20 9922    Or    haloperidol lactate (HALDOL) injection 10 mg  10 mg IntraMUSCular BID Jareth Heard MD   10 mg at 03/25/20 1717    OLANZapine (ZyPREXA) tablet 5 mg  5 mg Oral Q6H PRN Laz Ortiz MD        haloperidol lactate (HALDOL) injection 5 mg  5 mg IntraMUSCular Q6H PRN Laz Ortiz MD   5 mg at 03/15/20 0817    benztropine (COGENTIN) tablet 1 mg  1 mg Oral BID PRN Laz Ortiz MD        diphenhydrAMINE (BENADRYL) injection 50 mg  50 mg IntraMUSCular BID PRN Laz Ortiz MD        hydrOXYzine HCL (ATARAX) tablet 50 mg  50 mg Oral TID PRN Laz Ortiz MD        LORazepam (ATIVAN) injection 1 mg  1 mg IntraMUSCular Q4H PRN Laz Ortiz MD        traZODone (DESYREL) tablet 50 mg  50 mg Oral QHS PRN Laz Ortiz MD        acetaminophen (TYLENOL) tablet 650 mg  650 mg Oral Q4H PRN Laz Ortiz MD        magnesium hydroxide (MILK OF MAGNESIA) 400 mg/5 mL oral suspension 30 mL  30 mL Oral DAILY PRN Laz Ortiz MD        albuterol (PROVENTIL HFA, VENTOLIN HFA, PROAIR HFA) inhaler 1 Puff  1 Puff Inhalation Q4H PRN Michael Thomas MD             Mental Status Exam:  Eye contact: Fair  Grooming: fair  Psychomotor activity: calm  Speech is spontaneous  Mood is Pleasant  Affect: full range  Perception: Denies HI/AVH  Suicidal ideation: Denies SI or plan  Cognition is impaired due to mental health         Physical Exam:  Body habitus: Body mass index is 30.51 kg/m². Musculoskeletal system: normal gait  Tremor - neg  Cog wheeling - neg      Assessment and Plan:  Guerda Gil meets criteria for a diagnosis of Schizoaffective Disorder.   Continue the medication regimen as prescribed  Disposition planning to continue. I certify that this patients inpatient psychiatric hospital services furnished since the previous certification were, and continue to be, required for treatment that could reasonably be expected to improve the patient's condition, or for diagnostic study, and that the patient continues to need, on a daily basis, active treatment furnished directly by or requiring the supervision of inpatient psychiatric facility personnel. In addition, the hospital records show that services furnished were intensive treatment services, admission or related services, or equivalent services.

## 2020-03-29 PROCEDURE — 65220000003 HC RM SEMIPRIVATE PSYCH

## 2020-03-29 PROCEDURE — 74011250637 HC RX REV CODE- 250/637: Performed by: PSYCHIATRY & NEUROLOGY

## 2020-03-29 RX ADMIN — RISPERIDONE 2 MG: 1 TABLET ORAL at 17:01

## 2020-03-29 RX ADMIN — RISPERIDONE 2 MG: 1 TABLET ORAL at 07:48

## 2020-03-29 NOTE — PROGRESS NOTES
Problem: Psychosis  Goal: *STG: Decreased hallucinations  Outcome: Progressing Towards Goal  Goal: *STG: Decreased delusional thinking  Outcome: Progressing Towards Goal  Goal: *STG: Remains safe in hospital  Outcome: Progressing Towards Goal  Goal: *STG: Participates in individual and group therapy  Outcome: Not Progressing Towards Goal

## 2020-03-29 NOTE — PROGRESS NOTES
Problem: Psychosis  Goal: *STG: Decreased hallucinations  Outcome: Progressing Towards Goal  Goal: *STG: Decreased delusional thinking  Outcome: Progressing Towards Goal  Goal: *STG: Remains safe in hospital  Outcome: Progressing Towards Goal  Goal: *STG: Seeks staff when feelings of self harm or harm towards others arise  Outcome: Progressing Towards Goal  Goal: *STG: Accept constructive criticism without injury or isolation  Outcome: Progressing Towards Goal  Goal: *STG/LTG: Complies with medication therapy  Outcome: Progressing Towards Goal     Progressing.

## 2020-03-29 NOTE — PROGRESS NOTES
Chief Complaint:  \"I am fine\"  Length of Stay: 17 Days    Interval History:  Patient is calm and pleasant during assessment. Denies SI/HI/AVH and denies any adverse reactions to her medications at this time. Patient endorses she slept well last night and did not have any issues or concerns at this time. Compliant with staff and her medications at this time. No aggression or behaviors currently noted. Past Medical History:  Past Medical History:   Diagnosis Date    Asthma     Hepatitis C            Labs:  Lab Results   Component Value Date/Time    WBC 5.1 03/11/2020 07:44 PM    HGB 11.9 03/11/2020 07:44 PM    HCT 35.8 03/11/2020 07:44 PM    PLATELET 275 64/93/7683 07:44 PM    MCV 93.5 03/11/2020 07:44 PM      Lab Results   Component Value Date/Time    Sodium 141 03/11/2020 07:44 PM    Potassium 3.1 (L) 03/11/2020 07:44 PM    Chloride 107 03/11/2020 07:44 PM    CO2 22 03/11/2020 07:44 PM    Anion gap 12 03/11/2020 07:44 PM    Glucose 100 03/11/2020 07:44 PM    BUN 8 03/11/2020 07:44 PM    Creatinine 0.89 03/11/2020 07:44 PM    BUN/Creatinine ratio 9 (L) 03/11/2020 07:44 PM    GFR est AA >60 03/11/2020 07:44 PM    GFR est non-AA >60 03/11/2020 07:44 PM    Calcium 9.1 03/11/2020 07:44 PM    Bilirubin, total 0.3 03/22/2020 06:01 AM    AST (SGOT) 50 (H) 03/22/2020 06:01 AM    Alk.  phosphatase 84 03/22/2020 06:01 AM    Protein, total 6.8 03/22/2020 06:01 AM    Albumin 3.2 (L) 03/22/2020 06:01 AM    Globulin 3.6 03/22/2020 06:01 AM    A-G Ratio 0.9 (L) 03/22/2020 06:01 AM    ALT (SGPT) 22 03/22/2020 06:01 AM      Vitals:    03/27/20 1952 03/28/20 0745 03/28/20 2000 03/29/20 0733   BP: 122/58 (!) 121/97 116/79 109/72   Pulse: 84 77 81 75   Resp: 17 16 16 18   Temp: 98 °F (36.7 °C) 98.2 °F (36.8 °C) 97.8 °F (36.6 °C) 97.8 °F (36.6 °C)   SpO2: 98% 98% 100% 100%   Weight:       Height:             Current Facility-Administered Medications   Medication Dose Route Frequency Provider Last Rate Last Dose    risperiDONE (RisperDAL) tablet 2 mg  2 mg Oral BID Lydia Gil MD   2 mg at 03/29/20 8281    Or    haloperidol lactate (HALDOL) injection 10 mg  10 mg IntraMUSCular BID Lydia Gil MD   10 mg at 03/25/20 1717    OLANZapine (ZyPREXA) tablet 5 mg  5 mg Oral Q6H PRN Laz Ortiz MD        haloperidol lactate (HALDOL) injection 5 mg  5 mg IntraMUSCular Q6H PRN Laz Ortiz MD   5 mg at 03/15/20 0817    benztropine (COGENTIN) tablet 1 mg  1 mg Oral BID PRN Laz Ortiz MD        diphenhydrAMINE (BENADRYL) injection 50 mg  50 mg IntraMUSCular BID PRN Laz Ortiz MD        hydrOXYzine HCL (ATARAX) tablet 50 mg  50 mg Oral TID PRN Laz Ortiz MD        LORazepam (ATIVAN) injection 1 mg  1 mg IntraMUSCular Q4H PRN Laz Ortiz MD        traZODone (DESYREL) tablet 50 mg  50 mg Oral QHS PRN Laz Ortiz MD        acetaminophen (TYLENOL) tablet 650 mg  650 mg Oral Q4H PRN Laz Ortiz MD        magnesium hydroxide (MILK OF MAGNESIA) 400 mg/5 mL oral suspension 30 mL  30 mL Oral DAILY PRN Laz Ortiz MD        albuterol (PROVENTIL HFA, VENTOLIN HFA, PROAIR HFA) inhaler 1 Puff  1 Puff Inhalation Q4H PRN Beck Dale MD             Mental Status Exam:  Eye contact: Fair  Grooming: fair  Psychomotor activity: calm  Speech is spontaneous  Mood is Pleasant  Affect: full range  Perception: Denies HI/AVH  Suicidal ideation: Denies SI or plan  Cognition is impaired due to mental health         Physical Exam:  Body habitus: Body mass index is 30.51 kg/m². Musculoskeletal system: normal gait  Tremor - neg  Cog wheeling - neg      Assessment and Plan:  Rosetta Urena meets criteria for a diagnosis of Schizoaffective Disorder. Continue the medication regimen as prescribed  Disposition planning to continue.    I certify that this patients inpatient psychiatric hospital services furnished since the previous certification were, and continue to be, required for treatment that could reasonably be expected to improve the patient's condition, or for diagnostic study, and that the patient continues to need, on a daily basis, active treatment furnished directly by or requiring the supervision of inpatient psychiatric facility personnel. In addition, the hospital records show that services furnished were intensive treatment services, admission or related services, or equivalent services.

## 2020-03-29 NOTE — PROGRESS NOTES
0800 Awake in room. Pleasant. Minimal verbalization. Smiling at times. Brighter mood. \"I'm doing fine. Thank you\" Cooperative with PO medications. Pt has no complaints. 1030 Taking a shower. 1230 Ate lunch. Engages well when addressed by staff although her replies are brief. 1500 Resting in bed.    1800 Awake in bed. Cooperative with medication. Again, smiling at times.

## 2020-03-29 NOTE — BH NOTES
Behavioral Health Interdisciplinary Rounds      Patient Name: Ru Bird     Age: 60 y.o.   Room/Bed:  309/01  Primary Diagnosis: [facility]ffect[facility]         Admission Status: Voluntary Commitment                               Readmission within 30 days: no  Power of  in place: no  Patient requires a blocked bed: no            Reason for blocked bed: n/a  Order for blocked bed obtained: no        Sleep hours: 2.5  Morning Labs completed per orders:  none ordered                                  Participation in Care/Groups:  no  Medication Compliant?: Yes  PRNS (last 24 hours): None                 Restraints (last 24 hours):  no  Substance Abuse:  no               24 hour chart check complete: yes

## 2020-03-29 NOTE — BH NOTES
Assumed care of patient and given report by off going nurse  FRANCISCA Cunha RN. Pt was in her room most of the shift, she was out a few times. Remains more isolative and has minimal conversations. She has been more pleasant and calm, reporting no SI/HI or AVH as well as pain. She has been compliant with medications and even allowed staff to do her VS.  Pt had evening snack. She denies anxiety/depression. Pt had no prn's so far this shift. Pt remains on 15 min safety checks. Will continue to monitor and treat.

## 2020-03-30 PROCEDURE — 97116 GAIT TRAINING THERAPY: CPT | Performed by: PHYSICAL THERAPIST

## 2020-03-30 PROCEDURE — 97161 PT EVAL LOW COMPLEX 20 MIN: CPT | Performed by: PHYSICAL THERAPIST

## 2020-03-30 PROCEDURE — 65220000003 HC RM SEMIPRIVATE PSYCH

## 2020-03-30 PROCEDURE — 74011250637 HC RX REV CODE- 250/637: Performed by: PSYCHIATRY & NEUROLOGY

## 2020-03-30 RX ADMIN — RISPERIDONE 2 MG: 1 TABLET ORAL at 08:38

## 2020-03-30 RX ADMIN — RISPERIDONE 2 MG: 1 TABLET ORAL at 17:00

## 2020-03-30 NOTE — BH NOTES
Patient is sitting in the dayroom watching TV . Patient responded verbally and denied SI.  Mood is improved  Slept 8 hours

## 2020-03-30 NOTE — BH NOTES
0700-Report received from Hever Mcdaniel RN. [id number]-  Patient has been calm and cooperative  She took he medication at this time she denies SI/HI at this time, she denies auditory or visual hallucinations. She has been pleasant and cooperative at this time. Appetite is good  At this time staff will continue to monitor. [id number]- Patient met  With PT at this time and  Was evaluated per MD orders,  Patient  In no acute  Distress at this time/.  [id number]-- Patient in room for quiet time, she denies pain or discomfort at this time. 1400-1600-Patient in no acute distress at this time  She continue to ly in the bed at this time. 1600-1800-Patient in  Better mood , she is up in the dayroom watching  tv.and talking with peers this shift, she has been complaint with her scheduled medication, no court order medication given this shift. She had pt and ot consult which went very well at this time. Staff is improving at this time.

## 2020-03-30 NOTE — BH NOTES
GROUP THERAPY PROGRESS NOTE     Patient is participating in coping skills group. Group time: 45 minutes     Personal goal for participation: Discuss current level of stress and anxiety relating to discharge plans, world events, and hospitalization. Goal orientation: Personal     Group therapy participation: active     Therapeutic interventions reviewed and discussed: Group discussion on ways patients are coping with anxiety and stress and ways they relax. Discussion regarding alternative coping skills and a patients had a word search of other coping activities. Impression of participation: Amanda Benitez was present in group today. She shared that she enjoys walking in a park and feeding the ducks or listening to music and being creative. She was alert and oriented and pleasant in group. She was quiet initially but then joined in. She was wrapped in a blanket for the duration of group.      Guillermina Copeland LPC [facility]

## 2020-03-30 NOTE — PROGRESS NOTES
Problem: Psychosis  Goal: *STG: Decreased delusional thinking  Outcome: Progressing Towards Goal  Goal: *STG: Remains safe in hospital  Outcome: Progressing Towards Goal

## 2020-03-30 NOTE — INTERDISCIPLINARY ROUNDS
Behavioral Health Interdisciplinary Rounds Patient Name: Edilson Watson  Age: 60 y.o. Room/Bed:  309/01 Primary Diagnosis: Schizoaffective disorder ([facility] Admission Status: Involuntary Commitment and Forced Medication Order Readmission within 30 days: no 
Power of  in place: no 
Patient requires a blocked bed: no           
Reason for blocked bed:  
na 
Order for blocked bed obtained: no    
 
Sleep hours: 8 Morning Labs completed per orders:  na      
Participation in Care/Groups:  no 
Medication Compliant?: Yes PRNS (last 24 hours): None Restraints (last 24 hours):  no 
Substance Abuse:  no   
24 hour chart check complete: yes Patient goal(s) for today: Take medications as prescribed; engage in PT/OT eval 
Treatment team focus/goals: Continue medication regimen; MD to order PT/OT eval 
Progress note: Patient has voluntarily taken PO medications since 3/26. She is slightly more engaged and requesting to go home. MD reported that she would need to participate in PT/OT level of care evaluation prior to discharge; Pt states she will engage with PT/OT. Denies wanting [facility]. 
 
LOS:  18  Expected LOS: TBD Financial concerns/prescription coverage: Cydney Walker Family contact: 3/17 SW spoke to daughter Family requesting physician contact today: No 
Discharge plan: TBD Access to weapons: No 
Outpatient provider(s): To be linked to new provider Patient's preferred phone number for follow up call: [phone] Participating treatment team members: Edilson Watson, CARLY Morales; Dr. Kevin Rodriguez MD

## 2020-03-30 NOTE — PROGRESS NOTES
PHYSICAL THERAPY EVALUATION/DISCHARGE  Patient: Sebastian Rob (27 y.o. female)  Date: 3/30/2020  Primary Diagnosis: Schizoaffective disorder ([facility] [F25.9]  Psychosis ([facility] [F29]       Precautions:          ASSESSMENT  Based on the objective data described below, the patient presents with impaired cognition but independence with mobility and ADLs. Patient independent with bed mobility and transfers. Patient ambulated 250 feet independently without assistive device. No loss of balance noted. Patient able to don/doff socks independently, and simulate washing UE and LEs as well as behind head and back independently. Patient denies need for PT or OT at this time. RN reports that patient has been ambulating independently in the hallway. Functional Outcome Measure: The patient scored 56/56 on the OJEDA outcome measure which is indicative of low risk for falls. Further skilled acute physical therapy is not indicated at this time. PLAN :  Recommendation for discharge: (in order for the patient to meet his/her long term goals)  Assisted living facility vs home with family     IF patient discharges home will need the following DME: none       SUBJECTIVE:   Patient stated I feel fine.     OBJECTIVE DATA SUMMARY:   HISTORY:    Past Medical History:   Diagnosis Date    Asthma     Hepatitis C    History reviewed. No pertinent surgical history. Home Situation  Home Environment: Private residence  One/Two Story Residence: Other (Comment)(pt refused to answer)  Living Alone: No  Support Systems: Other (comments)(pt refused to answer)  Patient Expects to be Discharged to[de-identified] Other (comment)(pt refused to answer)  Current DME Used/Available at Home: Other (comment)(pt refused to answer)    EXAMINATION/PRESENTATION/DECISION MAKING:   Critical Behavior:  Neurologic State: Alert  Orientation Level: Oriented to person, Oriented to place  Cognition: Impaired decision making     Hearing:   Auditory  Auditory Impairment: None    Range Of Motion:  AROM: Within functional limits    Strength:    Strength: Generally decreased, functional     Tone & Sensation:   Tone: Normal  Sensation: Intact        Coordination:  Coordination: Within functional limits    Functional Mobility:  Bed Mobility:  Supine to Sit: Independent  Sit to Supine: Independent  Scooting: Independent     Transfers:  Sit to Stand: Independent  Stand to Sit: Independent     Balance:   Sitting: Intact  Standing: Intact     Ambulation/Gait Training:  Distance (ft): 250 Feet (ft)  Assistive Device: Other (comment)(None)  Ambulation - Level of Assistance: Independent  Gait Description (WDL): Exceptions to WDL     Functional Measure:  Cantrell Balance Test:    Sitting to Standin  Standing Unsupported: 4  Sitting with Back Unsupported: 4  Standing to Sittin  Transfers: 4  Standing Unsupported with Eyes Closed: 4  Standing Unsupported with Feet Together: 4  Reach Forward with Outstretched Arm: 4   Object: 4  Turn to Look Over Shoulders: 4  Turn 360 Degrees: 4  Alternate Foot on Step/Stool: 4  Standing Unsupported One Foot in Front: 4  Stand on One Le  Total: 56/56         56=Maximum possible score;   0-20=High fall risk  21-40=Moderate fall risk   41-56=Low fall risk       Based on the above components, the patient evaluation is determined to be of the following complexity level: LOW     Pain Rating:  No pain    Activity Tolerance:   Good  Please refer to the flowsheet for vital signs taken during this treatment. After treatment patient left in no apparent distress:   Supine in bed    COMMUNICATION/EDUCATION:   The patients plan of care was discussed with: Registered nurse. Fall prevention education was provided and the patient/caregiver indicated understanding., Patient/family have participated as able in goal setting and plan of care. and Patient/family agree to work toward stated goals and plan of care.     Thank you for this referral.  Myra Wyman, PT   Time Calculation: 14 mins

## 2020-03-30 NOTE — BH NOTES
GROUP THERAPY PROGRESS NOTE    Patient did not participate in Discharge Group.      Zofia Sharpe LPC [facility]

## 2020-03-30 NOTE — PROGRESS NOTES
Chief Complaint:  \"I am fine\"  Length of Stay: 18 Days    Interval History:  Patient is calm and pleasant during assessment. Denies SI/HI/AVH and denies any adverse reactions to her medications at this time. Patient endorses she slept well last night and did not have any issues or concerns at this time. Compliant with staff and her medications at this time. No aggression or behaviors currently noted. 3/30 - Christiane Haro reports feeling well and moods are good. Denies SI/HI/AH/VH. No aggression or violence. Appropriately interactive and aware. Tolerating medications well. Eating and sleeping fairly. States that she can care for herself and is interested in leaving soon. Past Medical History:  Past Medical History:   Diagnosis Date    Asthma     Hepatitis C            Labs:  Lab Results   Component Value Date/Time    WBC 5.1 03/11/2020 07:44 PM    HGB 11.9 03/11/2020 07:44 PM    HCT 35.8 03/11/2020 07:44 PM    PLATELET 756 26/82/1187 07:44 PM    MCV 93.5 03/11/2020 07:44 PM      Lab Results   Component Value Date/Time    Sodium 141 03/11/2020 07:44 PM    Potassium 3.1 (L) 03/11/2020 07:44 PM    Chloride 107 03/11/2020 07:44 PM    CO2 22 03/11/2020 07:44 PM    Anion gap 12 03/11/2020 07:44 PM    Glucose 100 03/11/2020 07:44 PM    BUN 8 03/11/2020 07:44 PM    Creatinine 0.89 03/11/2020 07:44 PM    BUN/Creatinine ratio 9 (L) 03/11/2020 07:44 PM    GFR est AA >60 03/11/2020 07:44 PM    GFR est non-AA >60 03/11/2020 07:44 PM    Calcium 9.1 03/11/2020 07:44 PM    Bilirubin, total 0.3 03/22/2020 06:01 AM    AST (SGOT) 50 (H) 03/22/2020 06:01 AM    Alk.  phosphatase 84 03/22/2020 06:01 AM    Protein, total 6.8 03/22/2020 06:01 AM    Albumin 3.2 (L) 03/22/2020 06:01 AM    Globulin 3.6 03/22/2020 06:01 AM    A-G Ratio 0.9 (L) 03/22/2020 06:01 AM    ALT (SGPT) 22 03/22/2020 06:01 AM      Vitals:    03/29/20 0733 03/29/20 2100 03/30/20 0645 03/30/20 0900   BP: 109/72 112/65 104/58 104/58   Pulse: 75 81 (!) 58 60   Resp: 18 16 16 16   Temp: 97.8 °F (36.6 °C) 98.2 °F (36.8 °C) 97.6 °F (36.4 °C) 97.6 °F (36.4 °C)   SpO2: 100% 98% 100% 100%   Weight:       Height:             Current Facility-Administered Medications   Medication Dose Route Frequency Provider Last Rate Last Dose    risperiDONE (RisperDAL) tablet 2 mg  2 mg Oral BID Davis Mahmood MD   2 mg at 03/30/20 9565    Or    haloperidol lactate (HALDOL) injection 10 mg  10 mg IntraMUSCular BID Davis Mahmood MD   10 mg at 03/25/20 1717    OLANZapine (ZyPREXA) tablet 5 mg  5 mg Oral Q6H PRN Laz Ortiz MD        haloperidol lactate (HALDOL) injection 5 mg  5 mg IntraMUSCular Q6H PRN Laz Ortiz MD   5 mg at 03/15/20 0817    benztropine (COGENTIN) tablet 1 mg  1 mg Oral BID PRN Laz Ortiz MD        diphenhydrAMINE (BENADRYL) injection 50 mg  50 mg IntraMUSCular BID PRN Laz Ortiz MD        hydrOXYzine HCL (ATARAX) tablet 50 mg  50 mg Oral TID PRN Laz Ortiz MD        LORazepam (ATIVAN) injection 1 mg  1 mg IntraMUSCular Q4H PRN Laz Ortiz MD        traZODone (DESYREL) tablet 50 mg  50 mg Oral QHS PRN Laz Ortiz MD        acetaminophen (TYLENOL) tablet 650 mg  650 mg Oral Q4H PRN Laz Ortiz MD        magnesium hydroxide (MILK OF MAGNESIA) 400 mg/5 mL oral suspension 30 mL  30 mL Oral DAILY PRN Laz Ortiz MD        albuterol (PROVENTIL HFA, VENTOLIN HFA, PROAIR HFA) inhaler 1 Puff  1 Puff Inhalation Q4H PRN Johnson Milner MD             Mental Status Exam:  Eye contact: Fair  Grooming: fair  Psychomotor activity: calm  Speech is spontaneous  Mood is Pleasant  Affect: full range  Perception: Denies HI/AVH  Suicidal ideation: Denies SI or plan  Cognition is impaired due to mental health         Physical Exam:  Body habitus: Body mass index is 30.51 kg/m².   Musculoskeletal system: normal gait  Tremor - neg  Cog wheeling - neg      Assessment and Plan:  Leopold Henry meets criteria for a diagnosis of Schizoaffective Disorder. Continue the medication regimen as prescribed  OT/PT evaluation for level of care  Disposition planning with social work for the next few days. I certify that this patients inpatient psychiatric hospital services furnished since the previous certification were, and continue to be, required for treatment that could reasonably be expected to improve the patient's condition, or for diagnostic study, and that the patient continues to need, on a daily basis, active treatment furnished directly by or requiring the supervision of inpatient psychiatric facility personnel. In addition, the hospital records show that services furnished were intensive treatment services, admission or related services, or equivalent services.

## 2020-03-31 PROCEDURE — 74011250637 HC RX REV CODE- 250/637: Performed by: PSYCHIATRY & NEUROLOGY

## 2020-03-31 PROCEDURE — 65220000003 HC RM SEMIPRIVATE PSYCH

## 2020-03-31 PROCEDURE — 97165 OT EVAL LOW COMPLEX 30 MIN: CPT | Performed by: OCCUPATIONAL THERAPIST

## 2020-03-31 RX ADMIN — RISPERIDONE 2 MG: 1 TABLET ORAL at 08:52

## 2020-03-31 RX ADMIN — TRAZODONE HYDROCHLORIDE 50 MG: 50 TABLET ORAL at 21:09

## 2020-03-31 RX ADMIN — RISPERIDONE 2 MG: 1 TABLET ORAL at 16:48

## 2020-03-31 NOTE — BH NOTES
Assumed care of patient and given report by off going nurse, Lety Nguyen, IRAM. Pt was in and out of her room tonight, in the dayroom more and watching t.v.  She only attended half the groups today. Pt has been complaint with her medications, had none scheduled for me tonight and no prn's so far. Pt is calm and cooperative and has been talking more now. She was out for evening snack. She denies SI/HI and AVH and is looking forward to going home. She denies pain. She has been sleeping better. Pt continues on 15 min safety checks. Will continue to monitor and treat.

## 2020-03-31 NOTE — GROUP NOTE
DORETHA  GROUP DOCUMENTATION INDIVIDUAL Group Therapy Note Date: 3/31/2020 Group Start Time: 1100 Group End Time: 8947 Group Topic: Focus Group [facility] Santi COYNE  GROUP DOCUMENTATION GROUP Group Therapy Note Attendees: 6 Attendance: Attended Patient's Goal:  People [place] Interventions/techniques: Informed and Promoted peer support Follows Directions: Followed directions Interactions: Interacted appropriately Mental Status: Calm Behavior/appearance: Cooperative and Withdrawn/quiet Goals Achieved: Able to listen to others and Able to self-disclose Additional Notes:   
 
Tarun Bautista

## 2020-03-31 NOTE — PROGRESS NOTES
Problem: Non-Violent Restraints  Goal: *Removal from restraints as soon as assessed to be safe  Outcome: Resolved/Not Met     Problem: Psychosis  Goal: *STG: Remains safe in hospital  Outcome: Progressing Towards Goal     Problem: Falls - Risk of  Goal: *Absence of Falls  Description: Document Aristeo Reason Fall Risk and appropriate interventions in the flowsheet.   Outcome: Progressing Towards Goal  Note: Fall Risk Interventions:

## 2020-03-31 NOTE — BH NOTES
GROUP THERAPY PROGRESS NOTE    Patient is participating in coping skills group. Group time: 45 minutes    Personal goal for participation: Learn coping skills to reduce stress and anxiety    Goal orientation: Personal    Group therapy participation: active    Therapeutic interventions reviewed and discussed: Group discussion on ways patients are coping with anxiety and stress and ways they relax. Discussion regarding alternative coping skills. Impression of participation: Shanika Lebron shared that she is looking forward to walking and spending time in the park when she leaves but that she is feeling better. She also shared that she plans to continue taking her medications so that she can continue to feel better.     Yany Valenzuela LPC LSATP [facility]

## 2020-03-31 NOTE — BH NOTES
Behavioral Health Interdisciplinary Rounds      Patient Name: Rosetta Urena   Age: 60 y.o. Room/Bed:  309/01  Primary Diagnosis: Schizoaffective disorder ([facility] [address]         Admission Status: Involuntary Commitment & Forced Medication Order                            Readmission within 30 days: no  Power of  in place: no  Patient requires a blocked bed: no            Reason for blocked bed: n/a  Order for blocked bed obtained: no        Sleep hours: 7.75  Morning Labs completed per orders:  none ordered                                  Participation in Care/Groups:  yes, but only half today  Medication Compliant?: Yes  PRNS (last 24 hours): No                Restraints (last 24 hours):  no  Substance Abuse:  no               24 hour chart check complete: yes     Patient goal(s) for today: Continue taking medications as prescribed; engage in unit activites  Treatment team focus/goals: MD to prescribe WATERMAN  Progress note: Patient is talkative and engaged today. She presents with good mood and good humor. Denies SI/HI/AVH. Remains delusional that her daughter  while she was at [facility]; states SW has been talking to someone who is not her daughter. Agreed to accept WATERMAN. Insight remains poor. LOS:  19  Expected LOS: TBD    Financial concerns/prescription coverage: [place] [employer]  Family contact: 3/17 EDDI spoke to daughter  Family requesting physician contact today: No  Discharge plan: Return home  Access to weapons: No  Outpatient provider(s):  To be linked to new provider  Patient's preferred phone number for follow up call: [phone]    Participating treatment team members: Ivethjason Urena, CARLY Norris; Dr. Zahira Calvillo MD; Rich Krishnan, ChelseaD

## 2020-03-31 NOTE — BH NOTES
GROUP THERAPY PROGRESS NOTE    Patient did not participate in Substance Abuse group.      [facility]

## 2020-03-31 NOTE — PROGRESS NOTES
Chief Complaint:  \"I am fine\"  Length of Stay: 19 Days    Interval History:  Patient is calm and pleasant during assessment. Denies SI/HI/AVH and denies any adverse reactions to her medications at this time. Patient endorses she slept well last night and did not have any issues or concerns at this time. Compliant with staff and her medications at this time. No aggression or behaviors currently noted. 3/30 - Klaus Reasoner reports feeling well and moods are good. Denies SI/HI/AH/VH. No aggression or violence. Appropriately interactive and aware. Tolerating medications well. Eating and sleeping fairly. States that she can care for herself and is interested in leaving soon. 3/31 - Klaus Reasoner reports feeling well and moods are good. Thinks her daughter Arabella Collins is dead however. States she  when patient was in [address]. Denies SI/HI/AH/VH. No aggression or violence. Appropriately interactive and aware. Tolerating medications well. Eating and sleeping fairly. Past Medical History:  Past Medical History:   Diagnosis Date    Asthma     Hepatitis C            Labs:  Lab Results   Component Value Date/Time    WBC 5.1 2020 07:44 PM    HGB 11.9 2020 07:44 PM    HCT 35.8 2020 07:44 PM    PLATELET 275  07:44 PM    MCV 93.5 2020 07:44 PM      Lab Results   Component Value Date/Time    Sodium 141 2020 07:44 PM    Potassium 3.1 (L) 2020 07:44 PM    Chloride 107 2020 07:44 PM    CO2 22 2020 07:44 PM    Anion gap 12 2020 07:44 PM    Glucose 100 2020 07:44 PM    BUN 8 2020 07:44 PM    Creatinine 0.89 2020 07:44 PM    BUN/Creatinine ratio 9 (L) 2020 07:44 PM    GFR est AA >60 2020 07:44 PM    GFR est non-AA >60 2020 07:44 PM    Calcium 9.1 2020 07:44 PM    Bilirubin, total 0.3 2020 06:01 AM    AST (SGOT) 50 (H) 2020 06:01 AM    Alk.  phosphatase 84 2020 06:01 AM Protein, total 6.8 03/22/2020 06:01 AM    Albumin 3.2 (L) 03/22/2020 06:01 AM    Globulin 3.6 03/22/2020 06:01 AM    A-G Ratio 0.9 (L) 03/22/2020 06:01 AM    ALT (SGPT) 22 03/22/2020 06:01 AM      Vitals:    03/30/20 0645 03/30/20 0900 03/30/20 1936 03/31/20 0757   BP: 104/58 104/58 119/56 129/86   Pulse: (!) 58 60 80 65   Resp: 16 16 16 16   Temp: 97.6 °F (36.4 °C) 97.6 °F (36.4 °C) 98.8 °F (37.1 °C) 97.8 °F (36.6 °C)   SpO2: 100% 100% 100% 100%   Weight:       Height:             Current Facility-Administered Medications   Medication Dose Route Frequency Provider Last Rate Last Dose    paliperidone palmitate (INVEGA SUSTENNA) injection 156 mg  156 mg IntraMUSCular ONCE Miquel Mendoza MD        risperiDONE (RisperDAL) tablet 2 mg  2 mg Oral BID Miquel Mendoza MD   2 mg at 03/31/20 1923    Or    haloperidol lactate (HALDOL) injection 10 mg  10 mg IntraMUSCular BID Miquel Mendoza MD   10 mg at 03/25/20 1717    OLANZapine (ZyPREXA) tablet 5 mg  5 mg Oral Q6H PRN Laz Ortiz MD        haloperidol lactate (HALDOL) injection 5 mg  5 mg IntraMUSCular Q6H PRN Laz Ortiz MD   5 mg at 03/15/20 0817    benztropine (COGENTIN) tablet 1 mg  1 mg Oral BID PRN Laz Ortiz MD        diphenhydrAMINE (BENADRYL) injection 50 mg  50 mg IntraMUSCular BID PRN Laz Ortiz MD        hydrOXYzine HCL (ATARAX) tablet 50 mg  50 mg Oral TID PRN Laz Ortiz MD        LORazepam (ATIVAN) injection 1 mg  1 mg IntraMUSCular Q4H PRN Laz Ortiz MD        traZODone (DESYREL) tablet 50 mg  50 mg Oral QHS PRN Laz Ortiz MD        acetaminophen (TYLENOL) tablet 650 mg  650 mg Oral Q4H PRN Laz Ortiz MD        magnesium hydroxide (MILK OF MAGNESIA) 400 mg/5 mL oral suspension 30 mL  30 mL Oral DAILY PRN Laz Ortiz MD        albuterol (PROVENTIL HFA, VENTOLIN HFA, PROAIR HFA) inhaler 1 Puff  1 Puff Inhalation Q4H SAMMY Woodward MD             Mental Status Exam:  Eye contact: Fair  Grooming: fair  Psychomotor activity: calm  Speech is spontaneous  Mood is Pleasant  Affect: full range  Perception: Denies HI/AVH  Suicidal ideation: Denies SI or plan  Cognition is impaired due to mental health         Physical Exam:  Body habitus: Body mass index is 30.51 kg/m². Musculoskeletal system: normal gait  Tremor - neg  Cog wheeling - neg      Assessment and Plan:  Domenico Patton meets criteria for a diagnosis of Schizoaffective Disorder. Continue the medication regimen as prescribed  OT/PT evaluated and patient is able to maintain her own ADL's and ambulate appropriately  Disposition planning with social work for the next few days. I certify that this patients inpatient psychiatric hospital services furnished since the previous certification were, and continue to be, required for treatment that could reasonably be expected to improve the patient's condition, or for diagnostic study, and that the patient continues to need, on a daily basis, active treatment furnished directly by or requiring the supervision of inpatient psychiatric facility personnel. In addition, the hospital records show that services furnished were intensive treatment services, admission or related services, or equivalent services.

## 2020-03-31 NOTE — BH NOTES
0730  Received report from MADINA RN. Assumed care of the patient. Pt in day [place] watching television this morning. Pt reports mood as fine. Pt denies anxiety/depression/SI/HI/AVH/pain. Pt reports last bm was yesterday.     0815  Pt in day [place] for breakfast    0852  Pt compliant with am medication    1000  Pt in day m[place]    1015  Pt in room; refusing group therapy participation at this time    1100  Pt in day [place]cl[place] for group therapy    1210  Pt in day [place] for lunch     1245  Pt asking if this writer is her nurse because she needs Trazodone; told her that I would pass along to the night nurse tonight that she wants it for tonight to help sleep    1315  Pt compliant w/Invega injection; pt asked if she still has to take oral meds; told pt she is scheduled to take risperdal as well; pt said she usually doesn't take oral meds when she gets an injection    [id number]  Pt in day room    1648  Pt compliant with evening meds    1700  Pt in day room for dinner    1800  Pt in day room watching television with peers    1830  Pt has been social, attended groups, and compliant with court ordered meds this shift

## 2020-03-31 NOTE — PROGRESS NOTES
Problem: Psychosis  Goal: *STG: Decreased hallucinations  Outcome: Progressing Towards Goal  Goal: *STG: Decreased delusional thinking  Outcome: Progressing Towards Goal  Goal: *STG: Remains safe in hospital  Outcome: Progressing Towards Goal  Goal: *STG: Participates in individual and group therapy  Outcome: Progressing Towards Goal

## 2020-03-31 NOTE — PROGRESS NOTES
OCCUPATIONAL THERAPY EVALUATION/DISCHARGE  Patient: Dinora Silvetsre (11 y.o. female)  Date: 3/31/2020  Primary Diagnosis: Schizoaffective disorder ([facility] [address] [F25.9]  Psychosis ([facility] [address] [F29]        Precautions:        ASSESSMENT  Based on the objective data described below, the patient presents with independence in basic ADL's and mobility, patient report of living situation differs from chart. Patient reporting she lives alone and brought herself to hospital, however per chart family brought her in. At this time feel she is likely at her baseline and recommend return with family. Discussed medication management/use of pillbox/benefits of taking as prescribed. No further needs at this time. Current Level of Function (ADLs/self-care): independent basic ADLs    Functional Outcome Measure: The patient scored 100/100 on the Barthel Index outcome measure   Other factors to consider for discharge:      PLAN :  Recommend with staff: facilitate participation in med management    Recommendation for discharge: (in order for the patient to meet his/her long term goals)  No skilled occupational therapy/ follow up rehabilitation needs identified at this time. This discharge recommendation:  Has not yet been discussed the attending provider and/or case management    IF patient discharges home will need the following DME: none       SUBJECTIVE:   Patient stated I do all that myself.  re: cooking, cleaning    OBJECTIVE DATA SUMMARY:   HISTORY:   Past Medical History:   Diagnosis Date    Asthma     Hepatitis C    History reviewed. No pertinent surgical history.     Prior Level of Function/Environment/Context: ? Per chart lives with family and stopped taking meds  Expanded or extensive additional review of patient history:   Home Situation  Home Environment: Private residence  One/Two Story Residence: Other (Comment)(pt refused to answer)  Living Alone: No(patient reports alone, chart reports living with family)  Support Systems: Other (comments)(pt refused to answer)  Patient Expects to be Discharged to[de-identified] Other (comment)(pt refused to answer)  Current DME Used/Available at Home: Other (comment)(pt refused to answer)      EXAMINATION OF PERFORMANCE DEFICITS:  Cognitive/Behavioral Status:  Neurologic State: Alert  Orientation Level: Oriented to person;Oriented to place  Cognition: Follows commands  Perception: Appears intact  Perseveration: No perseveration noted  Safety/Judgement: Awareness of environment;Decreased insight into deficits    Skin: intact    Edema: none noted    Hearing: Auditory  Auditory Impairment: None    Vision/Perceptual:                                     Range of Motion:  AROM: Within functional limits                         Strength:  Strength: Within functional limits                Coordination:  Coordination: Within functional limits  Fine Motor Skills-Upper: Left Intact; Right Intact    Gross Motor Skills-Upper: Left Intact; Right Intact    Tone & Sensation:  Tone: Normal  Sensation: Intact                      Balance:  Sitting: Intact  Standing: Intact    Functional Mobility and Transfers for ADLs:  Bed Mobility:  Rolling: Independent  Supine to Sit: Independent  Sit to Supine: Independent  Scooting: Independent    Transfers:  Sit to Stand: Independent  Stand to Sit: Independent  Bed to Chair: Independent  Bathroom Mobility: Independent  Toilet Transfer : Independent    ADL Assessment:  Feeding: Independent    Oral Facial Hygiene/Grooming: Independent    Bathing: Independent; Other (comment)(per nursing)    Upper Body Dressing: Independent    Lower Body Dressing: Independent    Toileting: Independent                ADL Intervention and task modifications:        Educated on use of pill box and benefit to increase adherence to taking meds as prescribed, educated on benefit of taking meds as prescribed to prevent re-admission and maintain independence          Cognitive Retraining  Safety/Judgement: Awareness of environment;Decreased insight into deficits       Functional Measure:  Barthel Index:    Bathin  Bladder: 10  Bowels: 10  Groomin  Dressing: 10  Feeding: 10  Mobility: 15  Stairs: 10  Toilet Use: 10  Transfer (Bed to Chair and Back): 15  Total: 100/100        The Barthel ADL Index: Guidelines  1. The index should be used as a record of what a patient does, not as a record of what a patient could do. 2. The main aim is to establish degree of independence from any help, physical or verbal, however minor and for whatever reason. 3. The need for supervision renders the patient not independent. 4. A patient's performance should be established using the best available evidence. Asking the patient, friends/relatives and nurses are the usual sources, but direct observation and common sense are also important. However direct testing is not needed. 5. Usually the patient's performance over the preceding 24-48 hours is important, but occasionally longer periods will be relevant. 6. Middle categories imply that the patient supplies over 50 per cent of the effort. 7. Use of aids to be independent is allowed. Zeny New., Barthel, D.W. (). Functional evaluation: the Barthel Index. [address] [facility] (14)2. Maria Ines Tatum casimiro ZORAN Rodriguez, Nicole Richardson., Martinez Stone., [place], [address] [place] [address] Ave (). Measuring the change indisability after inpatient rehabilitation; comparison of the responsiveness of the Barthel Index and Functional [place] Measure. Journal of Neurology, Neurosurgery, and Psychiatry, 66(4), 351-553. Briana Hines, N.J.A, PASTORA Núñez, & America Esteves MMIKE. (2004.) Assessment of post-stroke quality of life in cost-effectiveness studies: The usefulness of the Barthel Index and the EuroQoL-5D.  Quality of Life Research, 15, 527-         Occupational Therapy Evaluation Charge Determination   History Examination Decision-Making   LOW Complexity : Brief history review  LOW Complexity : 1-3 performance deficits relating to physical, cognitive , or psychosocial skils that result in activity limitations and / or participation restrictions  LOW Complexity : No comorbidities that affect functional and no verbal or physical assistance needed to complete eval tasks       Based on the above components, the patient evaluation is determined to be of the following complexity level: LOW   Pain Rating:  No complaint of    Activity Tolerance:   Good  Please refer to the flowsheet for vital signs taken during this treatment. After treatment patient left in no apparent distress:    Supine in bed    COMMUNICATION/EDUCATION:   The patients plan of care was discussed with: Physical therapist and Registered nurse.      Thank you for this referral.  Alejandro Youngblood OTR/L  Time Calculation: 10 mins

## 2020-04-01 PROCEDURE — 74011250637 HC RX REV CODE- 250/637: Performed by: PSYCHIATRY & NEUROLOGY

## 2020-04-01 PROCEDURE — 65220000003 HC RM SEMIPRIVATE PSYCH

## 2020-04-01 RX ADMIN — RISPERIDONE 2 MG: 1 TABLET ORAL at 08:22

## 2020-04-01 RX ADMIN — TRAZODONE HYDROCHLORIDE 50 MG: 50 TABLET ORAL at 21:31

## 2020-04-01 RX ADMIN — RISPERIDONE 2 MG: 1 TABLET ORAL at 16:21

## 2020-04-01 NOTE — PROGRESS NOTES
Tanya Plummer actively participated in Spirituality Group about \"Spirituality\" on 4/1/20 809 [address] ([id number])

## 2020-04-01 NOTE — PROGRESS NOTES
Problem: Non-Violent Restraints  Goal: *Removal from restraints as soon as assessed to be safe  Outcome: Resolved/Not Met  Goal: *No harm/injury to patient while restraints in use  Outcome: Resolved/Not Met  Goal: *Patient's dignity will be maintained  Outcome: Resolved/Not Met  Goal: *Patient Specific Goal (EDIT GOAL, INSERT TEXT)  Outcome: Resolved/Not Met  Goal: Non-violent Restaints:Standard Interventions  Outcome: Resolved/Not Met  Goal: Non-violent Restraints:Patient Interventions  Outcome: Resolved/Not Met  Goal: Patient/Family Education  Outcome: Resolved/Not Met

## 2020-04-01 NOTE — PROGRESS NOTES
Problem: Discharge Planning  Goal: *Discharge to safe environment  Outcome: Progressing Towards Goal  Note: Patient identifies home as a safe environment. Patient plans to return home upon discharge. Goal: *Knowledge of medication management  Outcome: Progressing Towards Goal  Note: Patient verbalizes limited understanding of medication regimen. Patient is taking medications as prescribed. Goal: *Knowledge of discharge instructions  Outcome: Progressing Towards Goal  Note: Patient verbalizes limited understanding of goals for treatment and safe discharge.

## 2020-04-01 NOTE — PROGRESS NOTES
Chief Complaint:  \"I am fine\"  Length of Stay: 20 Days    Interval History:  Patient is calm and pleasant during assessment. Denies SI/HI/AVH and denies any adverse reactions to her medications at this time. Patient endorses she slept well last night and did not have any issues or concerns at this time. Compliant with staff and her medications at this time. No aggression or behaviors currently noted. 3/30 - Domenico Patton reports feeling well and moods are good. Denies SI/HI/AH/VH. No aggression or violence. Appropriately interactive and aware. Tolerating medications well. Eating and sleeping fairly. States that she can care for herself and is interested in leaving soon. 3/31 - Domenico Patton reports feeling well and moods are good. Thinks her daughter Gio Sheth is dead however. States she  when patient was in [address] [place] [address]. Denies SI/HI/AH/VH. No aggression or violence. Appropriately interactive and aware. Tolerating medications well. Eating and sleeping fairly.  - Domenico Patton reports feeling well and moods are good. Denies SI/HI/AH/VH. No aggression or violence. Appropriately interactive and aware. Tolerating medications well. Eating and sleeping fairly. States note having family but has supportive friends. Took IM injection, but would prefer the PO version.     Past Medical History:  Past Medical History:   Diagnosis Date    Asthma     Hepatitis C            Labs:  Lab Results   Component Value Date/Time    WBC 5.1 2020 07:44 PM    HGB 11.9 2020 07:44 PM    HCT 35.8 2020 07:44 PM    PLATELET 938  07:44 PM    MCV 93.5 2020 07:44 PM      Lab Results   Component Value Date/Time    Sodium 141 2020 07:44 PM    Potassium 3.1 (L) 2020 07:44 PM    Chloride 107 2020 07:44 PM    CO2 22 2020 07:44 PM    Anion gap 12 2020 07:44 PM    Glucose 100 2020 07:44 PM    BUN 8 2020 07:44 PM    Creatinine 0.89 03/11/2020 07:44 PM    BUN/Creatinine ratio 9 (L) 03/11/2020 07:44 PM    GFR est AA >60 03/11/2020 07:44 PM    GFR est non-AA >60 03/11/2020 07:44 PM    Calcium 9.1 03/11/2020 07:44 PM    Bilirubin, total 0.3 03/22/2020 06:01 AM    AST (SGOT) 50 (H) 03/22/2020 06:01 AM    Alk.  phosphatase 84 03/22/2020 06:01 AM    Protein, total 6.8 03/22/2020 06:01 AM    Albumin 3.2 (L) 03/22/2020 06:01 AM    Globulin 3.6 03/22/2020 06:01 AM    A-G Ratio 0.9 (L) 03/22/2020 06:01 AM    ALT (SGPT) 22 03/22/2020 06:01 AM      Vitals:    03/30/20 1936 03/31/20 0757 03/31/20 1936 04/01/20 0720   BP: 119/56 129/86 118/56 111/55   Pulse: 80 65 94 72   Resp: 16 16 16 17   Temp: 98.8 °F (37.1 °C) 97.8 °F (36.6 °C) 98.6 °F (37 °C) 97.6 °F (36.4 °C)   SpO2: 100% 100% 98% 98%   Weight:       Height:             Current Facility-Administered Medications   Medication Dose Route Frequency Provider Last Rate Last Dose    risperiDONE (RisperDAL) tablet 2 mg  2 mg Oral BID Jacque Bui MD   2 mg at 04/01/20 3443    Or    haloperidol lactate (HALDOL) injection 10 mg  10 mg IntraMUSCular BID Jacque Bui MD   10 mg at 03/25/20 1717    OLANZapine (ZyPREXA) tablet 5 mg  5 mg Oral Q6H PRN Laz Ortiz MD        haloperidol lactate (HALDOL) injection 5 mg  5 mg IntraMUSCular Q6H PRN Laz Ortiz MD   5 mg at 03/15/20 0817    benztropine (COGENTIN) tablet 1 mg  1 mg Oral BID PRN Laz Ortiz MD        diphenhydrAMINE (BENADRYL) injection 50 mg  50 mg IntraMUSCular BID PRN Laz Ortiz MD        hydrOXYzine HCL (ATARAX) tablet 50 mg  50 mg Oral TID PRN Laz Ortiz MD        LORazepam (ATIVAN) injection 1 mg  1 mg IntraMUSCular Q4H PRN Laz Ortiz MD        traZODone (DESYREL) tablet 50 mg  50 mg Oral QHS PRN Laz Ortiz MD   50 mg at 03/31/20 2109    acetaminophen (TYLENOL) tablet 650 mg  650 mg Oral Q4H PRN Negrita Painting MD        magnesium hydroxide (MILK OF MAGNESIA) 400 mg/5 mL oral suspension 30 mL  30 mL Oral DAILY PRN Laz Ortiz MD        albuterol (PROVENTIL HFA, VENTOLIN HFA, PROAIR HFA) inhaler 1 Puff  1 Puff Inhalation Q4H PRN Tawanna Woodward MD             Mental Status Exam:  Eye contact: Fair  Grooming: fair  Psychomotor activity: calm  Speech is spontaneous  Mood is Pleasant  Affect: full range  Perception: Denies HI/AVH  Suicidal ideation: Denies SI or plan  Cognition is impaired due to mental health         Physical Exam:  Body habitus: Body mass index is 30.51 kg/m². Musculoskeletal system: normal gait  Tremor - neg  Cog wheeling - neg      Assessment and Plan:  Domenico Patton meets criteria for a diagnosis of Schizoaffective Disorder. Continue the medication regimen as prescribed  Collateral in formation  Disposition planning with social work for the next few days. I certify that this patients inpatient psychiatric hospital services furnished since the previous certification were, and continue to be, required for treatment that could reasonably be expected to improve the patient's condition, or for diagnostic study, and that the patient continues to need, on a daily basis, active treatment furnished directly by or requiring the supervision of inpatient psychiatric facility personnel. In addition, the hospital records show that services furnished were intensive treatment services, admission or related services, or equivalent services.

## 2020-04-01 NOTE — PROGRESS NOTES
Problem: Psychosis  Goal: *STG: Seeks staff when feelings of self harm or harm towards others arise  Outcome: Progressing Towards Goal

## 2020-04-01 NOTE — INTERDISCIPLINARY ROUNDS
Behavioral Health Interdisciplinary Rounds Patient Name: Soham Quinones  Age: 60 y.o. Room/Bed:  314/01 Primary [facility] Admission Status: Involuntary Commitment; Forced Medications Readmission within 30 days:  
Power of  in place:  
Patient requires a blocked bed:  
Reason for blocked bed: 
 
Sleep hours:  8 Participation in Care/Groups:   
Medication Compliant?: Yes PRNS (last 24 hours): Trazodone Restraints (last 24 hours):  
Substance Abuse:    
24 hour chart check complete:  Yes Patient goal(s) for today: Continue taking medications as prescribed; engage in unit activities Treatment team focus/goals: Continue medication regimen Progress note: Patient pleasant and talkative. Engaged with treatment team.  Still delusional that her daughter has passed away. Susanna Jimenez to discharge. Taking medications voluntarily. LOS:  20  Expected LOS: TBD Financial concerns/prescription coverage: Cydney Walker Family contact:  3/17 SW spoke to daughter Family requesting physician contact today:  No 
Discharge plan: Return home Access to weapons: No    
Outpatient provider(s): To be linked to new provider Patient's preferred phone number for follow up call: [phone] Participating treatment team members: Hussain Sotomayor MSW; Dr. Benjie Gannon MD

## 2020-04-01 NOTE — BH NOTES
GROUP THERAPY PROGRESS NOTE    Patient is participating in Discharge Group. Group time: 30 minutes    Personal goal for participation: Process feelings related to discharge and coping with anxiety    Goal orientation: Personal    Group therapy participation: active    Therapeutic interventions reviewed and discussed: Group discussion was focused on discharge plans and anxiety related to this. Group members discussed what they planned to do once discharge and discharge plans. Impression of participation: Herminio Johnson shared that she has a doctor she works with and prefers to continue working with him instead of getting a new psychiatrist. She reports being excited about planning to discharge on Friday.     Jannet Johnston LPC [facility]

## 2020-04-01 NOTE — BH NOTES
0730  Received report from CHETNA CORTEZ. Assumed care of the patient. Pt ambulating in room upon assessment. Pt reports her mood as \"alright. \"  Pt denies SI/HI/AVH/pain/anxiety/depression. Pt reports last bm was 2 days ago, which is normal for her.     0815  Pt in day [facility] eating breakfast     0822  Pt compliant with am medication    0915  Pt in day [facility] for am Spirituality Group    1030  Pt in day [facility] for group therapy    1215  Pt in day [facility] for lunch    [id number]  Pt in room    1515  Pt in day [facility] for afternoon group therapy and snack

## 2020-04-02 PROCEDURE — 74011250637 HC RX REV CODE- 250/637: Performed by: PSYCHIATRY & NEUROLOGY

## 2020-04-02 PROCEDURE — 65220000003 HC RM SEMIPRIVATE PSYCH

## 2020-04-02 RX ADMIN — TRAZODONE HYDROCHLORIDE 50 MG: 50 TABLET ORAL at 21:28

## 2020-04-02 RX ADMIN — RISPERIDONE 2 MG: 1 TABLET ORAL at 08:37

## 2020-04-02 RX ADMIN — RISPERIDONE 2 MG: 1 TABLET ORAL at 16:31

## 2020-04-02 NOTE — BH NOTES
GROUP THERAPY PROGRESS NOTE    Patient is participating in coping skills group. Group time: 45 minutes    Personal goal for participation: Learn coping skills to reduce negative emotions    Goal orientation: Personal    Group therapy participation: active    Therapeutic interventions reviewed and discussed: Group discussion on why being bored can be a problem and the consequences of boredom that patient have experienced. Patient discussed issues they have had with being bored and ways they have tried to combat boredom. This lead to discussion of coping skills for negative emotions and ways to feel better that each patient has tried or that they have heard of. Discussion of activities that help with boredom, challenges, potential solutions and plans. Impression of participation: Bobbi was present in group and very active. She continues to endorse walking and going to the brown as the best thing to help her keep from being bored.     Erika Bryan LPC [facility]

## 2020-04-02 NOTE — INTERDISCIPLINARY ROUNDS
Behavioral Health Interdisciplinary Rounds Patient Name: Will Nobles  Age: 60 y.o. Room/Bed:  326/01 Primary [facility]: [facility] Admission Status: Involuntary Commitment and Forced Medication Order Readmission within 30 days:  
Power of  in place:  
Patient requires a blocked bed: no           
Reason for blocked bed:  
Order for blocked bed obtained:     
 
Sleep hours: 8 Morning Labs completed per orders:        
Participation in Care/Groups:  yes Medication Compliant?: Yes PRNS (last 24 hours): Sleep Aid Restraints (last 24 hours):  no 
Substance Abuse:     
24 hour chart check complete: yes Patient goal(s) for today: Continue taking medications as prescribed; engage in unit activities Treatment team focus/goals: Continue medication regimen; MD to order 2nd WATERMAN Progress note: Patient is discharge-focused. She reports being fine and wanting to return home, but still delusional that her daughter has passed away. States she has no support on outpatient. LOS:  21  Expected LOS: TBD Financial concerns/prescription coverage: Cydney Walker Family contact: 3/17 SW spoke to daughter Family requesting physician contact today: No 
Discharge plan: Return home Access to weapons: No 
Outpatient provider(s): To be linked to new provider Patient's preferred phone number for follow up call: [phone] Participating treatment team members: Donnie Merlos MSW; Dr. Azalea Pan MD

## 2020-04-02 NOTE — PROGRESS NOTES
Chief Complaint:  \"I am fine\"  Length of Stay: 21 Days    Interval History:  Patient is calm and pleasant during assessment. Denies SI/HI/AVH and denies any adverse reactions to her medications at this time. Patient endorses she slept well last night and did not have any issues or concerns at this time. Compliant with staff and her medications at this time. No aggression or behaviors currently noted. 3/30 - Beth Allison reports feeling well and moods are good. Denies SI/HI/AH/VH. No aggression or violence. Appropriately interactive and aware. Tolerating medications well. Eating and sleeping fairly. States that she can care for herself and is interested in leaving soon. 3/31 - Beth Allison reports feeling well and moods are good. Thinks her daughter Jonathan Barnes is dead however. States she  when patient was in [address] [place] [address]. Denies SI/HI/AH/VH. No aggression or violence. Appropriately interactive and aware. Tolerating medications well. Eating and sleeping fairly.  - Beth Allison reports feeling well and moods are good. Denies SI/HI/AH/VH. No aggression or violence. Appropriately interactive and aware. Tolerating medications well. Eating and sleeping fairly. States note having family but has supportive friends. Took IM injection, but would prefer the PO version.  - Beth Allison reports feeling well and moods are good. Denies SI/HI/AH/VH. No aggression or violence. Appropriately interactive and aware. Tolerating medications well. Eating and sleeping fairly. Wants to leave tomorrow to home where she lives alone.     Past Medical History:  Past Medical History:   Diagnosis Date    Asthma     Hepatitis C            Labs:  Lab Results   Component Value Date/Time    WBC 5.1 2020 07:44 PM    HGB 11.9 2020 07:44 PM    HCT 35.8 2020 07:44 PM    PLATELET 112  07:44 PM    MCV 93.5 2020 07:44 PM      Lab Results Component Value Date/Time    Sodium 141 03/11/2020 07:44 PM    Potassium 3.1 (L) 03/11/2020 07:44 PM    Chloride 107 03/11/2020 07:44 PM    CO2 22 03/11/2020 07:44 PM    Anion gap 12 03/11/2020 07:44 PM    Glucose 100 03/11/2020 07:44 PM    BUN 8 03/11/2020 07:44 PM    Creatinine 0.89 03/11/2020 07:44 PM    BUN/Creatinine ratio 9 (L) 03/11/2020 07:44 PM    GFR est AA >60 03/11/2020 07:44 PM    GFR est non-AA >60 03/11/2020 07:44 PM    Calcium 9.1 03/11/2020 07:44 PM    Bilirubin, total 0.3 03/22/2020 06:01 AM    AST (SGOT) 50 (H) 03/22/2020 06:01 AM    Alk.  phosphatase 84 03/22/2020 06:01 AM    Protein, total 6.8 03/22/2020 06:01 AM    Albumin 3.2 (L) 03/22/2020 06:01 AM    Globulin 3.6 03/22/2020 06:01 AM    A-G Ratio 0.9 (L) 03/22/2020 06:01 AM    ALT (SGPT) 22 03/22/2020 06:01 AM      Vitals:    03/31/20 1936 04/01/20 0720 04/01/20 1948 04/02/20 0742   BP: 118/56 111/55 117/64 101/67   Pulse: 94 72 99 66   Resp: 16 17 17 16   Temp: 98.6 °F (37 °C) 97.6 °F (36.4 °C) 98.9 °F (37.2 °C) 98.1 °F (36.7 °C)   SpO2: 98% 98% 99% 98%   Weight:       Height:             Current Facility-Administered Medications   Medication Dose Route Frequency Provider Last Rate Last Dose    risperiDONE (RisperDAL) tablet 2 mg  2 mg Oral BID Saravanan Pickett MD   2 mg at 04/02/20 9295    Or    haloperidol lactate (HALDOL) injection 10 mg  10 mg IntraMUSCular BID Saravanan Pickett MD   10 mg at 03/25/20 1717    OLANZapine (ZyPREXA) tablet 5 mg  5 mg Oral Q6H PRN Laz Ortiz MD        haloperidol lactate (HALDOL) injection 5 mg  5 mg IntraMUSCular Q6H PRN Laz Ortiz MD   5 mg at 03/15/20 0817    benztropine (COGENTIN) tablet 1 mg  1 mg Oral BID PRN Laz Ortiz MD        diphenhydrAMINE (BENADRYL) injection 50 mg  50 mg IntraMUSCular BID PRN Laz Ortiz MD        hydrOXYzine HCL (ATARAX) tablet 50 mg  50 mg Oral TID PRN Laz Ortiz MD        LORazepam (ATIVAN) injection 1 mg  1 mg IntraMUSCular Q4H PRN Juanita Garvey MD        traZODone (DESYREL) tablet 50 mg  50 mg Oral QHS PRN Laz Ortiz MD   50 mg at 04/01/20 2131    acetaminophen (TYLENOL) tablet 650 mg  650 mg Oral Q4H PRN Laz Ortiz MD        magnesium hydroxide (MILK OF MAGNESIA) 400 mg/5 mL oral suspension 30 mL  30 mL Oral DAILY PRN Laz Ortiz MD        albuterol (PROVENTIL HFA, VENTOLIN HFA, PROAIR HFA) inhaler 1 Puff  1 Puff Inhalation Q4H PRN Aline Dennis MD             Mental Status Exam:  Eye contact: Fair  Grooming: fair  Psychomotor activity: calm  Speech is spontaneous  Mood is Pleasant  Affect: full range  Perception: Denies HI/AVH  Suicidal ideation: Denies SI or plan  Cognition is impaired due to mental health      Physical Exam:  Body habitus: Body mass index is 30.51 kg/m². Musculoskeletal system: normal gait  Tremor - neg  Cog wheeling - neg      Assessment and Plan:  Ru Bird meets criteria for a diagnosis of Schizoaffective Disorder. Continue the medication regimen as prescribed  Collateral in formation  Disposition planning with social work for the next few days. I certify that this patients inpatient psychiatric hospital services furnished since the previous certification were, and continue to be, required for treatment that could reasonably be expected to improve the patient's condition, or for diagnostic study, and that the patient continues to need, on a daily basis, active treatment furnished directly by or requiring the supervision of inpatient psychiatric facility personnel. In addition, the hospital records show that services furnished were intensive treatment services, admission or related services, or equivalent services.

## 2020-04-02 NOTE — PROGRESS NOTES
Problem: Psychosis  Goal: *STG: Remains safe in hospital  Outcome: Progressing Towards Goal  Goal: *STG: Seeks staff when feelings of self harm or harm towards others arise  Outcome: Progressing Towards Goal

## 2020-04-02 NOTE — BH NOTES
Patient has been medication compliant. Pleasant and cooperative. C/o insomnia, trazodone given per order and patient observed in bed resting quietly with eyes closed. No distress noted. Patient slept approx 8 hours.

## 2020-04-02 NOTE — PROGRESS NOTES
Problem: Psychosis  Goal: *STG: Remains safe in hospital  Outcome: Progressing Towards Goal     Problem: Psychosis  Goal: *STG/LTG: Complies with medication therapy  Outcome: Progressing Towards Goal

## 2020-04-02 NOTE — BH NOTES
0700-Report received from [address] [place] [address]. 0800-1000-Patient is alert and oriented x 4 she denies SI/HI at  This time  She denies auditory or visual hallucination her appetite is good, mood pleasant and cooperative she is medication compliant no issues note. [id number]- Patient in no acute distress she is in the dayroom watching TV at this time, she is in no acute distress. 1200-1400-Patient ate lunch then back to her room for quiet time, she is resting quietly at this time. [id number]- Patient out of room in dayroom active in group at this time. [id number]- Patient in no acute distress at this time she has been pleasant and cooperative this shift, states she is feeling much better and is  Wanting to go home,  Staff will continue to monitor at this time.

## 2020-04-03 PROCEDURE — 74011250637 HC RX REV CODE- 250/637: Performed by: PSYCHIATRY & NEUROLOGY

## 2020-04-03 PROCEDURE — 74011250636 HC RX REV CODE- 250/636: Performed by: PSYCHIATRY & NEUROLOGY

## 2020-04-03 PROCEDURE — 65220000003 HC RM SEMIPRIVATE PSYCH

## 2020-04-03 RX ADMIN — PALIPERIDONE PALMITATE 117 MG: 117 INJECTION INTRAMUSCULAR at 09:27

## 2020-04-03 RX ADMIN — TRAZODONE HYDROCHLORIDE 50 MG: 50 TABLET ORAL at 21:13

## 2020-04-03 NOTE — BH NOTES
Patient has been pleasant and cooperative. Medication compliant. No complaints voiced this shift. Observed in bed resting quietly with eyes closed. No distress noted. Patient slept approx 8 hours.

## 2020-04-03 NOTE — PROGRESS NOTES
Chief Complaint:  \"I am fine\"  Length of Stay: 22 Days    Interval History:  Patient is calm and pleasant during assessment. Denies SI/HI/AVH and denies any adverse reactions to her medications at this time. Patient endorses she slept well last night and did not have any issues or concerns at this time. Compliant with staff and her medications at this time. No aggression or behaviors currently noted. 3/30 - Deborra [facility] reports feeling well and moods are good. Denies SI/HI/AH/VH. No aggression or violence. Appropriately interactive and aware. Tolerating medications well. Eating and sleeping fairly. States that she can care for herself and is interested in leaving soon. 3/31 -  Joni reports feeling well and moods are good. Thinks her daughter Erendira Ray is dead however. States she  when patient was in [facility]. Denies SI/HI/AH/VH. No aggression or violence. Appropriately interactive and aware. Tolerating medications well. Eating and sleeping fairly.  [facility] reports feeling well and moods are good. Denies SI/HI/AH/VH. No aggression or violence. Appropriately interactive and aware. Tolerating medications well. Eating and sleeping fairly. States note having family but has supportive friends. Took IM injection, but would prefer the PO version.  [facility] reports feeling well and moods are good. Denies SI/HI/AH/VH. No aggression or violence. Appropriately interactive and aware. Tolerating medications well. Eating and sleeping fairly. Wants to leave tomorrow to home where she lives alone. 4/3 - Deborra [facility] reports feeling well and moods are good. Denies SI/HI/AH/VH. No aggression or violence. Appropriately interactive and aware. Tolerating medications well. Eating and sleeping alright and is requesting to go home soon. Has been living alone and she had no food in her house due to it spoiling.       Past Medical History:  Past Medical History:   Diagnosis Date    Asthma     Hepatitis C            Labs:  Lab Results   Component Value Date/Time    WBC 5.1 03/11/2020 07:44 PM    HGB 11.9 03/11/2020 07:44 PM    HCT 35.8 03/11/2020 07:44 PM    PLATELET 880 20/70/3928 07:44 PM    MCV 93.5 03/11/2020 07:44 PM      Lab Results   Component Value Date/Time    Sodium 141 03/11/2020 07:44 PM    Potassium 3.1 (L) 03/11/2020 07:44 PM    Chloride 107 03/11/2020 07:44 PM    CO2 22 03/11/2020 07:44 PM    Anion gap 12 03/11/2020 07:44 PM    Glucose 100 03/11/2020 07:44 PM    BUN 8 03/11/2020 07:44 PM    Creatinine 0.89 03/11/2020 07:44 PM    BUN/Creatinine ratio 9 (L) 03/11/2020 07:44 PM    GFR est AA >60 03/11/2020 07:44 PM    GFR est non-AA >60 03/11/2020 07:44 PM    Calcium 9.1 03/11/2020 07:44 PM    Bilirubin, total 0.3 03/22/2020 06:01 AM    AST (SGOT) 50 (H) 03/22/2020 06:01 AM    Alk.  phosphatase 84 03/22/2020 06:01 AM    Protein, total 6.8 03/22/2020 06:01 AM    Albumin 3.2 (L) 03/22/2020 06:01 AM    Globulin 3.6 03/22/2020 06:01 AM    A-G Ratio 0.9 (L) 03/22/2020 06:01 AM    ALT (SGPT) 22 03/22/2020 06:01 AM      Vitals:    04/01/20 1948 04/02/20 0742 04/02/20 1947 04/03/20 0813   BP: 117/64 101/67 115/62 95/60   Pulse: 99 66 90 84   Resp: 17 16 16 18   Temp: 98.9 °F (37.2 °C) 98.1 °F (36.7 °C) 98.4 °F (36.9 °C) 98.1 °F (36.7 °C)   SpO2: 99% 98% 98% 100%   Weight:       Height:             Current Facility-Administered Medications   Medication Dose Route Frequency Provider Last Rate Last Dose    [START ON 5/4/2020] paliperidone palmitate (INVEGA SUSTENNA) injection 117 mg  117 mg IntraMUSCular Q30D Davis Mahmood MD        OLANZapine (ZyPREXA) tablet 5 mg  5 mg Oral Q6H PRN Laz Ortiz MD        haloperidol lactate (HALDOL) injection 5 mg  5 mg IntraMUSCular Q6H PRN Laz Ortiz MD   5 mg at 03/15/20 0817    benztropine (COGENTIN) tablet 1 mg  1 mg Oral BID PRN Stevie Westfall MD  diphenhydrAMINE (BENADRYL) injection 50 mg  50 mg IntraMUSCular BID PRN Laz Ortiz MD        hydrOXYzine HCL (ATARAX) tablet 50 mg  50 mg Oral TID PRN Laz Ortiz MD        LORazepam (ATIVAN) injection 1 mg  1 mg IntraMUSCular Q4H PRN Laz Ortiz MD        traZODone (DESYREL) tablet 50 mg  50 mg Oral QHS PRN Laz Ortiz MD   50 mg at 04/02/20 2128    acetaminophen (TYLENOL) tablet 650 mg  650 mg Oral Q4H PRN Laz Ortiz MD        magnesium hydroxide (MILK OF MAGNESIA) 400 mg/5 mL oral suspension 30 mL  30 mL Oral DAILY PRN Laz Ortiz MD        albuterol (PROVENTIL HFA, VENTOLIN HFA, PROAIR HFA) inhaler 1 Puff  1 Puff Inhalation Q4H PRN Golden Blackmon MD             Mental Status Exam:  Eye contact: Fair  Grooming: fair  Psychomotor activity: calm  Speech is spontaneous  Mood is Pleasant  Affect: full range  Perception: Denies HI/AVH  Suicidal ideation: Denies SI or plan  Cognition is improving due to mental health      Physical Exam:  Body habitus: Body mass index is 30.51 kg/m². Musculoskeletal system: normal gait  Tremor - neg  Cog wheeling - neg      Assessment and Plan:  Kristen Luna meets criteria for a diagnosis of Schizoaffective Disorder. Continue the medication regimen as prescribed  Collateral in formation  Invega sustenna 117 mg IM Q 30 days  Disposition planning with social work for Monday    I certify that this patients inpatient psychiatric hospital services furnished since the previous certification were, and continue to be, required for treatment that could reasonably be expected to improve the patient's condition, or for diagnostic study, and that the patient continues to need, on a daily basis, active treatment furnished directly by or requiring the supervision of inpatient psychiatric facility personnel.  In addition, the hospital records show that services furnished were intensive treatment services, admission or related services, or equivalent services.

## 2020-04-03 NOTE — GROUP NOTE
IP  GROUP DOCUMENTATION INDIVIDUAL Group Therapy Note Date: 4/3/2020 Group Start Time: 1400 Group End Time: 2301 Group Topic: Reflection/Relaxation [facility] [place] [address] ACUTE [facility]HAV [facility]TH Destin Miller RN 
 
[facility] GROUP DOCUMENTATION GROUP Group Therapy Note Attendees: Relaxation and Stress Reduction Attendance: Attended group Patient's Goal: To share one personal experience with stress and a personal coping technique Interventions/techniques: Resident states that she enjoys watching television when something stressful happens. She listened to peers and was accepting of new interventions for stress management. Follows Directions: Yss Interactions: Interacts well with both peers and staff. Mental Status: Alert and orientedx4 Behavior/appearance: Within normal limits Goals Achieved: Yes Additional Notes:  ERNESTO Parr RN

## 2020-04-03 NOTE — BH NOTES
0700 Received report from night shift nurse. [id number] Pt ate breakfast in the day room. Pt attended group. Pt denies all psych symptoms and received her [place] injection today. [id number] Pt attended group and ate lunch in the day room. Pt interacts well with peers and staff. [id number] Pt rested in her room. [id number] Pt in and out of the day room. Pt interacted well with peers and staff. Ate dinner in the day room. No acute distress. Continue to monitor and provide support when needed.

## 2020-04-03 NOTE — BH NOTES
GROUP THERAPY PROGRESS NOTE    Patient is participating in Discharge Group. Group time: 30 minutes    Personal goal for participation: Process feelings related to discharge and coping with anxiety    Goal orientation: Personal    Group therapy participation: active    Therapeutic interventions reviewed and discussed: Group discussion was focused on discharge plans and anxiety related to this. Group members discussed what they planned to do once discharge and discharge plans. Impression of participation: Nazanin Velazquez shared that she is hopeful to leave today or Monday and is waiting to see her . She shared that she is not concerned about going home despite living alone and plans to stay home even before the stay at home order was issued by the [email or web address].     Fermín Merino LPC [facility]

## 2020-04-03 NOTE — BH NOTES
GROUP THERAPY PROGRESS NOTE    Patient is participating in Process group. Group time: 40 minutes    Personal goal for participation: To try new coping skills    Goal orientation: Personal    Group therapy participation: active    Therapeutic interventions reviewed and discussed: Group participating in a guided imagery activity designed for depression. Group members were asked to try to relax and follow along with the CD. Discussion of how each person felt during and after exercise. Impression of participation: Herminio Johnson shared that she is feeling more relaxed after the exercise. She was observed sitting calmly with her eyes closed at times and at others times she was observed looking outside or glancing out the door.     Jannet Johnston LPC [facility]

## 2020-04-03 NOTE — PROGRESS NOTES
Problem: Psychosis  Goal: *STG: Decreased hallucinations  Outcome: Progressing Towards Goal  Goal: *STG: Decreased delusional thinking  Outcome: Progressing Towards Goal  Goal: *STG: Remains safe in hospital  Outcome: Progressing Towards Goal  Goal: *STG: Seeks staff when feelings of self harm or harm towards others arise  Outcome: Progressing Towards Goal  Goal: *STG: Patient will verbalize areas in need of boundary recognition and limit setting  Outcome: Progressing Towards Goal  Goal: *STG: Accept constructive criticism without injury or isolation  Outcome: Progressing Towards Goal  Goal: *STG: Participates in individual and group therapy  Outcome: Progressing Towards Goal

## 2020-04-04 PROCEDURE — 65220000003 HC RM SEMIPRIVATE PSYCH

## 2020-04-04 PROCEDURE — 74011250637 HC RX REV CODE- 250/637: Performed by: PSYCHIATRY & NEUROLOGY

## 2020-04-04 RX ADMIN — ACETAMINOPHEN 650 MG: 325 TABLET ORAL at 21:13

## 2020-04-04 RX ADMIN — TRAZODONE HYDROCHLORIDE 50 MG: 50 TABLET ORAL at 21:14

## 2020-04-04 NOTE — BH NOTES
Patient without complaint this shift. She has been medication compliant. Pleasant and cooperative. Medication compliant. Observed in bed resting quietly with eyes closed. No distress noted. Patient slept approx 8 hours.

## 2020-04-04 NOTE — PROGRESS NOTES
Problem: Psychosis  Goal: *STG: Remains safe in hospital  Outcome: Progressing Towards Goal     Problem: Psychosis  Goal: *STG: Participates in individual and group therapy  Outcome: Progressing Towards Goal

## 2020-04-04 NOTE — PROGRESS NOTES
Chief Complaint:  \"I am fine\"  Length of Stay: 23 Days    Interval History:  Patient is calm and pleasant during assessment. Denies SI/HI/AVH and denies any adverse reactions to her medications at this time. Patient endorses she slept well last night and did not have any issues or concerns at this time. Compliant with staff and her medications at this time. No aggression or behaviors currently noted. 3/30 - Nanette Augustine reports feeling well and moods are good. Denies SI/HI/AH/VH. No aggression or violence. Appropriately interactive and aware. Tolerating medications well. Eating and sleeping fairly. States that she can care for herself and is interested in leaving soon. 3/31 - Nanette Augustine reports feeling well and moods are good. Thinks her daughter Lopez Vinson is dead however. States she  when patient was in [address]. Denies SI/HI/AH/VH. No aggression or violence. Appropriately interactive and aware. Tolerating medications well. Eating and sleeping fairly.  - Nanette Augustine reports feeling well and moods are good. Denies SI/HI/AH/VH. No aggression or violence. Appropriately interactive and aware. Tolerating medications well. Eating and sleeping fairly. States note having family but has supportive friends. Took IM injection, but would prefer the PO version.  - Nanette Augustine reports feeling well and moods are good. Denies SI/HI/AH/VH. No aggression or violence. Appropriately interactive and aware. Tolerating medications well. Eating and sleeping fairly. Wants to leave tomorrow to home where she lives alone. 4/3 - Nanette Augustine reports feeling well and moods are good. Denies SI/HI/AH/VH. No aggression or violence. Appropriately interactive and aware. Tolerating medications well. Eating and sleeping alright and is requesting to go home soon. Has been living alone and she had no food in her house due to it spoiling.        - no acute overnight events. Patient has been isolative, pleasant but hypoverbal. She remains compliant with medications, denies SI/HI, self care is poor but patient performs ADLs with prompting. She is otherwise calm and cooperative, slept 8 hours and got trazodone for insomnia. Past Medical History:  Past Medical History:   Diagnosis Date    Asthma     Hepatitis C            Labs:  Lab Results   Component Value Date/Time    WBC 5.1 03/11/2020 07:44 PM    HGB 11.9 03/11/2020 07:44 PM    HCT 35.8 03/11/2020 07:44 PM    PLATELET 309 78/08/4866 07:44 PM    MCV 93.5 03/11/2020 07:44 PM      Lab Results   Component Value Date/Time    Sodium 141 03/11/2020 07:44 PM    Potassium 3.1 (L) 03/11/2020 07:44 PM    Chloride 107 03/11/2020 07:44 PM    CO2 22 03/11/2020 07:44 PM    Anion gap 12 03/11/2020 07:44 PM    Glucose 100 03/11/2020 07:44 PM    BUN 8 03/11/2020 07:44 PM    Creatinine 0.89 03/11/2020 07:44 PM    BUN/Creatinine ratio 9 (L) 03/11/2020 07:44 PM    GFR est AA >60 03/11/2020 07:44 PM    GFR est non-AA >60 03/11/2020 07:44 PM    Calcium 9.1 03/11/2020 07:44 PM    Bilirubin, total 0.3 03/22/2020 06:01 AM    AST (SGOT) 50 (H) 03/22/2020 06:01 AM    Alk.  phosphatase 84 03/22/2020 06:01 AM    Protein, total 6.8 03/22/2020 06:01 AM    Albumin 3.2 (L) 03/22/2020 06:01 AM    Globulin 3.6 03/22/2020 06:01 AM    A-G Ratio 0.9 (L) 03/22/2020 06:01 AM    ALT (SGPT) 22 03/22/2020 06:01 AM      Vitals:    04/02/20 1947 04/03/20 0813 04/03/20 1924 04/04/20 0859   BP: 115/62 95/60 100/75 116/66   Pulse: 90 84 80 85   Resp: 16 18 16 16   Temp: 98.4 °F (36.9 °C) 98.1 °F (36.7 °C) 97.8 °F (36.6 °C) 98.4 °F (36.9 °C)   SpO2: 98% 100% 99% 100%   Weight:       Height:             Current Facility-Administered Medications   Medication Dose Route Frequency Provider Last Rate Last Dose    [START ON 5/4/2020] paliperidone palmitate (INVEGA SUSTENNA) injection 117 mg  117 mg IntraMUSCular Q30D Kym Blue MD        OLANZapine (ZyPREXA) tablet 5 mg  5 mg Oral Q6H PRN Laz Ortiz MD        haloperidol lactate (HALDOL) injection 5 mg  5 mg IntraMUSCular Q6H PRN Laz Ortiz MD   5 mg at 03/15/20 0817    benztropine (COGENTIN) tablet 1 mg  1 mg Oral BID PRN Laz Ortiz MD        diphenhydrAMINE (BENADRYL) injection 50 mg  50 mg IntraMUSCular BID PRN Laz Ortiz MD        hydrOXYzine HCL (ATARAX) tablet 50 mg  50 mg Oral TID PRN Laz Ortiz MD        LORazepam (ATIVAN) injection 1 mg  1 mg IntraMUSCular Q4H PRN Laz Ortiz MD        traZODone (DESYREL) tablet 50 mg  50 mg Oral QHS PRN Laz Ortiz MD   50 mg at 04/03/20 2113    acetaminophen (TYLENOL) tablet 650 mg  650 mg Oral Q4H PRN Laz Ortiz MD        magnesium hydroxide (MILK OF MAGNESIA) 400 mg/5 mL oral suspension 30 mL  30 mL Oral DAILY PRN Laz Ortiz MD        albuterol (PROVENTIL HFA, VENTOLIN HFA, PROAIR HFA) inhaler 1 Puff  1 Puff Inhalation Q4H PRN Beck Dale MD             Mental Status Exam:  Eye contact: Fair  Grooming: fair  Psychomotor activity: calm  Speech is non spontaneous  Content is poverty of content  Mood is Pleasant  Affect: full range  Perception: Denies HI/AVH  Suicidal ideation: Denies SI or plan  Cognition is improving due to mental health      Physical Exam:  Body habitus: Body mass index is 30.51 kg/m². Musculoskeletal system: normal gait  Tremor - neg  Cog wheeling - neg      Assessment and Plan:  Rosetta Urena meets criteria for a diagnosis of Schizoaffective Disorder.   Continue the medication regimen as prescribed  Collateral in formation  Invega sustenna 117 mg IM Q 30 days  Disposition planning with social work for Monday    I certify that this patients inpatient psychiatric hospital services furnished since the previous certification were, and continue to be, required for treatment that could reasonably be expected to improve the patient's condition, or for diagnostic study, and that the patient continues to need, on a daily basis, active treatment furnished directly by or requiring the supervision of inpatient psychiatric facility personnel. In addition, the hospital records show that services furnished were intensive treatment services, admission or related services, or equivalent services.

## 2020-04-05 PROCEDURE — 74011250637 HC RX REV CODE- 250/637: Performed by: PSYCHIATRY & NEUROLOGY

## 2020-04-05 PROCEDURE — 65220000003 HC RM SEMIPRIVATE PSYCH

## 2020-04-05 RX ADMIN — ACETAMINOPHEN 650 MG: 325 TABLET ORAL at 21:08

## 2020-04-05 RX ADMIN — TRAZODONE HYDROCHLORIDE 50 MG: 50 TABLET ORAL at 21:08

## 2020-04-05 NOTE — PROGRESS NOTES
Chief Complaint:  \"I am fine\"  Length of Stay: 24 Days    Interval History:  Patient is calm and pleasant during assessment. Denies SI/HI/AVH and denies any adverse reactions to her medications at this time. Patient endorses she slept well last night and did not have any issues or concerns at this time. Compliant with staff and her medications at this time. No aggression or behaviors currently noted. 3/30 - Maria Hamm reports feeling well and moods are good. Denies SI/HI/AH/VH. No aggression or violence. Appropriately interactive and aware. Tolerating medications well. Eating and sleeping fairly. States that she can care for herself and is interested in leaving soon. 3/31 - Maria Hamm reports feeling well and moods are good. Thinks her daughter Armando Merino is dead however. States she  when patient was in [address]. Denies SI/HI/AH/VH. No aggression or violence. Appropriately interactive and aware. Tolerating medications well. Eating and sleeping fairly.  - Maria Hamm reports feeling well and moods are good. Denies SI/HI/AH/VH. No aggression or violence. Appropriately interactive and aware. Tolerating medications well. Eating and sleeping fairly. States note having family but has supportive friends. Took IM injection, but would prefer the PO version.  - Maria Hamm reports feeling well and moods are good. Denies SI/HI/AH/VH. No aggression or violence. Appropriately interactive and aware. Tolerating medications well. Eating and sleeping fairly. Wants to leave tomorrow to home where she lives alone. 4/3 - Maria Hamm reports feeling well and moods are good. Denies SI/HI/AH/VH. No aggression or violence. Appropriately interactive and aware. Tolerating medications well. Eating and sleeping alright and is requesting to go home soon. Has been living alone and she had no food in her house due to it spoiling.        - no acute overnight events. Patient has been isolative, pleasant but hypoverbal. She remains compliant with medications, denies SI/HI, self care is poor but patient performs ADLs with prompting. She is otherwise calm and cooperative, slept 8 hours and got trazodone for insomnia. 4/5 - overnight, the patient spiked a fever to 100.9, given Tylenol with good effect. She remains visible, medication compliant, denies SI/HI/AVH; she continues to have a poverty of speech but is pleasant on interview; ADLs remains poor. Past Medical History:  Past Medical History:   Diagnosis Date    Asthma     Hepatitis C            Labs:  Lab Results   Component Value Date/Time    WBC 5.1 03/11/2020 07:44 PM    HGB 11.9 03/11/2020 07:44 PM    HCT 35.8 03/11/2020 07:44 PM    PLATELET 241 60/17/8897 07:44 PM    MCV 93.5 03/11/2020 07:44 PM      Lab Results   Component Value Date/Time    Sodium 141 03/11/2020 07:44 PM    Potassium 3.1 (L) 03/11/2020 07:44 PM    Chloride 107 03/11/2020 07:44 PM    CO2 22 03/11/2020 07:44 PM    Anion gap 12 03/11/2020 07:44 PM    Glucose 100 03/11/2020 07:44 PM    BUN 8 03/11/2020 07:44 PM    Creatinine 0.89 03/11/2020 07:44 PM    BUN/Creatinine ratio 9 (L) 03/11/2020 07:44 PM    GFR est AA >60 03/11/2020 07:44 PM    GFR est non-AA >60 03/11/2020 07:44 PM    Calcium 9.1 03/11/2020 07:44 PM    Bilirubin, total 0.3 03/22/2020 06:01 AM    AST (SGOT) 50 (H) 03/22/2020 06:01 AM    Alk.  phosphatase 84 03/22/2020 06:01 AM    Protein, total 6.8 03/22/2020 06:01 AM    Albumin 3.2 (L) 03/22/2020 06:01 AM    Globulin 3.6 03/22/2020 06:01 AM    A-G Ratio 0.9 (L) 03/22/2020 06:01 AM    ALT (SGPT) 22 03/22/2020 06:01 AM      Vitals:    04/04/20 1945 04/04/20 2200 04/05/20 0755 04/05/20 1412   BP: 128/54  104/62    Pulse: 86 86 72    Resp: 16  18    Temp: (!) 100.9 °F (38.3 °C) 98.9 °F (37.2 °C) 97.9 °F (36.6 °C) 98 °F (36.7 °C)   SpO2:   100%    Weight:       Height:             Current Facility-Administered Medications Medication Dose Route Frequency Provider Last Rate Last Dose    [START ON 5/4/2020] paliperidone palmitate (INVEGA SUSTENNA) injection 117 mg  117 mg IntraMUSCular Q30D Andre House MD        OLANZapine (ZyPREXA) tablet 5 mg  5 mg Oral Q6H PRN Laz Ortiz MD        haloperidol lactate (HALDOL) injection 5 mg  5 mg IntraMUSCular Q6H PRN Laz Ortiz MD   5 mg at 03/15/20 0817    benztropine (COGENTIN) tablet 1 mg  1 mg Oral BID PRN Laz Ortiz MD        diphenhydrAMINE (BENADRYL) injection 50 mg  50 mg IntraMUSCular BID PRN Laz Ortiz MD        hydrOXYzine HCL (ATARAX) tablet 50 mg  50 mg Oral TID PRN Laz Ortiz MD        LORazepam (ATIVAN) injection 1 mg  1 mg IntraMUSCular Q4H PRN Laz Ortiz MD        traZODone (DESYREL) tablet 50 mg  50 mg Oral QHS PRN Laz Ortiz MD   50 mg at 04/04/20 2114    acetaminophen (TYLENOL) tablet 650 mg  650 mg Oral Q4H PRN Laz Ortiz MD   650 mg at 04/04/20 2113    magnesium hydroxide (MILK OF MAGNESIA) 400 mg/5 mL oral suspension 30 mL  30 mL Oral DAILY PRN Laz Ortiz MD        albuterol (PROVENTIL HFA, VENTOLIN HFA, PROAIR HFA) inhaler 1 Puff  1 Puff Inhalation Q4H PRN Tawanna Woodward MD             Mental Status Exam:  Eye contact: Fair  Grooming: fair  Psychomotor activity: calm  Speech is non spontaneous  Content is poverty of content  Mood is Pleasant  Affect: full range  Perception: Denies HI/AVH  Suicidal ideation: Denies SI or plan  Cognition is improving due to mental health      Physical Exam:  Body habitus: Body mass index is 30.51 kg/m². Musculoskeletal system: normal gait  Tremor - neg  Cog wheeling - neg      Assessment and Plan:  Domenico Patton meets criteria for a diagnosis of Schizoaffective Disorder.   Continue the medication regimen as prescribed  Collateral in formation  Invega sustenna 117 mg IM Q 30 days  Disposition planning with social work for Monday    I certify that this patients inpatient psychiatric hospital services furnished since the previous certification were, and continue to be, required for treatment that could reasonably be expected to improve the patient's condition, or for diagnostic study, and that the patient continues to need, on a daily basis, active treatment furnished directly by or requiring the supervision of inpatient psychiatric facility personnel. In addition, the hospital records show that services furnished were intensive treatment services, admission or related services, or equivalent services.

## 2020-04-05 NOTE — GROUP NOTE
DORETHA  GROUP DOCUMENTATION INDIVIDUAL Group Therapy Note Date: 4/5/2020 Group Start Time: 5647 Group End Time: 6418 Group Topic: Discharge Planning [facility] Arabella COYNE  GROUP DOCUMENTATION GROUP Group Therapy Note Attendees: 5 Attendance: Attended Patient's Goal:  Patient will complete  the \"Flower of Doom\" worksheet to identify how behaviors are a description of underlying issues, what feeds the behaviors, and what feeds the underlying disorders and maladaptive thought processes. Patient will be able to identify what has caused and \"fed\" their maladaptive habits and behaviors to better understand how they can move forward after discharge. Interventions/techniques: Informed, Validated, Promoted peer support, Provide feedback and Reinforced Follows Directions: Did not follow directions Interactions: Interacted appropriately Mental Status: Blunted and Calm Behavior/appearance: Attentive Goals Achieved: Able to listen to others Additional Notes:  Patient did not participate in group discussion, and had a hard time staying awake during group. Patient did not complete the exercise, but did appear to be listening to the discussion. Loy Bang

## 2020-04-05 NOTE — PROGRESS NOTES
Assumed care of patient after report this am. Patient pleasant. No changes in behavior.      Problem: Psychosis  Goal: *STG: Patient will develop strategies to regulate emotions and corresponding behaviors  Outcome: Progressing Towards Goal  Goal: *STG: Patient will verbalize issues that get in their way of progress (i.e.: anger, fear etc.)  Outcome: Progressing Towards Goal  Goal: *STG/LTG: Demonstrates improved social functioning by responding appropriately to staff  Outcome: Progressing Towards Goal

## 2020-04-05 NOTE — PROGRESS NOTES
Problem: Psychosis  Goal: *STG: Decreased hallucinations  Outcome: Progressing Towards Goal     Problem: Psychosis  Goal: *STG: Decreased delusional thinking  Outcome: Progressing Towards Goal     Problem: Psychosis  Goal: *STG: Seeks staff when feelings of self harm or harm towards others arise  Outcome: Progressing Towards Goal     Problem: Psychosis  Goal: *STG: Patient will develop strategies to regulate emotions and corresponding behaviors  Outcome: Progressing Towards Goal    1900- Report given by Cammy Schlatter C. And I assume care of the patient. Patient is now sitting in the dayroom watching TV.    2010- Patient has a temp of 100.9, patient denies pain,fever,coughing and sore throat. Patient is in the dayroom now eating snacks and asymptomatic. Patient denies SI/HI/AH/VH.     2100- Patient given Tylenol for temp and Trazodone for sleep. 2130- Patient is sitting in the dayroom playing cards and watching TV by herself. 2205-Patient temp is now 98.9 and the patient went to bed. [id number]- Patient has been asleep in her bed.    0600- Patient slept approx. 8.5 hrs.

## 2020-04-05 NOTE — GROUP NOTE
[facility] GROUP DOCUMENTATION INDIVIDUAL Group Therapy Note Date: 4/4/2020 Group Start Time: 8617 Group End Time: 1529 Group Topic: Process Group - Inpatient [facility] Riley Jay [facility] GROUP DOCUMENTATION GROUP Group Therapy Note Attendees: 5 Attendance: Attended Patient's Goal:  Patient will complete the \"Personal Pie\" exercise and describe how they complete the sections of Relationships, Mental health, Physical Health, Community, Spirituality, Leisure, Work, and Recovery. Patient will be able to identify areas of growth and areas that have \"taken over the pie\" Interventions/techniques: Informed, Validated and Promoted peer support Follows Directions: Followed directions Interactions: Interacted appropriately Mental Status: Calm and Congruent Behavior/appearance: Attentive Goals Achieved: Able to engage in interactions and Able to listen to others Additional Notes:  Patient was quiet during group discussion but interacted when prompted. Patient stated that she tires to keep thing with her family as low stress as possible. Patient stated that her spirituality was important to her. Gerg Bullock

## 2020-04-06 VITALS
SYSTOLIC BLOOD PRESSURE: 93 MMHG | BODY MASS INDEX: 30.64 KG/M2 | RESPIRATION RATE: 14 BRPM | WEIGHT: 146 LBS | OXYGEN SATURATION: 99 % | DIASTOLIC BLOOD PRESSURE: 56 MMHG | HEIGHT: 58 IN | TEMPERATURE: 98 F | HEART RATE: 73 BPM

## 2020-04-06 RX ORDER — OLANZAPINE 5 MG/1
5 TABLET ORAL
Qty: 30 TAB | Refills: 0 | Status: ON HOLD | OUTPATIENT
Start: 2020-04-06 | End: 2022-04-21

## 2020-04-06 RX ORDER — TRAZODONE HYDROCHLORIDE 50 MG/1
50 TABLET ORAL
Qty: 30 TAB | Refills: 0 | Status: ON HOLD | OUTPATIENT
Start: 2020-04-06 | End: 2022-04-21

## 2020-04-06 NOTE — BH NOTES
Met face to face with patient today. Nazanin Velazquez reports she is doing well. She denies any issues. She reports being excited that she is leaving today and going to be home. She shared she plans to relax and stay home and plans to continue taking her medications and follow the follow up plans set up by the  for her.     Fermín Merino [facility]

## 2020-04-06 NOTE — PROGRESS NOTES
Chief Complaint:  \"I am fine\"  Length of Stay: 25 Days    Interval History:  Patient is calm and pleasant during assessment. Denies SI/HI/AVH and denies any adverse reactions to her medications at this time. Patient endorses she slept well last night and did not have any issues or concerns at this time. Compliant with staff and her medications at this time. No aggression or behaviors currently noted. 3/30 - Edilson Watson reports feeling well and moods are good. Denies SI/HI/AH/VH. No aggression or violence. Appropriately interactive and aware. Tolerating medications well. Eating and sleeping fairly. States that she can care for herself and is interested in leaving soon. 3/31 - Edilson Watson reports feeling well and moods are good. Thinks her daughter Skinny Mortensen is dead however. States she  when patient was in [address]. Denies SI/HI/AH/VH. No aggression or violence. Appropriately interactive and aware. Tolerating medications well. Eating and sleeping fairly.  - Edilson Watson reports feeling well and moods are good. Denies SI/HI/AH/VH. No aggression or violence. Appropriately interactive and aware. Tolerating medications well. Eating and sleeping fairly. States note having family but has supportive friends. Took IM injection, but would prefer the PO version.  - Edilson Watson reports feeling well and moods are good. Denies SI/HI/AH/VH. No aggression or violence. Appropriately interactive and aware. Tolerating medications well. Eating and sleeping fairly. Wants to leave tomorrow to home where she lives alone. 4/3 - Edilson Watson reports feeling well and moods are good. Denies SI/HI/AH/VH. No aggression or violence. Appropriately interactive and aware. Tolerating medications well. Eating and sleeping alright and is requesting to go home soon. Has been living alone and she had no food in her house due to it spoiling.        - no acute overnight events. Patient has been isolative, pleasant but hypoverbal. She remains compliant with medications, denies SI/HI, self care is poor but patient performs ADLs with prompting. She is otherwise calm and cooperative, slept 8 hours and got trazodone for insomnia. 4/5 - overnight, the patient spiked a fever to 100.9, given Tylenol with good effect. She remains visible, medication compliant, denies SI/HI/AVH; she continues to have a poverty of speech but is pleasant on interview; ADLs remains poor. 4/6 Flakito List reported feeling well today. She seems to be at her baseline level of functioning. He denies any suicidal or homicidal thoughts. The safety plan was reviewed. She will be discharged today. Prescriptions provided. Past Medical History:  Past Medical History:   Diagnosis Date    Asthma     Hepatitis C            Labs:  Lab Results   Component Value Date/Time    WBC 5.1 03/11/2020 07:44 PM    HGB 11.9 03/11/2020 07:44 PM    HCT 35.8 03/11/2020 07:44 PM    PLATELET 990 04/60/1715 07:44 PM    MCV 93.5 03/11/2020 07:44 PM      Lab Results   Component Value Date/Time    Sodium 141 03/11/2020 07:44 PM    Potassium 3.1 (L) 03/11/2020 07:44 PM    Chloride 107 03/11/2020 07:44 PM    CO2 22 03/11/2020 07:44 PM    Anion gap 12 03/11/2020 07:44 PM    Glucose 100 03/11/2020 07:44 PM    BUN 8 03/11/2020 07:44 PM    Creatinine 0.89 03/11/2020 07:44 PM    BUN/Creatinine ratio 9 (L) 03/11/2020 07:44 PM    GFR est AA >60 03/11/2020 07:44 PM    GFR est non-AA >60 03/11/2020 07:44 PM    Calcium 9.1 03/11/2020 07:44 PM    Bilirubin, total 0.3 03/22/2020 06:01 AM    AST (SGOT) 50 (H) 03/22/2020 06:01 AM    Alk.  phosphatase 84 03/22/2020 06:01 AM    Protein, total 6.8 03/22/2020 06:01 AM    Albumin 3.2 (L) 03/22/2020 06:01 AM    Globulin 3.6 03/22/2020 06:01 AM    A-G Ratio 0.9 (L) 03/22/2020 06:01 AM    ALT (SGPT) 22 03/22/2020 06:01 AM      Vitals:    04/05/20 0755 04/05/20 1412 04/05/20 1925 04/06/20 1536 BP: 104/62  116/61 93/56   Pulse: 72  79 73   Resp: 18  18 14   Temp: 97.9 °F (36.6 °C) 98 °F (36.7 °C) 99.2 °F (37.3 °C) 98 °F (36.7 °C)   SpO2: 100%  100% 99%   Weight:       Height:             Current Facility-Administered Medications   Medication Dose Route Frequency Provider Last Rate Last Dose    [START ON 5/4/2020] paliperidone palmitate (INVEGA SUSTENNA) injection 117 mg  117 mg IntraMUSCular Q30D Alma Delia Dominguez MD        OLANZapine (ZyPREXA) tablet 5 mg  5 mg Oral Q6H PRN Laz Ortiz MD        haloperidol lactate (HALDOL) injection 5 mg  5 mg IntraMUSCular Q6H PRN Laz Ortiz MD   5 mg at 03/15/20 0817    benztropine (COGENTIN) tablet 1 mg  1 mg Oral BID PRN Laz Ortiz MD        diphenhydrAMINE (BENADRYL) injection 50 mg  50 mg IntraMUSCular BID PRN Laz Ortiz MD        hydrOXYzine HCL (ATARAX) tablet 50 mg  50 mg Oral TID PRN Laz Ortiz MD        LORazepam (ATIVAN) injection 1 mg  1 mg IntraMUSCular Q4H PRN Laz Ortiz MD        traZODone (DESYREL) tablet 50 mg  50 mg Oral QHS PRN Laz Ortiz MD   50 mg at 04/05/20 2108    acetaminophen (TYLENOL) tablet 650 mg  650 mg Oral Q4H PRN Laz Ortiz MD   650 mg at 04/05/20 2108    magnesium hydroxide (MILK OF MAGNESIA) 400 mg/5 mL oral suspension 30 mL  30 mL Oral DAILY PRN Laz Ortiz MD        albuterol (PROVENTIL HFA, VENTOLIN HFA, PROAIR HFA) inhaler 1 Puff  1 Puff Inhalation Q4H PRN Edilson Brenner MD         Current Outpatient Medications   Medication Sig Dispense Refill    traZODone (DESYREL) 50 mg tablet Take 1 Tab by mouth nightly as needed for Sleep (For insomnia). Indications: insomnia associated with depression 30 Tab 0    OLANZapine (ZyPREXA) 5 mg tablet Take 1 Tab by mouth every six (6) hours as needed for Other (For agitation or psychosis).  Indications: bipolar disorder in remission, additional medications to treat depression 30 Tab 0    [START ON 5/4/2020] paliperidone palmitate (INVEGA SUSTENNA) 117 mg/0.75 mL syrg injection 0.75 mL by IntraMUSCular route every thirty (30) days. Indications: schizophrenia with mood changes 1 Syringe 0    traZODone (DESYREL) 100 mg tablet Take 100 mg by mouth nightly. Indications: insomnia      albuterol (PROVENTIL HFA, VENTOLIN HFA, PROAIR HFA) 90 mcg/actuation inhaler Take 2 Puffs by inhalation three (3) times daily as needed for Wheezing or Shortness of Breath. Mental Status Exam:  Eye contact: Fair  Grooming: fair  Psychomotor activity: calm  Speech is non spontaneous  Content is poverty of content  Mood is Pleasant  Affect: full range  Perception: Denies HI/AVH  Suicidal ideation: Denies SI or plan  Cognition is improving due to mental health      Physical Exam:  Body habitus: Body mass index is 30.51 kg/m². Musculoskeletal system: normal gait  Tremor - neg  Cog wheeling - neg      Assessment and Plan:  Sunday Seth meets criteria for a diagnosis of Schizoaffective Disorder. Discharge today. I certify that this patients inpatient psychiatric hospital services furnished since the previous certification were, and continue to be, required for treatment that could reasonably be expected to improve the patient's condition, or for diagnostic study, and that the patient continues to need, on a daily basis, active treatment furnished directly by or requiring the supervision of inpatient psychiatric facility personnel. In addition, the hospital records show that services furnished were intensive treatment services, admission or related services, or equivalent services.

## 2020-04-06 NOTE — BH NOTES
Behavioral Health Transition Record to Provider    Patient Name: Arletta Boeck  YOB: 1960  Medical Record Number: [id number]  Date of Admission: 3/11/2020  Date of Discharge: 4/6/2020    Attending Provider: Mildred Delgadillo MD  Discharging Provider: Kiana Mosley MD  To contact this individual call [phone] and ask the  to page. If unavailable, ask to be transferred to [facility] Provider on call. [facility] Provider will be available on call 24/7 and during holidays. Primary Care Provider: None    No Known Allergies    Reason for Admission: This is a 79-year-old [facility] female with history of schizoaffective disorder, brought to the emergency department with auditory hallucinations, noncompliance with medications, acting bizarre, odd, and indifferent to her family. They called 911. When EMS came, she was locked in the bathroom, combative, and disorganized. She is a poor historian. The aforementioned is gathered from records. In the interview, she just says \"leave me alone\" and appears to swat at the air and look out the window, somewhat restless, pacing, and disorganized appearing, uncooperative with interview.     Admission Diagnosis: Schizoaffective disorder ([facility] [F25.9]  Psychosis ([facility] [F29]    * No surgery found *    Results for orders placed or performed during the hospital encounter of 37/21/50   METABOLIC PANEL, BASIC   Result Value Ref Range    Sodium 141 136 - 145 mmol/L    Potassium 3.1 (L) 3.5 - 5.1 mmol/L    Chloride 107 97 - 108 mmol/L    CO2 22 21 - 32 mmol/L    Anion gap 12 5 - 15 mmol/L    Glucose 100 65 - 100 mg/dL    BUN 8 6 - 20 MG/DL    Creatinine 0.89 0.55 - 1.02 MG/DL    BUN/Creatinine ratio 9 (L) 12 - 20      GFR est AA >60 >60 ml/min/1.73m2    GFR est non-AA >60 >60 ml/min/1.73m2    Calcium 9.1 8.5 - 10.1 MG/DL   CBC WITH AUTOMATED DIFF   Result Value Ref Range    WBC 5.1 3.6 - 11.0 K/uL    RBC 3.83 3.80 - 5.20 M/uL HGB 11.9 11.5 - 16.0 g/dL    HCT 35.8 35.0 - 47.0 %    MCV 93.5 80.0 - 99.0 FL    MCH 31.1 26.0 - 34.0 PG    MCHC 33.2 30.0 - 36.5 g/dL    RDW 12.2 11.5 - 14.5 %    PLATELET 238 612 - 354 K/uL    MPV 8.9 8.9 - 12.9 FL    NRBC 0.0 0  WBC    ABSOLUTE NRBC 0.00 0.00 - 0.01 K/uL    NEUTROPHILS 55 32 - 75 %    LYMPHOCYTES 34 12 - 49 %    MONOCYTES 9 5 - 13 %    EOSINOPHILS 1 0 - 7 %    BASOPHILS 0 0 - 1 %    IMMATURE GRANULOCYTES 1 (H) 0.0 - 0.5 %    ABS. NEUTROPHILS 2.8 1.8 - 8.0 K/UL    ABS. LYMPHOCYTES 1.7 0.8 - 3.5 K/UL    ABS. MONOCYTES 0.5 0.0 - 1.0 K/UL    ABS. EOSINOPHILS 0.0 0.0 - 0.4 K/UL    ABS. BASOPHILS 0.0 0.0 - 0.1 K/UL    ABS. IMM.  GRANS. 0.1 (H) 0.00 - 0.04 K/UL    DF AUTOMATED     URINALYSIS W/ RFLX MICROSCOPIC   Result Value Ref Range    Color YELLOW/STRAW      Appearance CLOUDY (A) CLEAR      Specific gravity 1.015 1.003 - 1.030      pH (UA) 6.0 5.0 - 8.0      Protein NEGATIVE  NEG mg/dL    Glucose NEGATIVE  NEG mg/dL    Ketone TRACE (A) NEG mg/dL    Bilirubin NEGATIVE  NEG      Blood NEGATIVE  NEG      Urobilinogen 1.0 0.2 - 1.0 EU/dL    Nitrites NEGATIVE  NEG      Leukocyte Esterase TRACE (A) NEG     DRUG SCREEN, URINE   Result Value Ref Range    AMPHETAMINES NEGATIVE  NEG      BARBITURATES NEGATIVE  NEG      BENZODIAZEPINES NEGATIVE  NEG      COCAINE NEGATIVE  NEG      METHADONE NEGATIVE  NEG      OPIATES NEGATIVE  NEG      PCP(PHENCYCLIDINE) NEGATIVE  NEG      THC (TH-CANNABINOL) NEGATIVE  NEG      Drug screen comment (NOTE)    ETHYL ALCOHOL   Result Value Ref Range    ALCOHOL(ETHYL),SERUM <10 <10 MG/DL   URINE MICROSCOPIC ONLY   Result Value Ref Range    WBC 0-4 0 - 4 /hpf    RBC 0-5 0 - 5 /hpf    Epithelial cells FEW FEW /lpf    Bacteria NEGATIVE  NEG /hpf   HEMOGLOBIN A1C WITH EAG   Result Value Ref Range    Hemoglobin A1c 5.7 (H) 4.0 - 5.6 %    Est. average glucose 117 mg/dL   HEPATIC FUNCTION PANEL   Result Value Ref Range    Protein, total 6.8 6.4 - 8.2 g/dL    Albumin 3.2 (L) 3.5 - 5.0 g/dL    Globulin 3.6 2.0 - 4.0 g/dL    A-G Ratio 0.9 (L) 1.1 - 2.2      Bilirubin, total 0.3 0.2 - 1.0 MG/DL    Bilirubin, direct 0.1 0.0 - 0.2 MG/DL    Alk. phosphatase 84 45 - 117 U/L    AST (SGOT) 50 (H) 15 - 37 U/L    ALT (SGPT) 22 12 - 78 U/L   LIPID PANEL   Result Value Ref Range    LIPID PROFILE          Cholesterol, total 170 <200 MG/DL    Triglyceride 57 <150 MG/DL    HDL Cholesterol 62 MG/DL    LDL, calculated 96.6 0 - 100 MG/DL    VLDL, calculated 11.4 MG/DL    CHOL/HDL Ratio 2.7 0.0 - 5.0         Immunizations administered during this encounter: There is no immunization history on file for this patient. Screening for Metabolic Disorders for Patients on Antipsychotic Medications  (Data obtained from the EMR)    Estimated Body Mass Index  Estimated body mass index is 30.51 kg/m² as calculated from the following:    Height as of this encounter: 4' 10\" (1.473 m). Weight as of this encounter: 66.2 kg (146 lb). Vital Signs/Blood Pressure  Visit Vitals  BP 93/56   Pulse 73   Temp 98 °F (36.7 °C)   Resp 14   Ht 4' 10\" (1.473 m)   Wt 66.2 kg (146 lb)   SpO2 99%   BMI 30.51 kg/m²       Blood Glucose/Hemoglobin A1c  Lab Results   Component Value Date/Time    Glucose 100 2020 07:44 PM       Lab Results   Component Value Date/Time    Hemoglobin A1c 5.7 (H) 2020 06:01 AM        Lipid Panel  Lab Results   Component Value Date/Time    Cholesterol, total 170 2020 06:01 AM    HDL Cholesterol 62 2020 06:01 AM    LDL, calculated 96.6 2020 06:01 AM    Triglyceride 57 2020 06:01 AM    CHOL/HDL Ratio 2.7 2020 06:01 AM        Discharge Diagnosis: Schizoaffective disorder (ICD-10-CM: F25.9)    Discharge Plan: Patient discharged home into the care of family. DISCHARGE SUMMARY    [facility]  [facility] 1960  MRN: [id number]    The patient Annamary Crystal exhibits the ability to control behavior in a less restrictive environment.   Patient's level of functioning is improving. No assaultive/destructive behavior has been observed for the past 24 hours. No suicidal/homicidal threat or behavior has been observed for the past 24 hours. There is no evidence of serious medication side effects. Patient has not been in physical or protective restraints for at least the past 24 hours. If weapons involved, how are they secured? No weapons involved. Is patient aware of and in agreement with discharge plan? Yes    Arrangements for medication:  Prescriptions given to patient. Copy of discharge instructions to provider?:  [address]; Attachment & [address]    Arrangements for transportation home:  Daughter to . Keep all follow up appointments as scheduled, continue to take prescribed medications per physician instructions. Mental health crisis number:  372 or your local mental health crisis line number at [phone] ([address]) or [phone] [facility]). Discharge Medication List and Instructions:   Current Discharge Medication List      START taking these medications    Details   !! traZODone (DESYREL) 50 mg tablet Take 1 Tab by mouth nightly as needed for Sleep (For insomnia). Indications: insomnia associated with depression  Qty: 30 Tab, Refills: 0      OLANZapine (ZyPREXA) 5 mg tablet Take 1 Tab by mouth every six (6) hours as needed for Other (For agitation or psychosis). Indications: bipolar disorder in remission, additional medications to treat depression  Qty: 30 Tab, Refills: 0      paliperidone palmitate (INVEGA SUSTENNA) 117 mg/0.75 mL syrg injection 0.75 mL by IntraMUSCular route every thirty (30) days. Indications: schizophrenia with mood changes  Qty: 1 Syringe, Refills: 0       !! - Potential duplicate medications found. Please discuss with provider. CONTINUE these medications which have NOT CHANGED    Details   !! traZODone (DESYREL) 100 mg tablet Take 100 mg by mouth nightly.  Indications: insomnia      albuterol (PROVENTIL HFA, VENTOLIN HFA, PROAIR HFA) 90 mcg/actuation inhaler Take 2 Puffs by inhalation three (3) times daily as needed for Wheezing or Shortness of Breath. !! - Potential duplicate medications found. Please discuss with provider. Unresulted Labs (24h ago, onward)    None        To obtain results of studies pending at discharge, please contact [phone]    Follow-up Information     Follow up With Specialties Details Why Contact Info    Long-acting injectable antipsychotic  On 5/4/2020 Indy Michela, long-acting injectable antipsychotic, was started during hospitalization. Invega Sustenna 156 mg IM injection was given on 3/31/20 and 117 mg injection was given on 4/3/20. Recommend monthly injection dose of 78 mg, due on 5/4/20. [facility]  A referral has been sent for you to receive home health services. You will receive a call within the next few days to schedule an intake, [address] [place] [address]  [phone]    [facility] & [address]   A referral has been sent for you to receive mental health skill building services. You will be contacted this week to schedule an intake. [facility], [address] [place] [address]  [phone]    Twyla Dorman  On 5/6/2020 You have a 5:00pm tele-health appointment with the psychiatric nurse practitioner for medication management. [facility]  [address], [address], [address]  [phone]          Advanced Directive:   Does the patient have an appointed surrogate decision maker? No  Does the patient have a Medical Advance Directive? No  Does the patient have a Psychiatric Advance Directive?  No  If the patient does not have a surrogate or Medical Advance Directive AND Psychiatric Advance Directive, the patient was offered information on these advance directives Yes and Patient declined to complete    Patient Instructions: Please continue all medications until otherwise directed by physician. Tobacco Cessation Discharge Plan:   Is the patient a smoker and needs referral for smoking cessation? No  Patient referred to the following for smoking cessation with an appointment? Not applicable     Patient was offered medication to assist with smoking cessation at discharge? Not applicable  Was education for smoking cessation added to the discharge instructions? Yes    Alcohol/Substance Abuse Discharge Plan:   Does the patient have a history of substance/alcohol abuse and requires a referral for treatment? Yes  Patient referred to the following for substance/alcohol abuse treatment with an appointment? Yes, Patient has follow-up with [facility] and [address] [place] [address]. Patient was offered medication to assist with alcohol cessation at discharge? Refused  Was education for substance/alcohol abuse added to discharge instructions? Yes    Patient discharged to Home; discussed with patient/caregiver and provided to the patient/caregiver either in hard copy or electronically.

## 2020-04-06 NOTE — PROGRESS NOTES
Problem: Psychosis  Goal: *STG: Decreased hallucinations  Outcome: Progressing Towards Goal     Problem: Psychosis  Goal: *STG: Decreased delusional thinking  Outcome: Progressing Towards Goal     Problem: Psychosis  Goal: *STG: Remains safe in hospital  Outcome: Progressing Towards Goal     Problem: Psychosis  Goal: *STG/LTG: Complies with medication therapy  Outcome: Progressing Towards Goal     1900- Report given by Allison VILLARREAL assume care of the patient . Patient is sitting in the dayroom watching and smiling. Patient denies SI/HI/AVH. Patient is calm and cooperative and is in the dayroom with peers. 2030- Patient is sitting in the dayroom eating snacks. 2100- Patient is given Trazodone for sleep and Tylenol for rest and a precaution for low grade temp of 99.2.     2150- Patient is in the dayroom playing card. 2300- Patient is resting in her room. 0030- 0600- Patient slept approx 8 hrs.

## 2020-04-06 NOTE — BH NOTES
Patient alert and verbal. Denies SI/HI. Denies AVH. Discharged home to continue recommended plan of care. Discharge instructions reviewed with patient. Patient verbalized understanding. Patient's valuables and belongings returned. Patient transported home by daughter.

## 2020-04-06 NOTE — INTERDISCIPLINARY ROUNDS
Behavioral Health Interdisciplinary Rounds  
  
Patient Name: Gage Kathleen                      Age: 60 y.o. Room/Bed:  [facility]/ Primary Diagnosis: [facility] Admission Status: Involuntary Commitment and Forced Medication Order Readmission within 30 days:  
Power of  in place:  
Patient requires a blocked bed: no           
Reason for blocked bed:  
Order for blocked bed obtained:     
  
Sleep hours: 8 hrs. Morning Labs completed per orders:                                       
Participation in Care/Groups:  yes Medication Compliant?: Yes PRNS (last 24 hours): Sleep Aid and Tylenol Restraints (last 24 hours):  no 
Substance Abuse:                    
24 hour chart check complete:  Yes

## 2020-04-06 NOTE — DISCHARGE INSTRUCTIONS
DISCHARGE SUMMARY    Ike Mancera  : 1960  MRN: [id number]    The patient Will Nobles exhibits the ability to control behavior in a less restrictive environment. Patient's level of functioning is improving. No assaultive/destructive behavior has been observed for the past 24 hours. No suicidal/homicidal threat or behavior has been observed for the past 24 hours. There is no evidence of serious medication side effects. Patient has not been in physical or protective restraints for at least the past 24 hours. If weapons involved, how are they secured? No weapons involved. Is patient aware of and in agreement with discharge plan? Yes    Arrangements for medication:  Prescriptions given to patient. Copy of discharge instructions to provider?:  [address]; [address] & [address]    Arrangements for transportation home:  Daughter to . Keep all follow up appointments as scheduled, continue to take prescribed medications per physician instructions. Mental health crisis number:  559 or your local mental health crisis line number at [phone] ([facility]) or [phone] [facility]). DISCHARGE SUMMARY from Nurse    PATIENT INSTRUCTIONS:    What to do at Home:    Recommended activity: Activity as tolerated,       *  Please give a list of your current medications to your Primary Care Provider. *  Please update this list whenever your medications are discontinued, doses are      changed, or new medications (including over-the-counter products) are added. *  Please carry medication information at all times in case of emergency situations. These are general instructions for a healthy lifestyle:    No smoking/ No tobacco products/ Avoid exposure to second hand smoke  Surgeon General's Warning:  Quitting smoking now greatly reduces serious risk to your health.     Obesity, smoking, and sedentary lifestyle greatly increases your risk for illness    A healthy diet, regular physical exercise & weight monitoring are important for maintaining a healthy lifestyle    If I feel I am at risk of hurting myself or others, I will call the crisis office and speak with a crisis worker who will assist me during my crisis. [address]  [phone]  [facility] [place] [address] [phone]  [facility]-  [phone]      Patient Education        Schizophrenia: Care Instructions  Your Care Instructions  Schizophrenia is a disease that makes it hard to think clearly, manage emotions, and interact with other people. It can cause:  · Delusions. These are beliefs that are not real.  · Hallucinations. These are things that you may see or hear that are not really there. · Paranoia. This is the belief that others are lying, cheating, using you, or trying to harm you. The disease may change your ability to enjoy life, express emotions, or function. At times, you may hear voices, behave strangely, have trouble speaking or understanding speech, or keep to yourself. The goal of treatment is to lower your stress and help your brain function normally. You may need lifelong treatment with medicines and counseling to keep your schizophrenia under control. When schizophrenia is not treated, the risks are higher for suicide, a hospital stay, and other problems. Early treatment called coordinated specialty care [facility]) may help a person who is having his or her first episode of psychotic thoughts. Ask your doctor about [address]. Follow-up care is a key part of your treatment and safety. Be sure to make and go to all appointments, and call your doctor if you are having problems. It's also a good idea to know your test results and keep a list of the medicines you take. How can you care for yourself at home? · Be safe with medicines.  Take your medicines exactly as prescribed. Call your doctor if you think you are having a problem with your medicine. When you feel good, you may think that you do not need your medicines. But it is important to keep taking them as scheduled. · Go to your counseling sessions. Call and talk with your counselor if you can't attend or if you don't think the sessions are helping. Do not just stop going. · Eat a healthy diet. Talk with a dietitian about what type of diet may be best for you. · Do not use alcohol or illegal drugs. · Keep the numbers for these national suicide hotlines: 1-848-754-TALK (0-100.620.3763) and 1-502-JPGVGBI (7-637.356.5580). If you or someone you know talks about suicide or feeling hopeless, get help right away. What should you do if someone in your family has schizophrenia? · Learn about the disease and how it may get worse over time. · Remind your family member that you love him or her. · Make a plan with all family members about how to take care of your loved one when his or her symptoms are bad. · Talk about your fears and concerns and those of other family members. Seek counseling if needed. · Do not focus attention only on the person who is in treatment. · Remind yourself that it will take time for changes to occur. · Do not blame yourself for the disease. · Know your legal rights and the legal rights of your family member. · Take care of yourself. Stay involved with your own interests, such as your career, hobbies, and friends. Use exercise, positive self-talk, relaxation, and deep breathing to help lower your stress. · Give yourself time to grieve. You may need to deal with emotions such as anger, fear, and frustration. After you work through your feelings, you will be better able to care for yourself and your family. · If you are having a hard time with your feelings and your interactions with your family member, talk with a counselor. When should you call for help?   Call 46 anytime you think you may need emergency care. For example, call if:    · You are thinking about suicide or are threatening suicide.     · You feel like hurting yourself or someone else.    Call your doctor now or seek immediate medical care if:    · You hear voices.     · You think someone is trying to harm you.     · You cannot concentrate or are easily confused.    Watch closely for changes in your health, and be sure to contact your doctor if:    · You are having trouble taking care of yourself.     · You cannot attend your counseling sessions. Where can you learn more? Go to http://kaity-amor.info/  Enter B628 in the search box to learn more about \"Schizophrenia: Care Instructions. \"  Current as of: May 28, 2019Content Version: 12.4  © 9011-1122 Healthwise[employer] Inc[employer]. Care instructions adapted under license by [employer] (which disclaims liability or warranty for this information). If you have questions about a medical condition or this instruction, always ask your healthcare professional. [address] any warranty or liability for your use of this information. The discharge information has been reviewed with the patient. The patient verbalized understanding. Discharge medications reviewed with the patient and appropriate educational materials and side effects teaching were provided.   ___________________________________________________________________________________________________________________________________

## 2020-04-10 NOTE — DISCHARGE SUMMARY
PSYCHIATRIC DISCHARGE SUMMARY         IDENTIFICATION:    Patient Name  Roderick Hill   Date of Birth 1960   [facility] [id number]   Medical Record Number  [id number]      Age  60 y.o. PCP None   Admit date:  3/11/2020    Discharge date: 4/9/2020   Room Number  326/01  @ [address]   Date of Service  4/9/2020            TYPE OF DISCHARGE: REGULAR               CONDITION AT DISCHARGE: improved       PROVISIONAL & DISCHARGE DIAGNOSES:    Problem List  Never Reviewed          Codes Class    Agitation ICD-10-CM: R45.1  ICD-9-CM: 307.9         * (Principal) Schizoaffective disorder ([facility] [address] ICD-10-CM: F25.9  ICD-9-CM: 295.70               Active Hospital Problems    *Schizoaffective disorder ([facility] [address] [facility]        DISCHARGE DIAGNOSIS:   Axis I:  SEE ABOVE  Axis II: SEE ABOVE  Axis III: SEE ABOVE  Axis IV:  lack of structure  Axis V:  <50 on admission, 55+ on discharge     CC & HISTORY OF PRESENT ILLNESS:  59-year-old [place] American female with history of schizoaffective disorder, brought to the emergency department with auditory hallucinations, noncompliance with medications, acting bizarre, odd, and indifferent to her family. They called 911. When EMS came, she was locked in the bathroom, combative, and disorganized. She is a poor historian. The aforementioned is gathered from records. In the interview, she just says \"leave me alone\" and appears to swat at the air and look out the window, somewhat restless, pacing, and disorganized appearing, uncooperative with interview.      SOCIAL HISTORY:    Social History     Socioeconomic History    Marital status: SINGLE     Spouse name: Not on file    Number of children: Not on file    Years of education: Not on file    Highest education level: Not on file   Occupational History    Not on file   Social Needs    Financial resource strain: Not on file    Food insecurity     Worry: Not on file     Inability: Not on file    Transportation needs     Medical: Not on file     Non-medical: Not on file   Tobacco Use    Smoking status: Former Smoker    Smokeless tobacco: Never Used   Substance and Sexual Activity    Alcohol use: Not Currently    Drug use: Yes     Types: Marijuana    Sexual activity: Yes     Partners: Male     Birth control/protection: None   Lifestyle    Physical activity     Days per week: Not on file     Minutes per session: Not on file    Stress: Not on file   Relationships    Social connections     Talks on phone: Not on file     Gets together: Not on file     Attends Congregational service: Not on file     Active member of club or organization: Not on file     Attends meetings of clubs or organizations: Not on file     Relationship status: Not on file    Intimate partner violence     Fear of current or ex partner: Not on file     Emotionally abused: Not on file     Physically abused: Not on file     Forced sexual activity: Not on file   Other Topics Concern    Not on file   Social History Narrative    Not on file      FAMILY HISTORY:   History reviewed. No pertinent family history. HOSPITALIZATION COURSE:    Hubert Lowery was admitted to the inpatient psychiatric unit [facility] for acute psychiatric stabilization in regards to symptomatology as described in the HPI above. The differential diagnosis at time of admission included: Schizophrenia vs substance induced psychotic disorder schizoaffective vs bipolar MDD vs adjustment disorder. While on the unit Hubert Lowery was involved in individual, group, occupational and milieu therapy. Psychiatric medications were adjusted during this hospitalization. Hubert Lowery demonstrated a progressive improvement in overall condition. Much of patient's initial presentation appeared to be related to situational stressors, effects of medication non-compliance and psychological factors.   Please see individual progress notes for more specific details regarding patient's hospitalization course. Micha Rodgers reports feeling well and moods are good. Denies SI/HI/AH/VH. No aggression or violence. More appropriately interactive and aware. Tolerating medications well. Eating and sleeping fairly. Patient with request for discharge today. There are no grounds to seek a TDO. At time of discharge, Micha Rodgers is without significant problems of depression, psychosis, or sam. Patient free of suicidal and homicidal ideations (appears to be at very low risk of suicide or homicide) and reports many positive predictive factors in terms of not attempting suicide or homicide. Overall presentation at time of discharge is most consistent with the diagnosis of Schizoaffective disorder bipolar type depressed. Patient has maximized benefit to be derived from acute inpatient psychiatric treatment. All members of the treatment team concur with each other in regards to plans for discharge today. Patient and family are aware and in agreement with discharge and discharge plan. LABS AND IMAGAING:    Labs Reviewed   METABOLIC PANEL, BASIC - Abnormal; Notable for the following components:       Result Value    Potassium 3.1 (*)     BUN/Creatinine ratio 9 (*)     All other components within normal limits   CBC WITH AUTOMATED DIFF - Abnormal; Notable for the following components:    IMMATURE GRANULOCYTES 1 (*)     ABS. IMM.  GRANS. 0.1 (*)     All other components within normal limits   URINALYSIS W/ RFLX MICROSCOPIC - Abnormal; Notable for the following components:    Appearance CLOUDY (*)     Ketone TRACE (*)     Leukocyte Esterase TRACE (*)     All other components within normal limits   HEMOGLOBIN A1C WITH EAG - Abnormal; Notable for the following components:    Hemoglobin A1c 5.7 (*)     All other components within normal limits   HEPATIC FUNCTION PANEL - Abnormal; Notable for the following components:    Albumin 3.2 (*)     A-G Ratio 0.9 (*)     AST (SGOT) 50 (*)     All other components within normal limits   DRUG SCREEN, URINE   ETHYL ALCOHOL   URINE MICROSCOPIC ONLY   LIPID PANEL     Lab Results   Component Value Date/Time    Valproic acid 87 04/20/2018 08:35 AM     Admission on 03/11/2020, Discharged on 04/06/2020   Component Date Value Ref Range Status    Sodium 03/11/2020 141  136 - 145 mmol/L Final    Potassium 03/11/2020 3.1* 3.5 - 5.1 mmol/L Final    Chloride 03/11/2020 107  97 - 108 mmol/L Final    CO2 03/11/2020 22  21 - 32 mmol/L Final    Anion gap 03/11/2020 12  5 - 15 mmol/L Final    Glucose 03/11/2020 100  65 - 100 mg/dL Final    BUN 03/11/2020 8  6 - 20 MG/DL Final    Creatinine 03/11/2020 0.89  0.55 - 1.02 MG/DL Final    BUN/Creatinine ratio 03/11/2020 9* 12 - 20   Final    GFR est AA 03/11/2020 >60  >60 ml/min/1.73m2 Final    GFR est non-AA 03/11/2020 >60  >60 ml/min/1.73m2 Final    Calcium 03/11/2020 9.1  8.5 - 10.1 MG/DL Final    WBC 03/11/2020 5.1  3.6 - 11.0 K/uL Final    RBC 03/11/2020 3.83  3.80 - 5.20 M/uL Final    HGB 03/11/2020 11.9  11.5 - 16.0 g/dL Final    HCT 03/11/2020 35.8  35.0 - 47.0 % Final    MCV 03/11/2020 93.5  80.0 - 99.0 FL Final    MCH 03/11/2020 31.1  26.0 - 34.0 PG Final    MCHC 03/11/2020 33.2  30.0 - 36.5 g/dL Final    RDW 03/11/2020 12.2  11.5 - 14.5 % Final    PLATELET 38/40/4664 411  150 - 400 K/uL Final    MPV 03/11/2020 8.9  8.9 - 12.9 FL Final    NRBC 03/11/2020 0.0  0  WBC Final    ABSOLUTE NRBC 03/11/2020 0.00  0.00 - 0.01 K/uL Final    NEUTROPHILS 03/11/2020 55  32 - 75 % Final    LYMPHOCYTES 03/11/2020 34  12 - 49 % Final    MONOCYTES 03/11/2020 9  5 - 13 % Final    EOSINOPHILS 03/11/2020 1  0 - 7 % Final    BASOPHILS 03/11/2020 0  0 - 1 % Final    IMMATURE GRANULOCYTES 03/11/2020 1* 0.0 - 0.5 % Final    ABS. NEUTROPHILS 03/11/2020 2.8  1.8 - 8.0 K/UL Final    ABS. LYMPHOCYTES 03/11/2020 1.7  0.8 - 3.5 K/UL Final    ABS. MONOCYTES 03/11/2020 0.5  0.0 - 1.0 K/UL Final    ABS.  EOSINOPHILS 03/11/2020 0.0  0.0 - 0.4 K/UL Final    ABS. BASOPHILS 03/11/2020 0.0  0.0 - 0.1 K/UL Final    ABS. IMM. GRANS. 03/11/2020 0.1* 0.00 - 0.04 K/UL Final    DF 03/11/2020 AUTOMATED    Final    Color 03/12/2020 YELLOW/STRAW    Final    Appearance 03/12/2020 CLOUDY* CLEAR   Final    Specific gravity 03/12/2020 1.015  1.003 - 1.030   Final    pH (UA) 03/12/2020 6.0  5.0 - 8.0   Final    Protein 03/12/2020 NEGATIVE   NEG mg/dL Final    Glucose 03/12/2020 NEGATIVE   NEG mg/dL Final    Ketone 03/12/2020 TRACE* NEG mg/dL Final    Bilirubin 03/12/2020 NEGATIVE   NEG   Final    Blood 03/12/2020 NEGATIVE   NEG   Final    Urobilinogen 03/12/2020 1.0  0.2 - 1.0 EU/dL Final    Nitrites 03/12/2020 NEGATIVE   NEG   Final    Leukocyte Esterase 03/12/2020 TRACE* NEG   Final    AMPHETAMINES 03/12/2020 NEGATIVE   NEG   Final    BARBITURATES 03/12/2020 NEGATIVE   NEG   Final    BENZODIAZEPINES 03/12/2020 NEGATIVE   NEG   Final    COCAINE 03/12/2020 NEGATIVE   NEG   Final    METHADONE 03/12/2020 NEGATIVE   NEG   Final    OPIATES 03/12/2020 NEGATIVE   NEG   Final    PCP(PHENCYCLIDINE) 03/12/2020 NEGATIVE   NEG   Final    THC (TH-CANNABINOL) 03/12/2020 NEGATIVE   NEG   Final    Drug screen comment 03/12/2020 (NOTE)   Final    ALCOHOL(ETHYL),SERUM 03/11/2020 <10  <10 MG/DL Final    WBC 03/12/2020 0-4  0 - 4 /hpf Final    RBC 03/12/2020 0-5  0 - 5 /hpf Final    Epithelial cells 03/12/2020 FEW  FEW /lpf Final    Bacteria 03/12/2020 NEGATIVE   NEG /hpf Final    Hemoglobin A1c 03/22/2020 5.7* 4.0 - 5.6 % Final    Est. average glucose 03/22/2020 117  mg/dL Final    Protein, total 03/22/2020 6.8  6.4 - 8.2 g/dL Final    Albumin 03/22/2020 3.2* 3.5 - 5.0 g/dL Final    Globulin 03/22/2020 3.6  2.0 - 4.0 g/dL Final    A-G Ratio 03/22/2020 0.9* 1.1 - 2.2   Final    Bilirubin, total 03/22/2020 0.3  0.2 - 1.0 MG/DL Final    Bilirubin, direct 03/22/2020 0.1  0.0 - 0.2 MG/DL Final    Alk.  phosphatase 03/22/2020 84  45 - 117 U/L Final    AST (SGOT) 03/22/2020 50* 15 - 37 U/L Final    ALT (SGPT) 03/22/2020 22  12 - 78 U/L Final    LIPID PROFILE 03/22/2020        Final    Cholesterol, total 03/22/2020 170  <200 MG/DL Final    Triglyceride 03/22/2020 57  <150 MG/DL Final    HDL Cholesterol 03/22/2020 62  MG/DL Final    LDL, calculated 03/22/2020 96.6  0 - 100 MG/DL Final    VLDL, calculated 03/22/2020 11.4  MG/DL Final    CHOL/HDL Ratio 03/22/2020 2.7  0.0 - 5.0   Final     No results found. DISPOSITION:    Home. Patient to f/u with  psychiatric, and psychotherapy appointments. Patient is to f/u with internist as directed. FOLLOW-UP CARE:    Activity as tolerated  Regular diet  Wound Care: none needed. Follow-up Information     Follow up With Specialties Details Why Contact Info    Long-acting injectable antipsychotic  On 5/4/2020 Fritzi Hermosillo, long-acting injectable antipsychotic, was started during hospitalization. Invega Sustenna 156 mg IM injection was given on 3/31/20 and 117 mg injection was given on 4/3/20. Recommend monthly injection dose of 78 mg, due on 5/4/20. [address] [facility]  A referral has been sent for you to receive home health services. You will receive a call within the next few days to schedule an intake, [address] [place] [address]  [phone]    [facility] & [address]   A referral has been sent for you to receive mental health skill building services. You will be contacted this week to schedule an intake. [address], [address]  [phone]    [facility]  On 5/6/2020 You have a 5:00pm tele-health appointment with the psychiatric nurse practitioner for medication management. [facility] [address], [address], [address]  [phone]                 PROGNOSIS:   Hayde Rainey ---- based on nature of patient's pathology/ies and treatment compliance issues. Prognosis is greatly dependent upon patient's ability to remain sober and to follow up with scheduled appointments as well as to comply with psychiatric medications as prescribed. DISCHARGE MEDICATIONS:     Informed consent given for the use of following psychotropic medications:  Discharge Medication List as of 4/6/2020 11:52 AM      START taking these medications    Details   !! traZODone (DESYREL) 50 mg tablet Take 1 Tab by mouth nightly as needed for Sleep (For insomnia). Indications: insomnia associated with depression, Print, Disp-30 Tab, R-0      OLANZapine (ZyPREXA) 5 mg tablet Take 1 Tab by mouth every six (6) hours as needed for Other (For agitation or psychosis). Indications: bipolar disorder in remission, additional medications to treat depression, Print, Disp-30 Tab, R-0      paliperidone palmitate (INVEGA SUSTENNA) 117 mg/0.75 mL syrg injection 0.75 mL by IntraMUSCular route every thirty (30) days. Indications: schizophrenia with mood changes, Normal, Disp-1 Syringe, R-0       !! - Potential duplicate medications found. Please discuss with provider. CONTINUE these medications which have NOT CHANGED    Details   !! traZODone (DESYREL) 100 mg tablet Take 100 mg by mouth nightly. Indications: insomnia, Historical Med      albuterol (PROVENTIL HFA, VENTOLIN HFA, PROAIR HFA) 90 mcg/actuation inhaler Take 2 Puffs by inhalation three (3) times daily as needed for Wheezing or Shortness of Breath., Historical Med       !! - Potential duplicate medications found. Please discuss with provider. A coordinated, multidisplinary treatment team round was conducted with Robbie Simeon---this is done daily here at [facility]. This team consists of the nurse, psychiatric unit pharmacist,  and Darryl Calvillo. I have spent greater than 35 minutes on discharge work. Signed:  Noris Maynard MD  4/9/2020  .

## 2020-10-01 ENCOUNTER — HOSPITAL ENCOUNTER (EMERGENCY)
Age: 60
Discharge: HOME | End: 2020-10-01
Attending: EMERGENCY MEDICINE
Payer: MEDICAID

## 2020-10-01 ENCOUNTER — APPOINTMENT (OUTPATIENT)
Dept: CT IMAGING | Age: 60
End: 2020-10-01
Attending: EMERGENCY MEDICINE
Payer: MEDICAID

## 2020-10-01 ENCOUNTER — APPOINTMENT (OUTPATIENT)
Dept: ULTRASOUND IMAGING | Age: 60
End: 2020-10-01
Attending: EMERGENCY MEDICINE
Payer: MEDICAID

## 2020-10-01 VITALS
RESPIRATION RATE: 16 BRPM | HEIGHT: 62 IN | TEMPERATURE: 98 F | SYSTOLIC BLOOD PRESSURE: 125 MMHG | WEIGHT: 152.56 LBS | DIASTOLIC BLOOD PRESSURE: 74 MMHG | HEART RATE: 71 BPM | OXYGEN SATURATION: 99 % | BODY MASS INDEX: 28.07 KG/M2

## 2020-10-01 DIAGNOSIS — K57.90 DIVERTICULOSIS: ICD-10-CM

## 2020-10-01 DIAGNOSIS — N76.0 BV (BACTERIAL VAGINOSIS): ICD-10-CM

## 2020-10-01 DIAGNOSIS — R10.9 ACUTE ABDOMINAL PAIN: Primary | ICD-10-CM

## 2020-10-01 DIAGNOSIS — B96.89 BV (BACTERIAL VAGINOSIS): ICD-10-CM

## 2020-10-01 DIAGNOSIS — N64.52 BREAST DISCHARGE: ICD-10-CM

## 2020-10-01 DIAGNOSIS — N28.1 RENAL CYST, LEFT: ICD-10-CM

## 2020-10-01 LAB
ALBUMIN SERPL-MCNC: 3.6 G/DL (ref 3.5–5)
ALBUMIN/GLOB SERPL: 0.9 {RATIO} (ref 1.1–2.2)
ALP SERPL-CCNC: 90 U/L (ref 45–117)
ALT SERPL-CCNC: 17 U/L (ref 12–78)
ANION GAP SERPL CALC-SCNC: 3 MMOL/L (ref 5–15)
APPEARANCE UR: CLEAR
AST SERPL-CCNC: 22 U/L (ref 15–37)
BASOPHILS # BLD: 0 K/UL (ref 0–0.1)
BASOPHILS NFR BLD: 1 % (ref 0–1)
BILIRUB SERPL-MCNC: 0.4 MG/DL (ref 0.2–1)
BILIRUB UR QL: NEGATIVE
BUN SERPL-MCNC: 13 MG/DL (ref 6–20)
BUN/CREAT SERPL: 13 (ref 12–20)
CALCIUM SERPL-MCNC: 9 MG/DL (ref 8.5–10.1)
CHLORIDE SERPL-SCNC: 105 MMOL/L (ref 97–108)
CLUE CELLS VAG QL WET PREP: NORMAL
CO2 SERPL-SCNC: 30 MMOL/L (ref 21–32)
COLOR UR: NORMAL
CREAT SERPL-MCNC: 0.97 MG/DL (ref 0.55–1.02)
DIFFERENTIAL METHOD BLD: NORMAL
EOSINOPHIL # BLD: 0 K/UL (ref 0–0.4)
EOSINOPHIL NFR BLD: 1 % (ref 0–7)
ERYTHROCYTE [DISTWIDTH] IN BLOOD BY AUTOMATED COUNT: 13.2 % (ref 11.5–14.5)
GLOBULIN SER CALC-MCNC: 4.2 G/DL (ref 2–4)
GLUCOSE SERPL-MCNC: 100 MG/DL (ref 65–100)
GLUCOSE UR STRIP.AUTO-MCNC: NEGATIVE MG/DL
HCT VFR BLD AUTO: 39 % (ref 35–47)
HGB BLD-MCNC: 13.3 G/DL (ref 11.5–16)
HGB UR QL STRIP: NEGATIVE
IMM GRANULOCYTES # BLD AUTO: 0 K/UL (ref 0–0.04)
IMM GRANULOCYTES NFR BLD AUTO: 0 % (ref 0–0.5)
KETONES UR QL STRIP.AUTO: NEGATIVE MG/DL
KOH PREP SPEC: NORMAL
LEUKOCYTE ESTERASE UR QL STRIP.AUTO: NEGATIVE
LIPASE SERPL-CCNC: 180 U/L (ref 73–393)
LYMPHOCYTES # BLD: 2.5 K/UL (ref 0.8–3.5)
LYMPHOCYTES NFR BLD: 41 % (ref 12–49)
MCH RBC QN AUTO: 31.6 PG (ref 26–34)
MCHC RBC AUTO-ENTMCNC: 34.1 G/DL (ref 30–36.5)
MCV RBC AUTO: 92.6 FL (ref 80–99)
MONOCYTES # BLD: 0.7 K/UL (ref 0–1)
MONOCYTES NFR BLD: 12 % (ref 5–13)
NEUTS SEG # BLD: 2.7 K/UL (ref 1.8–8)
NEUTS SEG NFR BLD: 45 % (ref 32–75)
NITRITE UR QL STRIP.AUTO: NEGATIVE
NRBC # BLD: 0 K/UL (ref 0–0.01)
NRBC BLD-RTO: 0 PER 100 WBC
PH UR STRIP: 7 [PH] (ref 5–8)
PLATELET # BLD AUTO: 400 K/UL (ref 150–400)
PMV BLD AUTO: 10.1 FL (ref 8.9–12.9)
POTASSIUM SERPL-SCNC: 4.4 MMOL/L (ref 3.5–5.1)
PROT SERPL-MCNC: 7.8 G/DL (ref 6.4–8.2)
PROT UR STRIP-MCNC: NEGATIVE MG/DL
RBC # BLD AUTO: 4.21 M/UL (ref 3.8–5.2)
SERVICE CMNT-IMP: NORMAL
SODIUM SERPL-SCNC: 138 MMOL/L (ref 136–145)
SP GR UR REFRACTOMETRY: 1.02 (ref 1–1.03)
T VAGINALIS VAG QL WET PREP: NORMAL
UROBILINOGEN UR QL STRIP.AUTO: 1 EU/DL (ref 0.2–1)
WBC # BLD AUTO: 5.9 K/UL (ref 3.6–11)

## 2020-10-01 PROCEDURE — 76705 ECHO EXAM OF ABDOMEN: CPT

## 2020-10-01 PROCEDURE — 87210 SMEAR WET MOUNT SALINE/INK: CPT

## 2020-10-01 PROCEDURE — 85025 COMPLETE CBC W/AUTO DIFF WBC: CPT

## 2020-10-01 PROCEDURE — 99284 EMERGENCY DEPT VISIT MOD MDM: CPT

## 2020-10-01 PROCEDURE — 74011000636 HC RX REV CODE- 636: Performed by: EMERGENCY MEDICINE

## 2020-10-01 PROCEDURE — 80053 COMPREHEN METABOLIC PANEL: CPT

## 2020-10-01 PROCEDURE — 36415 COLL VENOUS BLD VENIPUNCTURE: CPT

## 2020-10-01 PROCEDURE — 74177 CT ABD & PELVIS W/CONTRAST: CPT

## 2020-10-01 PROCEDURE — 83690 ASSAY OF LIPASE: CPT

## 2020-10-01 PROCEDURE — 81003 URINALYSIS AUTO W/O SCOPE: CPT

## 2020-10-01 PROCEDURE — 87491 CHLMYD TRACH DNA AMP PROBE: CPT

## 2020-10-01 RX ORDER — SODIUM CHLORIDE 0.9 % (FLUSH) 0.9 %
10 SYRINGE (ML) INJECTION
Status: DISCONTINUED | OUTPATIENT
Start: 2020-10-01 | End: 2020-10-01 | Stop reason: HOSPADM

## 2020-10-01 RX ORDER — DICYCLOMINE HYDROCHLORIDE 10 MG/1
10 CAPSULE ORAL
Qty: 20 CAP | Refills: 0 | Status: SHIPPED | OUTPATIENT
Start: 2020-10-01 | End: 2020-10-06

## 2020-10-01 RX ORDER — METRONIDAZOLE 500 MG/1
500 TABLET ORAL 2 TIMES DAILY
Qty: 14 TAB | Refills: 0 | Status: SHIPPED | OUTPATIENT
Start: 2020-10-01 | End: 2020-10-08

## 2020-10-01 RX ADMIN — IOPAMIDOL 100 ML: 755 INJECTION, SOLUTION INTRAVENOUS at 09:52

## 2020-10-01 NOTE — ED NOTES
Patient discharged by Dr. Shefali Bell. Patient provided with discharge instructions Rx and instructions on follow up care. Patient out of ED ambulatory accompanied by family.

## 2020-10-01 NOTE — DISCHARGE INSTRUCTIONS
Patient Education        Abdominal Pain: Care Instructions  Your Care Instructions     Abdominal pain has many possible causes. Some aren't serious and get better on their own in a few days. Others need more testing and treatment. If your pain continues or gets worse, you need to be rechecked and may need more tests to find out what is wrong. You may need surgery to correct the problem. Don't ignore new symptoms, such as fever, nausea and vomiting, urination problems, pain that gets worse, and dizziness. These may be signs of a more serious problem. Your doctor may have recommended a follow-up visit in the next 8 to 12 hours. If you are not getting better, you may need more tests or treatment. The doctor has checked you carefully, but problems can develop later. If you notice any problems or new symptoms, get medical treatment right away. Follow-up care is a key part of your treatment and safety. Be sure to make and go to all appointments, and call your doctor if you are having problems. It's also a good idea to know your test results and keep a list of the medicines you take. How can you care for yourself at home? · Rest until you feel better. · To prevent dehydration, drink plenty of fluids, enough so that your urine is light yellow or clear like water. Choose water and other caffeine-free clear liquids until you feel better. If you have kidney, heart, or liver disease and have to limit fluids, talk with your doctor before you increase the amount of fluids you drink. · If your stomach is upset, eat mild foods, such as rice, dry toast or crackers, bananas, and applesauce. Try eating several small meals instead of two or three large ones. · Wait until 48 hours after all symptoms have gone away before you have spicy foods, alcohol, and drinks that contain caffeine. · Do not eat foods that are high in fat. · Avoid anti-inflammatory medicines such as aspirin, ibuprofen (Advil, Motrin), and naproxen (Aleve). These can cause stomach upset. Talk to your doctor if you take daily aspirin for another health problem. When should you call for help? Call 911 anytime you think you may need emergency care. For example, call if:    · You passed out (lost consciousness).     · You pass maroon or very bloody stools.     · You vomit blood or what looks like coffee grounds.     · You have new, severe belly pain. Call your doctor now or seek immediate medical care if:    · Your pain gets worse, especially if it becomes focused in one area of your belly.     · You have a new or higher fever.     · Your stools are black and look like tar, or they have streaks of blood.     · You have unexpected vaginal bleeding.     · You have symptoms of a urinary tract infection. These may include:  ? Pain when you urinate. ? Urinating more often than usual.  ? Blood in your urine.     · You are dizzy or lightheaded, or you feel like you may faint. Watch closely for changes in your health, and be sure to contact your doctor if:    · You are not getting better after 1 day (24 hours). Where can you learn more? Go to http://www.gray.com/  Enter F577 in the search box to learn more about \"Abdominal Pain: Care Instructions. \"  Current as of: June 26, 2019               Content Version: 12.6  © 5994-8062 [employer]. Care instructions adapted under license by [employer] (which disclaims liability or warranty for this information). If you have questions about a medical condition or this instruction, always ask your healthcare professional. [place] [address] any warranty or liability for your use of this information. Patient Education        Bacterial Vaginosis: Care Instructions  Overview     Bacterial vaginosis is a type of vaginal infection. It is caused by excess growth of certain bacteria that are normally found in the vagina.  Symptoms can include itching, swelling, pain when you urinate or have sex, and a gray or yellow discharge with a \"fishy\" odor. It is not considered an infection that is spread through sexual contact. Symptoms can be annoying and uncomfortable. But bacterial vaginosis does not usually cause other health problems. However, if you have it while you are pregnant, it can cause complications. While the infection may go away on its own, most doctors use antibiotics to treat it. You may have been prescribed pills or vaginal cream. With treatment, bacterial vaginosis usually clears up in 5 to 7 days. Follow-up care is a key part of your treatment and safety. Be sure to make and go to all appointments, and call your doctor if you are having problems. It's also a good idea to know your test results and keep a list of the medicines you take. How can you care for yourself at home? · Take your antibiotics as directed. Do not stop taking them just because you feel better. You need to take the full course of antibiotics. · Do not eat or drink anything that contains alcohol if you are taking metronidazole or tinidazole. · Keep using your medicine if you start your period. Use pads instead of tampons while using a vaginal cream or suppository. Tampons can absorb the medicine. · Wear loose cotton clothing. Do not wear nylon and other materials that hold body heat and moisture close to the skin. · Do not scratch. Relieve itching with a cold pack or a cool bath. · Do not wash your vaginal area more than once a day. Use plain water or a mild, unscented soap. Do not douche. When should you call for help? Watch closely for changes in your health, and be sure to contact your doctor if:    · You have unexpected vaginal bleeding.     · You have a fever.     · You have new or increased pain in your vagina or pelvis.     · You are not getting better after 1 week.     · Your symptoms return after you finish the course of your medicine. Where can you learn more?   Go to http://www.gray.com/  Enter X360 in the search box to learn more about \"Bacterial Vaginosis: Care Instructions. \"  Current as of: November 8, 2019               Content Version: 12.6  © 3042-6333 [employer]. Care instructions adapted under license by [employer] (which disclaims liability or warranty for this information). If you have questions about a medical condition or this instruction, always ask your healthcare professional. [address] any warranty or liability for your use of this information. Patient Education        Diverticulosis: Care Instructions  Your Care Instructions  In diverticulosis, pouches called diverticula form in the wall of the large intestine (colon). The pouches do not cause any pain or other symptoms. Most people who have diverticulosis do not know they have it. But the pouches sometimes bleed, and if they become infected, they can cause pain and other symptoms. When this happens, it is called diverticulitis. Diverticula form when pressure pushes the wall of the colon outward at certain weak points. A diet that is too low in fiber can cause diverticula. Follow-up care is a key part of your treatment and safety. Be sure to make and go to all appointments, and call your doctor if you are having problems. It's also a good idea to know your test results and keep a list of the medicines you take. How can you care for yourself at home? · Include fruits, leafy green vegetables, beans, and whole grains in your diet each day. These foods are high in fiber. · Take a fiber supplement, such as Citrucel or Metamucil, every day if needed. Read and follow all instructions on the label. · Drink plenty of fluids, enough so that your urine is light yellow or clear like water.  If you have kidney, heart, or liver disease and have to limit fluids, talk with your doctor before you increase the amount of fluids you drink.  · Get at least 30 minutes of exercise on most days of the week. Walking is a good choice. You also may want to do other activities, such as running, swimming, cycling, or playing tennis or team sports. · Cut out foods that cause gas, pain, or other symptoms. When should you call for help? Call your doctor now or seek immediate medical care if:    · You have belly pain.     · You pass maroon or very bloody stools.     · You have a fever.     · You have nausea and vomiting.     · You have unusual changes in your bowel movements or abdominal swelling.     · You have burning pain when you urinate.     · You have abnormal vaginal discharge.     · You have shoulder pain.     · You have cramping pain that does not get better when you have a bowel movement or pass gas.     · You pass gas or stool from your urethra while urinating. Watch closely for changes in your health, and be sure to contact your doctor if you have any problems. Where can you learn more? Go to http://www.gray.com/  Enter [id number] in the search box to learn more about \"Diverticulosis: Care Instructions. \"  Current as of: April 15, 2020               Content Version: 12.6  © 0638-7430 [employer]. Care instructions adapted under license by [employer] (which disclaims liability or warranty for this information). If you have questions about a medical condition or this instruction, always ask your healthcare professional. [place] [address] any warranty or liability for your use of this information. Patient Education        Nipple Discharge: Care Instructions  Your Care Instructions  Fluid leaking from one or both nipples when you are not breastfeeding is called nipple discharge. Clear, cloudy, or white discharge that appears only when you press on your nipple is usually normal. The more the nipple is pressed or stimulated, the more fluid appears.  Yellow, green, or brown discharge is not normal and may be a symptom of an infection or other problem. Spontaneous discharge appears without pressing or stimulating the nipple. This is not normal unless you are pregnant or breastfeeding. It may be a side effect of a medicine, or it may be caused by other health problems. The treatment of spontaneous nipple discharge depends on what is causing it. You may need additional tests to find out what is causing the nipple discharge. Follow-up care is a key part of your treatment and safety. Be sure to make and go to all appointments, and call your doctor if you are having problems. It's also a good idea to know your test results and keep a list of the medicines you take. How can you care for yourself at home? · If your doctor gave you medicine, take it exactly as prescribed. Call your doctor if you think you are having a problem with your medicine. · Wear a supportive bra, such as a sports bra or jog bra. · Avoid stimulating your breast until you have your follow-up appointment. When should you call for help? Call your doctor now or seek immediate medical care if:    · You have symptoms of a breast infection, such as:  ? Increased pain, swelling, redness, or warmth around a breast.  ? Red streaks extending from the breast.  ? Pus draining from a breast.  ? A fever. Watch closely for changes in your health, and be sure to contact your doctor if:    · You notice any changes in your breast or discharge.     · You do not get better as expected. Where can you learn more? Go to http://www.gray.com/  Enter Z289 in the search box to learn more about \"Nipple Discharge: Care Instructions. \"  Current as of: June 26, 2019               Content Version: 12.6  © 1137-6601 [employer]. Care instructions adapted under license by [employer] (which disclaims liability or warranty for this information).  If you have questions about a medical condition or this instruction, always ask your healthcare professional. [place] [address] any warranty or liability for your use of this information.

## 2020-10-01 NOTE — ED NOTES
Pt received from triage with c/o epigastric pain for \"a while\" with nausea and vomited once yesterday. Pt also reports bilateral breast pain and states \"my nipples be leakin'. It look like milk\" that has been going on for \"years\". Pt denies any urinary or breast pain. Pt currently resting comfortably with significant other at bedside and updated on plan of care.

## 2020-10-01 NOTE — ED PROVIDER NOTES
EMERGENCY DEPARTMENT HISTORY AND PHYSICAL EXAM      Date: 10/1/2020  Patient Name: Jose Elias Ramirez    History of Presenting Illness     Chief Complaint   Patient presents with    Abdominal Pain     pt complains of midepigastric abdominal pain x 1.5 months. pt vomited yesterday    Breast Discharge     \"for a long time\"   wants this checked. pt has mentioned this to her gyn and has had mammograms       History Provided By: Patient and Patient's     HPI: Jose Elias Ramirez, 63 y.o. female presents to the ED with cc of abdominal pain and breast discharge. The patient has breast discharge which has been going on for years. She has had several mammograms which were unremarkable. The most recent was in August.  She states discharge is milky. I asked her if she had any lab tests done by her gynecologist, and she stated\" no\". She denies any discharge currently. Her abdominal pain is midepigastric and started 1-1/2 months ago. Lasst for minutes at a time and is an 8 out of 10 when it occurs. Occasionally, it radiates to the back. She denies chest pain, cough, fever or chills. She also denies change in bowel habits or dysuria. She is worried that she may have pancreatic cancer. Patient also has an occasional odor in the vaginal region. There are no other complaints, changes, or physical findings at this time. PCP: None    No current facility-administered medications on file prior to encounter. Current Outpatient Medications on File Prior to Encounter   Medication Sig Dispense Refill    traZODone (DESYREL) 50 mg tablet Take 1 Tab by mouth nightly as needed for Sleep (For insomnia). Indications: insomnia associated with depression 30 Tab 0    OLANZapine (ZyPREXA) 5 mg tablet Take 1 Tab by mouth every six (6) hours as needed for Other (For agitation or psychosis).  Indications: bipolar disorder in remission, additional medications to treat depression 30 Tab 0    paliperidone palmitate (INVEGA SUSTENNA) 117 mg/0.75 mL syrg injection 0.75 mL by IntraMUSCular route every thirty (30) days. Indications: schizophrenia with mood changes 1 Syringe 0    traZODone (DESYREL) 100 mg tablet Take 100 mg by mouth nightly. Indications: insomnia      albuterol (PROVENTIL HFA, VENTOLIN HFA, PROAIR HFA) 90 mcg/actuation inhaler Take 2 Puffs by inhalation three (3) times daily as needed for Wheezing or Shortness of Breath. Past History     Past Medical History:  Past Medical History:   Diagnosis Date    Asthma     Hepatitis C        Past Surgical History:  No past surgical history on file. Family History:  No family history on file. Social History:  Social History     Tobacco Use    Smoking status: Former Smoker    Smokeless tobacco: Never Used   Substance Use Topics    Alcohol use: Not Currently    Drug use: Yes     Types: Marijuana       Allergies:  No Known Allergies      Review of Systems   Review of Systems   Constitutional: Negative for fever. HENT: Negative for congestion. Eyes: Negative. Respiratory: Negative for shortness of breath. Cardiovascular: Negative for chest pain. Gastrointestinal: Positive for abdominal pain, nausea and vomiting. Endocrine: Negative for heat intolerance. Genitourinary: Negative. Musculoskeletal: Positive for back pain. Skin: Negative for rash. Allergic/Immunologic: Negative for immunocompromised state. Neurological: Negative for dizziness. Hematological: Does not bruise/bleed easily. Psychiatric/Behavioral: Negative. All other systems reviewed and are negative. Physical Exam   Physical Exam  Vitals signs and nursing note reviewed. Constitutional:       General: She is not in acute distress. Appearance: She is well-developed. HENT:      Head: Normocephalic. Neck:      Musculoskeletal: Normal range of motion and neck supple. Cardiovascular:      Rate and Rhythm: Normal rate and regular rhythm.       Heart sounds: Normal heart sounds. Pulmonary:      Effort: Pulmonary effort is normal.      Breath sounds: Normal breath sounds. Abdominal:      General: Bowel sounds are normal.      Palpations: Abdomen is soft. Tenderness: There is no abdominal tenderness. Musculoskeletal: Normal range of motion. Skin:     General: Skin is warm and dry. Neurological:      General: No focal deficit present. Mental Status: She is alert and oriented to person, place, and time. Psychiatric:         Behavior: Behavior normal.         Diagnostic Study Results     Labs -     Recent Results (from the past 12 hour(s))   CBC WITH AUTOMATED DIFF    Collection Time: 10/01/20  8:50 AM   Result Value Ref Range    WBC 5.9 3.6 - 11.0 K/uL    RBC 4.21 3.80 - 5.20 M/uL    HGB 13.3 11.5 - 16.0 g/dL    HCT 39.0 35.0 - 47.0 %    MCV 92.6 80.0 - 99.0 FL    MCH 31.6 26.0 - 34.0 PG    MCHC 34.1 30.0 - 36.5 g/dL    RDW 13.2 11.5 - 14.5 %    PLATELET 727 304 - 638 K/uL    MPV 10.1 8.9 - 12.9 FL    NRBC 0.0 0  WBC    ABSOLUTE NRBC 0.00 0.00 - 0.01 K/uL    NEUTROPHILS 45 32 - 75 %    LYMPHOCYTES 41 12 - 49 %    MONOCYTES 12 5 - 13 %    EOSINOPHILS 1 0 - 7 %    BASOPHILS 1 0 - 1 %    IMMATURE GRANULOCYTES 0 0.0 - 0.5 %    ABS. NEUTROPHILS 2.7 1.8 - 8.0 K/UL    ABS. LYMPHOCYTES 2.5 0.8 - 3.5 K/UL    ABS. MONOCYTES 0.7 0.0 - 1.0 K/UL    ABS. EOSINOPHILS 0.0 0.0 - 0.4 K/UL    ABS. BASOPHILS 0.0 0.0 - 0.1 K/UL    ABS. IMM.  GRANS. 0.0 0.00 - 0.04 K/UL    DF AUTOMATED     METABOLIC PANEL, COMPREHENSIVE    Collection Time: 10/01/20  8:50 AM   Result Value Ref Range    Sodium 138 136 - 145 mmol/L    Potassium 4.4 3.5 - 5.1 mmol/L    Chloride 105 97 - 108 mmol/L    CO2 30 21 - 32 mmol/L    Anion gap 3 (L) 5 - 15 mmol/L    Glucose 100 65 - 100 mg/dL    BUN 13 6 - 20 MG/DL    Creatinine 0.97 0.55 - 1.02 MG/DL    BUN/Creatinine ratio 13 12 - 20      GFR est AA >60 >60 ml/min/1.73m2    GFR est non-AA 58 (L) >60 ml/min/1.73m2    Calcium 9.0 8.5 - 10.1 MG/DL Bilirubin, total 0.4 0.2 - 1.0 MG/DL    ALT (SGPT) 17 12 - 78 U/L    AST (SGOT) 22 15 - 37 U/L    Alk. phosphatase 90 45 - 117 U/L    Protein, total 7.8 6.4 - 8.2 g/dL    Albumin 3.6 3.5 - 5.0 g/dL    Globulin 4.2 (H) 2.0 - 4.0 g/dL    A-G Ratio 0.9 (L) 1.1 - 2.2     LIPASE    Collection Time: 10/01/20  8:50 AM   Result Value Ref Range    Lipase 180 73 - 393 U/L   WET PREP    Collection Time: 10/01/20  9:27 AM    Specimen: Miscellaneous sample   Result Value Ref Range    Clue cells CLUE CELLS PRESENT      Wet prep NO TRICHOMONAS SEEN     KOH, OTHER SOURCES    Collection Time: 10/01/20  9:27 AM    Specimen: Cervix; Other   Result Value Ref Range    Special Requests: NO SPECIAL REQUESTS      KOH NO YEAST SEEN     URINALYSIS W/ RFLX MICROSCOPIC    Collection Time: 10/01/20  9:27 AM   Result Value Ref Range    Color YELLOW/STRAW      Appearance CLEAR CLEAR      Specific gravity 1.020 1.003 - 1.030      pH (UA) 7.0 5.0 - 8.0      Protein Negative NEG mg/dL    Glucose Negative NEG mg/dL    Ketone Negative NEG mg/dL    Bilirubin Negative NEG      Blood Negative NEG      Urobilinogen 1.0 0.2 - 1.0 EU/dL    Nitrites Negative NEG      Leukocyte Esterase Negative NEG         Radiologic Studies -   CT ABD PELV W CONT   Final Result   IMPRESSION:   No acute abnormality. US ABD LTD   Final Result   IMPRESSION: Normal right upper quadrant ultrasound. CT Results  (Last 48 hours)    None        CXR Results  (Last 48 hours)    None          Medical Decision Making   I am the first provider for this patient. I reviewed the vital signs, available nursing notes, past medical history, past surgical history, family history and social history. Vital Signs-Reviewed the patient's vital signs.   Patient Vitals for the past 12 hrs:   Temp Pulse Resp BP SpO2   10/01/20 0810 98 °F (36.7 °C) 71 16 127/65 98 %         Records Reviewed: Nursing Notes, Old Medical Records, Previous Radiology Studies and Previous Laboratory Studies    Provider Notes (Medical Decision Making):   Pancreatitis, biliary colic, gastritis, reflux, peptic ulcer disease, UTI, vaginitis, hormonal abnormality    ED Course:   Initial assessment performed. The patients presenting problems have been discussed, and they are in agreement with the care plan formulated and outlined with them. I have encouraged them to ask questions as they arise throughout their visit. Progress note:    Patient's results were reviewed. The patient is feeling better. She is advised to follow-up and return to ER if worse             Critical Care Time:   0    Disposition:  home    DISCHARGE PLAN:  1. Discharge Medication List as of 10/1/2020 10:50 AM      START taking these medications    Details   metroNIDAZOLE (FlagyL) 500 mg tablet Take 1 Tab by mouth two (2) times a day for 7 days. , Normal, Disp-14 Tab,R-0      dicyclomine (BentyL) 10 mg capsule Take 1 Cap by mouth four (4) times daily as needed for Abdominal Cramps (abd cramps) for up to 5 days. , Normal, Disp-20 Cap,R-0         CONTINUE these medications which have NOT CHANGED    Details   OLANZapine (ZyPREXA) 5 mg tablet Take 1 Tab by mouth every six (6) hours as needed for Other (For agitation or psychosis). Indications: bipolar disorder in remission, additional medications to treat depression, Print, Disp-30 Tab, R-0      !! traZODone (DESYREL) 100 mg tablet Take 100 mg by mouth nightly. Indications: insomnia, Historical Med      !! traZODone (DESYREL) 50 mg tablet Take 1 Tab by mouth nightly as needed for Sleep (For insomnia). Indications: insomnia associated with depression, Print, Disp-30 Tab, R-0      paliperidone palmitate (INVEGA SUSTENNA) 117 mg/0.75 mL syrg injection 0.75 mL by IntraMUSCular route every thirty (30) days.  Indications: schizophrenia with mood changes, Normal, Disp-1 Syringe, R-0      albuterol (PROVENTIL HFA, VENTOLIN HFA, PROAIR HFA) 90 mcg/actuation inhaler Take 2 Puffs by inhalation three (3) times daily as needed for Wheezing or Shortness of Breath., Historical Med       !! - Potential duplicate medications found. Please discuss with provider. 2.   Follow-up Information     Follow up With Specialties Details Why Contact Info    Kathrin Xiao MD Gastroenterology  As needed [address]  [address] [facility]  [phone]      Follow-up with your GYN doctor   Regarding the nipple discharge     [facility] EMER[facility] Emergency Medicine  If symptoms worsen [address]  [address] [facility]  [phone]        3. Return to ED if worse     Diagnosis     Clinical Impression:   1. Acute abdominal pain    2. Diverticulosis    3. Renal cyst, left    4. BV (bacterial vaginosis)    5. Breast discharge        Attestations:    Sukumar Jaimes MD    Please note that this dictation was completed with PostPath, the computer voice recognition software. Quite often unanticipated grammatical, syntax, homophones, and other interpretive errors are inadvertently transcribed by the computer software. Please disregard these errors. Please excuse any errors that have escaped final proofreading. Thank you.

## 2020-10-02 LAB
C TRACH DNA SPEC QL NAA+PROBE: NEGATIVE
N GONORRHOEA DNA SPEC QL NAA+PROBE: NEGATIVE
SAMPLE TYPE: NORMAL
SERVICE CMNT-IMP: NORMAL
SPECIMEN SOURCE: NORMAL

## 2020-11-12 ENCOUNTER — HOSPITAL ENCOUNTER (EMERGENCY)
Age: 60
Discharge: HOME | End: 2020-11-12
Attending: EMERGENCY MEDICINE
Payer: MEDICAID

## 2020-11-12 VITALS
RESPIRATION RATE: 20 BRPM | BODY MASS INDEX: 28.03 KG/M2 | OXYGEN SATURATION: 97 % | DIASTOLIC BLOOD PRESSURE: 64 MMHG | SYSTOLIC BLOOD PRESSURE: 119 MMHG | HEIGHT: 62 IN | TEMPERATURE: 98 F | HEART RATE: 79 BPM | WEIGHT: 152.34 LBS

## 2020-11-12 DIAGNOSIS — K29.90 GASTRITIS AND DUODENITIS: Primary | ICD-10-CM

## 2020-11-12 LAB
ALBUMIN SERPL-MCNC: 3.6 G/DL (ref 3.5–5)
ALBUMIN/GLOB SERPL: 0.9 {RATIO} (ref 1.1–2.2)
ALP SERPL-CCNC: 102 U/L (ref 45–117)
ALT SERPL-CCNC: 15 U/L (ref 12–78)
ANION GAP SERPL CALC-SCNC: 8 MMOL/L (ref 5–15)
AST SERPL-CCNC: 14 U/L (ref 15–37)
BASOPHILS # BLD: 0 K/UL (ref 0–0.1)
BASOPHILS NFR BLD: 1 % (ref 0–1)
BILIRUB SERPL-MCNC: 0.2 MG/DL (ref 0.2–1)
BUN SERPL-MCNC: 12 MG/DL (ref 6–20)
BUN/CREAT SERPL: 13 (ref 12–20)
CALCIUM SERPL-MCNC: 9.1 MG/DL (ref 8.5–10.1)
CHLORIDE SERPL-SCNC: 104 MMOL/L (ref 97–108)
CO2 SERPL-SCNC: 28 MMOL/L (ref 21–32)
CREAT SERPL-MCNC: 0.9 MG/DL (ref 0.55–1.02)
DIFFERENTIAL METHOD BLD: NORMAL
EOSINOPHIL # BLD: 0.2 K/UL (ref 0–0.4)
EOSINOPHIL NFR BLD: 2 % (ref 0–7)
ERYTHROCYTE [DISTWIDTH] IN BLOOD BY AUTOMATED COUNT: 12.3 % (ref 11.5–14.5)
GLOBULIN SER CALC-MCNC: 4 G/DL (ref 2–4)
GLUCOSE SERPL-MCNC: 110 MG/DL (ref 65–100)
HCT VFR BLD AUTO: 38.5 % (ref 35–47)
HGB BLD-MCNC: 12.8 G/DL (ref 11.5–16)
IMM GRANULOCYTES # BLD AUTO: 0 K/UL (ref 0–0.04)
IMM GRANULOCYTES NFR BLD AUTO: 0 % (ref 0–0.5)
LIPASE SERPL-CCNC: 122 U/L (ref 73–393)
LYMPHOCYTES # BLD: 3 K/UL (ref 0.8–3.5)
LYMPHOCYTES NFR BLD: 43 % (ref 12–49)
MCH RBC QN AUTO: 31 PG (ref 26–34)
MCHC RBC AUTO-ENTMCNC: 33.2 G/DL (ref 30–36.5)
MCV RBC AUTO: 93.2 FL (ref 80–99)
MONOCYTES # BLD: 0.8 K/UL (ref 0–1)
MONOCYTES NFR BLD: 12 % (ref 5–13)
NEUTS SEG # BLD: 3 K/UL (ref 1.8–8)
NEUTS SEG NFR BLD: 42 % (ref 32–75)
NRBC # BLD: 0 K/UL (ref 0–0.01)
NRBC BLD-RTO: 0 PER 100 WBC
PLATELET # BLD AUTO: 349 K/UL (ref 150–400)
PMV BLD AUTO: 9.1 FL (ref 8.9–12.9)
POTASSIUM SERPL-SCNC: 3.5 MMOL/L (ref 3.5–5.1)
PROT SERPL-MCNC: 7.6 G/DL (ref 6.4–8.2)
RBC # BLD AUTO: 4.13 M/UL (ref 3.8–5.2)
SODIUM SERPL-SCNC: 140 MMOL/L (ref 136–145)
WBC # BLD AUTO: 7 K/UL (ref 3.6–11)

## 2020-11-12 PROCEDURE — 74011000250 HC RX REV CODE- 250: Performed by: EMERGENCY MEDICINE

## 2020-11-12 PROCEDURE — 99283 EMERGENCY DEPT VISIT LOW MDM: CPT

## 2020-11-12 PROCEDURE — 36415 COLL VENOUS BLD VENIPUNCTURE: CPT

## 2020-11-12 PROCEDURE — 83690 ASSAY OF LIPASE: CPT

## 2020-11-12 PROCEDURE — 96374 THER/PROPH/DIAG INJ IV PUSH: CPT

## 2020-11-12 PROCEDURE — 74011250637 HC RX REV CODE- 250/637: Performed by: EMERGENCY MEDICINE

## 2020-11-12 PROCEDURE — 85025 COMPLETE CBC W/AUTO DIFF WBC: CPT

## 2020-11-12 PROCEDURE — 74011250636 HC RX REV CODE- 250/636: Performed by: EMERGENCY MEDICINE

## 2020-11-12 PROCEDURE — 80053 COMPREHEN METABOLIC PANEL: CPT

## 2020-11-12 RX ORDER — OMEPRAZOLE 20 MG/1
20 CAPSULE, DELAYED RELEASE ORAL DAILY
Qty: 30 CAP | Refills: 0 | Status: ON HOLD | OUTPATIENT
Start: 2020-11-12 | End: 2022-04-21

## 2020-11-12 RX ADMIN — LIDOCAINE HYDROCHLORIDE 40 ML: 20 SOLUTION ORAL; TOPICAL at 20:18

## 2020-11-12 RX ADMIN — FAMOTIDINE 20 MG: 10 INJECTION, SOLUTION INTRAVENOUS at 20:18

## 2020-11-13 NOTE — ED PROVIDER NOTES
EMERGENCY DEPARTMENT HISTORY AND PHYSICAL EXAM      Date: 11/12/2020  Patient Name: Marcie Goodwin  Patient Age and Sex: 63 y.o. female     History of Presenting Illness     Chief Complaint   Patient presents with    Abdominal Pain     Pt reports diffuse generalized abdominal pain for two months. History Provided By: Patient    HPI: Marcie Goodwin is a 43-year-old female presenting for upper abdominal pain. Patient states that for the past 3 months has been dealing with upper abdominal pain associated with some nausea. Denies any vomiting, diarrhea, constipation, dysuria or hematuria. Was here on October 1 and had a CT and an ultrasound done. Patient is concerned about her gallbladder. Niece states that they have gallbladder issues in the family but also history of gastritis. Patient does not have a GI doctor. Has not been taking anything for her stomach    There are no other complaints, changes, or physical findings at this time. PCP: None    No current facility-administered medications on file prior to encounter. Current Outpatient Medications on File Prior to Encounter   Medication Sig Dispense Refill    traZODone (DESYREL) 50 mg tablet Take 1 Tab by mouth nightly as needed for Sleep (For insomnia). Indications: insomnia associated with depression 30 Tab 0    OLANZapine (ZyPREXA) 5 mg tablet Take 1 Tab by mouth every six (6) hours as needed for Other (For agitation or psychosis). Indications: bipolar disorder in remission, additional medications to treat depression 30 Tab 0    paliperidone palmitate (INVEGA SUSTENNA) 117 mg/0.75 mL syrg injection 0.75 mL by IntraMUSCular route every thirty (30) days. Indications: schizophrenia with mood changes 1 Syringe 0    traZODone (DESYREL) 100 mg tablet Take 100 mg by mouth nightly.  Indications: insomnia      albuterol (PROVENTIL HFA, VENTOLIN HFA, PROAIR HFA) 90 mcg/actuation inhaler Take 2 Puffs by inhalation three (3) times daily as needed for Wheezing or Shortness of Breath. Past History     Past Medical History:  Past Medical History:   Diagnosis Date    Asthma     Hepatitis C        Past Surgical History:  No past surgical history on file. Family History:  No family history on file. Social History:  Social History     Tobacco Use    Smoking status: Former Smoker    Smokeless tobacco: Never Used   Substance Use Topics    Alcohol use: Not Currently    Drug use: Yes     Types: Marijuana       Allergies:  No Known Allergies      Review of Systems   Review of Systems   Constitutional: Negative for chills and fever. Respiratory: Negative for cough and shortness of breath. Cardiovascular: Negative for chest pain. Gastrointestinal: Positive for abdominal pain and nausea. Negative for constipation, diarrhea and vomiting. Genitourinary: Negative for dysuria, frequency and hematuria. Neurological: Negative for weakness and numbness. All other systems reviewed and are negative. Physical Exam   Physical Exam  Vitals signs and nursing note reviewed. Constitutional:       Appearance: She is well-developed. HENT:      Head: Normocephalic and atraumatic. Nose: Nose normal.      Mouth/Throat:      Mouth: Mucous membranes are moist.   Eyes:      Extraocular Movements: Extraocular movements intact. Conjunctiva/sclera: Conjunctivae normal.   Neck:      Musculoskeletal: Normal range of motion and neck supple. Cardiovascular:      Rate and Rhythm: Normal rate and regular rhythm. Pulmonary:      Effort: Pulmonary effort is normal. No respiratory distress. Breath sounds: Normal breath sounds. Abdominal:      General: There is no distension. Palpations: Abdomen is soft. Tenderness: There is abdominal tenderness in the epigastric area and left upper quadrant. Musculoskeletal: Normal range of motion. Skin:     General: Skin is warm and dry. Neurological:      General: No focal deficit present. Mental Status: She is alert and oriented to person, place, and time. Mental status is at baseline. Psychiatric:         Mood and Affect: Mood normal.          Diagnostic Study Results     Labs -     Recent Results (from the past 12 hour(s))   CBC WITH AUTOMATED DIFF    Collection Time: 11/12/20  6:24 PM   Result Value Ref Range    WBC 7.0 3.6 - 11.0 K/uL    RBC 4.13 3.80 - 5.20 M/uL    HGB 12.8 11.5 - 16.0 g/dL    HCT 38.5 35.0 - 47.0 %    MCV 93.2 80.0 - 99.0 FL    MCH 31.0 26.0 - 34.0 PG    MCHC 33.2 30.0 - 36.5 g/dL    RDW 12.3 11.5 - 14.5 %    PLATELET 830 576 - 941 K/uL    MPV 9.1 8.9 - 12.9 FL    NRBC 0.0 0  WBC    ABSOLUTE NRBC 0.00 0.00 - 0.01 K/uL    NEUTROPHILS 42 32 - 75 %    LYMPHOCYTES 43 12 - 49 %    MONOCYTES 12 5 - 13 %    EOSINOPHILS 2 0 - 7 %    BASOPHILS 1 0 - 1 %    IMMATURE GRANULOCYTES 0 0.0 - 0.5 %    ABS. NEUTROPHILS 3.0 1.8 - 8.0 K/UL    ABS. LYMPHOCYTES 3.0 0.8 - 3.5 K/UL    ABS. MONOCYTES 0.8 0.0 - 1.0 K/UL    ABS. EOSINOPHILS 0.2 0.0 - 0.4 K/UL    ABS. BASOPHILS 0.0 0.0 - 0.1 K/UL    ABS. IMM. GRANS. 0.0 0.00 - 0.04 K/UL    DF AUTOMATED     LIPASE    Collection Time: 11/12/20  7:09 PM   Result Value Ref Range    Lipase 122 73 - 874 U/L   METABOLIC PANEL, COMPREHENSIVE    Collection Time: 11/12/20  7:09 PM   Result Value Ref Range    Sodium 140 136 - 145 mmol/L    Potassium 3.5 3.5 - 5.1 mmol/L    Chloride 104 97 - 108 mmol/L    CO2 28 21 - 32 mmol/L    Anion gap 8 5 - 15 mmol/L    Glucose 110 (H) 65 - 100 mg/dL    BUN 12 6 - 20 MG/DL    Creatinine 0.90 0.55 - 1.02 MG/DL    BUN/Creatinine ratio 13 12 - 20      GFR est AA >60 >60 ml/min/1.73m2    GFR est non-AA >60 >60 ml/min/1.73m2    Calcium 9.1 8.5 - 10.1 MG/DL    Bilirubin, total 0.2 0.2 - 1.0 MG/DL    ALT (SGPT) 15 12 - 78 U/L    AST (SGOT) 14 (L) 15 - 37 U/L    Alk.  phosphatase 102 45 - 117 U/L    Protein, total 7.6 6.4 - 8.2 g/dL    Albumin 3.6 3.5 - 5.0 g/dL    Globulin 4.0 2.0 - 4.0 g/dL    A-G Ratio 0.9 (L) 1.1 - 2.2 Radiologic Studies -   No orders to display     CT Results  (Last 48 hours)    None        CXR Results  (Last 48 hours)    None            Medical Decision Making   I am the first provider for this patient. I reviewed the vital signs, available nursing notes, past medical history, past surgical history, family history and social history. Vital Signs-Reviewed the patient's vital signs. Patient Vitals for the past 12 hrs:   Temp Pulse Resp BP SpO2   11/12/20 1820 98 °F (36.7 °C) 79 20 119/64 97 %       Records Reviewed: Nursing Notes and Old Medical Records    Provider Notes (Medical Decision Making):   Patient presenting with 3 months of upper abdominal pain. Most likely gastritis given her liver function and gallbladder function tests have come back negative in the past as well as today. Her CT and ultrasound were reviewed by me from October 1 and they all were negative for any acute pathology. We will give her GI cocktail, IV Pepcid and discharge her home on omeprazole and have her follow-up with GI as needed. ED Course:   Initial assessment performed. The patients presenting problems have been discussed, and they are in agreement with the care plan formulated and outlined with them. I have encouraged them to ask questions as they arise throughout their visit. Critical Care Time:   0    Disposition:  Discharge Note:  The patient has been re-evaluated and is ready for discharge. Reviewed available results with patient. Counseled patient on diagnosis and care plan. Patient has expressed understanding, and all questions have been answered. Patient agrees with plan and agrees to follow up as recommended, or to return to the ED if their symptoms worsen. Discharge instructions have been provided and explained to the patient, along with reasons to return to the ED.       PLAN:  Discharge Medication List as of 11/12/2020  8:31 PM      START taking these medications    Details   omeprazole (PRILOSEC) 20 mg capsule Take 1 Cap by mouth daily. , Normal, Disp-30 Cap,R-0         CONTINUE these medications which have NOT CHANGED    Details   !! traZODone (DESYREL) 50 mg tablet Take 1 Tab by mouth nightly as needed for Sleep (For insomnia). Indications: insomnia associated with depression, Print, Disp-30 Tab, R-0      OLANZapine (ZyPREXA) 5 mg tablet Take 1 Tab by mouth every six (6) hours as needed for Other (For agitation or psychosis). Indications: bipolar disorder in remission, additional medications to treat depression, Print, Disp-30 Tab, R-0      paliperidone palmitate (INVEGA SUSTENNA) 117 mg/0.75 mL syrg injection 0.75 mL by IntraMUSCular route every thirty (30) days. Indications: schizophrenia with mood changes, Normal, Disp-1 Syringe, R-0      !! traZODone (DESYREL) 100 mg tablet Take 100 mg by mouth nightly. Indications: insomnia, Historical Med      albuterol (PROVENTIL HFA, VENTOLIN HFA, PROAIR HFA) 90 mcg/actuation inhaler Take 2 Puffs by inhalation three (3) times daily as needed for Wheezing or Shortness of Breath., Historical Med       !! - Potential duplicate medications found. Please discuss with provider. 2.   Follow-up Information     Follow up With Specialties Details Why Contact Vadim Barr MD Gastroenterology Schedule an appointment as soon as possible for a visit  [address] [phone]          3. Return to ED if worse     Diagnosis     Clinical Impression:   1. Gastritis and duodenitis        Attestations:    Carlos Engle M.D. Please note that this dictation was completed with [employer], the [employer] voice recognition software. Quite often unanticipated grammatical, syntax, homophones, and other interpretive errors are inadvertently transcribed by the computer software. Please disregard these errors. Please excuse any errors that have escaped final proofreading. Thank you.

## 2020-11-13 NOTE — DISCHARGE INSTRUCTIONS
Patient Education        Gastritis: Care Instructions  Your Care Instructions     Gastritis is a sore and upset stomach. It happens when something irritates the stomach lining. Many things can cause it. These include an infection such as the flu or something you ate or drank. Medicines or a sore on the lining of the stomach (ulcer) also can cause it. Your belly may bloat and ache. You may belch, vomit, and feel sick to your stomach. You should be able to relieve the problem by taking medicine. And it may help to change your diet. If gastritis lasts, your doctor may prescribe medicine. Follow-up care is a key part of your treatment and safety. Be sure to make and go to all appointments, and call your doctor if you are having problems. It's also a good idea to know your test results and keep a list of the medicines you take. How can you care for yourself at home? · If your doctor prescribed antibiotics, take them as directed. Do not stop taking them just because you feel better. You need to take the full course of antibiotics. · Be safe with medicines. If your doctor prescribed medicine to decrease stomach acid, take it as directed. Call your doctor if you think you are having a problem with your medicine. · Do not take any other medicine, including over-the-counter pain relievers, without talking to your doctor first.  · If your doctor recommends over-the-counter medicine to reduce stomach acid, such as Pepcid AC (famotidine), Prilosec (omeprazole), or Tagamet HB (cimetidine) follow the directions on the label. · Drink plenty of fluids (enough so that your urine is light yellow or clear like water) to prevent dehydration. Choose water and other caffeine-free clear liquids. If you have kidney, heart, or liver disease and have to limit fluids, talk with your doctor before you increase the amount of fluids you drink. · Limit how much alcohol you drink.   · Avoid coffee, tea, cola drinks, chocolate, and other foods with caffeine. They increase stomach acid. When should you call for help? Call 911 anytime you think you may need emergency care. For example, call if:    · You vomit blood or what looks like coffee grounds.     · You pass maroon or very bloody stools. Call your doctor now or seek immediate medical care if:    · You start breathing fast and have not produced urine in the last 8 hours.     · You cannot keep fluids down. Watch closely for changes in your health, and be sure to contact your doctor if:    · You do not get better as expected. Where can you learn more? Go to http://www.grider.com/  Enter Z536 in the search box to learn more about \"Gastritis: Care Instructions. \"  Current as of: April 15, 2020               Content Version: 12.6  © 4940-9163 [employer]. Care instructions adapted under license by [employer] (which disclaims liability or warranty for this information). If you have questions about a medical condition or this instruction, always ask your healthcare professional. [address] any warranty or liability for your use of this information.

## 2020-11-13 NOTE — ED NOTES
Joey Garg MD reviewed discharge instructions with the patient. The patient verbalized understanding. All questions and concerns were addressed. The patient declined a wheelchair and is discharged ambulatory in the care of family members with instructions and prescriptions in hand. Pt is alert and oriented x 4. Respirations are clear and unlabored.

## 2020-12-21 ENCOUNTER — TRANSCRIBE ORDER (OUTPATIENT)
Dept: SCHEDULING | Age: 60
End: 2020-12-21

## 2020-12-21 DIAGNOSIS — Z12.31 VISIT FOR SCREENING MAMMOGRAM: Primary | ICD-10-CM

## 2021-01-08 NOTE — BH NOTES
GROUP THERAPY PROGRESS NOTE    Patient did not participate in Discharge Group.      Azalea Garcia LPC LSATP [facility] 1.71

## 2021-03-02 ENCOUNTER — HOSPITAL ENCOUNTER (OUTPATIENT)
Dept: MAMMOGRAPHY | Age: 61
Discharge: HOME | End: 2021-03-02
Attending: FAMILY MEDICINE
Payer: MEDICAID

## 2021-03-02 DIAGNOSIS — Z12.31 VISIT FOR SCREENING MAMMOGRAM: ICD-10-CM

## 2021-03-02 PROCEDURE — 77063 BREAST TOMOSYNTHESIS BI: CPT

## 2021-08-15 ENCOUNTER — HOSPITAL ENCOUNTER (EMERGENCY)
Age: 61
Discharge: HOME | End: 2021-08-15
Attending: EMERGENCY MEDICINE
Payer: MEDICAID

## 2021-08-15 VITALS
SYSTOLIC BLOOD PRESSURE: 149 MMHG | OXYGEN SATURATION: 99 % | RESPIRATION RATE: 16 BRPM | DIASTOLIC BLOOD PRESSURE: 81 MMHG | HEART RATE: 74 BPM | TEMPERATURE: 98.5 F

## 2021-08-15 DIAGNOSIS — M54.2 NECK PAIN: ICD-10-CM

## 2021-08-15 DIAGNOSIS — M54.50 ACUTE RIGHT-SIDED LOW BACK PAIN WITHOUT SCIATICA: Primary | ICD-10-CM
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
## 2021-08-15 PROCEDURE — 99282 EMERGENCY DEPT VISIT SF MDM: CPT

## 2021-08-15 RX ORDER — INDAPAMIDE 2.5 MG/1
TABLET, FILM COATED ORAL DAILY
COMMUNITY

## 2021-08-15 RX ORDER — SPIRONOLACTONE 25 MG/1
TABLET ORAL DAILY
COMMUNITY

## 2021-08-15 RX ORDER — NAPROXEN 500 MG/1
500 TABLET ORAL 2 TIMES DAILY WITH MEALS
Qty: 6 TABLET | Refills: 0 | Status: SHIPPED | OUTPATIENT
Start: 2021-08-15 | End: 2021-08-18

## 2021-08-15 RX ORDER — ATORVASTATIN CALCIUM 20 MG/1
TABLET, FILM COATED ORAL DAILY
COMMUNITY

## 2021-08-15 RX ORDER — LIDOCAINE 4 G/100G
PATCH TOPICAL
Qty: 10 PATCH | Refills: 0 | Status: SHIPPED | OUTPATIENT
Start: 2021-08-15

## 2021-08-15 RX ORDER — AMLODIPINE BESYLATE 10 MG/1
TABLET ORAL DAILY
COMMUNITY

## 2021-08-15 RX ORDER — CYCLOBENZAPRINE HCL 5 MG
10 TABLET ORAL 3 TIMES DAILY
Qty: 18 TABLET | Refills: 0 | Status: SHIPPED | OUTPATIENT
Start: 2021-08-15 | End: 2021-08-18
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
## 2021-08-15 NOTE — ED NOTES
Roseanne Edwards is a 63 y.o. female that was discharged in stable condition. The patients diagnosis, condition and treatment were explained to  patient and aftercare instructions were given. The patient verbalized understanding. Patient armband removed and shredded.

## 2021-09-14 ENCOUNTER — HOSPITAL ENCOUNTER (OUTPATIENT)
Dept: PHYSICAL THERAPY | Age: 61
Discharge: HOME | End: 2021-09-14
Payer: MEDICAID

## 2021-09-14 PROCEDURE — 97162 PT EVAL MOD COMPLEX 30 MIN: CPT
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
## 2021-09-16 ENCOUNTER — HOSPITAL ENCOUNTER (OUTPATIENT)
Dept: PHYSICAL THERAPY | Age: 61
Discharge: HOME | End: 2021-09-16
Payer: MEDICAID

## 2021-09-16 PROCEDURE — 97112 NEUROMUSCULAR REEDUCATION: CPT

## 2021-09-16 PROCEDURE — 97530 THERAPEUTIC ACTIVITIES: CPT

## 2021-09-16 PROCEDURE — 97014 ELECTRIC STIMULATION THERAPY: CPT

## 2021-09-16 NOTE — PROGRESS NOTES
PT DAILY TREATMENT NOTE     Patient Name: Aaliyah Mayo  Date:2021  : 1960  [x]  Patient  Verified  Payor: Bianca Mcmullen / Plan: Theoplis Callow / Product Type: Managed Care Medicaid /    In time: 8:15  Out time: 8:50  Total Treatment Time (min): 35  Visit #: 2 of 12    Treatment Area: Low back pain [M54.5]  Cervicalgia [M54.2]    SUBJECTIVE  Pain Level (0-10 scale): 9/10  Any medication changes, allergies to medications, adverse drug reactions, diagnosis change, or new procedure performed?: [x] No    [] Yes (see summary sheet for update)  Subjective functional status/changes:   [] No changes reported  Pt reports pain mostly locates along Right neck/shoulder and upper back; no pain with lower back. Her job include mostly heavy activities which have been difficulty for her. Pt hopes to get some notes from PT to take off work to help with her pain.      OBJECTIVE    Modality rationale: decrease pain and increase tissue extensibility to improve the patients ability to tolerate PT/ADLs   Min Type Additional Details   15 min with set up time [] Estim:  []Unatt       [x]IFC  []Premod                        []Other:  []w/ice   [x]w/heat  Position: seated  Location: Right UT/upper back    [] Estim: []Att    []TENS instruct  []NMES                    []Other:  []w/US   []w/ice   []w/heat  Position:  Location:    []  Traction: [] Cervical       []Lumbar                       [] Prone          []Supine                       []Intermittent   []Continuous Lbs:  [] before manual  [] after manual    []  Ultrasound: []Continuous   [] Pulsed                           []1MHz   []3MHz W/cm2:  Location:    []  Iontophoresis with dexamethasone         Location: [] Take home patch   [] In clinic    []  Ice     []  heat  []  Ice massage  []  Laser   []  Anodyne Position:  Location:    []  Laser with stim  []  Other:  Position:  Location:    []  Vasopneumatic Device    []  Right     []  Left  Pre-treatment girth:  Post-treatment girth:  Measured at (location):  Pressure:       [] lo [] med [] hi   Temperature: [] lo [] med [] hi   [x] Skin assessment post-treatment:  [x]intact []redness- no adverse reaction    []redness - adverse reaction:       12 min Therapeutic Activity:  [x]  See flow sheet : massage therapy, modalities use   Rationale: increase ROM, increase strength, improve coordination, improve balance and increase proprioception  to improve the patients ability to perform ADLs with more ease     8 min Neuromuscular Re-education:  [x]  See flow sheet : relaxation techniques, posture; LAD for right upslip   Rationale: improve coordination, improve balance and increase proprioception  to improve the patients ability to perform ADLs with ease & safety          With   [] TE   [] TA   [] neuro   [] other: Patient Education: [x] Review HEP    [] Progressed/Changed HEP based on:   [] positioning   [] body mechanics   [] transfers   [] heat/ice application    [] other:      Other Objective/Functional Measures:    Right upslip, reported improved pain with upper back after LAD   Pleased with ESTIM & Moist Heat   Improved guarding/shoulders hiking with diaphragmatic breathing    BP: 134/93 mmHg; HR: 63 bpm    Pain Level (0-10 scale) post treatment: 8/10    ASSESSMENT/Changes in Function: Focussed on pain management and relaxation tech today. Pt demonstrates min improvement with pain and good understanding with self management. Will progress with combo for Right UT and initiate therex as tolerated.      Patient will continue to benefit from skilled PT services to modify and progress therapeutic interventions, address functional mobility deficits, address ROM deficits, address strength deficits, analyze and address soft tissue restrictions, analyze and cue movement patterns, analyze and modify body mechanics/ergonomics, assess and modify postural abnormalities and instruct in home and community integration to attain remaining goals. [x]  See Plan of Care  []  See progress note/recertification  []  See Discharge Summary         Progress towards goals / Updated goals:  Short Term Goals: To be accomplished in 1 weeks:  1. Pt will be compliant with initial HEP in order to optimize therapy outcomes. Long Term Goals: To be accomplished in 4 weeks:  1. Pt will improve FOTO by 17 points in order to demonstrate functional improvement. 2.  Pt will report 50% improvement since start of care in order to improve QOL. 3.  Pt will improve right cervical rotation AROM to > 60* in order to improve ease of checking blind spots with driving. 4.  Pt will perform 15# box lift floor<>waist with correct ergonomics without pain increase in order to demonstrate improved safety/ability with lifting with job tasks. 5.  Pt will report a little difficulty with performing usual work/housework in order to demonstrate improved ability with job tasks and household management.      PLAN  [x]  Upgrade activities as tolerated     [x]  Continue plan of care  []  Update interventions per flow sheet       []  Discharge due to:_  []  Other:_      Xiomy Pat 9/16/2021  7:57 AM    Future Appointments   Date Time Provider [place]   9/16/2021  8:15 AM Haresh Barros [facility]   9/20/2021  7:30 AM Ferdinand Rivera PTA [facility]   9/22/2021  9:00 AM Ferdinand Rivera PTA [facility]   9/27/2021  9:00 AM Ferdinand Rivera PTA [facility]   9/29/2021 11:00 AM Shade Elizabeth, Paige Bloch, NP VSMO [facility] AM[facility]   9/30/2021  7:30 AM Charley SANFORD, PT [facility]   10/4/2021  9:00 AM Ferdinand Rivera PTA [facility]   10/6/2021  9:00 AM Ferdinand Rivera PTA [facility]   10/11/2021  9:00 AM Ferdinand Rivera, PTA [facility]   10/13/2021  9:00 AM Ferdinand Rivera, PTA [facility]   10/18/2021  9:00 AM Ferdinand Rivera, PTA [facility]   10/21/2021  8:15 AM Jennifer Pantoja, PT [facility]   10/25/2021  9:00 AM Ferdinand Rivera, PTA [facility] 10/27/2021  9:00 AM Reford CHRISTOPHER Cadena [facility]

## 2021-09-20 ENCOUNTER — HOSPITAL ENCOUNTER (OUTPATIENT)
Dept: PHYSICAL THERAPY | Age: 61
Discharge: HOME | End: 2021-09-20
Payer: MEDICAID

## 2021-09-20 PROCEDURE — 97112 NEUROMUSCULAR REEDUCATION: CPT

## 2021-09-20 PROCEDURE — 97014 ELECTRIC STIMULATION THERAPY: CPT

## 2021-09-20 PROCEDURE — 97530 THERAPEUTIC ACTIVITIES: CPT

## 2021-09-20 PROCEDURE — 97110 THERAPEUTIC EXERCISES: CPT

## 2021-09-20 NOTE — PROGRESS NOTES
PT DAILY TREATMENT NOTE     Patient Name: Obie Lee  Date:2021  : 1960  [x]  Patient  Verified  Payor: Adeola Lambert / Plan: [employer] / Product Type: Managed Care Medicaid /    In time:730  Out time:815  Total Treatment Time (min): 45  Visit #: 3 of 12    Treatment Area: Low back pain [M54.5]  Cervicalgia [M54.2]    SUBJECTIVE  Pain Level (0-10 scale): 8/10  Any medication changes, allergies to medications, adverse drug reactions, diagnosis change, or new procedure performed?: [x] No    [] Yes (see summary sheet for update)  Subjective functional status/changes:   [] No changes reported  Pt stated that most of her pain is in her upper back and she is having less pain in the right UT    OBJECTIVE    Modality rationale: decrease pain and increase tissue extensibility to improve the patients ability to increase ease with ADLs   Min Type Additional Details   15 [x] Estim:  []Unatt       [x]IFC  []Premod                        []Other:  []w/ice   [x]w/heat  Position:seated  Location:right upper back    [] Estim: []Att    []TENS instruct  []NMES                    []Other:  []w/US   []w/ice   []w/heat  Position:  Location:    []  Traction: [] Cervical       []Lumbar                       [] Prone          []Supine                       []Intermittent   []Continuous Lbs:  [] before manual  [] after manual    []  Ultrasound: []Continuous   [] Pulsed                           []1MHz   []3MHz W/cm2:  Location:    []  Iontophoresis with dexamethasone         Location: [] Take home patch   [] In clinic    []  Ice     []  heat  []  Ice massage  []  Laser   []  Anodyne Position:  Location:    []  Laser with stim  []  Other:  Position:  Location:    []  Vasopneumatic Device    []  Right     []  Left  Pre-treatment girth:  Post-treatment girth:  Measured at (location):  Pressure:       [] lo [] med [] hi   Temperature: [] lo [] med [] hi   [x] Skin assessment post-treatment:  [x]intact []redness- no adverse reaction    []redness - adverse reaction:     10 min Therapeutic Exercise:  [x] See flow sheet :   Rationale: increase ROM and increase strength to improve the patients ability to increase ease with ADLs    10 min Therapeutic Activity:  [x]  See flow sheet :   Rationale: increase ROM, improve coordination and increase proprioception  to improve the patients ability to increase ease with functional tasks     10 min Neuromuscular Re-education:  [x]  See flow sheet :   Rationale: increase ROM, improve coordination and increase proprioception  to improve the patients ability to increase ease with functional activities    With   [x] TE   [] TA   [] neuro   [] other: Patient Education: [x] Review HEP    [] Progressed/Changed HEP based on:   [] positioning   [] body mechanics   [] transfers   [] heat/ice application    [] other:      Other Objective/Functional Measures:   No complaint of increased pain with UBE  No complaint of increased pain during session   Tolerated all exercises without difficulty  Fair C/S AROM with exercises and stretches     Pain Level (0-10 scale) post treatment: 7/10    ASSESSMENT/Changes in Function:   Pt is making slow progress toward goals. Pt reported that she mainly has pain in the right upper back today. Cont to have difficulty with ADLs, but did not report difficulty with driving. Patient will continue to benefit from skilled PT services to modify and progress therapeutic interventions, address functional mobility deficits, address ROM deficits, address strength deficits, analyze and cue movement patterns, analyze and modify body mechanics/ergonomics, assess and modify postural abnormalities and instruct in home and community integration to attain remaining goals.      [x]  See Plan of Care  []  See progress note/recertification  []  See Discharge Summary         Progress towards goals / Updated goals:  Short Term Goals: To be accomplished in 1 weeks:  1.  Pt will be compliant with initial HEP in order to optimize therapy outcomes.      Long Term Goals: To be accomplished in 4 weeks:  1.  Pt will improve FOTO by 17 points in order to demonstrate functional improvement. 2.  Pt will report 50% improvement since start of care in order to improve QOL. 3.  Pt will improve right cervical rotation AROM to > 60* in order to improve ease of checking blind spots with driving.    4.  Pt will perform 15# box lift floor<>waist with correct ergonomics without pain increase in order to demonstrate improved safety/ability with lifting with job tasks. 5.  Pt will report a little difficulty with performing usual work/housework in order to demonstrate improved ability with job tasks and household management.      PLAN  []  Upgrade activities as tolerated     [x]  Continue plan of care  []  Update interventions per flow sheet       []  Discharge due to:_  []  Other:_      Nely Kingsley PTA 9/20/2021  6:32 AM    Future Appointments   Date Time Provider En [place]   9/20/2021  7:30 AM Leane Leak, PTA [facility]   9/22/2021  9:00 AM Leane Leak, PTA [facility]   9/27/2021  9:00 AM Leane Leak, PTA [facility]   9/29/2021 11:00 AM Crecencio Hodgkins, May Fruit, NP [facility] BS AMB   9/30/2021  7:30 AM Anum SANFORD, PT PCLVYEV [facility]   10/4/2021  9:00 AM Leane Leak, PTA [facility]   10/6/2021  9:00 AM Leane Leak, PTA [facility]   10/11/2021  9:00 AM Leane Leak, PTA [facility]   10/13/2021  9:00 AM Leane Leak, PTA [facility]   10/18/2021  9:00 AM Leane Leak, PTA [facility]   10/21/2021  8:15 AM Minerva Gonzalez, PT [facility]   10/25/2021  9:00 AM Steve Leak, PTA [facility]   10/27/2021  9:00 AM Steve Leak, PTA [facility]

## 2021-09-21 ENCOUNTER — APPOINTMENT (OUTPATIENT)
Dept: PHYSICAL THERAPY | Age: 61
End: 2021-09-21
Payer: MEDICAID

## 2021-09-22 ENCOUNTER — HOSPITAL ENCOUNTER (OUTPATIENT)
Dept: PHYSICAL THERAPY | Age: 61
Discharge: HOME | End: 2021-09-22
Payer: MEDICAID

## 2021-09-22 PROCEDURE — 97112 NEUROMUSCULAR REEDUCATION: CPT

## 2021-09-22 PROCEDURE — 97530 THERAPEUTIC ACTIVITIES: CPT

## 2021-09-22 PROCEDURE — 97110 THERAPEUTIC EXERCISES: CPT

## 2021-09-22 PROCEDURE — 97014 ELECTRIC STIMULATION THERAPY: CPT

## 2021-09-27 ENCOUNTER — HOSPITAL ENCOUNTER (OUTPATIENT)
Dept: PHYSICAL THERAPY | Age: 61
Discharge: HOME | End: 2021-09-27
Payer: MEDICAID

## 2021-09-27 PROCEDURE — 97112 NEUROMUSCULAR REEDUCATION: CPT

## 2021-09-27 PROCEDURE — 97530 THERAPEUTIC ACTIVITIES: CPT

## 2021-09-27 PROCEDURE — 97110 THERAPEUTIC EXERCISES: CPT

## 2021-09-27 NOTE — PROGRESS NOTES
PT DAILY TREATMENT NOTE     Patient Name: Joseph Bolivar  Date:2021  : 1960  [x]  Patient  Verified  Payor: Jerry Led / Plan: [employer] / Product Type: Managed Care Medicaid /    In time:900  Out time:930  Total Treatment Time (min): 30  Visit #: 5 of 12    Treatment Area: Low back pain [M54.5]  Cervicalgia [M54.2]    SUBJECTIVE  Pain Level (0-10 scale): 0/10  Any medication changes, allergies to medications, adverse drug reactions, diagnosis change, or new procedure performed?: [x] No    [] Yes (see summary sheet for update)  Subjective functional status/changes:   [] No changes reported  Pt stated that she is doing good and has no pain today    OBJECTIVE    12 min Therapeutic Exercise:  [x] See flow sheet :   Rationale: increase ROM and increase strength to improve the patients ability to increase ease with ADLs    9 min Therapeutic Activity:  [x]  See flow sheet :   Rationale: increase strength, improve coordination and increase proprioception  to improve the patients ability to increase ease with functional tasks     9 min Neuromuscular Re-education:  [x]  See flow sheet :   Rationale: increase strength, improve coordination, improve balance and increase proprioception  to improve the patients ability to decrease fall risk    With   [x] TE   [] TA   [] neuro   [] other: Patient Education: [x] Review HEP    [] Progressed/Changed HEP based on:   [] positioning   [] body mechanics   [] transfers   [] heat/ice application    [] other:      Other Objective/Functional Measures:   No difficulty with exercises  Cervical AROM is improving for all motions  Cont to need cueing with SNAGS  No difficulty with SB LTR  Pt declined modalities due having no pain today     Pain Level (0-10 scale) post treatment: 0/10    ASSESSMENT/Changes in Function:   Pt is progressing well toward goals. Pt reported having no pain today. Cervical rotation AROM is improving bilaterally.  Cont to report some difficulty with lifting objects with moderate weight from the floor. Cont to report having some difficulty with performing some household tasks    Patient will continue to benefit from skilled PT services to modify and progress therapeutic interventions, address functional mobility deficits, address ROM deficits, address strength deficits, analyze and cue movement patterns, analyze and modify body mechanics/ergonomics, assess and modify postural abnormalities, address imbalance/dizziness and instruct in home and community integration to attain remaining goals. [x]  See Plan of Care  []  See progress note/recertification  []  See Discharge Summary         Progress towards goals / Updated goals:  Short Term Goals: To be accomplished in 1 weeks:  1.  Pt will be compliant with initial HEP in order to optimize therapy outcomes.      Long Term Goals: To be accomplished in 4 weeks:  1.  Pt will improve FOTO by 17 points in order to demonstrate functional improvement. 2.  Pt will report 50% improvement since start of care in order to improve QOL. 3.  Pt will improve right cervical rotation AROM to > 60* in order to improve ease of checking blind spots with driving.    4.  Pt will perform 15# box lift floor<>waist with correct ergonomics without pain increase in order to demonstrate improved safety/ability with lifting with job tasks.   5.  Pt will report a little difficulty with performing usual work/housework in order to demonstrate improved ability with job tasks and household management.     PLAN  []  Upgrade activities as tolerated     [x]  Continue plan of care  []  Update interventions per flow sheet       []  Discharge due to:_  []  Other:_      Adriano Francisco PTA 9/27/2021  6:33 AM    Future Appointments   Date Time Provider [place]   9/27/2021  9:00 AM Reyes Borja, PTA [facility]   9/29/2021 11:00 AM Josh Monzon, NP [facility]   9/30/2021  7:30 AM Breonna Kelsey, PT [facility] 10/4/2021  9:00 AM Sierra Draper, PTA [facility]   10/6/2021  9:00 AM Sierra Draper, PTA [facility]   10/11/2021  9:00 AM Sierra Draper, PTA [facility]   10/13/2021  9:00 AM Sierra Draper, PTA [facility]   10/18/2021  9:00 AM Sierra Draper, PTA [facility]   10/21/2021  8:15 AM Bang Dinh, PT [facility]   10/25/2021  9:00 AM Sierra Draper, PTA [facility]   10/27/2021  9:00 AM Sierra Draper, PTA [facility]

## 2021-09-29 ENCOUNTER — OFFICE VISIT (OUTPATIENT)
Dept: ORTHOPEDIC SURGERY | Age: 61
End: 2021-09-29
Payer: MEDICAID

## 2021-09-29 VITALS
DIASTOLIC BLOOD PRESSURE: 80 MMHG | WEIGHT: 226 LBS | SYSTOLIC BLOOD PRESSURE: 125 MMHG | OXYGEN SATURATION: 99 % | HEIGHT: 66 IN | BODY MASS INDEX: 36.32 KG/M2 | HEART RATE: 82 BPM | TEMPERATURE: 97 F
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
## 2021-09-29 PROCEDURE — 99203 OFFICE O/P NEW LOW 30 MIN: CPT | Performed by: NURSE PRACTITIONER

## 2021-09-29 RX ORDER — CYCLOBENZAPRINE HCL 10 MG
TABLET ORAL
COMMUNITY
Start: 2021-08-27

## 2021-09-29 RX ORDER — MELOXICAM 15 MG/1
TABLET ORAL
COMMUNITY
Start: 2021-08-27 | End: 2021-09-29 | Stop reason: ALTCHOICE

## 2021-09-29 RX ORDER — TRIAMCINOLONE ACETONIDE 5 MG/G
CREAM TOPICAL
COMMUNITY

## 2021-09-29 RX ORDER — CLOBETASOL PROPIONATE 0.5 MG/G
OINTMENT TOPICAL
COMMUNITY
Start: 2021-09-28
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
## 2021-09-30 ENCOUNTER — HOSPITAL ENCOUNTER (OUTPATIENT)
Dept: PHYSICAL THERAPY | Age: 61
Discharge: HOME | End: 2021-09-30
Payer: MEDICAID

## 2021-09-30 PROCEDURE — 97530 THERAPEUTIC ACTIVITIES: CPT

## 2021-09-30 PROCEDURE — 97112 NEUROMUSCULAR REEDUCATION: CPT

## 2021-09-30 PROCEDURE — 97110 THERAPEUTIC EXERCISES: CPT

## 2021-09-30 NOTE — PROGRESS NOTES
PT DAILY TREATMENT NOTE     Patient Name: Odalis Roy  Date:2021  : 1960  [x]  Patient  Verified  Payor: [employer]ares D[employer] / [place] / Product Type: Managed Care Medicaid /    In time:7:31  Out time:8:09  Total Treatment Time (min): 38  Visit #: 6 of 12    Treatment Area: Low back pain [M54.5]  Cervicalgia [M54.2]    SUBJECTIVE  Pain Level (0-10 scale): 0/10  Any medication changes, allergies to medications, adverse drug reactions, diagnosis change, or new procedure performed?: [x] No    [] Yes (see summary sheet for update)  Subjective functional status/changes:   [] No changes reported  Pt reports that the heat is really helping her neck and her back. She has not had any pain for the past 3 days. She had some pain on Monday, but the heat really helped. She has been working, and lifting has been ok at work. She has not been having to bend at much at work lately because her boss has been working with her and had her doing pressing instead of bending/lifting while she was having a lot of pain. She will need to return to bending more at work eventually.       OBJECTIVE    19 min Therapeutic Exercise:  [x] See flow sheet : interventions per flow sheet, review of HEP    Rationale: increase ROM and increase strength to improve the patients ability to improve ease of job tasks     8 min Therapeutic Activity:  [x]  See flow sheet : review of lifting mechanics    Rationale: increase ROM and increase strength  to improve the patients ability to safely return to lifting with job tasks      11 min Neuromuscular Re-education:  [x]  See flow sheet : posture re-ed with c/s retraction, [place] row/extension, scapular retraction    Rationale: increase strength and increase proprioception  to improve the patients ability to reduce pain and improve ease of job tasks and household management             With   [] TE   [] TA   [] neuro   [] other: Patient Education: [x] Review HEP    [] Progressed/Changed HEP based on:   [] positioning   [] body mechanics   [] transfers   [] heat/ice application    [] other:      Other Objective/Functional Measures:     Reviewed lifting mechanics with 15# box lift floor<>waist  Required cues for increased knee flexion and reduced lumbar flexion/maintain neutral spine with box lift, correct form with cuing    Reviewed avoiding combination of bending/twisting with job tasks, advised pt to squat instead of bend whenever possible but perform straight-plane bending if bending was necessary      Cervical rotation AROM: Left 64*, Right 70*    Gave and reviewed updated HEP   Gave pt blue and plum therabands for independent progression with HEP    Performed rows/extension with TB instead of Alexandre this visit to simulate HEP and determine band resistance for HEP      Pt declined heat     Pain Level (0-10 scale) post treatment: 0/10    ASSESSMENT/Changes in Function:     Pt is making steady progress towards initial goals in therapy. She has not had pain for the past three days and reports 80% improvement since start of care. Reviewed lifting mechanics this visit for safe return to previous job tasks. Gave and reviewed updated HEP to allow pt to continue to improve/maintain functional gains achieved with therapy independently following DC. Will continue to address remaining strength, flexibility, and postural deficits to allow for return to previous job tasks and return to PLOF. Anticipated transition to DC within the next few sessions pending continued steady progress.        Patient will continue to benefit from skilled PT services to modify and progress therapeutic interventions, address functional mobility deficits, address ROM deficits, address strength deficits, analyze and address soft tissue restrictions, analyze and cue movement patterns, analyze and modify body mechanics/ergonomics, assess and modify postural abnormalities and instruct in home and community integration to attain remaining goals. Progress towards goals / Updated goals:  Short Term Goals: To be accomplished in 1 weeks:  1. Pt will be compliant with initial HEP in order to optimize therapy outcomes. Goal met. 9/30/21  Long Term Goals: To be accomplished in 4 weeks:  1.  Pt will improve FOTO by 17 points in order to demonstrate functional improvement. 2.  Pt will report 50% improvement since start of care in order to improve QOL. Goal met. Pt reports 80% improvement 9/30/21  3.  Pt will improve right cervical rotation AROM to > 60* in order to improve ease of checking blind spots with driving. Goal met. 70* 9/30/21    4.  Pt will perform 15# box lift floor<>waist with correct ergonomics without pain increase in order to demonstrate improved safety/ability with lifting with job tasks. Goal met.  9/30/21   5.  Pt will report a little difficulty with performing usual work/housework in order to demonstrate improved ability with job tasks and household management.     PLAN  [x]  Upgrade activities as tolerated     [x]  Continue plan of care  []  Update interventions per flow sheet       []  Discharge due to:_  []  Other:_      Josias Brooks, PT 9/30/2021  7:33 AM    Future Appointments   Date Time Provider [place]   10/4/2021  9:00 AM Arnulfo Farris PTA [facility] [address]   10/6/2021  9:00 AM Arnulfo Farris, PTA [facility] [address]   10/11/2021  9:00 AM Arnulfo Farris, PTA [facility] [address]   10/13/2021  9:00 AM Arnulfo Farris, PTA [facility] [address]   10/18/2021  9:00 AM Arnulfo Farris, PTA [facility] [address]   10/21/2021  8:15 AM Oniel Teran PT [facility] [address]   10/25/2021  9:00 AM Arnulfo Farris, PTA [facility] [address]   10/27/2021  9:00 AM Arnulfo Farris, PTA [facility] [address]

## 2021-10-04 ENCOUNTER — HOSPITAL ENCOUNTER (OUTPATIENT)
Dept: PHYSICAL THERAPY | Age: 61
Discharge: HOME | End: 2021-10-04
Payer: MEDICAID

## 2021-10-04 PROCEDURE — 97530 THERAPEUTIC ACTIVITIES: CPT

## 2021-10-04 PROCEDURE — 97110 THERAPEUTIC EXERCISES: CPT

## 2021-10-04 PROCEDURE — 97112 NEUROMUSCULAR REEDUCATION: CPT

## 2021-10-06 ENCOUNTER — HOSPITAL ENCOUNTER (OUTPATIENT)
Dept: PHYSICAL THERAPY | Age: 61
Discharge: HOME | End: 2021-10-06
Payer: MEDICAID

## 2021-10-06 PROCEDURE — 97530 THERAPEUTIC ACTIVITIES: CPT

## 2021-10-06 PROCEDURE — 97112 NEUROMUSCULAR REEDUCATION: CPT

## 2021-10-06 PROCEDURE — 97110 THERAPEUTIC EXERCISES: CPT

## 2021-10-06 NOTE — PROGRESS NOTES
PT DISCHARGE DAILY NOTE AND [id number]    Patient name: Shayy Balderrama Start of Care: 2021   Referral source: Lew Alvarez* : 1960                Medical Diagnosis: Low back pain [M54.5]  Neck pain [M54.2]  Payor: Lety Vance / Plan: Morgan Gilmore / Product Type: Managed Care Medicaid /  Onset Date:[facility] 21                Treatment Diagnosis: Cervicalgia, Thoracic Pain, LBP   Prior Hospitalization: see medical history Provider#: [id number]   Medications: Verified on Patient summary List    Comorbidities: HTN   Prior Level of Function: lives with micha in a 1 story home with 3 steps to enter with handrail, presser for clothing             Visits from Start of Care: 8    Missed Visits: 0    Reporting Period : 2021 to 10/06/2021    Date:10/6/2021  : 1960  [x]  Patient  Verified  Payor: Lety Vance / [place] / Product Type: Managed Care Medicaid /    In time:900  Out time:930  Total Treatment Time (min): 30  Visit #: 8 of 12    SUBJECTIVE  Pain Level (0-10 scale): 0/10  Any medication changes, allergies to medications, adverse drug reactions, diagnosis change, or new procedure performed?: [x] No    [] Yes (see summary sheet for update)  Subjective functional status/changes:   [] No changes reported  Pt stated that she feels pretty good today    OBJECTIVE    10 min Therapeutic Exercise:  [x] See flow sheet :   Rationale: increase ROM and increase strength to improve the patients ability to increase ease with ADLs    10 min Therapeutic Activity:  [x]  See flow sheet :   Rationale: increase strength, improve coordination and increase proprioception  to improve the patients ability to increase ease with functional tasks     10 min Neuromuscular Re-education:  [x]  See flow sheet :   Rationale: increase strength, improve coordination and increase proprioception  to improve the patients ability to increase ease with functional activities    With   [x] TE   [] TA   [] neuro   [] other: Patient Education: [x] Review HEP    [] Progressed/Changed HEP based on:   [] positioning   [] body mechanics   [] transfers   [] heat/ice application    [] other:      Other Objective/Functional Measures:   FOTO 94     Pain Level (0-10 scale) post treatment: 0/10    Summary of Care:  Short Term Goals: To be accomplished in 1 weeks:  1.  Pt will be compliant with initial HEP in order to optimize therapy outcomes.   Goal met. 9/30/21  Long Term Goals: To be accomplished in 4 weeks:  1.  Pt will improve FOTO by 17 points in order to demonstrate functional improvement. Goal met. Increased from 50 to 94 which is 44 point increase  2.  Pt will report 50% improvement since start of care in order to improve QOL.   Goal met.  Pt reports 80% improvement 9/30/21  3.  Pt will improve right cervical rotation AROM to > 60* in order to improve ease of checking blind spots with driving.   Goal met.  70* 9/30/21    4.  Pt will perform 15# box lift floor<>waist with correct ergonomics without pain increase in order to demonstrate improved safety/ability with lifting with job tasks.   Goal met. 9/30/21   5.  Pt will report a little difficulty with performing usual work/housework in order to demonstrate improved ability with job tasks and household management.   Goal met. Pt reported no difficulty with performing usual work/housework. ASSESSMENT/Changes in Function:   Patient progressed well with therapy. Patient reports that she no longer has pain in her back and neck. Reports having no difficulty with performing household chores. Patient no longer has difficulty or pain with lifting heavy items from the floor. Cervical rotation is WNL and patient reports no difficulty with driving. Patient was issued and shows understanding of final HEP.  FOTO score increased 44 points which indicates good increase in functional ability    Thank you for this referral! PLAN  [x]Discontinue therapy: [x]Patient has reached or is progressing toward set goals      []Patient is non-compliant or has abdicated      []Due to lack of appreciable progress towards set goals    Frankey Railing, PTA 10/6/2021  7:20 AM    NOTE TO PHYSICIAN:  Please complete the following and fax to: [facility] at [phone]  Retain this original for your records. If you are unable to process this request in   24 hours, please contact our office.      [] I have read the above report and request that my patient continue therapy with the following changes/special instructions:  [] I have read the above report and request that my patient be discharged from therapy    Physician's Signature:_____________________ Date:___________Time:__________

## 2021-10-11 ENCOUNTER — APPOINTMENT (OUTPATIENT)
Dept: PHYSICAL THERAPY | Age: 61
End: 2021-10-11
Payer: MEDICAID

## 2021-10-13 ENCOUNTER — APPOINTMENT (OUTPATIENT)
Dept: PHYSICAL THERAPY | Age: 61
End: 2021-10-13
Payer: MEDICAID

## 2021-10-18 ENCOUNTER — APPOINTMENT (OUTPATIENT)
Dept: PHYSICAL THERAPY | Age: 61
End: 2021-10-18
Payer: MEDICAID

## 2021-10-21 ENCOUNTER — APPOINTMENT (OUTPATIENT)
Dept: PHYSICAL THERAPY | Age: 61
End: 2021-10-21
Payer: MEDICAID

## 2021-10-25 ENCOUNTER — APPOINTMENT (OUTPATIENT)
Dept: PHYSICAL THERAPY | Age: 61
End: 2021-10-25
Payer: MEDICAID

## 2021-10-27 ENCOUNTER — APPOINTMENT (OUTPATIENT)
Dept: PHYSICAL THERAPY | Age: 61
End: 2021-10-27
Payer: MEDICAID

## 2022-03-18 PROBLEM — R45.1 AGITATION: Status: ACTIVE | Noted: 2018-03-31

## 2022-03-19 PROBLEM — F25.9 SCHIZOAFFECTIVE DISORDER (HCC): Status: ACTIVE | Noted: 2018-03-30

## 2022-04-11 ENCOUNTER — TRANSCRIBE ORDER (OUTPATIENT)
Dept: SCHEDULING | Age: 62
End: 2022-04-11

## 2022-04-11 DIAGNOSIS — Z12.31 VISIT FOR SCREENING MAMMOGRAM: Primary | ICD-10-CM

## 2022-04-15 ENCOUNTER — HOSPITAL ENCOUNTER (OUTPATIENT)
Dept: MAMMOGRAPHY | Age: 62
Discharge: HOME | End: 2022-04-15
Attending: FAMILY MEDICINE
Payer: MEDICAID

## 2022-04-15 DIAGNOSIS — Z12.31 VISIT FOR SCREENING MAMMOGRAM: ICD-10-CM

## 2022-04-15 PROCEDURE — 77063 BREAST TOMOSYNTHESIS BI: CPT

## 2022-04-20 ENCOUNTER — HOSPITAL ENCOUNTER (INPATIENT)
Age: 62
LOS: 28 days | Discharge: HOME | DRG: 750 | End: 2022-05-19
Attending: EMERGENCY MEDICINE | Admitting: PSYCHIATRY & NEUROLOGY
Payer: MEDICAID

## 2022-04-20 DIAGNOSIS — F20.1 DISORGANIZED SCHIZOPHRENIA (HCC): Primary | ICD-10-CM

## 2022-04-20 DIAGNOSIS — F25.9 SCHIZOAFFECTIVE DISORDER, UNSPECIFIED TYPE (HCC): ICD-10-CM

## 2022-04-20 LAB
ALBUMIN SERPL-MCNC: 3.7 G/DL (ref 3.5–5)
ALBUMIN/GLOB SERPL: 1.1 {RATIO} (ref 1.1–2.2)
ALP SERPL-CCNC: 72 U/L (ref 45–117)
ALT SERPL-CCNC: 25 U/L (ref 12–78)
AMPHET UR QL SCN: NEGATIVE
ANION GAP SERPL CALC-SCNC: 12 MMOL/L (ref 5–15)
APPEARANCE UR: ABNORMAL
AST SERPL-CCNC: 29 U/L (ref 15–37)
BACTERIA URNS QL MICRO: NEGATIVE /HPF
BARBITURATES UR QL SCN: NEGATIVE
BASOPHILS # BLD: 0 K/UL (ref 0–0.1)
BASOPHILS NFR BLD: 0 % (ref 0–1)
BENZODIAZ UR QL: NEGATIVE
BILIRUB SERPL-MCNC: 0.4 MG/DL (ref 0.2–1)
BILIRUB UR QL: NEGATIVE
BUN SERPL-MCNC: 14 MG/DL (ref 6–20)
BUN/CREAT SERPL: 16 (ref 12–20)
CALCIUM SERPL-MCNC: 8.9 MG/DL (ref 8.5–10.1)
CANNABINOIDS UR QL SCN: NEGATIVE
CHLORIDE SERPL-SCNC: 101 MMOL/L (ref 97–108)
CO2 SERPL-SCNC: 23 MMOL/L (ref 21–32)
COCAINE UR QL SCN: NEGATIVE
COLOR UR: ABNORMAL
CREAT SERPL-MCNC: 0.9 MG/DL (ref 0.55–1.02)
DIFFERENTIAL METHOD BLD: ABNORMAL
DRUG SCRN COMMENT,DRGCM: NORMAL
EOSINOPHIL # BLD: 0 K/UL (ref 0–0.4)
EOSINOPHIL NFR BLD: 1 % (ref 0–7)
EPITH CASTS URNS QL MICRO: ABNORMAL /LPF
ERYTHROCYTE [DISTWIDTH] IN BLOOD BY AUTOMATED COUNT: 12.3 % (ref 11.5–14.5)
ETHANOL SERPL-MCNC: <10 MG/DL
FLUAV RNA SPEC QL NAA+PROBE: NOT DETECTED
FLUBV RNA SPEC QL NAA+PROBE: NOT DETECTED
GLOBULIN SER CALC-MCNC: 3.5 G/DL (ref 2–4)
GLUCOSE SERPL-MCNC: 89 MG/DL (ref 65–100)
GLUCOSE UR STRIP.AUTO-MCNC: NEGATIVE MG/DL
HCT VFR BLD AUTO: 37.1 % (ref 35–47)
HGB BLD-MCNC: 12.9 G/DL (ref 11.5–16)
HGB UR QL STRIP: NEGATIVE
IMM GRANULOCYTES # BLD AUTO: 0 K/UL (ref 0–0.04)
IMM GRANULOCYTES NFR BLD AUTO: 0 % (ref 0–0.5)
KETONES UR QL STRIP.AUTO: 80 MG/DL
LEUKOCYTE ESTERASE UR QL STRIP.AUTO: ABNORMAL
LYMPHOCYTES # BLD: 1.5 K/UL (ref 0.8–3.5)
LYMPHOCYTES NFR BLD: 25 % (ref 12–49)
MCH RBC QN AUTO: 32.1 PG (ref 26–34)
MCHC RBC AUTO-ENTMCNC: 34.8 G/DL (ref 30–36.5)
MCV RBC AUTO: 92.3 FL (ref 80–99)
METHADONE UR QL: NEGATIVE
MONOCYTES # BLD: 0.5 K/UL (ref 0–1)
MONOCYTES NFR BLD: 8 % (ref 5–13)
NEUTS SEG # BLD: 3.9 K/UL (ref 1.8–8)
NEUTS SEG NFR BLD: 66 % (ref 32–75)
NITRITE UR QL STRIP.AUTO: NEGATIVE
NRBC # BLD: 0 K/UL (ref 0–0.01)
NRBC BLD-RTO: 0 PER 100 WBC
OPIATES UR QL: NEGATIVE
PCP UR QL: NEGATIVE
PH UR STRIP: 5.5 [PH] (ref 5–8)
PLATELET # BLD AUTO: 425 K/UL (ref 150–400)
PMV BLD AUTO: 9 FL (ref 8.9–12.9)
POTASSIUM SERPL-SCNC: 3.4 MMOL/L (ref 3.5–5.1)
PROT SERPL-MCNC: 7.2 G/DL (ref 6.4–8.2)
PROT UR STRIP-MCNC: 30 MG/DL
RBC # BLD AUTO: 4.02 M/UL (ref 3.8–5.2)
RBC #/AREA URNS HPF: ABNORMAL /HPF (ref 0–5)
SARS-COV-2, COV2: NOT DETECTED
SODIUM SERPL-SCNC: 136 MMOL/L (ref 136–145)
SP GR UR REFRACTOMETRY: 1.02
UA: UC IF INDICATED,UAUC: ABNORMAL
UROBILINOGEN UR QL STRIP.AUTO: 1 EU/DL (ref 0.2–1)
WBC # BLD AUTO: 6 K/UL (ref 3.6–11)
WBC URNS QL MICRO: ABNORMAL /HPF (ref 0–4)

## 2022-04-20 PROCEDURE — 82077 ASSAY SPEC XCP UR&BREATH IA: CPT

## 2022-04-20 PROCEDURE — 99285 EMERGENCY DEPT VISIT HI MDM: CPT

## 2022-04-20 PROCEDURE — 80053 COMPREHEN METABOLIC PANEL: CPT

## 2022-04-20 PROCEDURE — 36415 COLL VENOUS BLD VENIPUNCTURE: CPT

## 2022-04-20 PROCEDURE — 87636 SARSCOV2 & INF A&B AMP PRB: CPT

## 2022-04-20 PROCEDURE — 81001 URINALYSIS AUTO W/SCOPE: CPT

## 2022-04-20 PROCEDURE — 85025 COMPLETE CBC W/AUTO DIFF WBC: CPT

## 2022-04-20 PROCEDURE — 80307 DRUG TEST PRSMV CHEM ANLYZR: CPT

## 2022-04-20 NOTE — ED PROVIDER NOTES
EMERGENCY DEPARTMENT HISTORY AND PHYSICAL EXAM      Date: 4/20/2022  Patient Name: Kristal Ramirez  Patient Age and Sex: 57 y.o. female  MRN:  [id number]  CSN:  [id number]    History of Presenting Illness     Chief Complaint   Patient presents with   [address] Problem       History Provided By: Patient and Patient's Son    Ability to gather history was limited by: Nonverbal, psychiatric crisis, decompensated schizophrenia    HPI: Kristal Ramirez, 57 y.o. female with history of bipolar disorder, schizophrenia, brought to emergency department by her son for concerns that patient has become increasingly psychiatrically disorganized and nonverbal over the past 1 week. The son believes that the patient may have stopped taking her medications sometime last week. He is not sure what medications she takes but thinks that she takes trazodone. Her symptoms have become severe and she has been completely nonverbal for a number of days, making strange gestures and swatting at invisible stimuli. No drug or alcohol use    Location:    Quality:      Severity:    Duration:   Timing:      Context:    Modifying factors:   Associated symptoms:     Past History      The patient's medical, surgical, and social history on file were reviewed by me today.  The family history was reviewed by me today and was non-contributory, unless otherwise specified below:    Past Medical History:  Past Medical History:   Diagnosis Date    Asthma     Depression     Hepatitis C     Psychiatric disorder     bipolar     Psychiatric disorder     schizophernia       Past Surgical History:  History reviewed. No pertinent surgical history. Family History:  History reviewed. No pertinent family history. Social History:  Social History     Tobacco Use    Smoking status: Former Smoker    Smokeless tobacco: Never Used   Vaping Use    Vaping Use: Never used   Substance Use Topics    Alcohol use: Not Currently    Drug use:  Yes Types: Marijuana       Current Medications:  No current facility-administered medications on file prior to encounter. Current Outpatient Medications on File Prior to Encounter   Medication Sig Dispense Refill    omeprazole (PRILOSEC) 20 mg capsule Take 1 Cap by mouth daily. 30 Cap 0    traZODone (DESYREL) 50 mg tablet Take 1 Tab by mouth nightly as needed for Sleep (For insomnia). Indications: insomnia associated with depression 30 Tab 0    OLANZapine (ZyPREXA) 5 mg tablet Take 1 Tab by mouth every six (6) hours as needed for Other (For agitation or psychosis). Indications: bipolar disorder in remission, additional medications to treat depression 30 Tab 0    paliperidone palmitate (INVEGA SUSTENNA) 117 mg/0.75 mL syrg injection 0.75 mL by IntraMUSCular route every thirty (30) days. Indications: schizophrenia with mood changes 1 Syringe 0    traZODone (DESYREL) 100 mg tablet Take 100 mg by mouth nightly. Indications: insomnia      albuterol (PROVENTIL HFA, VENTOLIN HFA, PROAIR HFA) 90 mcg/actuation inhaler Take 2 Puffs by inhalation three (3) times daily as needed for Wheezing or Shortness of Breath. Allergies:  No Known Allergies  Review of Systems    A complete ROS was reviewed by me today and was negative, unless otherwise specified below:    Review of Systems   Unable to perform ROS: Psychiatric disorder   Constitutional: Negative for fatigue and fever. Neurological: Negative for headaches. Psychiatric/Behavioral: Positive for behavioral problems, confusion and hallucinations. Negative for self-injury, sleep disturbance and suicidal ideas. All other systems reviewed and are negative. Physical Exam   Vital Signs  Patient Vitals for the past 8 hrs:   Temp Pulse Resp BP SpO2   04/20/22 1240 98.5 °F (36.9 °C) 99 16 132/86 99 %          Physical Exam  Vitals and nursing note reviewed. Constitutional:       General: She is not in acute distress. Appearance: Normal appearance.  She is well-developed. She is not ill-appearing. HENT:      Head: Normocephalic and atraumatic. Mouth/Throat:      Mouth: Mucous membranes are moist.   Eyes:      General:         Right eye: No discharge. Left eye: No discharge. Conjunctiva/sclera: Conjunctivae normal.   Cardiovascular:      Rate and Rhythm: Normal rate and regular rhythm. Heart sounds: Normal heart sounds. No murmur heard. Pulmonary:      Effort: Pulmonary effort is normal. No respiratory distress. Breath sounds: Normal breath sounds. No wheezing. Abdominal:      General: There is no distension. Palpations: Abdomen is soft. Tenderness: There is no abdominal tenderness. Musculoskeletal:         General: No deformity. Normal range of motion. Cervical back: Normal range of motion and neck supple. Skin:     General: Skin is warm and dry. Findings: No rash. Neurological:      General: No focal deficit present. Mental Status: She is alert. She is confused. Psychiatric:         Mood and Affect: Affect is flat. Speech: She is noncommunicative. Behavior: Behavior is slowed. Comments: Flat affect, completely nonverbal.  Would not answer any questions.   Slowed and blunted, but occasionally flailing her arms or swatting or punching at invisible stimuli in front of her, presumably visual hallucinations         Diagnostic Study Results   Labs  Recent Results (from the past 24 hour(s))   ETHYL ALCOHOL    Collection Time: 04/20/22  1:10 PM   Result Value Ref Range    ALCOHOL(ETHYL),SERUM <10 <10 MG/DL   CBC WITH AUTOMATED DIFF    Collection Time: 04/20/22  1:10 PM   Result Value Ref Range    WBC 6.0 3.6 - 11.0 K/uL    RBC 4.02 3.80 - 5.20 M/uL    HGB 12.9 11.5 - 16.0 g/dL    HCT 37.1 35.0 - 47.0 %    MCV 92.3 80.0 - 99.0 FL    MCH 32.1 26.0 - 34.0 PG    MCHC 34.8 30.0 - 36.5 g/dL    RDW 12.3 11.5 - 14.5 %    PLATELET 660 (H) 059 - 400 K/uL    MPV 9.0 8.9 - 12.9 FL    NRBC 0.0 0  WBC    ABSOLUTE NRBC 0.00 0.00 - 0.01 K/uL    NEUTROPHILS 66 32 - 75 %    LYMPHOCYTES 25 12 - 49 %    MONOCYTES 8 5 - 13 %    EOSINOPHILS 1 0 - 7 %    BASOPHILS 0 0 - 1 %    IMMATURE GRANULOCYTES 0 0.0 - 0.5 %    ABS. NEUTROPHILS 3.9 1.8 - 8.0 K/UL    ABS. LYMPHOCYTES 1.5 0.8 - 3.5 K/UL    ABS. MONOCYTES 0.5 0.0 - 1.0 K/UL    ABS. EOSINOPHILS 0.0 0.0 - 0.4 K/UL    ABS. BASOPHILS 0.0 0.0 - 0.1 K/UL    ABS. IMM.  GRANS. 0.0 0.00 - 0.04 K/UL    DF AUTOMATED     URINALYSIS W/ REFLEX CULTURE    Collection Time: 04/20/22  1:10 PM    Specimen: Urine   Result Value Ref Range    Color YELLOW/STRAW      Appearance CLOUDY (A) CLEAR      Specific gravity 1.020      pH (UA) 5.5 5.0 - 8.0      Protein 30 (A) NEG mg/dL    Glucose Negative NEG mg/dL    Ketone 80 (A) NEG mg/dL    Bilirubin Negative NEG      Blood Negative NEG      Urobilinogen 1.0 0.2 - 1.0 EU/dL    Nitrites Negative NEG      Leukocyte Esterase SMALL (A) NEG      WBC 0-4 0 - 4 /hpf    RBC 0-5 0 - 5 /hpf    Epithelial cells MANY (A) FEW /lpf    Bacteria Negative NEG /hpf    UA:UC IF INDICATED CULTURE NOT INDICATED BY UA RESULT CNI     DRUG SCREEN, URINE    Collection Time: 04/20/22  1:10 PM   Result Value Ref Range    AMPHETAMINES Negative NEG      BARBITURATES Negative NEG      BENZODIAZEPINES Negative NEG      COCAINE Negative NEG      METHADONE Negative NEG      OPIATES Negative NEG      PCP(PHENCYCLIDINE) Negative NEG      THC (TH-CANNABINOL) Negative NEG      Drug screen comment (NOTE)    COVID-19 WITH INFLUENZA A/B    Collection Time: 04/20/22  1:10 PM   Result Value Ref Range    SARS-CoV-2 by PCR Not detected NOTD      Influenza A by PCR Not detected      Influenza B by PCR Not detected     METABOLIC PANEL, COMPREHENSIVE    Collection Time: 04/20/22  1:10 PM   Result Value Ref Range    Sodium 136 136 - 145 mmol/L    Potassium 3.4 (L) 3.5 - 5.1 mmol/L    Chloride 101 97 - 108 mmol/L    CO2 23 21 - 32 mmol/L    Anion gap 12 5 - 15 mmol/L    Glucose 89 65 - 100 mg/dL    BUN 14 6 - 20 MG/DL    Creatinine 0.90 0.55 - 1.02 MG/DL    BUN/Creatinine ratio 16 12 - 20      GFR est AA >60 >60 ml/min/1.73m2    GFR est non-AA >60 >60 ml/min/1.73m2    Calcium 8.9 8.5 - 10.1 MG/DL    Bilirubin, total 0.4 0.2 - 1.0 MG/DL    ALT (SGPT) 25 12 - 78 U/L    AST (SGOT) 29 15 - 37 U/L    Alk. phosphatase 72 45 - 117 U/L    Protein, total 7.2 6.4 - 8.2 g/dL    Albumin 3.7 3.5 - 5.0 g/dL    Globulin 3.5 2.0 - 4.0 g/dL    A-G Ratio 1.1 1.1 - 2.2         Radiologic Studies  No orders to display     CT Results  (Last 48 hours)    None        CXR Results  (Last 48 hours)    None          Billable Procedures   Procedures    Medical Decision Making     I reviewed the patient's most recent Emergency Dept notes and diagnostic tests in formulating my MDM on today's visit. Provider Notes (Medical Decision Making):   79-year-old female with history of schizophrenia brought to emergency department by her son for concerns for decompensation over the past week, has become completely nonverbal, swatting at invisible stimuli in front of her, seems to have visual hallucinations. Has not spoken for days. Unclear what medications she was taking. No drugs or alcohol. On examination she has normal vital signs, no signs of injury. She does not appear intoxicated. She would not make eye contact or answer any of the questions I asked her, including her name or why she is here. She was noted to have frequent swatting or punching motions, may be having visual hallucinations. Patient will need to be admitted for psychiatric stabilization. Decompensated schizophrenia. Medically cleared for Psych admission. I will be the attending of record for this patient.     Terrell Vick MD  1:15 PM  4/20/2022     Consults:    Social History     Tobacco Use    Smoking status: Former Smoker    Smokeless tobacco: Never Used   Vaping Use    Vaping Use: Never used   Substance Use Topics    Alcohol use: Not Currently    Drug use: Yes     Types: Marijuana       Medications Administered during ED course:  Medications - No data to display       Prescriptions from today's ED visit:  Current Discharge Medication List         Diagnosis and Disposition     Disposition:      Clinical Impression:   1. Disorganized schizophrenia ([facility] [address]        Attestation:  I personally performed the services described in this documentation on this date 4/20/2022 for patient Alejandra Landis. Delvis Paris MD        I was the first provider for this patient on this visit. To the best of my ability I reviewed relevant prior medical records, electrocardiograms, laboratories, and radiologic studies. The patient's presenting problems were discussed, and the patient was in agreement with the care plan formulated and outlined with them. Delvis Paris MD    Please note that this dictation was completed with Dragon voice recognition software. Quite often unanticipated grammatical, syntax, homophones, and other interpretive errors are inadvertently transcribed by the computer software. Please disregard these errors and excuse any errors that have escaped final proofreading.

## 2022-04-20 NOTE — BSMART NOTE
Patient to be a TDO per [facility] crisis. ER notified and officer notified as well.     Greg Cooper LPC [facility] Unresponsive intoxicated

## 2022-04-20 NOTE — ED NOTES
Mansoor French, aware of consult. She is coming from [address] [place] [address] to evaluate pt.

## 2022-04-20 NOTE — BSMART NOTE
Comprehensive Assessment Form Part 1      Section I - Disposition    Axis I - Schizoaffective Disorder   Schizophrenia by history   Bipolar Disorder by history  Axis II - Deferred  Axis III -   Past Medical History:   Diagnosis Date    Asthma     Depression     Hepatitis C     Psychiatric disorder     bipolar     Psychiatric disorder     schizophernia       The Medical Doctor to Psychiatrist conference was not completed. The Medical Doctor is in agreement with Psychiatrist disposition because of (reason) patient is unable to consent for treatment. The plan is [facility] to assess for a TDO due to inability to care for self and psychosis. The on-call Psychiatrist consulted was Dr. Samia Soliz. The admitting Psychiatrist will be Dr. Samia Soliz. The admitting Diagnosis is Schizoaffective Disorder. The Payor source is Medicaid. This writer reviewed the [address] in nursing flowsheet and the risk level assigned is no risk. Based on this assessment, the risk of suicide is no risk and the plan is [facility]HA to assess for a TDO due to inability to care for self and psychosis. Section II - Integrated Summary  Summary:  Patient is a 64yo female who presents for mental health complaint. Per nurse, \"Pt arrived via EMS. Per EMS upon arrival pt had erratic behavior and swinging at EMS personnel but was redirectable with family members guidance. Per pt's son she was over a friend's house for about a week and a half and hasn't taken her meds since than. Per pt's son over the last 3 days she has been exteremly quiet. Son is unsure of the pt's medication, and PMH. Pt is nonverbal with a flat affect, has abnormal movement of upper extremities and responds to commands currently. \"    Patient seen face to face. Patient did not respond to this writer though she would at times look at this writer. She was observed swinging her hand/arm at things that this writer could nto see.  She was observed suddenly turning to look at the corners and appeared to be responding to internal stimuli. She did not speak to this writer or nod her head to simple questions. The patient did not interact with this writer at all. Son, Franklyn Cuevas was present at bedside. He reports that his mother was visiting friends and did not take her medication. Upon return home, she refused to restart her medications. He reports she was not answering the phone or door at home. He reports she has not been caring for the home as she was prior to her visit and that she had not been eating from what he can tell. He reports his mother was quiet but slightly verbal at times this morning but has stopped talking as the day progressed. He reports observing her swinging at things that are not visible and talking to herself. He reports concern for her safety due to not caring for her self and not taking her medications. He reports not knowing what medications she should be taking or who her outpatient provider is but reports she appeared to be compliant prior to the last week. The patienthas not demonstrated mental capacity to provide informed consent. The information is given by the patient, relative(s) and past medical records. The Chief Complaint is mental health. The Precipitant Factors are medication noncompliance. Previous Hospitalizations: yes [facility] 2020  The patient has not previously been in restraints. Current Psychiatrist and/or  is unknown. Previously at [address] per chart review. Lethality Assessment:    The potential for suicide noted by the following: not noted . The potential for homicide is not noted. The patient has not been a perpetrator of sexual or physical abuse. There are not pending charges. The patient is not felt to be at risk for self harm or harm to others. The attending nurse was advised that security has not been notified. Section III - Psychosocial  The patient's overall mood and attitude is nonresponsive.   Feelings of helplessness and hopelessness are unable to assess. Generalized anxiety is unable to assess. Panic is unable to assess. Phobias are unable to assess. Obsessive compulsive tendencies are unable to assess. Section IV - Mental Status Exam  The patient's appearance is unkempt. The patient's behavior shows poor eye contact and mute. The patient is unable to assess orientation. The patient's speech is mute. The patient's mood unable to assess. The range of affect unable to assess. The patient's thought content demonstrates unable to assess. The thought process unable to assess. The patient's perception demonstrated changes in the following:  auditory  visual hallucinations. The patient's memory unable to assess. The patient's appetite is decreased and shows signs of weight loss. The patient's sleep unable to assess. The patient's insight is unable to assess. The patient's judgement is unable to assess. Section V - Substance Abuse  The patient is not using substances. Section VI - Living Arrangements  The patient is single. The patient lives alone. The patient has adult children. The patient does plan to return home upon discharge. The patient does not have legal issues pending. The patient's source of income comes from disability. Orthodox and cultural practices have not been voiced at this time. The patient's greatest support comes from family and this person will be involved with the treatment. The patient has not been in an event described as horrible or outside the realm of ordinary life experience either currently or in the past.  The patient has not been a victim of sexual/physical abuse. Section VII - Other Areas of Clinical Concern  The highest grade achieved is unable to assess with the overall quality of school experience being described as unable to assess. The patient is currently disabled and speaks [place] as a primary language.   The patient has no communication impairments affecting communication. The patient's preference for learning can be described as: can read and write adequately.   The patient's hearing is normal.  The patient's vision is normal.      Nikki Brandt LPC [facility]

## 2022-04-20 NOTE — ED NOTES
Pt arrived via EMS. Per EMS upon arrival pt had erratic behavior and swinging at EMS personnel but was redirectable with family members guidance. Per pt's son she was over a friend's house for about a week and a half and hasn't taken her meds since than. Per pt's son over the last 3 days she has been exteremly quiet. Son is unsure of the pt's medication, and PMH. Pt is nonverbal with a flat affect, has abnormal movement of upper extremities and responds to commands currently.

## 2022-04-20 NOTE — ED NOTES
Emergency Department Nursing Plan of Care       The Nursing Plan of Care is developed from the Nursing assessment and Emergency Department Attending provider initial evaluation. The plan of care may be reviewed in the ED Provider note.     The Plan of Care was developed with the following considerations:   Patient / Family readiness to learn indicated by:verbalized understanding  Persons(s) to be included in education: Son   Barriers to Learning/Limitations:No    Signed     Shiva Yeboah RN    4/20/2022   12:50 PM

## 2022-04-20 NOTE — ED NOTES
RN spoke to [facility]. Per [facility] still waiting on bed search.

## 2022-04-21 PROCEDURE — 65220000003 HC RM SEMIPRIVATE PSYCH

## 2022-04-21 RX ORDER — IBUPROFEN 200 MG
1 TABLET ORAL DAILY PRN
Status: DISCONTINUED | OUTPATIENT
Start: 2022-04-21 | End: 2022-05-19 | Stop reason: HOSPADM

## 2022-04-21 RX ORDER — TRAZODONE HYDROCHLORIDE 50 MG/1
50 TABLET ORAL
Status: DISCONTINUED | OUTPATIENT
Start: 2022-04-21 | End: 2022-05-19 | Stop reason: HOSPADM

## 2022-04-21 RX ORDER — DIPHENHYDRAMINE HYDROCHLORIDE 50 MG/ML
50 INJECTION, SOLUTION INTRAMUSCULAR; INTRAVENOUS
Status: DISCONTINUED | OUTPATIENT
Start: 2022-04-21 | End: 2022-05-19 | Stop reason: HOSPADM

## 2022-04-21 RX ORDER — BENZTROPINE MESYLATE 1 MG/1
1 TABLET ORAL
Status: DISCONTINUED | OUTPATIENT
Start: 2022-04-21 | End: 2022-05-19 | Stop reason: HOSPADM

## 2022-04-21 RX ORDER — TRAZODONE HYDROCHLORIDE 150 MG/1
150 TABLET ORAL
COMMUNITY
End: 2022-05-19

## 2022-04-21 RX ORDER — OLANZAPINE 5 MG/1
10 TABLET, ORALLY DISINTEGRATING ORAL DAILY
Status: DISCONTINUED | OUTPATIENT
Start: 2022-04-21 | End: 2022-04-22

## 2022-04-21 RX ORDER — HYDROXYZINE 25 MG/1
50 TABLET, FILM COATED ORAL
Status: DISCONTINUED | OUTPATIENT
Start: 2022-04-21 | End: 2022-05-19 | Stop reason: HOSPADM

## 2022-04-21 RX ORDER — LORAZEPAM 2 MG/ML
1 INJECTION INTRAMUSCULAR
Status: DISCONTINUED | OUTPATIENT
Start: 2022-04-21 | End: 2022-05-19 | Stop reason: HOSPADM

## 2022-04-21 RX ORDER — HALOPERIDOL 5 MG/ML
5 INJECTION INTRAMUSCULAR
Status: DISCONTINUED | OUTPATIENT
Start: 2022-04-21 | End: 2022-05-19 | Stop reason: HOSPADM

## 2022-04-21 RX ORDER — OLANZAPINE 5 MG/1
5 TABLET ORAL
Status: DISCONTINUED | OUTPATIENT
Start: 2022-04-21 | End: 2022-05-19 | Stop reason: HOSPADM

## 2022-04-21 RX ORDER — ADHESIVE BANDAGE
30 BANDAGE TOPICAL DAILY PRN
Status: DISCONTINUED | OUTPATIENT
Start: 2022-04-21 | End: 2022-05-19 | Stop reason: HOSPADM

## 2022-04-21 RX ORDER — ACETAMINOPHEN 325 MG/1
650 TABLET ORAL
Status: DISCONTINUED | OUTPATIENT
Start: 2022-04-21 | End: 2022-05-19 | Stop reason: HOSPADM

## 2022-04-21 NOTE — ED NOTES
This RN and Symone Steele attempted to obtain vitals from patient. Pt proceeded to shoo us out of room. When this writer asked if we could obtain vitals and pt firmly stated \"No.\" Will attempt again. Charge RN aware.

## 2022-04-21 NOTE — PROGRESS NOTES
[facility]: MED RECONCILIATION    Comments/Recommendations:   Med rec performed via BSMART notes, historical med rec notes, RX query, call to [employer] and previous discharge from [facility] in 2020. Unable to interview patient due to current condition. Per [facility] notes who spoke with patient's son, patient has been noncompliant with medications for at least 1.5 weeks. BSMART reports son is unsure of patient's current medications. Per [facility], patient has only picked up trazodone 150 mg QHS on 3/12/22. Pharmacist called [facility] to see if patient was current with them, however they state she is not. Of note per refill history, appears patient was previously on paliperidone ER 9 mg daily as of 11/2021 per Dr. Barbara Taylor. Medications added:     Trazodone 150 mg - last picked up 3/12/22 per [facility]    Medications removed:    Olanzapine - no record at [facility] 117 mg - prescribed by Dr. Sanjuana Mathis in 2020 at discharge from [facility]; could not find record of this medication per collateral review. No dispenses per [facility]. Patient not set up with [facility]. Omeprazole - per [facility], last picked up in 2020    Allergies: Patient has no known allergies. Prior to Admission Medications:     Prior to Admission Medications   Prescriptions Last Dose Informant Patient Reported? Taking? albuterol (PROVENTIL HFA, VENTOLIN HFA, PROAIR HFA) 90 mcg/actuation inhaler Unknown at Unknown time Other Yes No   Sig: Take 2 Puffs by inhalation three (3) times daily as needed for Wheezing or Shortness of Breath. traZODone (DESYREL) 150 mg tablet Unknown at Unknown time Other Yes No   Sig: Take 150 mg by mouth nightly.       Facility-Administered Medications: None             DERRICK Koehler   Contact: [phone]

## 2022-04-21 NOTE — ED NOTES
Called [facility] for an update on patient. Stated [facility] was reviewing her but would not get back to them until the morning.  Will retry again in the AM.

## 2022-04-21 NOTE — ED NOTES
Maryjane Carranza contacted for patient update. Maryjane Carranza reported that they were still searching for a bed placement and would continue their search at 10:00am. Provider and charge nurse made aware.

## 2022-04-21 NOTE — PROGRESS NOTES
Problem: Psychosis  Goal: *STG: Remains safe in hospital  Outcome: Progressing Towards Goal     Problem: General Medical Care Plan  Goal: *Vital signs within specified parameters  Outcome: Progressing Towards Goal

## 2022-04-21 NOTE — ED NOTES
TRANSFER - OUT REPORT:    Verbal report given to [facility] (name) on Kvng Basurto  being transferred to [address] [place] [address] (unit) for routine progression of care       Report consisted of patients Situation, Background, Assessment and   Recommendations(SBAR). Information from the following report(s) SBAR, Kardex, ED Summary, Procedure Summary, MAR and Recent Results was reviewed with the receiving nurse. Lines:   Peripheral IV 04/20/22 Right Antecubital (Active)   Site Assessment Clean, dry, & intact 04/20/22 1317   Phlebitis Assessment 0 04/20/22 1317   Infiltration Assessment 0 04/20/22 1317   Dressing Status Clean, dry, & intact 04/20/22 1317   Dressing Type Transparent 04/20/22 1317   Hub Color/Line Status Pink;Patent; Flushed 04/20/22 1317   Action Taken Blood drawn 04/20/22 1317        Opportunity for questions and clarification was provided.       Patient transported with:  [employer]

## 2022-04-21 NOTE — ED NOTES
Pt agreed to let this writer obtain vitals. Able to obtain all but a BP as pt pulled off cuff before a reading was obtained. Attempted to redirect pt, pt stated \"No, I don't want this on. \" VS updated.

## 2022-04-21 NOTE — ED NOTES
Assumed care of pt. Pt awake in bed occasionally waiving arm around. Offered more juice for hydration or snacks, pt did not verbalize or respond to this RN. Will continue to monitor pt. Pt within line of sight.

## 2022-04-21 NOTE — CONSULTS
PSYCHIATRY CONSULT NOTE:    REASON FOR CONSULT:  agitation  Refusing everything    HISTORY OF PRESENTING COMPLAINT:  Kristal Ramirez is a 57 y.o. female   with history of bipolar disorder, schizophrenia, brought to emergency department by her son for concerns that patient has become increasingly psychiatrically disorganized and nonverbal over the past 1 week. The son believes that the patient may have stopped taking her medications sometime last week. He is not sure what medications she takes but thinks that she takes trazodone. Her symptoms have become severe and she has been completely nonverbal for a number of days, making strange gestures and swatting at invisible stimuli. No drug or alcohol use. Kristal Ramirez reports not wanting to talk and stated that she did not want to do anything. She then repeated the statement, \"just leaving me alone\" in response to further interview questions. Moods are irritable and seemingly depressed. Denies SI/HI/AH/VH. No aggression or violence. Isolative with limited awareness. Tolerating medications well. Eating and sleeping fairly.     PAST PSYCHIATRIC HISTORY:  Therapy, Out Patient Schizophrenia and Bipolar disorder by records review    SUBSTANCE ABUSE HISTORY:  Social History     Substance and Sexual Activity   Drug Use Yes    Types: Marijuana     Social History     Substance and Sexual Activity   Alcohol Use Not Currently     Social History     Tobacco Use   Smoking Status Former Smoker   Smokeless Tobacco Never Used       PAST MEDICAL HISTORY:  Past Medical History:   Diagnosis Date    Asthma     Depression     Hepatitis C     Psychiatric disorder     bipolar     Psychiatric disorder     schizophernia       SOCIAL HISTORY:  See H & P    VITALS:  Visit Vitals  /72 (BP 1 Location: Right upper arm, BP Patient Position: Sitting)   Pulse 85   Temp 98.1 °F (36.7 °C)   Resp 14   Ht 5' 2\" (1.575 m)   Wt 65.6 kg (144 lb 9.6 oz)   SpO2 99%   BMI 26.45 kg/m²       MEDICATIONS:    Current Facility-Administered Medications:     OLANZapine (ZyPREXA zydis) disintegrating tablet 10 mg, 10 mg, Oral, DAILY, Cleo Mcdonnell MD    Current Outpatient Medications:     omeprazole (PRILOSEC) 20 mg capsule, Take 1 Cap by mouth daily. , Disp: 30 Cap, Rfl: 0    traZODone (DESYREL) 50 mg tablet, Take 1 Tab by mouth nightly as needed for Sleep (For insomnia). Indications: insomnia associated with depression, Disp: 30 Tab, Rfl: 0    OLANZapine (ZyPREXA) 5 mg tablet, Take 1 Tab by mouth every six (6) hours as needed for Other (For agitation or psychosis). Indications: bipolar disorder in remission, additional medications to treat depression, Disp: 30 Tab, Rfl: 0    paliperidone palmitate (INVEGA SUSTENNA) 117 mg/0.75 mL syrg injection, 0.75 mL by IntraMUSCular route every thirty (30) days. Indications: schizophrenia with mood changes, Disp: 1 Syringe, Rfl: 0    traZODone (DESYREL) 100 mg tablet, Take 100 mg by mouth nightly. Indications: insomnia, Disp: , Rfl:     albuterol (PROVENTIL HFA, VENTOLIN HFA, PROAIR HFA) 90 mcg/actuation inhaler, Take 2 Puffs by inhalation three (3) times daily as needed for Wheezing or Shortness of Breath., Disp: , Rfl:     MENTAL STATUS EXAM:  Calm and Depressed  Person/place only  Alert, partially aware, denies SI/HI/AH/VH. No aggression or violence. Inappropriately interactive  Goal directed    ASSESSMENT AND PLAN:  Schizoaffective Disorder, Cluster A Traits , Problems with primary support group, Problems related to social environment, Problems with access to health care services and Other psychosocial or environmental problems  and 41-50 serious symptoms    RECOMMENDATIONS:  Demonstrating inability to care for herself at prior to ED visit and by refusing assessments.   A candidate for inpatient psychiatry at this time  Continue 1:1 nursing  Restart her home medications until placement  Thank you for this consultation    Adrián Paeg Jose Enrique Reynoso MD  4/21/2022

## 2022-04-21 NOTE — ED NOTES
Pt up and pacing in room. Pt offered toileting assistance, pt declined at this time. Pt still mostly non-verbal with staff but is calm and cooperative. NAD noted.

## 2022-04-21 NOTE — BH NOTES
Pt is CINTIA'tiffanie. Transferred from [facility]. Pt came in with daughter non verbal. Swatting in the air. Parnoid. Off medications. Not caring for self. Medical hx of asthma and Hep C. NKA. Pt was agitated on admission. Alert to self. Uncooperative. Poor historian. Thought blocking. Pt denies SI by shaking head no. Pt states to nurse to leave her alone. Skin assessment completed old scars and tattoos. Pt is resting in room. Will continue to monitor pt.

## 2022-04-21 NOTE — ED NOTES
Pt given water jug and snacks for hydration/nutrition. Pt immediately threw food away in trashcan and stated \"I do not want to eat! \" Water at bedside. Pt remains pacing in room but is calm and cooperative. Within line of sight. Offered another blanket for comfort, pt declined at this time.

## 2022-04-21 NOTE — ED NOTES
Pt offered food or drink and pt declined at this time. Pt pacing in room and occasionally motioning with arms and hands but is otherwise calm and cooperative. NAD noted. [place] in room.

## 2022-04-21 NOTE — ED NOTES
RN spoke to [facility] they stated that they are still completing a bed search.

## 2022-04-21 NOTE — ED NOTES
Pt ambulated to the restroom w/ intact gait. Pt more alert. Pt responds verbally when RN asks questions. Pt returned to room. Pt is resting comfortably in bed.

## 2022-04-21 NOTE — ED NOTES
Offered to dim lights for pt as pt has not slept or to provide more blankets, pt shook her head no. Pt sitting on edge of bed quietly. Water jug at bedside. NAD noted.

## 2022-04-22 PROCEDURE — 65220000003 HC RM SEMIPRIVATE PSYCH

## 2022-04-22 PROCEDURE — 74011250636 HC RX REV CODE- 250/636: Performed by: PSYCHIATRY & NEUROLOGY

## 2022-04-22 PROCEDURE — 99223 1ST HOSP IP/OBS HIGH 75: CPT | Performed by: PSYCHIATRY & NEUROLOGY

## 2022-04-22 RX ORDER — HALOPERIDOL 5 MG/ML
5 INJECTION INTRAMUSCULAR 2 TIMES DAILY
Status: DISCONTINUED | OUTPATIENT
Start: 2022-04-22 | End: 2022-04-25

## 2022-04-22 RX ORDER — ALBUTEROL SULFATE 90 UG/1
2 AEROSOL, METERED RESPIRATORY (INHALATION)
Status: DISCONTINUED | OUTPATIENT
Start: 2022-04-22 | End: 2022-05-19 | Stop reason: HOSPADM

## 2022-04-22 RX ORDER — HALOPERIDOL 2 MG/ML
5 SOLUTION ORAL 2 TIMES DAILY
Status: DISCONTINUED | OUTPATIENT
Start: 2022-04-22 | End: 2022-04-25

## 2022-04-22 RX ADMIN — HALOPERIDOL LACTATE 5 MG: 5 INJECTION, SOLUTION INTRAMUSCULAR at 17:56

## 2022-04-22 NOTE — PROGRESS NOTES
Bedside shift change report given to Deni Taylor RN (oncoming nurse) by Giovanni Hunter RN (offgoing nurse). Report included the following information SBAR, Kardex, MAR, and Accordion. Assumed care of patient resting quietly in bed. Her faced was covered by a blanket. She refused to answer any questions. Later in the morning she was pacing the hallway and at times bumping into other patients and staff. Selectively mute, she only said no when asked if she would take her medications. She refused to take her medications. MD ramirez. Has court hearing today, possible court ordered medications. Problem: Psychosis  Goal: *STG: Remains safe in hospital  Outcome: Progressing Towards Goal     Problem: Falls - Risk of  Goal: *Absence of Falls  Description: Document Shraddha Fall Risk and appropriate interventions in the flowsheet.   Outcome: Progressing Towards Goal  Note: Fall Risk Interventions:       Mentation Interventions: Adequate sleep, hydration, pain control,More frequent rounding,Reorient patient

## 2022-04-22 NOTE — PROGRESS NOTES
1930: Patient's care assumed with the change of shift report received from the outgoing nurse - Radha Church, [facility]. Patient received pacing the hallway, alert and uncooperative with assessment, she shakes her head to a yes and no to answer assessment questions. She denies SI, HI, AVH, pain. Pt appears anxious with flat affect, mood is liable. Pt refused offer of PRN medication for anxiety. No sign of medical or behavioral distress observed. Staff will continue to monitor. Pt observed asleep for 5 hours during the night. Pt refused to scheduled blood work.       Problem: Psychosis  Goal: *STG: Remains safe in hospital  Outcome: Progressing Towards Goal

## 2022-04-22 NOTE — BH NOTES
PSYCHOSOCIAL ASSESSMENT  :Patient identifying info:   Ayana Phillips is a 57 y.o., female admitted 4/20/2022 12:37 PM       Presenting problem and precipitating factors: Patient was involuntarily committed at her TDO hearing with [facility]. Per prescreening pt stopped taking her medication a week ago and has been nonverbal the past few days. Pt has reportedly not slept or eaten in 4 days. Mental status assessment: Alert, oriented to self, refusing to engage with treatment team, irritable    Strengths: Supportive family    Collateral information: Meagan Mcneill, son ([phone])    Current psychiatric /substance abuse providers and contact info: Unknown    Previous psychiatric/substance abuse providers and response to treatment: Multiple previous inpatient psychiatric hospitalizations [facility] 3/11/2020-4/6/2020, 4/2018; [facility] 7/2019, 12/2018; [place] 1997)    Family history of mental illness or substance abuse: Unknown    Substance abuse history:  None indicated  Social History     Tobacco Use    Smoking status: Former Smoker    Smokeless tobacco: Never Used   Substance Use Topics    Alcohol use: Not Currently     History of biomedical complications associated with substance abuse:  Denies    Patient's current acceptance of treatment or motivation for change: Poor    Family constellation: Daughter and son    Is significant other involved? No    Describe support system: Daughter and son    Describe living arrangements and home environment: Patient lives alone.     GUARDIAN/POA: No    Guardian Name: N/A    Guardian Contact: N/A    Health issues:   Hospital Problems  Never Reviewed          Codes Class Noted POA    [facility] ICD-10-CM: F25.9  ICD-9-CM: 295.70  3/30/2018 Unknown            Trauma history: None indicated    Legal issues: None indicated    History of  service: No    Financial status: [facility]    Anabaptist/cultural factors: Jewish    Education/work history: Unemployed    Have you been licensed as a health care professional (current or ): No    Leisure and recreation preferences: None indicated    Describe coping skills: Limited and poor judgement    Kristen Angeles  2022

## 2022-04-22 NOTE — PROGRESS NOTES
Problem: Discharge Planning  Goal: *Discharge to safe environment  Outcome: Progressing Towards Goal  Note: Patient identifies home as a safe environment. Patient will return home upon discharge. Goal: *Knowledge of medication management  Outcome: Not Progressing Towards Goal  Note: FMO is place  Goal: *Knowledge of discharge instructions  Outcome: Not Progressing Towards Goal  Note:   Patient does not verbalize understanding of goals for treatment or safe discharge.

## 2022-04-22 NOTE — GROUP NOTE
DORETHA  GROUP DOCUMENTATION INDIVIDUAL                                                                          Group Therapy Note    Date: 4/22/2022    Group Start Time: 1500  Group End Time: 1600  Group Topic: Recreational/Music Therapy    [address] [facility] 3 ACUTE BEHAV [facility]    [facility] GROUP    Group Therapy Note    Attendees: 5         Attendance: Did not attend    Patient's Goal:      Interventions/techniquesWhitney Yeung

## 2022-04-22 NOTE — PROGRESS NOTES
Behavioral Services  Medicare Certification Upon Admission    I certify that this patient's inpatient psychiatric hospital admission is medically necessary for:    [x] (1) Treatment which could reasonably be expected to improve this patient's condition,       [x] (2) Or for diagnostic study;     AND     [x](2) The inpatient psychiatric services are provided while the individual is under the care of a physician and are included in the individualized plan of care.     Estimated length of stay/service 5-7 days    Plan for post-hospital care home    Electronically signed by Hayes Solorio MD on 4/22/2022 at 5:14 PM

## 2022-04-22 NOTE — PROGRESS NOTES
Pt was seen by court for tdo hearing and forced medication order. Pt was committed and forced medication order was approved for up to 30x days.

## 2022-04-22 NOTE — GROUP NOTE
DORETHA  GROUP DOCUMENTATION INDIVIDUAL                                                                          Group Therapy Note    Date: 4/22/2022    Group Start Time: 0900  Group End Time: 1000  Group Topic: Topic Group    [facility] 3 ACUTE BEHAV [facility] [address] [id number] [address] GROUP    Group Therapy Note    Attendees: 5         Attendance: Did not attend    Patient's Goal:      Interventions/techniques:  Adriano Lazaro

## 2022-04-23 PROCEDURE — 65220000003 HC RM SEMIPRIVATE PSYCH

## 2022-04-23 PROCEDURE — 74011250636 HC RX REV CODE- 250/636: Performed by: PSYCHIATRY & NEUROLOGY

## 2022-04-23 RX ADMIN — HALOPERIDOL LACTATE 5 MG: 5 INJECTION, SOLUTION INTRAMUSCULAR at 08:38

## 2022-04-23 RX ADMIN — HALOPERIDOL LACTATE 5 MG: 5 INJECTION, SOLUTION INTRAMUSCULAR at 16:49

## 2022-04-23 NOTE — H&P
INITIAL PSYCHIATRIC EVALUATION            IDENTIFICATION:    Patient Name  Maulik Mcclellan   Date of Birth 1960   [facility] [id number]   Medical Record Number  [id number]      Age  57 y.o. PCP None   Admit date:  4/20/2022    Room Number  575/92  @ [facility]   Date of Service  4/22/2022            HISTORY         REASON FOR HOSPITALIZATION:  CC: \"psychosis\". Pt admitted under a temporary skilled nursing order (TDO) for severe psychosis proving to be an imminent danger to self and others and an inability to care for self. HISTORY OF PRESENT ILLNESS:    The patient, Maulik Mcclellan, is a 57 y.o. BLACK/ female with a past psychiatric history significant for schizoaffective disorder, who presents at this time with complaints of (and/or evidence of) the following emotional symptoms: agitation, psychotic behavior and sam. Additional symptomatology include poor self care. The above symptoms have been present for 2+ weeks. These symptoms are of moderate to high severity. These symptoms are constant in nature. The patient's condition has been precipitated by psychosocial stressors. Patient's condition made worse by treatment noncompliance. UDS: negative; BAL=0. The patient was admitted as an involuntary patient due to psychiatric decompensation in the setting of medication non-compliance. She had previously been on [place] and per chart review was to be taking Zyprexa but went to a friends house and did not continue her medication. She has been at times grossly flat and internally preoccupied and at other times combative and agitated. No history of catatonia though this is a concern for unit staff who have observed her not participating in the milieu in any appreciable way, pacing without any attention as to where she is going and subsequently colliding with peers and staff. The patient is a poor historian. The patient does not corroborate the above narrative. The patient contracts for safety on the unit but does not give consent for the team to contact collateral. The patient is not amenable to initiating treatment while on the unit. Court order pending. ALLERGIES: No Known Allergies   MEDICATIONS PRIOR TO ADMISSION:   Medications Prior to Admission   Medication Sig    traZODone (DESYREL) 150 mg tablet Take 150 mg by mouth nightly.  albuterol (PROVENTIL HFA, VENTOLIN HFA, PROAIR HFA) 90 mcg/actuation inhaler Take 2 Puffs by inhalation three (3) times daily as needed for Wheezing or Shortness of Breath. PAST MEDICAL HISTORY:   Past Medical History:   Diagnosis Date    Asthma     Depression     Hepatitis C     Psychiatric disorder     bipolar     Psychiatric disorder     schizophernia   History reviewed. No pertinent surgical history. SOCIAL HISTORY:  History limited by patient condition. The patient is currently on disability; it is not known if the patient is a smoker; the patient's marital status is unknown; the patient has adult children; the patient is unable to report the highest level of education achieved. FAMILY HISTORY: History reviewed, pertinent family history as below:   History reviewed. No pertinent family history. REVIEW OF SYSTEMS:   Pertinent items are noted in the History of Present Illness. All other Systems reviewed and are considered negative. MENTAL STATUS EXAM & VITALS     MENTAL STATUS EXAM (MSE):    MSE FINDINGS ARE WITHIN NORMAL LIMITS (WNL) UNLESS OTHERWISE STATED BELOW. ( ALL OF THE BELOW CATEGORIES OF THE MSE HAVE BEEN REVIEWED (reviewed 4/22/2022) AND UPDATED AS DEEMED APPROPRIATE )  General Presentation age appropriate, uncooperative   Orientation disorganized, disoriented   Vital Signs  See below (reviewed 4/22/2022); Vital Signs (BP, Pulse, & Temp) are within normal limits if not listed below.    Gait and Station Stable/steady, no ataxia   Musculoskeletal System No extrapyramidal symptoms (EPS); no abnormal muscular movements or Tardive Dyskinesia (TD); muscle strength and tone are within normal limits   Language No aphasia or dysarthria   Speech:  mute   Thought Processes illogical; slow rate of thoughts; poor abstract reasoning/computation   Thought Associations blocked    Thought Content poverty of content   Suicidal Ideations unable to assess   Homicidal Ideations unable to assess   Mood:  unable to assess   Affect:  flat and odd demeanor   Memory recent  impaired   Memory remote:  impaired   Concentration/Attention:  impaired   Fund of Knowledge unable to assess   Insight:  poor   Reliability unable to assess   Judgment:  poor          VITALS:     Patient Vitals for the past 24 hrs:   Resp   04/22/22 2052 16     Wt Readings from Last 3 Encounters:   04/21/22 66 kg (145 lb 8.1 oz)   11/12/20 69.1 kg (152 lb 5.4 oz)   10/01/20 69.2 kg (152 lb 8.9 oz)     Temp Readings from Last 3 Encounters:   11/12/20 98 °F (36.7 °C)   10/01/20 98 °F (36.7 °C)   04/06/20 98 °F (36.7 °C)     BP Readings from Last 3 Encounters:   04/21/22 139/87   11/12/20 119/64   10/01/20 125/74     Pulse Readings from Last 3 Encounters:   04/21/22 89   11/12/20 79   10/01/20 71            DATA     LABORATORY DATA:  Labs Reviewed   CBC WITH AUTOMATED DIFF - Abnormal; Notable for the following components:       Result Value    PLATELET 113 (*)     All other components within normal limits   URINALYSIS W/ REFLEX CULTURE - Abnormal; Notable for the following components:    Appearance CLOUDY (*)     Protein 30 (*)     Ketone 80 (*)     Leukocyte Esterase SMALL (*)     Epithelial cells MANY (*)     All other components within normal limits   METABOLIC PANEL, COMPREHENSIVE - Abnormal; Notable for the following components:    Potassium 3.4 (*)     All other components within normal limits   COVID-19 WITH INFLUENZA A/B   ETHYL ALCOHOL   DRUG SCREEN, URINE     Admission on 04/20/2022   Component Date Value Ref Range Status    ALCOHOL(ETHYL),SERUM 04/20/2022 <10  <10 MG/DL Final    WBC 04/20/2022 6.0  3.6 - 11.0 K/uL Final    RBC 04/20/2022 4.02  3.80 - 5.20 M/uL Final    HGB 04/20/2022 12.9  11.5 - 16.0 g/dL Final    HCT 04/20/2022 37.1  35.0 - 47.0 % Final    MCV 04/20/2022 92.3  80.0 - 99.0 FL Final    MCH 04/20/2022 32.1  26.0 - 34.0 PG Final    MCHC 04/20/2022 34.8  30.0 - 36.5 g/dL Final    RDW 04/20/2022 12.3  11.5 - 14.5 % Final    PLATELET 85/71/1118 176* 150 - 400 K/uL Final    MPV 04/20/2022 9.0  8.9 - 12.9 FL Final    NRBC 04/20/2022 0.0  0  WBC Final    ABSOLUTE NRBC 04/20/2022 0.00  0.00 - 0.01 K/uL Final    NEUTROPHILS 04/20/2022 66  32 - 75 % Final    LYMPHOCYTES 04/20/2022 25  12 - 49 % Final    MONOCYTES 04/20/2022 8  5 - 13 % Final    EOSINOPHILS 04/20/2022 1  0 - 7 % Final    BASOPHILS 04/20/2022 0  0 - 1 % Final    IMMATURE GRANULOCYTES 04/20/2022 0  0.0 - 0.5 % Final    ABS. NEUTROPHILS 04/20/2022 3.9  1.8 - 8.0 K/UL Final    ABS. LYMPHOCYTES 04/20/2022 1.5  0.8 - 3.5 K/UL Final    ABS. MONOCYTES 04/20/2022 0.5  0.0 - 1.0 K/UL Final    ABS. EOSINOPHILS 04/20/2022 0.0  0.0 - 0.4 K/UL Final    ABS. BASOPHILS 04/20/2022 0.0  0.0 - 0.1 K/UL Final    ABS. IMM.  GRANS. 04/20/2022 0.0  0.00 - 0.04 K/UL Final    DF 04/20/2022 AUTOMATED    Final    Color 04/20/2022 YELLOW/STRAW    Final    Appearance 04/20/2022 CLOUDY* CLEAR   Final    Specific gravity 04/20/2022 1.020    Final    pH (UA) 04/20/2022 5.5  5.0 - 8.0   Final    Protein 04/20/2022 30* NEG mg/dL Final    Glucose 04/20/2022 Negative  NEG mg/dL Final    Ketone 04/20/2022 80* NEG mg/dL Final    Bilirubin 04/20/2022 Negative  NEG   Final    Blood 04/20/2022 Negative  NEG   Final    Urobilinogen 04/20/2022 1.0  0.2 - 1.0 EU/dL Final    Nitrites 04/20/2022 Negative  NEG   Final    Leukocyte Esterase 04/20/2022 SMALL* NEG   Final    WBC 04/20/2022 0-4  0 - 4 /hpf Final    RBC 04/20/2022 0-5  0 - 5 /hpf Final    Epithelial cells 04/20/2022 MANY* FEW /lpf Final    Bacteria 04/20/2022 Negative  NEG /hpf Final    UA:UC IF INDICATED 04/20/2022 CULTURE NOT INDICATED BY UA RESULT  CNI   Final    AMPHETAMINES 04/20/2022 Negative  NEG   Final    BARBITURATES 04/20/2022 Negative  NEG   Final    BENZODIAZEPINES 04/20/2022 Negative  NEG   Final    COCAINE 04/20/2022 Negative  NEG   Final    METHADONE 04/20/2022 Negative  NEG   Final    OPIATES 04/20/2022 Negative  NEG   Final    PCP(PHENCYCLIDINE) 04/20/2022 Negative  NEG   Final    THC (TH-CANNABINOL) 04/20/2022 Negative  NEG   Final    Drug screen comment 04/20/2022 (NOTE)   Final    SARS-CoV-2 by PCR 04/20/2022 Not detected  NOTD   Final    Influenza A by PCR 04/20/2022 Not detected    Final    Influenza B by PCR 04/20/2022 Not detected    Final    Sodium 04/20/2022 136  136 - 145 mmol/L Final    Potassium 04/20/2022 3.4* 3.5 - 5.1 mmol/L Final    Chloride 04/20/2022 101  97 - 108 mmol/L Final    CO2 04/20/2022 23  21 - 32 mmol/L Final    Anion gap 04/20/2022 12  5 - 15 mmol/L Final    Glucose 04/20/2022 89  65 - 100 mg/dL Final    BUN 04/20/2022 14  6 - 20 MG/DL Final    Creatinine 04/20/2022 0.90  0.55 - 1.02 MG/DL Final    BUN/Creatinine ratio 04/20/2022 16  12 - 20   Final    GFR est AA 04/20/2022 >60  >60 ml/min/1.73m2 Final    GFR est non-AA 04/20/2022 >60  >60 ml/min/1.73m2 Final    Calcium 04/20/2022 8.9  8.5 - 10.1 MG/DL Final    Bilirubin, total 04/20/2022 0.4  0.2 - 1.0 MG/DL Final    ALT (SGPT) 04/20/2022 25  12 - 78 U/L Final    AST (SGOT) 04/20/2022 29  15 - 37 U/L Final    Alk.  phosphatase 04/20/2022 72  45 - 117 U/L Final    Protein, total 04/20/2022 7.2  6.4 - 8.2 g/dL Final    Albumin 04/20/2022 3.7  3.5 - 5.0 g/dL Final    Globulin 04/20/2022 3.5  2.0 - 4.0 g/dL Final    A-G Ratio 04/20/2022 1.1  1.1 - 2.2   Final        RADIOLOGY REPORTS:  Results from Hospital Encounter encounter on 01/28/20    XR CHEST PORT    Narrative  Portable chest 2 views dated 1/28/2020    Comparison chest dated 11/9/2014    History is cough    2 portable AP upright views of the chest were obtained. The cardiac silhouette  is normal in size. There is hyperaeration of the lungs. No areas of lobar  consolidation are identified. The costophrenic angles are sharp in appearance. Impression  IMPRESSION: No evidence of active lung disease. No results found.            MEDICATIONS       ALL MEDICATIONS  Current Facility-Administered Medications   Medication Dose Route Frequency    haloperidol (HALDOL) 2 mg/mL oral solution 5 mg  5 mg Oral BID    Or    haloperidol lactate (HALDOL) injection 5 mg++COURT-ORDERED MEDICATION FOR REFUSAL OF PO++  5 mg IntraMUSCular BID    OLANZapine (ZyPREXA) tablet 5 mg  5 mg Oral Q6H PRN    haloperidol lactate (HALDOL) injection 5 mg  5 mg IntraMUSCular Q6H PRN    benztropine (COGENTIN) tablet 1 mg  1 mg Oral BID PRN    diphenhydrAMINE (BENADRYL) injection 50 mg  50 mg IntraMUSCular BID PRN    hydrOXYzine HCL (ATARAX) tablet 50 mg  50 mg Oral TID PRN    LORazepam (ATIVAN) injection 1 mg  1 mg IntraMUSCular Q4H PRN    traZODone (DESYREL) tablet 50 mg  50 mg Oral QHS PRN    acetaminophen (TYLENOL) tablet 650 mg  650 mg Oral Q4H PRN    magnesium hydroxide (MILK OF MAGNESIA) 400 mg/5 mL oral suspension 30 mL  30 mL Oral DAILY PRN    nicotine (NICODERM CQ) 21 mg/24 hr patch 1 Patch  1 Patch TransDERmal DAILY PRN      SCHEDULED MEDICATIONS  Current Facility-Administered Medications   Medication Dose Route Frequency    haloperidol (HALDOL) 2 mg/mL oral solution 5 mg  5 mg Oral BID    Or    haloperidol lactate (HALDOL) injection 5 mg++COURT-ORDERED MEDICATION FOR REFUSAL OF PO++  5 mg IntraMUSCular BID                ASSESSMENT & PLAN        The patient, Racquel Cruz, is a 57 y.o.  female who presents at this time for treatment of the following diagnoses:  Patient Active Hospital Problem List:   Schizoaffective disorder ([facility] [address] (3/30/2018) Assessment: the patient presents with gerardo psychosis in the setting of medication non-compliance x1.5 weeks. She is unable to make her needs known and vacillates between agitation and near catatonia. Will obtain court order for psychiatric treatment, observe and treat symptomatically. Plan:   COURT ORDERED MEDICATION:  - Haldol oral soln 5 mg PO *OR* IM BID for psychosis    - START Trazodone 150 mg QHS for insomnia  - IGM therapy as tolerated  - Expand database / obtain collateral  - Dispo planning           A coordinated, multidisplinary treatment team (includes the nurse, unit pharmacist,  and writer) round was conducted for this initial evaluation with the patient present. The following regarding medications was addressed during rounds with patient: the risks and benefits of the proposed medication. The patient was given the opportunity to ask questions. Informed consent given to the use of the above medications. I will continue to adjust psychiatric and non-psychiatric medications (see above \"medication\" section and orders section for details) as deemed appropriate & based upon diagnoses and response to treatment. I have reviewed admission (and previous/old) labs and medical tests in the EHR and or transferring hospital documents. I will continue to order blood tests/labs and diagnostic tests as deemed appropriate and review results as they become available (see orders for details). I have reviewed old psychiatric and medical records available in the EHR. I Will order additional psychiatric records from other institutions to further elucidate the nature of patient's psychopathology and review once available. I will gather additional collateral information from friends, family and o/p treatment team to further elucidate the nature of patient's psychopathology and baselline level of psychiatric functioning.     I certify that this patient's inpatient psychiatric hospital services are required for treatment that could reasonably be expected to improve the patient's condition, or for diagnostic study, and that the patient continues to need, on a daily basis, active treatment furnished directly by or requiring the supervision of inpatient psychiatric facility personnel. In addition, the hospital records show that services furnished were intensive treatment services, admission or related services, or equivalent services.       ESTIMATED LENGTH OF STAY:  5-7 days       STRENGTHS:  Exercising self-direction/Resourceful, Access to housing/residential stability and Interpersonal/supportive relationships (family, friends, peers)                                        SIGNED:    Racquel Ruiz MD  4/22/2022

## 2022-04-23 NOTE — PROGRESS NOTES
Laboratory monitoring for mood stabilizer and antipsychotics:    Recommended baseline monitoring has not been completed based on this patient's current medication regimen. The following labs have been ordered to complete baseline monitoring:Hgb A1c, TSH, lipid panel        The patient is currently taking the following medication(s):   Current Facility-Administered Medications   Medication Dose Route Frequency    haloperidol (HALDOL) 2 mg/mL oral solution 5 mg  5 mg Oral BID    Or    haloperidol lactate (HALDOL) injection 5 mg++COURT-ORDERED MEDICATION FOR REFUSAL OF PO++  5 mg IntraMUSCular BID    traZODone (DESYREL) tablet 150 mg  150 mg Oral QHS       Height, Weight, BMI Estimation  Estimated body mass index is 26.61 kg/m² as calculated from the following:    Height as of this encounter: 157.5 cm (62\"). Weight as of this encounter: 66 kg (145 lb 8.1 oz). Renal Function, Hepatic Function and Chemistry  Estimated Creatinine Clearance: 57.8 mL/min (by C-G formula based on SCr of 0.9 mg/dL). Lab Results   Component Value Date/Time    Sodium 136 04/20/2022 01:10 PM    Potassium 3.4 (L) 04/20/2022 01:10 PM    Chloride 101 04/20/2022 01:10 PM    CO2 23 04/20/2022 01:10 PM    Anion gap 12 04/20/2022 01:10 PM    Glucose 89 04/20/2022 01:10 PM    BUN 14 04/20/2022 01:10 PM    Creatinine 0.90 04/20/2022 01:10 PM    BUN/Creatinine ratio 16 04/20/2022 01:10 PM    GFR est AA >60 04/20/2022 01:10 PM    GFR est non-AA >60 04/20/2022 01:10 PM    Calcium 8.9 04/20/2022 01:10 PM    ALT (SGPT) 25 04/20/2022 01:10 PM    Alk.  phosphatase 72 04/20/2022 01:10 PM    Protein, total 7.2 04/20/2022 01:10 PM    Albumin 3.7 04/20/2022 01:10 PM    Globulin 3.5 04/20/2022 01:10 PM    A-G Ratio 1.1 04/20/2022 01:10 PM    Bilirubin, total 0.4 04/20/2022 01:10 PM       Lab Results   Component Value Date/Time    Glucose 89 04/20/2022 01:10 PM       Lab Results   Component Value Date/Time    Hemoglobin A1c 5.7 (H) 03/22/2020 06:01 AM Hematology  Lab Results   Component Value Date/Time    WBC 6.0 04/20/2022 01:10 PM    HGB 12.9 04/20/2022 01:10 PM    HCT 37.1 04/20/2022 01:10 PM    PLATELET 807 (H) 06/30/0035 01:10 PM    MCV 92.3 04/20/2022 01:10 PM       Lipids  Lab Results   Component Value Date/Time    Cholesterol, total 170 03/22/2020 06:01 AM    HDL Cholesterol 62 03/22/2020 06:01 AM    LDL, calculated 96.6 03/22/2020 06:01 AM    Triglyceride 57 03/22/2020 06:01 AM    CHOL/HDL Ratio 2.7 03/22/2020 06:01 AM       Thyroid Function    Lab Results   Component Value Date/Time    TSH 0.55 04/16/2018 04:48 AM     Vitals  Visit Vitals  /87 (BP 1 Location: Left arm, BP Patient Position: Sitting)   Pulse 82   Temp 97.5 °F (36.4 °C)   Resp 16   Ht 157.5 cm (62\")   Wt 66 kg (145 lb 8.1 oz)   SpO2 98%   BMI 26.61 kg/m²       [facility], [address] (pharmacy)

## 2022-04-23 NOTE — BH NOTES
Psychiatric Progress Note    Patient: Sarah Barrios MRN: [id number]  SSN: [id number]    YOB: 1960  Age: 57 y.o. Sex: female      Admit Date: 4/20/2022    LOS: 2 days     Subjective:     Sarah Barrios  is selectively mute per staff and moods are depressed. She opened an eye then closed it again with no further correspondence. No prns given. No aggression or violence. Inappropriately interactive and poorly aware. Tolerating court ordered medications well. Eating ok and sleeping fairly (7hrs) .     Objective:     Vitals:    04/21/22 1803 04/21/22 2015 04/22/22 2052 04/23/22 0806   BP:    123/87   Pulse:    82   Resp:  16 16 16   Temp:    97.5 °F (36.4 °C)   SpO2:    98%   Weight: 66 kg (145 lb 8.1 oz)      Height: 5' 2\" (1.575 m)           Mental Status Exam:   Sensorium  not oriented to situation   Relations uncooperative   Eye Contact poor   Appearance:  age appropriate   Speech:  mute   Thought Process: blocked   Thought Content internally preoccupied    Suicidal ideations none   Mood:  dysphoric   Affect:  constricted   Memory   N/A   Concentration:  adequate   Insight:  poor   Judgment:  poor       MEDICATIONS:  Current Facility-Administered Medications   Medication Dose Route Frequency    haloperidol (HALDOL) 2 mg/mL oral solution 5 mg  5 mg Oral BID    Or    haloperidol lactate (HALDOL) injection 5 mg++COURT-ORDERED MEDICATION FOR REFUSAL OF PO++  5 mg IntraMUSCular BID    traZODone (DESYREL) tablet 150 mg  150 mg Oral QHS    albuterol (PROVENTIL HFA, VENTOLIN HFA, PROAIR HFA) inhaler 2 Puff  2 Puff Inhalation TID PRN    OLANZapine (ZyPREXA) tablet 5 mg  5 mg Oral Q6H PRN    haloperidol lactate (HALDOL) injection 5 mg  5 mg IntraMUSCular Q6H PRN    benztropine (COGENTIN) tablet 1 mg  1 mg Oral BID PRN    diphenhydrAMINE (BENADRYL) injection 50 mg  50 mg IntraMUSCular BID PRN    hydrOXYzine HCL (ATARAX) tablet 50 mg  50 mg Oral TID PRN    LORazepam (ATIVAN) injection 1 mg 1 mg IntraMUSCular Q4H PRN    traZODone (DESYREL) tablet 50 mg  50 mg Oral QHS PRN    acetaminophen (TYLENOL) tablet 650 mg  650 mg Oral Q4H PRN    magnesium hydroxide (MILK OF MAGNESIA) 400 mg/5 mL oral suspension 30 mL  30 mL Oral DAILY PRN    nicotine (NICODERM CQ) 21 mg/24 hr patch 1 Patch  1 Patch TransDERmal DAILY PRN      DISCUSSION:   the risks and benefits of the proposed medication  patient given opportunity to ask questions    Lab/Data Review: All lab results for the last 24 hours reviewed. No results found for this or any previous visit (from the past 24 hour(s)). Assessment:     Principal Problem:    Schizoaffective disorder ([facility] [address] (3/30/2018)        Plan:     Continue current care  Collateral information  Medication modification as appropriate  Disposition planning with social work    I certify that this patient's inpatient psychiatric hospital services furnished since the previous certification were, and continue to be, required for treatment that could reasonably be expected to improve the patient's condition, or for diagnostic study, and that the patient continues to need, on a daily basis, active treatment furnished directly by or requiring the supervision of inpatient psychiatric facility personnel. In addition, the hospital records show that services furnished were intensive treatment services, admission or related services, or equivalent services.   Signed By: Lasha Rader MD     April 23, 2022

## 2022-04-23 NOTE — PROGRESS NOTES
Problem: Psychosis  Goal: *STG: Remains safe in hospital  Outcome: Progressing Towards Goal     Problem: Psychosis  Goal: *STG/LTG: Demonstrates improved social functioning by responding appropriately to staff  Outcome: Not Progressing Towards Goal     56 y/o female pt received this evening awake in bed in room. Pt is alert and turned to face RN when her name was called. Pt affect is irritable/restricted. Mood not stated by pt. Pt would not participate in orientation assessment and would not answer any psychiatric assessment questions. No self injurious or aggressive behaviors noted. Pt remained reclusive to room during shift. No medications scheduled during shift, no PRNs given. Pt would not allow VS but pt did not appear to be in any distress. Will continue to monitor. 7 hours of sleep logged.

## 2022-04-23 NOTE — PROGRESS NOTES
Bedside shift change report given to Camelia Sequeira RN (oncoming nurse) by Khadijah Laureano RN (offgoing nurse). Report included the following information SBAR, Kardex, MAR, and Accordion. Assumed care of patient resting quietly in bed. Patient is selectively mute and does not answer questions. She allowed her VS to be obtained and she ate breakfast in her room. She refused PO haldol despite RN explaining that it was court ordered. IM haldol given without issue. Isolative to room. Refused PM PO haldol, IM given. Bedside shift change report given to Khadijah Laureano RN (oncoming nurse) by Camelia Sequeira RN (offgoing nurse). Report included the following information SBAR, Kardex, MAR and Accordion. Problem: Psychosis  Goal: *STG: Remains safe in hospital  Outcome: Progressing Towards Goal     Problem: Falls - Risk of  Goal: *Absence of Falls  Description: Document Shraddha Fall Risk and appropriate interventions in the flowsheet.   Outcome: Progressing Towards Goal  Note: Fall Risk Interventions:       Mentation Interventions: Adequate sleep, hydration, pain control,More frequent rounding,Reorient patient    Medication Interventions: Teach patient to arise slowly

## 2022-04-24 PROCEDURE — 74011250636 HC RX REV CODE- 250/636: Performed by: PSYCHIATRY & NEUROLOGY

## 2022-04-24 PROCEDURE — 65220000003 HC RM SEMIPRIVATE PSYCH

## 2022-04-24 RX ADMIN — HALOPERIDOL LACTATE 5 MG: 5 INJECTION, SOLUTION INTRAMUSCULAR at 08:23

## 2022-04-24 RX ADMIN — HALOPERIDOL LACTATE 5 MG: 5 INJECTION, SOLUTION INTRAMUSCULAR at 16:28

## 2022-04-24 NOTE — PROGRESS NOTES
Problem: Psychosis  Goal: *STG: Remains safe in hospital  Outcome: Progressing Towards Goal     Problem: Falls - Risk of  Goal: *Absence of Falls  Description: Document Shraddha Fall Risk and appropriate interventions in the flowsheet. Outcome: Progressing Towards Goal  Note: Fall Risk Interventions:       Mentation Interventions: Adequate sleep, hydration, pain control,More frequent rounding,Reorient patient    Medication Interventions: Teach patient to arise slowly    56 y/o female pt received this evening awake in bed in room. Pt awakens to name being called and turns to face RN but does not answer any assessment questions. No self injurious or aggressive behaviors noted. Pt remained reclusive to room during shift. No medications scheduled during shift. Pt refused VS. No PRNs given. 9 hours of sleep logged. Will continue to monitor pt.

## 2022-04-24 NOTE — BH NOTES
Psychiatric Progress Note    Patient: Marce Harrison MRN: [id number]  SSN: [id number]    YOB: 1960  Age: 57 y.o. Sex: female      Admit Date: 4/20/2022    LOS: 3 days     Subjective:     Marce Harrison  is selectively mute per staff and moods are depressed. She opened an eye then closed it again with no further correspondence. No prns given. No aggression or violence. Inappropriately interactive and poorly aware. Tolerating court ordered medications well. Eating ok and sleeping fairly (7hrs) . 4/24 - Marce Harrison moved her hands to my voices but refused to converse with me. Selective with nursing also. She responded appropriately when breakfast was called only. Moods are down. No aggression or violence. Selectively interactive. Limited awareness. Refusing medications. Eating and sleeping fairly.       Objective:     Vitals:    04/21/22 2015 04/22/22 2052 04/23/22 0806 04/23/22 1958   BP:   123/87    Pulse:   82    Resp: 16 16 16 16   Temp:   97.5 °F (36.4 °C)    SpO2:   98%    Weight:       Height:            Mental Status Exam:   Sensorium  not oriented to situation   Relations uncooperative   Eye Contact poor   Appearance:  age appropriate   Speech:  mute   Thought Process: blocked   Thought Content internally preoccupied    Suicidal ideations none   Mood:  dysphoric   Affect:  constricted   Memory   N/A   Concentration:  adequate   Insight:  poor   Judgment:  poor       MEDICATIONS:  Current Facility-Administered Medications   Medication Dose Route Frequency    haloperidol (HALDOL) 2 mg/mL oral solution 5 mg  5 mg Oral BID    Or    haloperidol lactate (HALDOL) injection 5 mg++COURT-ORDERED MEDICATION FOR REFUSAL OF PO++  5 mg IntraMUSCular BID    traZODone (DESYREL) tablet 150 mg  150 mg Oral QHS    albuterol (PROVENTIL HFA, VENTOLIN HFA, PROAIR HFA) inhaler 2 Puff  2 Puff Inhalation TID PRN    OLANZapine (ZyPREXA) tablet 5 mg  5 mg Oral Q6H PRN    haloperidol lactate (HALDOL) injection 5 mg  5 mg IntraMUSCular Q6H PRN    benztropine (COGENTIN) tablet 1 mg  1 mg Oral BID PRN    diphenhydrAMINE (BENADRYL) injection 50 mg  50 mg IntraMUSCular BID PRN    hydrOXYzine HCL (ATARAX) tablet 50 mg  50 mg Oral TID PRN    LORazepam (ATIVAN) injection 1 mg  1 mg IntraMUSCular Q4H PRN    traZODone (DESYREL) tablet 50 mg  50 mg Oral QHS PRN    acetaminophen (TYLENOL) tablet 650 mg  650 mg Oral Q4H PRN    magnesium hydroxide (MILK OF MAGNESIA) 400 mg/5 mL oral suspension 30 mL  30 mL Oral DAILY PRN    nicotine (NICODERM CQ) 21 mg/24 hr patch 1 Patch  1 Patch TransDERmal DAILY PRN      DISCUSSION:   the risks and benefits of the proposed medication  patient given opportunity to ask questions    Lab/Data Review: All lab results for the last 24 hours reviewed. No results found for this or any previous visit (from the past 24 hour(s)). Assessment:     Principal Problem:    Schizoaffective disorder ([facility] [address] (3/30/2018)        Plan:     Continue current care  Collateral information  Medication modification as appropriate  Consider Court ordered treatment  Disposition planning with social work    I certify that this patient's inpatient psychiatric hospital services furnished since the previous certification were, and continue to be, required for treatment that could reasonably be expected to improve the patient's condition, or for diagnostic study, and that the patient continues to need, on a daily basis, active treatment furnished directly by or requiring the supervision of inpatient psychiatric facility personnel. In addition, the hospital records show that services furnished were intensive treatment services, admission or related services, or equivalent services.   Signed By: Khadra Modi MD     April 24, 2022 69786 - Disposition Day Code

## 2022-04-24 NOTE — PROGRESS NOTES
Bedside shift change report given to Trevon Graves RN (oncoming nurse) by The dae RN (offgoing nurse). Report included the following information SBAR, Kardex, MAR, and Accordion. Assumed care of patient resting quietly in bed. Patient is selectively mute and does not answer questions. She got OOB to eat. She refused VS and PO medications. Court ordered IM haldol given without issue. Uneventful shift. Isolative to room. Refused PM PO haldol- IM given. Bedside shift change report given to The dae RN (oncoming nurse) by Trevon Graves RN (offgoing nurse). Report included the following information SBAR, Kardex, MAR and Accordion. Problem: Psychosis  Goal: *STG: Remains safe in hospital  Outcome: Progressing Towards Goal     Problem: Falls - Risk of  Goal: *Absence of Falls  Description: Document Shraddha Fall Risk and appropriate interventions in the flowsheet.   Outcome: Progressing Towards Goal  Note: Fall Risk Interventions:       Mentation Interventions: Adequate sleep, hydration, pain control    Medication Interventions: Teach patient to arise slowly

## 2022-04-25 PROCEDURE — 74011250636 HC RX REV CODE- 250/636: Performed by: PSYCHIATRY & NEUROLOGY

## 2022-04-25 PROCEDURE — 74011250636 HC RX REV CODE- 250/636: Performed by: NURSE PRACTITIONER

## 2022-04-25 PROCEDURE — 99232 SBSQ HOSP IP/OBS MODERATE 35: CPT | Performed by: PSYCHIATRY & NEUROLOGY

## 2022-04-25 PROCEDURE — 65220000003 HC RM SEMIPRIVATE PSYCH

## 2022-04-25 RX ORDER — LORAZEPAM 2 MG/ML
1 INJECTION INTRAMUSCULAR 2 TIMES DAILY
Status: DISCONTINUED | OUTPATIENT
Start: 2022-04-25 | End: 2022-04-25

## 2022-04-25 RX ORDER — LORAZEPAM 2 MG/ML
1 CONCENTRATE ORAL 2 TIMES DAILY
Status: DISCONTINUED | OUTPATIENT
Start: 2022-04-25 | End: 2022-04-28

## 2022-04-25 RX ORDER — HALOPERIDOL 2 MG/ML
7.5 SOLUTION ORAL 2 TIMES DAILY
Status: DISCONTINUED | OUTPATIENT
Start: 2022-04-25 | End: 2022-05-04

## 2022-04-25 RX ORDER — HALOPERIDOL 5 MG/ML
7.5 INJECTION INTRAMUSCULAR 2 TIMES DAILY
Status: DISCONTINUED | OUTPATIENT
Start: 2022-04-26 | End: 2022-04-25

## 2022-04-25 RX ORDER — LORAZEPAM 2 MG/ML
1 CONCENTRATE ORAL 2 TIMES DAILY
Status: DISCONTINUED | OUTPATIENT
Start: 2022-04-25 | End: 2022-04-25

## 2022-04-25 RX ORDER — HALOPERIDOL 5 MG/ML
7.5 INJECTION INTRAMUSCULAR 2 TIMES DAILY
Status: DISCONTINUED | OUTPATIENT
Start: 2022-04-25 | End: 2022-05-04

## 2022-04-25 RX ORDER — LORAZEPAM 2 MG/ML
1 INJECTION INTRAMUSCULAR 2 TIMES DAILY
Status: DISCONTINUED | OUTPATIENT
Start: 2022-04-25 | End: 2022-04-28

## 2022-04-25 RX ORDER — HALOPERIDOL 2 MG/ML
7.5 SOLUTION ORAL 2 TIMES DAILY
Status: DISCONTINUED | OUTPATIENT
Start: 2022-04-26 | End: 2022-04-25

## 2022-04-25 RX ADMIN — LORAZEPAM 1 MG: 2 INJECTION INTRAMUSCULAR; INTRAVENOUS at 17:37

## 2022-04-25 RX ADMIN — HALOPERIDOL LACTATE 5 MG: 5 INJECTION, SOLUTION INTRAMUSCULAR at 09:06

## 2022-04-25 RX ADMIN — HALOPERIDOL LACTATE 7.5 MG: 5 INJECTION, SOLUTION INTRAMUSCULAR at 17:38

## 2022-04-25 NOTE — BH NOTES
Pt is isolative to self. selectively mute. Distractd. Guarded. Flat affect. Pt appears preoccupied. Pt refused to answer assessment questions. Nurse was able to obtain pt vital sign except pulse ox. Pt was uncooperative. Pt refuses scheduled medications and was given IM alternatives. Medication given without issues. Pt has a poor appetite pt is encouraged to eat. Pt ate and drink small amount this shift. Will continue to monitor pt.

## 2022-04-25 NOTE — PROGRESS NOTES
Spiritual Care Assessment/Progress Note  [facility]      NAME: Kaylyn Fonseca      MRN: [id number]  AGE: 57 y.o. SEX: female  Temple Affiliation: Yarsani   Language: English     4/25/2022     Total Time (in minutes): 9     Spiritual Assessment begun in [place] [address] [address] [place] [address] through conversation with:         []Patient        [] Family    [] Friend(s)        Reason for Consult: Initial/Spiritual assessment, patient floor     Spiritual beliefs: (Please include comment if needed)     [] Identifies with a lorrie tradition:         [] Supported by a lorrei community:            [] Claims no spiritual orientation:           [] Seeking spiritual identity:                [] Adheres to an individual form of spirituality:           [x] Not able to assess:                           Identified resources for coping:      [] Prayer                               [] Music                  [] Guided Imagery     [] Family/friends                 [] Pet visits     [] Devotional reading                         [x] Unknown     [] Other:                                               Interventions offered during this visit: (See comments for more details)                Plan of Care:     [] Support spiritual and/or cultural needs    [] Support AMD and/or advance care planning process      [] Support grieving process   [] Coordinate Rites and/or Rituals    [] Coordination with community clergy   [] No spiritual needs identified at this time   [] Detailed Plan of Care below (See Comments)  [] Make referral to Music Therapy  [] Make referral to Pet Therapy     [] Make referral to Addiction services  [] Make referral to [facility]  [] Make referral to Spiritual Care Partner  [] No future visits requested        [x] Contact Spiritual Care for further referrals     Comments:  visit for initial spiritual assessment. Patient appears to be resting at the time of this visit, door closed for privacy.   Please contact spiritual care for further referral or consult. Rev.  Chanelle Mallory MDiv, [facility], [facility]   paging service: [phone])

## 2022-04-25 NOTE — GROUP NOTE
DORETHA  GROUP DOCUMENTATION INDIVIDUAL                                                                          Group Therapy Note    Date: 4/25/2022    Group Start Time: 0900  Group End Time: 1000  Group Topic: Topic Group    [address] ACUTE BEHAV [place] [address] GROUP    Group Therapy Note    Attendees: 5         Attendance: Did not attend    Patient's Goal:      Interventions/techniques  Tomeka Ireland

## 2022-04-25 NOTE — GROUP NOTE
DORETHA  GROUP DOCUMENTATION INDIVIDUAL                                                                          Group Therapy Note    Date: 4/25/2022    Group Start Time: 1500  Group End Time: 1600  Group Topic: Recreational/Music Therapy    [address] [facility] 3 ACUTE BEHAV [facility]    Stephan Bustamante [facility] GROUP    Group Therapy Note    Attendees: 4         Attendance: Did not attend    Patient's Goal:      Interventions/techniquesWhitney Yeung

## 2022-04-25 NOTE — BH NOTES
Behavioral Health Treatment Team Note     Patient goal(s) for today: continue taking meds as prescribed, engage in unit activities, participate in hygiene/ADLs, prepare for discharge, follow directions from staff, contact support team, attend groups  Treatment team focus/goals: continue adjusting meds as needed, discharge planning, update support system    Progress note: Pt is refusing medications, remains selectively mute. Pt with depressed mood and blunted affect. Pt is isolative to room and does not engage with others. Pt declines to engage in treatment team or discharge planning.  department to continue to coordinate discharge plans. LOS:  4  Expected LOS: TBD    Insurance info/prescription coverage:  [employer] [facility] Medicaid   Date of last family contact:  Mat Kaylyn, WKX ([phone])  Family requesting physician contact today:  no  Discharge plan:  Return home   Guns in the home:  no   Outpatient provider(s):   Insurance JESICA Chance [phone]    Participating treatment team members: Selma Headley, Dr. Julio Booth 22

## 2022-04-25 NOTE — BH NOTES
Psychiatric Progress Note    Patient: Fede Pacheco MRN: [id number]  SSN: [id number]    YOB: 1960  Age: 57 y.o. Sex: female      Admit Date: 4/20/2022    LOS: 4 days     Subjective:     Fede Pacheco  is selectively mute per staff and moods are depressed. She opened an eye then closed it again with no further correspondence. No prns given. No aggression or violence. Inappropriately interactive and poorly aware. Tolerating court ordered medications well. Eating ok and sleeping fairly (7hrs) . 4/24 - Fede Pacheco moved her hands to my voices but refused to converse with me. Selective with nursing also. She responded appropriately when breakfast was called only. Moods are down. No aggression or violence. Selectively interactive. Limited awareness. Refusing medications. Eating and sleeping fairly. 04/25 - the patient slept 8 hours overnight. She continues to refuse medications and requires much prompting to participate in ADLs. No agitation noted today, and patient got no PRNs for aggression x24 hours. She appears catatonic at times, but will react to direct questions. Patient getting IM injections of Haldol for psychosis by court order as she refuses all oral agents. On interview she says nothing and lies in bed.        Objective:     Vitals:    04/23/22 0806 04/23/22 1958 04/24/22 2005 04/25/22 0822   BP: 123/87   (!) 124/90   Pulse: 82   82   Resp: 16 16 16 18   Temp: 97.5 °F (36.4 °C)   97.4 °F (36.3 °C)   SpO2: 98%      Weight:       Height:            Mental Status Exam:   Sensorium  not oriented to situation   Relations uncooperative   Eye Contact poor   Appearance:  age appropriate   Speech:  mute   Thought Process: blocked   Thought Content internally preoccupied    Suicidal ideations none   Mood:  dysphoric   Affect:  constricted   Memory   N/A   Concentration:  adequate   Insight:  poor   Judgment:  poor       MEDICATIONS:  Current Facility-Administered Medications   Medication Dose Route Frequency    [START ON 4/26/2022] haloperidol (HALDOL) 2 mg/mL oral solution 7.5 mg  7.5 mg Oral BID    Or    [START ON 4/26/2022] haloperidol lactate (HALDOL) injection 7.5 mg  7.5 mg IntraMUSCular BID    traZODone (DESYREL) tablet 150 mg  150 mg Oral QHS    albuterol (PROVENTIL HFA, VENTOLIN HFA, PROAIR HFA) inhaler 2 Puff  2 Puff Inhalation TID PRN    OLANZapine (ZyPREXA) tablet 5 mg  5 mg Oral Q6H PRN    haloperidol lactate (HALDOL) injection 5 mg  5 mg IntraMUSCular Q6H PRN    benztropine (COGENTIN) tablet 1 mg  1 mg Oral BID PRN    diphenhydrAMINE (BENADRYL) injection 50 mg  50 mg IntraMUSCular BID PRN    hydrOXYzine HCL (ATARAX) tablet 50 mg  50 mg Oral TID PRN    LORazepam (ATIVAN) injection 1 mg  1 mg IntraMUSCular Q4H PRN    traZODone (DESYREL) tablet 50 mg  50 mg Oral QHS PRN    acetaminophen (TYLENOL) tablet 650 mg  650 mg Oral Q4H PRN    magnesium hydroxide (MILK OF MAGNESIA) 400 mg/5 mL oral suspension 30 mL  30 mL Oral DAILY PRN    nicotine (NICODERM CQ) 21 mg/24 hr patch 1 Patch  1 Patch TransDERmal DAILY PRN      DISCUSSION:   the risks and benefits of the proposed medication  patient given opportunity to ask questions    Lab/Data Review: All lab results for the last 24 hours reviewed. No results found for this or any previous visit (from the past 24 hour(s)). Assessment:     Principal Problem:    Schizoaffective disorder ([facility] [address] (3/30/2018)     Assessment: the patient presents with gerardo psychosis in the setting of medication non-compliance x1.5 weeks. She is unable to make her needs known and vacillates between agitation and near catatonia. Will obtain court order for psychiatric treatment, observe and treat symptomatically.       Plan:      Plan:   COURT ORDERED MEDICATION:  - INCREASE Haldol oral soln to 7.5 mg PO *OR* IM BID for psychosis  - START Ativan 1 mg PO *OR* IM BID for catatonia     - CONTINUE Trazodone 150 mg QHS for insomnia  - IGM therapy as tolerated  - Expand database / obtain collateral  - Dispo planning    I certify that this patient's inpatient psychiatric hospital services furnished since the previous certification were, and continue to be, required for treatment that could reasonably be expected to improve the patient's condition, or for diagnostic study, and that the patient continues to need, on a daily basis, active treatment furnished directly by or requiring the supervision of inpatient psychiatric facility personnel. In addition, the hospital records show that services furnished were intensive treatment services, admission or related services, or equivalent services.     Signed By: Lorene Ramon MD     April 25, 2022

## 2022-04-25 NOTE — PROGRESS NOTES
Problem: Psychosis  Goal: *STG: Remains safe in hospital  Outcome: Progressing Towards Goal     Problem: Falls - Risk of  Goal: *Absence of Falls  Description: Document Shraddha Fall Risk and appropriate interventions in the flowsheet. Outcome: Progressing Towards Goal  Note: Fall Risk Interventions:       Mentation Interventions: Adequate sleep, hydration, pain control    Medication Interventions: Teach patient to arise slowly    56 y/o female pt received this evening awake in room. Pt is alert and turns to RN but does not speak and waves this RN away effectively refusing any assessment. No self injurious or aggressive behaviors noted. Pt refused VS but no acute distress noted. No behavioral concerns. 8 hours of sleep logged.

## 2022-04-26 PROCEDURE — 99232 SBSQ HOSP IP/OBS MODERATE 35: CPT | Performed by: PSYCHIATRY & NEUROLOGY

## 2022-04-26 PROCEDURE — 65220000003 HC RM SEMIPRIVATE PSYCH

## 2022-04-26 PROCEDURE — 74011250636 HC RX REV CODE- 250/636: Performed by: PSYCHIATRY & NEUROLOGY

## 2022-04-26 RX ADMIN — HALOPERIDOL LACTATE 7.5 MG: 5 INJECTION, SOLUTION INTRAMUSCULAR at 17:44

## 2022-04-26 RX ADMIN — LORAZEPAM 1 MG: 2 INJECTION INTRAMUSCULAR; INTRAVENOUS at 09:06

## 2022-04-26 RX ADMIN — HALOPERIDOL LACTATE 7.5 MG: 5 INJECTION, SOLUTION INTRAMUSCULAR at 09:07

## 2022-04-26 RX ADMIN — LORAZEPAM 1 MG: 2 INJECTION INTRAMUSCULAR; INTRAVENOUS at 17:44

## 2022-04-26 NOTE — BH NOTES
Behavioral Health Treatment Team Note      Patient goal(s) for today: continue taking meds as prescribed, engage in unit activities, participate in hygiene/ADLs, prepare for discharge, follow directions from staff, contact support team, attend groups  Treatment team focus/goals: continue adjusting meds as needed, discharge planning, update support system     Progress note: Pt was seen by treatment team in her room. Pt is selectively mute and refused to engage with treatment team this AM. Pt has been refusing oral medications and is taking [address] medications via IM. Pt is unable to identify a safe discharge plan.  called insurance  to coordinate discharge and resources, left VM and awaiting return call.  department to continue to coordinate discharge plans.     LOS:  5                       Expected LOS: TBD     Insurance info/prescription coverage:  [employer] [facility] Medicaid   Date of last family contact:  RENÉ Mercado ([phone])  Family requesting physician contact today:  no  Discharge plan:  Return home   Guns in the home:  no   Outpatient provider(s):   Insurance  [phone]     Participating treatment team members: Lelo Cross MSW, Dr. Desire Ramirez

## 2022-04-26 NOTE — PROGRESS NOTES
Pt screened per LOS policy. Last admitted to [facility] in March 2020. Diet as tolerated. Pt noted to refuse some meals. Hx notable for obesity, hep C.     Ht: 5'2\"  Wt: 145 lb 8.1 oz  BMI: 26.62 kg/(m^2) c/w overweight  Est energy needs: 1610 kcal, 60 g protein, 1 mL/kcal fluids  Pt will consume > 50% of meals at follow up 7-10 days  LOS

## 2022-04-26 NOTE — BH NOTES
During shift, pt remained in bed. Pt refused to answer questions pertaining to SI, HI, A/V hallucinations. Patient refused night medication. Currently, pt is resting in bed with eyes closed. No signs of distress nor made any further complaints. Locked Unit. Patient slept this shift 8 hours, respirations noted even and unlabored. Safe environment maintained, q15 min safety checks maintained. Patient updated on plan of care.

## 2022-04-26 NOTE — BH NOTES
Psychiatric Progress Note    Patient: Cole Wall MRN: [id number]  SSN: [id number]    YOB: 1960  Age: 57 y.o. Sex: female      Admit Date: 4/20/2022    LOS: 5 days     Subjective:     Cole Wall  is selectively mute per staff and moods are depressed. She opened an eye then closed it again with no further correspondence. No prns given. No aggression or violence. Inappropriately interactive and poorly aware. Tolerating court ordered medications well. Eating ok and sleeping fairly (7hrs) . 4/24 - Cole Wall moved her hands to my voices but refused to converse with me. Selective with nursing also. She responded appropriately when breakfast was called only. Moods are down. No aggression or violence. Selectively interactive. Limited awareness. Refusing medications. Eating and sleeping fairly. 04/25 - the patient slept 8 hours overnight. She continues to refuse medications and requires much prompting to participate in ADLs. No agitation noted today, and patient got no PRNs for aggression x24 hours. She appears catatonic at times, but will react to direct questions. Patient getting IM injections of Haldol for psychosis by court order as she refuses all oral agents. On interview she says nothing and lies in bed.     04/26 - no active overnight events. Patient has been mute, she ate breakfast this morning and was noted to say only 'no, no, no' regarding court ordered IM medication after refusing PO agent. Patient has been isolative to her room with a flat affect and little spontaneous speech. She slept 8 hours overnight and got no other PRNs for agitation. Patient has not been communicative for much of the day.        Objective:     Vitals:    04/23/22 1958 04/24/22 2005 04/25/22 0822 04/26/22 0734   BP:   (!) 124/90 114/70   Pulse:   82 97   Resp: 16 16 18 16   Temp:   97.4 °F (36.3 °C) 98 °F (36.7 °C)   SpO2:       Weight:       Height:            Mental Status Exam:   Sensorium  not oriented to situation   Relations uncooperative   Eye Contact poor   Appearance:  age appropriate   Speech:  mute   Thought Process: blocked   Thought Content internally preoccupied    Suicidal ideations none   Mood:  dysphoric   Affect:  constricted   Memory   N/A   Concentration:  adequate   Insight:  poor   Judgment:  poor       MEDICATIONS:  Current Facility-Administered Medications   Medication Dose Route Frequency    haloperidol (HALDOL) 2 mg/mL oral solution 7.5 mg  7.5 mg Oral BID    Or    haloperidol lactate (HALDOL) injection 7.5 mg ++COURT ORDERED FOR REFUSAL OF ORAL HALOPERIDOL++  7.5 mg IntraMUSCular BID    LORazepam (INTENSOL) 2 mg/mL oral concentrate 1 mg  1 mg Oral BID    Or    LORazepam (ATIVAN) injection 1 mg ++COURT ORDERED FOR REFUSAL OF ORAL LORAZEPAM++  1 mg IntraMUSCular BID    [Held by provider] traZODone (DESYREL) tablet 150 mg  150 mg Oral QHS    albuterol (PROVENTIL HFA, VENTOLIN HFA, PROAIR HFA) inhaler 2 Puff  2 Puff Inhalation TID PRN    OLANZapine (ZyPREXA) tablet 5 mg  5 mg Oral Q6H PRN    haloperidol lactate (HALDOL) injection 5 mg  5 mg IntraMUSCular Q6H PRN    benztropine (COGENTIN) tablet 1 mg  1 mg Oral BID PRN    diphenhydrAMINE (BENADRYL) injection 50 mg  50 mg IntraMUSCular BID PRN    hydrOXYzine HCL (ATARAX) tablet 50 mg  50 mg Oral TID PRN    LORazepam (ATIVAN) injection 1 mg  1 mg IntraMUSCular Q4H PRN    traZODone (DESYREL) tablet 50 mg  50 mg Oral QHS PRN    acetaminophen (TYLENOL) tablet 650 mg  650 mg Oral Q4H PRN    magnesium hydroxide (MILK OF MAGNESIA) 400 mg/5 mL oral suspension 30 mL  30 mL Oral DAILY PRN    nicotine (NICODERM CQ) 21 mg/24 hr patch 1 Patch  1 Patch TransDERmal DAILY PRN      DISCUSSION:   the risks and benefits of the proposed medication  patient given opportunity to ask questions    Lab/Data Review: All lab results for the last 24 hours reviewed.      No results found for this or any previous visit (from the past 24 hour(s)). Assessment:     Principal Problem:    Schizoaffective disorder ([facility] [address] (3/30/2018)     Assessment: the patient presents with gerardo psychosis in the setting of medication non-compliance x1.5 weeks. She is unable to make her needs known and vacillates between agitation and near catatonia. Will obtain court order for psychiatric treatment, observe and treat symptomatically. Plan:      Plan:   COURT ORDERED MEDICATION:  - CONTINUE Haldol oral soln 7.5 mg PO *OR* IM BID for psychosis  - CONTINUE Ativan 1 mg PO *OR* IM BID for catatonia     - HOLD Trazodone 150 mg QHS for insomnia (patient refusing)  - IGM therapy as tolerated  - Expand database / obtain collateral  - Dispo planning    I certify that this patient's inpatient psychiatric hospital services furnished since the previous certification were, and continue to be, required for treatment that could reasonably be expected to improve the patient's condition, or for diagnostic study, and that the patient continues to need, on a daily basis, active treatment furnished directly by or requiring the supervision of inpatient psychiatric facility personnel. In addition, the hospital records show that services furnished were intensive treatment services, admission or related services, or equivalent services.     Signed By: Ana Espinoza MD     April 26, 2022

## 2022-04-26 NOTE — PROGRESS NOTES
Problem: Psychosis  Goal: *STG: Remains safe in hospital  Outcome: Progressing Towards Goal     Problem: Falls - Risk of  Goal: *Absence of Falls  Description: Document Shraddha Fall Risk and appropriate interventions in the flowsheet.   Outcome: Progressing Towards Goal  Note: Fall Risk Interventions:       Mentation Interventions: Adequate sleep, hydration, pain control    Medication Interventions: Teach patient to arise slowly

## 2022-04-26 NOTE — PROGRESS NOTES
Assumed care of pt after receiving shift report from outgoing nurse. No apparent distress. Pt does not answer assessment questions, selectively mute. Pt withdrawn, isolative to room. Pt did not answer staff when given options for court ordered meds. Court ordered injections given, pt educated on PO vs IM medications, pt stated \"No, no, no.\" Pt refused vitals. Depressed mood with flat affect. Meal compliant. Alert and oriented. Pt voices no concerns or complaints. No behavioral issues observed at this time. Pt refused evening scheduled PO meds, IMs given per court order      Problem: Psychosis  Goal: *STG/LTG: Complies with medication therapy  Outcome: Not Progressing Towards Goal     Problem: Falls - Risk of  Goal: *Absence of Falls  Description: Document Shraddha Fall Risk and appropriate interventions in the flowsheet.   Outcome: Progressing Towards Goal  Note: Fall Risk Interventions:       Mentation Interventions: Reorient patient,Adequate sleep, hydration, pain control    Medication Interventions: Teach patient to arise slowly

## 2022-04-26 NOTE — GROUP NOTE
DORETHA  GROUP DOCUMENTATION INDIVIDUAL                                                                          Group Therapy Note    Date: 4/26/2022    Group Start Time: 1500  Group End Time: 1600  Group Topic: Recreational/Music Therapy    [facility] [place] [address] ACUTE BEHAV [facility]    [place], [address] [facility] GROUP DOCUMENTATION GROUP    Group Therapy Note    Attendees: 15         Attendance: Attended    Patient's Goal:  To concentrate on selected task    Interventions/techniques: Supported-crafts,games,music    Interactions: minimal    Mental Status: Flat    Behavior/appearance: Withdrawn/quiet-needed prompted    Goals Achieved:        Additional Notes:  Pt declined active participation-left session early    Dax Alvarez

## 2022-04-26 NOTE — GROUP NOTE
DORETHA  GROUP DOCUMENTATION INDIVIDUAL                                                                          Group Therapy Note    Date: 4/26/2022    Group Start Time: 0900  Group End Time: 1000  Group Topic: Topic Group    [facility] [place] [facility] ACUTE BEHAV [facility]    [place], [address] GROUP    Group Therapy Note    Attendees: 4         Attendance: Did not attend    Patient's Goal:      Interventions/techniques:Whitney Yeung

## 2022-04-27 PROCEDURE — 65220000003 HC RM SEMIPRIVATE PSYCH

## 2022-04-27 PROCEDURE — 99231 SBSQ HOSP IP/OBS SF/LOW 25: CPT | Performed by: PSYCHIATRY & NEUROLOGY

## 2022-04-27 PROCEDURE — 74011250636 HC RX REV CODE- 250/636: Performed by: PSYCHIATRY & NEUROLOGY

## 2022-04-27 RX ADMIN — LORAZEPAM 1 MG: 2 INJECTION INTRAMUSCULAR; INTRAVENOUS at 18:38

## 2022-04-27 RX ADMIN — LORAZEPAM 1 MG: 2 INJECTION INTRAMUSCULAR; INTRAVENOUS at 08:36

## 2022-04-27 RX ADMIN — HALOPERIDOL LACTATE 7.5 MG: 5 INJECTION, SOLUTION INTRAMUSCULAR at 18:38

## 2022-04-27 RX ADMIN — HALOPERIDOL LACTATE 7.5 MG: 5 INJECTION, SOLUTION INTRAMUSCULAR at 08:41

## 2022-04-27 NOTE — PROGRESS NOTES
Problem: Psychosis  Goal: *STG: Remains safe in hospital  Outcome: Progressing Towards Goal     Problem: Falls - Risk of  Goal: *Absence of Falls  Description: Document Shraddha Fall Risk and appropriate interventions in the flowsheet.   Outcome: Progressing Towards Goal  Note: Fall Risk Interventions:       Mentation Interventions: Reorient patient,Adequate sleep, hydration, pain control    Medication Interventions: Teach patient to arise slowly

## 2022-04-27 NOTE — PROGRESS NOTES
Problem: Psychosis  Goal: *STG: Decreased hallucinations  Outcome: Progressing Towards Goal  Goal: *STG: Remains safe in hospital  4/27/2022 1337 by Fatimah Carrera RN  Outcome: Progressing Towards Goal  4/27/2022 1331 by Fatimah Carrera RN  Outcome: Progressing Towards Goal  Goal: *STG/LTG: Complies with medication therapy  Outcome: Progressing Towards Goal

## 2022-04-27 NOTE — BH NOTES
Monitoring rounds have been completed to assure patient safety and attend to care needs. Pt is alert and oriented x 4. She denies SI/HI,AVH, depression and anxiety. She is meal and medication compliant. There are no other issues to note at this time.

## 2022-04-27 NOTE — BH NOTES
Behavioral Health Treatment Team Note      Patient goal(s) for today: continue taking meds as prescribed, engage in unit activities, participate in hygiene/ADLs, prepare for discharge, follow directions from staff, contact support team, attend groups  Treatment team focus/goals: continue adjusting meds as needed, discharge planning, update support system     Progress note: Pt was seen by treatment team in her room. Pt was sleeping and not easily roused despite multiple attempts by treatment team. Pt remains catatonic, depressed, withdrawn and selectively mute. EDDI spoke to pt's son Mary who reports pt has been depressed and catatonic 4 days prior to admission. Mary reports he stays with pt at her house from time to time. At baseline, pt is aware, able to take care of self and social with those she trusts. Pt is grieving the death of her aunt who she spent significant time with. MD approved pt's son Mary to visit patient. Pt's son to visit patient on unit Friday at 1 pm.  department to continue to coordinate discharge plans.   KARIN:  8                      [id number] LOS: TBD     Insurance info/prescription coverage:  [facility]  Date of last family contact:  JEREL Sotelo ([phone])  Family requesting physician contact today:  no  Discharge plan:  Return home   Guns in the home:  no   Outpatient provider(s):  Insurance  [phone]     Participating treatment team Destin CARROLL, Dr. Margy Wallace

## 2022-04-27 NOTE — PROGRESS NOTES
Spiritual Care Assessment/Progress Note  [facility]      NAME: Henrietta Miramontes      MRN: [id number]  AGE: 57 y.o. SEX: female  Shinto Affiliation: Samaritan   Language: English     4/27/2022     Total Time (in minutes): 5     Spiritual Assessment begun in [place] [address] [address] [place] [address] through conversation with:         []Patient        [] Family    [] Friend(s)        Reason for Consult: Initial/Spiritual assessment, patient floor     Spiritual beliefs: (Please include comment if needed)     [] Identifies with a lorrie tradition:         [] Supported by a lorrie community:            [] Claims no spiritual orientation:           [] Seeking spiritual identity:                [] Adheres to an individual form of spirituality:           [x] Not able to assess:                           Identified resources for coping:      [] Prayer                               [] Music                  [] Guided Imagery     [] Family/friends                 [] Pet visits     [] Devotional reading                         [x] Unknown     [] Other:                                         Interventions offered during this visit: (See comments for more details)                Plan of Care:     [] Support spiritual and/or cultural needs    [] Support AMD and/or advance care planning process      [] Support grieving process   [] Coordinate Rites and/or Rituals    [] Coordination with community clergy   [] No spiritual needs identified at this time   [] Detailed Plan of Care below (See Comments)  [] Make referral to Music Therapy  [] Make referral to Pet Therapy     [] Make referral to Addiction services  [] Make referral to [facility]  [] Make referral to Spiritual Care Partner  [] No future visits requested        [x] Contact Spiritual Care for further referrals     Comments:  visit for initial spiritual assessment. Patient appears to be resting at the time of this visit, door closed for privacy.   Please contact spiritual care for further referral or consult.     Rev.  Will Jeronimo MDiv, [facility], [facility]   paging service: [phone])

## 2022-04-27 NOTE — GROUP NOTE
DORETHA  GROUP DOCUMENTATION INDIVIDUAL                                                                          Group Therapy Note    Date: 4/27/2022    Group Start Time: 1000  Group End Time: 1100  Group Topic: Topic Group    [facility] ACUTE BE[facility]    [place], [address] [facility] GROUP DOCUMENTATION GROUP    Group Therapy Note    Attendees: 8         Attendance: Attended    Patient's Goal:  To participate in chair exercise routine    Interventions/techniques: Supported-benefits of exercise    Interactions: Minimal    Mental Status: Flat    Behavior/appearance: Needed prompting. isolative    Goals Achieved:        Additional Notes:  Pt declined active participation-elected to make a phone call    Mercedes Raimrez

## 2022-04-27 NOTE — BH NOTES
During shift, pt remained in bed. Pt refused to answer questions pertaining to SI, HI, A/V hallucinations. Currently, pt is resting in bed with eyes closed. No signs of distress nor made any further complaints. Locked Unit. Patient slept this shift 8 hours, respirations noted even and unlabored. Safe environment maintained, q15 min safety checks maintained. Patient updated on plan of care.

## 2022-04-27 NOTE — GROUP NOTE
DORETHA  GROUP DOCUMENTATION INDIVIDUAL                                                                          Group Therapy Note    Date: 4/27/2022    Group Start Time: 1400  Group End Time: 1500  Group Topic: Recreational/Music Therapy    [address] [facility] 3 ACUTE BEHAV TH    [address]  [facility] GROUP DOCUMENTATION GROUP    Group Therapy Note    Attendees: 11         Attendance: Did not attend    Patient's Goal:      Interventions/techniquesWhitney Yeung

## 2022-04-27 NOTE — BH NOTES
Psychiatric Progress Note    Patient: Yoly Barreto MRN: [id number]  SSN: [id number]    YOB: 1960  Age: 57 y.o. Sex: female      Admit Date: 4/20/2022    LOS: 6 days     Subjective:     Yoly Barreto  is selectively mute per staff and moods are depressed. She opened an eye then closed it again with no further correspondence. No prns given. No aggression or violence. Inappropriately interactive and poorly aware. Tolerating court ordered medications well. Eating ok and sleeping fairly (7hrs) . 4/24 - Yoly Barreto moved her hands to my voices but refused to converse with me. Selective with nursing also. She responded appropriately when breakfast was called only. Moods are down. No aggression or violence. Selectively interactive. Limited awareness. Refusing medications. Eating and sleeping fairly. 04/25 - the patient slept 8 hours overnight. She continues to refuse medications and requires much prompting to participate in ADLs. No agitation noted today, and patient got no PRNs for aggression x24 hours. She appears catatonic at times, but will react to direct questions. Patient getting IM injections of Haldol for psychosis by court order as she refuses all oral agents. On interview she says nothing and lies in bed.     04/26 - no active overnight events. Patient has been mute, she ate breakfast this morning and was noted to say only 'no, no, no' regarding court ordered IM medication after refusing PO agent. Patient has been isolative to her room with a flat affect and little spontaneous speech. She slept 8 hours overnight and got no other PRNs for agitation. Patient has not been communicative for much of the day. 04/27 - the patient continues to refuse vitals and medications; she is disorganized and with no significant change in her mental state. Patient discharge focused, slept 8 hours and got no PRNs for agitation.  She has been in bed since getting court ordered IM after refusing PO agents. Per SW, son is able to visit this week.  She remains mute on interview and does not engage MD and treatment team.      Objective:     Vitals:    04/23/22 1958 04/24/22 2005 04/25/22 0822 04/26/22 0734   BP:   (!) 124/90 114/70   Pulse:   82 97   Resp: 16 16 18 16   Temp:   97.4 °F (36.3 °C) 98 °F (36.7 °C)   SpO2:       Weight:       Height:            Mental Status Exam:   Sensorium  not oriented to situation   Relations uncooperative   Eye Contact poor   Appearance:  age appropriate   Speech:  mute   Thought Process: blocked   Thought Content internally preoccupied    Suicidal ideations none   Mood:  dysphoric   Affect:  constricted   Memory   N/A   Concentration:  adequate   Insight:  poor   Judgment:  poor       MEDICATIONS:  Current Facility-Administered Medications   Medication Dose Route Frequency    haloperidol (HALDOL) 2 mg/mL oral solution 7.5 mg  7.5 mg Oral BID    Or    haloperidol lactate (HALDOL) injection 7.5 mg ++COURT ORDERED FOR REFUSAL OF ORAL HALOPERIDOL++  7.5 mg IntraMUSCular BID    LORazepam (INTENSOL) 2 mg/mL oral concentrate 1 mg  1 mg Oral BID    Or    LORazepam (ATIVAN) injection 1 mg ++COURT ORDERED FOR REFUSAL OF ORAL LORAZEPAM++  1 mg IntraMUSCular BID    [Held by provider] traZODone (DESYREL) tablet 150 mg  150 mg Oral QHS    albuterol (PROVENTIL HFA, VENTOLIN HFA, PROAIR HFA) inhaler 2 Puff  2 Puff Inhalation TID PRN    OLANZapine (ZyPREXA) tablet 5 mg  5 mg Oral Q6H PRN    haloperidol lactate (HALDOL) injection 5 mg  5 mg IntraMUSCular Q6H PRN    benztropine (COGENTIN) tablet 1 mg  1 mg Oral BID PRN    diphenhydrAMINE (BENADRYL) injection 50 mg  50 mg IntraMUSCular BID PRN    hydrOXYzine HCL (ATARAX) tablet 50 mg  50 mg Oral TID PRN    LORazepam (ATIVAN) injection 1 mg  1 mg IntraMUSCular Q4H PRN    traZODone (DESYREL) tablet 50 mg  50 mg Oral QHS PRN    acetaminophen (TYLENOL) tablet 650 mg  650 mg Oral Q4H PRN    magnesium hydroxide (MILK OF MAGNESIA) 400 mg/5 mL oral suspension 30 mL  30 mL Oral DAILY PRN    nicotine (NICODERM CQ) 21 mg/24 hr patch 1 Patch  1 Patch TransDERmal DAILY PRN      DISCUSSION:   the risks and benefits of the proposed medication  patient given opportunity to ask questions    Lab/Data Review: All lab results for the last 24 hours reviewed. No results found for this or any previous visit (from the past 24 hour(s)). Assessment:     Principal Problem:    Schizoaffective disorder ([facility] [address] (3/30/2018)     Assessment: the patient presents with gerardo psychosis in the setting of medication non-compliance x1.5 weeks. She is unable to make her needs known and vacillates between agitation and near catatonia. Will obtain court order for psychiatric treatment, observe and treat symptomatically. Plan:      Plan:   COURT ORDERED MEDICATION:  - CONTINUE Haldol oral soln 7.5 mg PO *OR* IM BID for psychosis  - CONTINUE Ativan 1 mg PO *OR* IM BID for catatonia     - HOLD Trazodone 150 mg QHS for insomnia (patient refusing)  - IGM therapy as tolerated  - Expand database / obtain collateral  - Dispo planning    I certify that this patient's inpatient psychiatric hospital services furnished since the previous certification were, and continue to be, required for treatment that could reasonably be expected to improve the patient's condition, or for diagnostic study, and that the patient continues to need, on a daily basis, active treatment furnished directly by or requiring the supervision of inpatient psychiatric facility personnel. In addition, the hospital records show that services furnished were intensive treatment services, admission or related services, or equivalent services.     Signed By: Bowen Fletcher MD     April 27, 2022

## 2022-04-28 PROCEDURE — 74011250636 HC RX REV CODE- 250/636: Performed by: PSYCHIATRY & NEUROLOGY

## 2022-04-28 PROCEDURE — 99232 SBSQ HOSP IP/OBS MODERATE 35: CPT | Performed by: PSYCHIATRY & NEUROLOGY

## 2022-04-28 PROCEDURE — 65220000003 HC RM SEMIPRIVATE PSYCH

## 2022-04-28 RX ORDER — LORAZEPAM 2 MG/ML
1 INJECTION INTRAMUSCULAR 3 TIMES DAILY
Status: DISCONTINUED | OUTPATIENT
Start: 2022-04-28 | End: 2022-05-13

## 2022-04-28 RX ORDER — LORAZEPAM 2 MG/ML
1 CONCENTRATE ORAL 3 TIMES DAILY
Status: DISCONTINUED | OUTPATIENT
Start: 2022-04-28 | End: 2022-05-13

## 2022-04-28 RX ADMIN — LORAZEPAM 1 MG: 2 INJECTION INTRAMUSCULAR; INTRAVENOUS at 09:56

## 2022-04-28 RX ADMIN — HALOPERIDOL LACTATE 7.5 MG: 5 INJECTION, SOLUTION INTRAMUSCULAR at 17:49

## 2022-04-28 RX ADMIN — HALOPERIDOL LACTATE 7.5 MG: 5 INJECTION, SOLUTION INTRAMUSCULAR at 09:55

## 2022-04-28 RX ADMIN — LORAZEPAM 1 MG: 2 INJECTION INTRAMUSCULAR; INTRAVENOUS at 17:49

## 2022-04-28 NOTE — GROUP NOTE
DORETHA  GROUP DOCUMENTATION INDIVIDUAL                                                                          Group Therapy Note    Date: 4/28/2022    Group Start Time: 1400  Group End Time: 1500  Group Topic: Recreational/Music Therapy    [address] [facility] 3 ACUTE BEHAV [facility]    Stephan Bustamante [facility] GROUP    Group Therapy Note    Attendees: 12         Attendance: Did not attend    Patient's Goal:      Interventions/techniquesWhitney Yeung

## 2022-04-28 NOTE — GROUP NOTE
DORETHA  GROUP DOCUMENTATION INDIVIDUAL                                                                          Group Therapy Note    Date: 4/28/2022    Group Start Time: 1000  Group End Time: 1100  Group Topic: Topic Group    [facility] 3 ACUTE BEHAV [facility]    [place], [address] GROUP    Group Therapy Note    Attendees: 7         Attendance: Did not attend    Patient's Goal:      Interventions/techniques:Whitney Yeung

## 2022-04-28 NOTE — PROGRESS NOTES
Problem: Psychosis  Goal: *STG: Remains safe in hospital  Outcome: Progressing Towards Goal  Goal: *STG: Patient will verbalize areas in need of boundary recognition and limit setting  Outcome: Not Progressing Towards Goal  Goal: *STG: Patient will verbalize issues that get in their way of progress (i.e.: anger, fear etc.)  Outcome: Not Progressing Towards Goal  Goal: *STG/LTG: Demonstrates improved social functioning by responding appropriately to staff  Outcome: Not Progressing Towards Goal     [id number]: Assumed care of patient after receiving shift report from outgoing nurse. Patient resting in bed upon assessment. Affect is constricted, mood is irritable. Pt cooperative with vitals. Patient made eye contact but ignored writer for majority of assessment. Patient did shake head no to SI/HI. Patient alert, unable to assess orientation at this time related to patient being uncooperative with assessment. Independent in ADLs. Insight and concentration present minimally. Gait is steady. Appetite patterns WNL. Patient does not endorse  AVH/SI/HI/Anxiety/Depression. Patient diet compliant. Pt encouraged to continue to participate in care. [id number]: Patient resting quietly in bed at this time. 4728-8758: Pt has been observed throughout the night. Total hours slept approximately 9. No s/s of distress noted. Patient has remained safe.

## 2022-04-28 NOTE — BH NOTES
Psychiatric Progress Note    Patient: Kaleb Conti MRN: [id number]  SSN: [id number]    YOB: 1960  Age: 57 y.o. Sex: female      Admit Date: 4/20/2022    LOS: 7 days     Subjective:     Kaleb Conti  is selectively mute per staff and moods are depressed. She opened an eye then closed it again with no further correspondence. No prns given. No aggression or violence. Inappropriately interactive and poorly aware. Tolerating court ordered medications well. Eating ok and sleeping fairly (7hrs) . 4/24 - Kaleb Conti moved her hands to my voices but refused to converse with me. Selective with nursing also. She responded appropriately when breakfast was called only. Moods are down. No aggression or violence. Selectively interactive. Limited awareness. Refusing medications. Eating and sleeping fairly. 04/25 - the patient slept 8 hours overnight. She continues to refuse medications and requires much prompting to participate in ADLs. No agitation noted today, and patient got no PRNs for aggression x24 hours. She appears catatonic at times, but will react to direct questions. Patient getting IM injections of Haldol for psychosis by court order as she refuses all oral agents. On interview she says nothing and lies in bed.     04/26 - no active overnight events. Patient has been mute, she ate breakfast this morning and was noted to say only 'no, no, no' regarding court ordered IM medication after refusing PO agent. Patient has been isolative to her room with a flat affect and little spontaneous speech. She slept 8 hours overnight and got no other PRNs for agitation. Patient has not been communicative for much of the day. 04/27 - the patient continues to refuse vitals and medications; she is disorganized and with no significant change in her mental state. Patient discharge focused, slept 8 hours and got no PRNs for agitation.  She has been in bed since getting court ordered IM after refusing PO agents. Per SW, son is able to visit this week. She remains mute on interview and does not engage MD and treatment team.    04/28 - there has been no significant improvement in the patient's mental state. She remains mute, isolative, and refusing all medication. Patient slept 9+ hours, got Haldol and Ativan injections due to ongoing disorganization of thought and catatonia. On interview, she states only 'leave me alone' and is incoherent. Per SW, son will come visit to try to help reconstitute the patient, stating she may respond to a familiar face and that she remains far from her baseline.        Objective:     Vitals:    04/21/22 1803 04/25/22 0822 04/26/22 0734 04/27/22 2030   BP:   114/70 98/61   Pulse:  82 97 91   Resp:  18 16 14   Temp:  97.4 °F (36.3 °C) 98 °F (36.7 °C) 98.1 °F (36.7 °C)   SpO2:    97%   Weight: 66 kg (145 lb 8.1 oz)      Height: 5' 2\" (1.575 m)           Mental Status Exam:   Sensorium  not oriented to situation   Relations uncooperative   Eye Contact poor   Appearance:  age appropriate   Speech:  mute   Thought Process: blocked   Thought Content internally preoccupied    Suicidal ideations none   Mood:  dysphoric   Affect:  constricted   Memory   N/A   Concentration:  adequate   Insight:  poor   Judgment:  poor       MEDICATIONS:  Current Facility-Administered Medications   Medication Dose Route Frequency    haloperidol (HALDOL) 2 mg/mL oral solution 7.5 mg  7.5 mg Oral BID    Or    haloperidol lactate (HALDOL) injection 7.5 mg ++COURT ORDERED FOR REFUSAL OF ORAL HALOPERIDOL++  7.5 mg IntraMUSCular BID    LORazepam (INTENSOL) 2 mg/mL oral concentrate 1 mg  1 mg Oral BID    Or    LORazepam (ATIVAN) injection 1 mg ++COURT ORDERED FOR REFUSAL OF ORAL LORAZEPAM++  1 mg IntraMUSCular BID    [Held by provider] traZODone (DESYREL) tablet 150 mg  150 mg Oral QHS    albuterol (PROVENTIL HFA, VENTOLIN HFA, PROAIR HFA) inhaler 2 Puff  2 Puff Inhalation TID PRN    OLANZapine (ZyPREXA) tablet 5 mg  5 mg Oral Q6H PRN    haloperidol lactate (HALDOL) injection 5 mg  5 mg IntraMUSCular Q6H PRN    benztropine (COGENTIN) tablet 1 mg  1 mg Oral BID PRN    diphenhydrAMINE (BENADRYL) injection 50 mg  50 mg IntraMUSCular BID PRN    hydrOXYzine HCL (ATARAX) tablet 50 mg  50 mg Oral TID PRN    LORazepam (ATIVAN) injection 1 mg  1 mg IntraMUSCular Q4H PRN    traZODone (DESYREL) tablet 50 mg  50 mg Oral QHS PRN    acetaminophen (TYLENOL) tablet 650 mg  650 mg Oral Q4H PRN    magnesium hydroxide (MILK OF MAGNESIA) 400 mg/5 mL oral suspension 30 mL  30 mL Oral DAILY PRN    nicotine (NICODERM CQ) 21 mg/24 hr patch 1 Patch  1 Patch TransDERmal DAILY PRN      DISCUSSION:   the risks and benefits of the proposed medication  patient given opportunity to ask questions    Lab/Data Review: All lab results for the last 24 hours reviewed. No results found for this or any previous visit (from the past 24 hour(s)). Assessment:     Principal Problem:    Schizoaffective disorder ([facility] [address] (3/30/2018)     Assessment: the patient presents with gerardo psychosis in the setting of medication non-compliance x1.5 weeks. She is unable to make her needs known and vacillates between agitation and near catatonia. Will obtain court order for psychiatric treatment, observe and treat symptomatically.       Plan:      Plan:   COURT ORDERED MEDICATION:  - CONTINUE Haldol oral soln 7.5 mg PO *OR* IM BID for psychosis  - INCREASE Ativan to 1 mg PO *OR* IM TID for catatonia     - HOLD Trazodone 150 mg QHS for insomnia (patient refusing)  - IGM therapy as tolerated  - Expand database / obtain collateral  - Dispo planning    I certify that this patient's inpatient psychiatric hospital services furnished since the previous certification were, and continue to be, required for treatment that could reasonably be expected to improve the patient's condition, or for diagnostic study, and that the patient continues to need, on a daily basis, active treatment furnished directly by or requiring the supervision of inpatient psychiatric facility personnel. In addition, the hospital records show that services furnished were intensive treatment services, admission or related services, or equivalent services.     Signed By: Sampson Corea MD     April 28, 2022

## 2022-04-28 NOTE — PROGRESS NOTES
Assumed care of pt after receiving shift report from outgoing nurseNguyen. No apparent distress. Mood is depressed with flat affect. Pt uninterested/uncooperative with assessment and refused vitals. Alert and oriented. Noncompliant with scheduled meds, court ordered IMs given. Pt isolative to room. Pt voices no concerns or complaints and no behavioral issues observed at this time. 0800: pt awake in bed, eating breakfast, no apparent distress  0900: pt lying awake in bed, respirations even and unlabored, no apparent distress  1000: pt lying awake in bed, respirations even and unlabored, no apparent distress  1100: pt lying awake in bed, respirations even and unlabored, no apparent distress  1200: pt lying awake in bed, respirations even and unlabored, no apparent distress  1300: pt lying awake in bed, respirations even and unlabored, no apparent distress  1400: pt in bathroom, staff changed linens, provided change of clothes  1500: pt lying in bed, awake staring at ceiling, selectively mute  1600: pt walking in hallway, minimally interactive, NAD, pt ate dinner  1700: pt up in room, refused court ordered meds, IMs given  1800: pt lying in bed, quiet    1900:    Pt's sister and son called for pt, pt states, \"I don't have any family,\" when staff notified her    [place]: Sister  Phone # [phone]              Problem: Psychosis  Goal: *STG: Seeks staff when feelings of self harm or harm towards others arise  Outcome: Not Progressing Towards Goal  Goal: *STG/LTG: Complies with medication therapy  Outcome: Not Progressing Towards Goal     Problem: Falls - Risk of  Goal: *Absence of Falls  Description: Document Melina Herbert Fall Risk and appropriate interventions in the flowsheet.   Outcome: Progressing Towards Goal  Note: Fall Risk Interventions:       Mentation Interventions: More frequent rounding    Medication Interventions: Teach patient to arise slowly

## 2022-04-28 NOTE — PROGRESS NOTES
Behavioral Services                                              Medicare Re-Certification    I certify that the inpatient psychiatric hospital services furnished since the previous certification/re-certification were, and continue to be, medically necessary for;    [x] (1) Treatment which could reasonably be expected to improve the patient's condition,    [x] (2) Or for diagnostic study. Estimated length of stay/service 5-7 days    Plan for post-hospital care home    This patient continues to need, on a daily basis, active treatment furnished directly by or requiring the supervision of inpatient psychiatric personnel.     Electronically signed by Nicholas Soto MD on 4/28/2022 at 9:13 AM

## 2022-04-28 NOTE — BH NOTES
Behavioral Health Treatment Team Note      Patient goal(s) for today: continue taking meds as prescribed, engage in unit activities, participate in hygiene/ADLs, prepare for discharge, follow directions from staff, contact support team, attend groups  Treatment team focus/goals: continue adjusting meds as needed, discharge planning, update support system     Progress note: Pt was seen by treatment team in her room. Pt remains isolative to her room, selectively mute and does not interact with others. Upon interview, pt asks treatment team to leave her alone. MD approved pt's son [place] Abel to visit patient. Pt's son to visit patient on unit tomorrow at 1 pm.  department to continue to coordinate discharge plans.     HEJ:  8                      [id number] LOS: TBD     Insurance info/prescription coverage:  [employer] [facility] [address]  Date of last family contact:  ELIZABETH Sotelo ([phone])  Family requesting physician contact today:  no  Discharge plan:  Return home   Guns in the home:  no   Outpatient provider(s):  Insurance  [phone]     Participating treatment team Guille CARROLL, Dr. Jeppie Gottron

## 2022-04-29 PROCEDURE — 99231 SBSQ HOSP IP/OBS SF/LOW 25: CPT | Performed by: PSYCHIATRY & NEUROLOGY

## 2022-04-29 PROCEDURE — 65220000003 HC RM SEMIPRIVATE PSYCH

## 2022-04-29 PROCEDURE — 74011250636 HC RX REV CODE- 250/636: Performed by: PSYCHIATRY & NEUROLOGY

## 2022-04-29 PROCEDURE — 74011250637 HC RX REV CODE- 250/637: Performed by: NURSE PRACTITIONER

## 2022-04-29 RX ORDER — ONDANSETRON 4 MG/1
4 TABLET, ORALLY DISINTEGRATING ORAL
Status: ACTIVE | OUTPATIENT
Start: 2022-04-29 | End: 2022-04-30

## 2022-04-29 RX ORDER — ONDANSETRON 4 MG/1
4 TABLET, ORALLY DISINTEGRATING ORAL
Status: COMPLETED | OUTPATIENT
Start: 2022-04-29 | End: 2022-04-29

## 2022-04-29 RX ORDER — ONDANSETRON 2 MG/ML
4 INJECTION INTRAMUSCULAR; INTRAVENOUS
Status: DISCONTINUED | OUTPATIENT
Start: 2022-04-29 | End: 2022-04-29

## 2022-04-29 RX ADMIN — HALOPERIDOL LACTATE 7.5 MG: 5 INJECTION, SOLUTION INTRAMUSCULAR at 18:30

## 2022-04-29 RX ADMIN — ONDANSETRON 4 MG: 4 TABLET, ORALLY DISINTEGRATING ORAL at 18:43

## 2022-04-29 RX ADMIN — LORAZEPAM 1 MG: 2 INJECTION INTRAMUSCULAR; INTRAVENOUS at 18:30

## 2022-04-29 RX ADMIN — LORAZEPAM 1 MG: 2 INJECTION INTRAMUSCULAR; INTRAVENOUS at 13:43

## 2022-04-29 RX ADMIN — HALOPERIDOL LACTATE 7.5 MG: 5 INJECTION, SOLUTION INTRAMUSCULAR at 08:29

## 2022-04-29 RX ADMIN — LORAZEPAM 1 MG: 2 INJECTION INTRAMUSCULAR; INTRAVENOUS at 08:30

## 2022-04-29 NOTE — PROGRESS NOTES
Problem: Discharge Planning  Goal: *Discharge to safe environment  Outcome: Progressing Towards Goal  Note: Patient identifies home as a safe environment. Patient will return home upon discharge  Goal: *Knowledge of medication management  Outcome: Not Progressing Towards Goal  Note: Patient is refusing all medications. Goal: *Knowledge of discharge instructions  Outcome: Not Progressing Towards Goal  Note:   Patient does not verbalize understanding of goals for treatment or safe discharge.

## 2022-04-29 NOTE — BH NOTES
Report received from off going nurse assumed care of resident. Resident encountered sleeping in bed. Chest rise and fall noted. Breathing is even and unlabored. There are no other issues to note at thsi time. Monitoring will continue. 1700-Pt has been isolative and guarded. She has refused all medication requiring IM injections for admin. There are no other issues to note at this time.

## 2022-04-29 NOTE — BH NOTES
Behavioral Health Treatment Team Note      Patient goal(s) for today: continue taking meds as prescribed, engage in unit activities, participate in hygiene/ADLs, prepare for discharge, follow directions from staff, contact support team, attend groups  Treatment team focus/goals: continue adjusting meds as needed, discharge planning, update support system     Progress note: Pt was seen by treatment team in her room. Pt remains isolative to her room, selectively mute and does not interact with others. MD approved pt's son [place] Abel to visit patient.  Pt's son to visit patient on unit today at 1 pm.  department to continue to coordinate discharge plans.     LOS:  8                      Expected LOS: TBD     Insurance info/prescription coverage:  [employer] [facility] [address]  Date of last family contact:  PENNY Sotelo ([phone])  Family requesting physician contact today:  no  Discharge plan:  Return home   Guns in the home:  no   Outpatient provider(s):  Insurance  [phone]     Participating treatment team Germaine CARROLL, Dr. Marshall Moreau

## 2022-04-29 NOTE — PROGRESS NOTES
Problem: Psychosis  Goal: *STG: Remains safe in hospital  Outcome: Progressing Towards Goal  Goal: *STG/LTG: Complies with medication therapy  Outcome: Not Progressing Towards Goal

## 2022-04-29 NOTE — BH NOTES
Psychiatric Progress Note    Patient: Atul Jeffries MRN: [id number]  SSN: [id number]    YOB: 1960  Age: 57 y.o. Sex: female      Admit Date: 4/20/2022    LOS: 8 days     Subjective:     Atul Jeffries  is selectively mute per staff and moods are depressed. She opened an eye then closed it again with no further correspondence. No prns given. No aggression or violence. Inappropriately interactive and poorly aware. Tolerating court ordered medications well. Eating ok and sleeping fairly (7hrs) . 4/24 - Atul Jeffries moved her hands to my voices but refused to converse with me. Selective with nursing also. She responded appropriately when breakfast was called only. Moods are down. No aggression or violence. Selectively interactive. Limited awareness. Refusing medications. Eating and sleeping fairly. 04/25 - the patient slept 8 hours overnight. She continues to refuse medications and requires much prompting to participate in ADLs. No agitation noted today, and patient got no PRNs for aggression x24 hours. She appears catatonic at times, but will react to direct questions. Patient getting IM injections of Haldol for psychosis by court order as she refuses all oral agents. On interview she says nothing and lies in bed.     04/26 - no active overnight events. Patient has been mute, she ate breakfast this morning and was noted to say only 'no, no, no' regarding court ordered IM medication after refusing PO agent. Patient has been isolative to her room with a flat affect and little spontaneous speech. She slept 8 hours overnight and got no other PRNs for agitation. Patient has not been communicative for much of the day. 04/27 - the patient continues to refuse vitals and medications; she is disorganized and with no significant change in her mental state. Patient discharge focused, slept 8 hours and got no PRNs for agitation.  She has been in bed since getting court ordered IM after refusing PO agents. Per SW, son is able to visit this week. She remains mute on interview and does not engage MD and treatment team.    04/28 - there has been no significant improvement in the patient's mental state. She remains mute, isolative, and refusing all medication. Patient slept 9+ hours, got Haldol and Ativan injections due to ongoing disorganization of thought and catatonia. On interview, she states only 'leave me alone' and is incoherent. Per , son will come visit to try to help reconstitute the patient, stating she may respond to a familiar face and that she remains far from her baseline. 04/29 - the patient is little improved. She remains guarded and mute, catatonic throughout the day but up this morning when she refused medications and got IM injections via court order. She has little insight concerning the admission but slept 9 hours overnight. Patient unable to make her needs known, she speaks sparingly about her immediate desires but cannot provide any abstract reasoning regarding her refusal of medication or her treatment course thus far.  Patient will be visited by her son this afternoon per .      Objective:     Vitals:    04/21/22 1803 04/26/22 0734 04/27/22 2030 04/28/22 2030   BP:   98/61 106/71   Pulse:   91 81   Resp:  16 14 15   Temp:   98.1 °F (36.7 °C) 98 °F (36.7 °C)   SpO2:   97% 97%   Weight: 66 kg (145 lb 8.1 oz)      Height: 5' 2\" (1.575 m)           Mental Status Exam:   Sensorium  not oriented to situation   Relations uncooperative   Eye Contact poor   Appearance:  age appropriate   Speech:  mute   Thought Process: blocked   Thought Content internally preoccupied    Suicidal ideations none   Mood:  dysphoric   Affect:  constricted   Memory   N/A   Concentration:  adequate   Insight:  poor   Judgment:  poor       MEDICATIONS:  Current Facility-Administered Medications   Medication Dose Route Frequency    LORazepam (INTENSOL) 2 mg/mL oral concentrate 1 mg  1 mg Oral TID    Or    LORazepam (ATIVAN) injection 1 mg ++COURT ORDERED MEDICATION FOR REFUSAL OF ORAL LORAZEPAM++  1 mg IntraMUSCular TID    haloperidol (HALDOL) 2 mg/mL oral solution 7.5 mg  7.5 mg Oral BID    Or    haloperidol lactate (HALDOL) injection 7.5 mg ++COURT ORDERED FOR REFUSAL OF ORAL HALOPERIDOL++  7.5 mg IntraMUSCular BID    [Held by provider] traZODone (DESYREL) tablet 150 mg  150 mg Oral QHS    albuterol (PROVENTIL HFA, VENTOLIN HFA, PROAIR HFA) inhaler 2 Puff  2 Puff Inhalation TID PRN    OLANZapine (ZyPREXA) tablet 5 mg  5 mg Oral Q6H PRN    haloperidol lactate (HALDOL) injection 5 mg  5 mg IntraMUSCular Q6H PRN    benztropine (COGENTIN) tablet 1 mg  1 mg Oral BID PRN    diphenhydrAMINE (BENADRYL) injection 50 mg  50 mg IntraMUSCular BID PRN    hydrOXYzine HCL (ATARAX) tablet 50 mg  50 mg Oral TID PRN    LORazepam (ATIVAN) injection 1 mg  1 mg IntraMUSCular Q4H PRN    traZODone (DESYREL) tablet 50 mg  50 mg Oral QHS PRN    acetaminophen (TYLENOL) tablet 650 mg  650 mg Oral Q4H PRN    magnesium hydroxide (MILK OF MAGNESIA) 400 mg/5 mL oral suspension 30 mL  30 mL Oral DAILY PRN    nicotine (NICODERM CQ) 21 mg/24 hr patch 1 Patch  1 Patch TransDERmal DAILY PRN      DISCUSSION:   the risks and benefits of the proposed medication  patient given opportunity to ask questions    Lab/Data Review: All lab results for the last 24 hours reviewed. No results found for this or any previous visit (from the past 24 hour(s)). Assessment:     Principal Problem:    Schizoaffective disorder ([facility] [address] (3/30/2018)     Assessment: the patient presents with gerardo psychosis in the setting of medication non-compliance x1.5 weeks. She is unable to make her needs known and vacillates between agitation and near catatonia. Will obtain court order for psychiatric treatment, observe and treat symptomatically.       Plan:      Plan:   COURT ORDERED MEDICATION:  - CONTINUE Haldol oral soln 7.5 mg PO *OR* IM BID for psychosis  - CONTINUE Ativan 1 mg PO *OR* IM TID for catatonia     - DISCONTINUE Trazodone 150 mg QHS for insomnia due to no medical necessity  - IGM therapy as tolerated  - Expand database / obtain collateral  - Dispo planning    I certify that this patient's inpatient psychiatric hospital services furnished since the previous certification were, and continue to be, required for treatment that could reasonably be expected to improve the patient's condition, or for diagnostic study, and that the patient continues to need, on a daily basis, active treatment furnished directly by or requiring the supervision of inpatient psychiatric facility personnel. In addition, the hospital records show that services furnished were intensive treatment services, admission or related services, or equivalent services.     Signed By: Nickie Perrin MD     April 29, 2022

## 2022-04-29 NOTE — PROGRESS NOTES
Problem: Psychosis  Goal: *STG: Remains safe in hospital  Outcome: Progressing Towards Goal  Goal: *STG/LTG: Demonstrates improved thought patterns as evidenced by logical and coherent speech  Outcome: Progressing Towards Goal  Goal: *STG/LTG: Demonstrates improved social functioning by responding appropriately to staff  Outcome: Progressing Towards Goal     8562-0227: Assumed care of patient after receiving shift report from outgoing nurse. Patient isolative to room at this time Affect is flat, mood is labile/suspicious. Patient presents as untrusting upon initial interaction but cooperative. Patient stated she felt \"fine\" when asked how she was. Shortly after interview, patient went to bathroom and threw up a small amount. The contents presented as bile. Patient was asked what she had to eat or drink today. Patient reported she could not remember the last time that she ate. Patient followed statement by noting that she did not want any medications for it at this time. Writer offered progression of fluids then light snack. Patient receptive. Patient tolerated fluids without issue. Patient also tolerated snack without issue, smiled, and reported she felt \"a little\" better. Pt cooperative with vitals and assessment. A&O x self/place. Independent in ADLs. Insight and concentration present. Gait is steady. Appetite patterns WNL. Denies AVH/SI/HI/Anxiety/Depression. Pt denies pain. Pt encouraged to continue to participate in care. 1261-2316: Patient resting quietly in bed. No further issues noted at this time. [id number]: Pt has been observed throughout the night. Total hours slept approximately 9.5. No s/s of distress noted. Patient has remained safe.

## 2022-04-29 NOTE — GROUP NOTE
DORETHA  GROUP DOCUMENTATION INDIVIDUAL                                                                          Group Therapy Note    Date: 4/29/2022    Group Start Time: 1100  Group End Time: 1200  Group Topic: Topic Group    [facility] 3 ACUTE BEHAV [facility]    Stephan Bustamante [facility] GROUP    Group Therapy Note    Attendees: 4         Attendance: Did not attend    Patient's Goal:      Interventions/techniquesWhitney Yeung

## 2022-04-30 PROCEDURE — 74011250636 HC RX REV CODE- 250/636: Performed by: PSYCHIATRY & NEUROLOGY

## 2022-04-30 PROCEDURE — 65220000003 HC RM SEMIPRIVATE PSYCH

## 2022-04-30 RX ADMIN — HALOPERIDOL LACTATE 7.5 MG: 5 INJECTION, SOLUTION INTRAMUSCULAR at 08:16

## 2022-04-30 RX ADMIN — LORAZEPAM 1 MG: 2 INJECTION INTRAMUSCULAR; INTRAVENOUS at 18:04

## 2022-04-30 RX ADMIN — HALOPERIDOL LACTATE 7.5 MG: 5 INJECTION, SOLUTION INTRAMUSCULAR at 17:00

## 2022-04-30 RX ADMIN — LORAZEPAM 1 MG: 2 INJECTION INTRAMUSCULAR; INTRAVENOUS at 12:04

## 2022-04-30 RX ADMIN — LORAZEPAM 1 MG: 2 INJECTION INTRAMUSCULAR; INTRAVENOUS at 08:13

## 2022-04-30 NOTE — PROGRESS NOTES
Problem: Psychosis  Goal: *STG: Remains safe in hospital  Outcome: Progressing Towards Goal   Pt is alert and oriented to person and place. She is reluctant to answer assessment questions. Pt refused am medications and was given IM injection. There are no noted issues with pain or discomfort at this time. Pt refused to complete vital signs as well. Monitoring will continue for changes.

## 2022-04-30 NOTE — BH NOTES
Psychiatric Progress Note    Patient: Alejandro Guo MRN: [id number]  SSN: [id number]    YOB: 1960  Age: 57 y.o. Sex: female      Admit Date: 4/20/2022    LOS: 9 days     Subjective:     Alejandro Guo  is selectively mute per staff and moods are depressed. She opened an eye then closed it again with no further correspondence. No prns given. No aggression or violence. Inappropriately interactive and poorly aware. Tolerating court ordered medications well. Eating ok and sleeping fairly (7hrs) . 4/24 - Alejandro Guo moved her hands to my voices but refused to converse with me. Selective with nursing also. She responded appropriately when breakfast was called only. Moods are down. No aggression or violence. Selectively interactive. Limited awareness. Refusing medications. Eating and sleeping fairly. 04/25 - the patient slept 8 hours overnight. She continues to refuse medications and requires much prompting to participate in ADLs. No agitation noted today, and patient got no PRNs for aggression x24 hours. She appears catatonic at times, but will react to direct questions. Patient getting IM injections of Haldol for psychosis by court order as she refuses all oral agents. On interview she says nothing and lies in bed.     04/26 - no active overnight events. Patient has been mute, she ate breakfast this morning and was noted to say only 'no, no, no' regarding court ordered IM medication after refusing PO agent. Patient has been isolative to her room with a flat affect and little spontaneous speech. She slept 8 hours overnight and got no other PRNs for agitation. Patient has not been communicative for much of the day. 04/27 - the patient continues to refuse vitals and medications; she is disorganized and with no significant change in her mental state. Patient discharge focused, slept 8 hours and got no PRNs for agitation.  She has been in bed since getting court ordered IM after refusing PO agents. Per , son is able to visit this week. She remains mute on interview and does not engage MD and treatment team.    04/28 - there has been no significant improvement in the patient's mental state. She remains mute, isolative, and refusing all medication. Patient slept 9+ hours, got Haldol and Ativan injections due to ongoing disorganization of thought and catatonia. On interview, she states only 'leave me alone' and is incoherent. Per , son will come visit to try to help reconstitute the patient, stating she may respond to a familiar face and that she remains far from her baseline. 04/29 - the patient is little improved. She remains guarded and mute, catatonic throughout the day but up this morning when she refused medications and got IM injections via court order. She has little insight concerning the admission but slept 9 hours overnight. Patient unable to make her needs known, she speaks sparingly about her immediate desires but cannot provide any abstract reasoning regarding her refusal of medication or her treatment course thus far. Patient will be visited by her son this afternoon per .    4/30- the patient refuses to speak with treatment team this morning. States \" I dont want to talk\"  Patient refuses to answer questions. She continues to refuse medications,has court order medications requiring IM injections. Remains isolative to her room, sleeping most of the day.       Objective:     Vitals:    04/21/22 1803 04/27/22 2030 04/28/22 2030 04/29/22 2000   BP:  98/61 106/71 106/60   Pulse:   81 94   Resp:  14 15 16   Temp:   98 °F (36.7 °C) 98.6 °F (37 °C)   SpO2:   97% 100%   Weight: 66 kg (145 lb 8.1 oz)      Height: 5' 2\" (1.575 m)           Mental Status Exam:   Sensorium  not oriented to situation   Relations uncooperative   Eye Contact poor   Appearance:  age appropriate   Speech:  mute   Thought Process: blocked   Thought Content internally preoccupied Suicidal ideations none   Mood:  dysphoric   Affect:  constricted   Memory   N/A   Concentration:  adequate   Insight:  poor   Judgment:  poor       MEDICATIONS:  Current Facility-Administered Medications   Medication Dose Route Frequency    ondansetron (ZOFRAN ODT) tablet 4 mg  4 mg Oral ONCE PRN    LORazepam (INTENSOL) 2 mg/mL oral concentrate 1 mg  1 mg Oral TID    Or    LORazepam (ATIVAN) injection 1 mg ++COURT ORDERED MEDICATION FOR REFUSAL OF ORAL LORAZEPAM++  1 mg IntraMUSCular TID    haloperidol (HALDOL) 2 mg/mL oral solution 7.5 mg  7.5 mg Oral BID    Or    haloperidol lactate (HALDOL) injection 7.5 mg ++COURT ORDERED FOR REFUSAL OF ORAL HALOPERIDOL++  7.5 mg IntraMUSCular BID    albuterol (PROVENTIL HFA, VENTOLIN HFA, PROAIR HFA) inhaler 2 Puff  2 Puff Inhalation TID PRN    OLANZapine (ZyPREXA) tablet 5 mg  5 mg Oral Q6H PRN    haloperidol lactate (HALDOL) injection 5 mg  5 mg IntraMUSCular Q6H PRN    benztropine (COGENTIN) tablet 1 mg  1 mg Oral BID PRN    diphenhydrAMINE (BENADRYL) injection 50 mg  50 mg IntraMUSCular BID PRN    hydrOXYzine HCL (ATARAX) tablet 50 mg  50 mg Oral TID PRN    LORazepam (ATIVAN) injection 1 mg  1 mg IntraMUSCular Q4H PRN    traZODone (DESYREL) tablet 50 mg  50 mg Oral QHS PRN    acetaminophen (TYLENOL) tablet 650 mg  650 mg Oral Q4H PRN    magnesium hydroxide (MILK OF MAGNESIA) 400 mg/5 mL oral suspension 30 mL  30 mL Oral DAILY PRN    nicotine (NICODERM CQ) 21 mg/24 hr patch 1 Patch  1 Patch TransDERmal DAILY PRN      DISCUSSION:   the risks and benefits of the proposed medication  patient given opportunity to ask questions    Lab/Data Review: All lab results for the last 24 hours reviewed. No results found for this or any previous visit (from the past 24 hour(s)).       Assessment:     Principal Problem:    Schizoaffective disorder ([facility] [address] (3/30/2018)     Assessment: the patient presents with gerardo psychosis in the setting of medication non-compliance x1.5 weeks. She is unable to make her needs known and vacillates between agitation and near catatonia. Will obtain court order for psychiatric treatment, observe and treat symptomatically. Plan:      Plan:   COURT ORDERED MEDICATION:  - CONTINUE Haldol oral soln 7.5 mg PO *OR* IM BID for psychosis  - CONTINUE Ativan 1 mg PO *OR* IM TID for catatonia     - DISCONTINUE Trazodone 150 mg QHS for insomnia due to no medical necessity  - IGM therapy as tolerated  - Expand database / obtain collateral  - Dispo planning    I certify that this patient's inpatient psychiatric hospital services furnished since the previous certification were, and continue to be, required for treatment that could reasonably be expected to improve the patient's condition, or for diagnostic study, and that the patient continues to need, on a daily basis, active treatment furnished directly by or requiring the supervision of inpatient psychiatric facility personnel. In addition, the hospital records show that services furnished were intensive treatment services, admission or related services, or equivalent services.     Signed By: Samson Lopez NP     April 30, 2022

## 2022-04-30 NOTE — PROGRESS NOTES
Problem: Psychosis  Goal: *STG: Remains safe in hospital  Outcome: Progressing Towards Goal     58 y/o female pt received this evening awake in room. This RN was informed shortly after start of shift that pt was vomiting. Pt stated after being asked that she would take medication for vomiting. Orders received for a once PRN dose of Zofran from covering provider. Prior to pulling medication, pt refused to take Zofran and covered herself with blanket in bed. No medications scheduled during shift. Pt declines to participate in assessment or permit VS. No self injurious or aggressive behaviors noted. No PRNs given. 9.5 hours of sleep logged. Will continue to monitor. Simple / Intermediate / Complex Repair - Final Wound Length In Cm: 0

## 2022-05-01 PROCEDURE — 74011250636 HC RX REV CODE- 250/636: Performed by: PSYCHIATRY & NEUROLOGY

## 2022-05-01 PROCEDURE — 74011250637 HC RX REV CODE- 250/637: Performed by: PSYCHIATRY & NEUROLOGY

## 2022-05-01 PROCEDURE — 65220000003 HC RM SEMIPRIVATE PSYCH

## 2022-05-01 RX ORDER — ONDANSETRON 4 MG/1
4 TABLET, ORALLY DISINTEGRATING ORAL
Status: DISCONTINUED | OUTPATIENT
Start: 2022-05-01 | End: 2022-05-19 | Stop reason: HOSPADM

## 2022-05-01 RX ADMIN — Medication 1 MG: at 12:06

## 2022-05-01 RX ADMIN — LORAZEPAM 1 MG: 2 INJECTION INTRAMUSCULAR; INTRAVENOUS at 09:30

## 2022-05-01 RX ADMIN — HALOPERIDOL LACTATE 7.5 MG: 5 INJECTION, SOLUTION INTRAMUSCULAR at 09:29

## 2022-05-01 RX ADMIN — Medication 1 MG: at 16:36

## 2022-05-01 RX ADMIN — HALOPERIDOL 7.5 MG: 2 SOLUTION ORAL at 16:36

## 2022-05-01 NOTE — BH NOTES
Psychiatric Progress Note    Patient: Kaylyn Fonseca MRN: [id number]  SSN: [id number]    YOB: 1960  Age: 57 y.o. Sex: female      Admit Date: 4/20/2022    LOS: 10 days     Subjective:     Kaylyn Fonseca  is selectively mute per staff and moods are depressed. She opened an eye then closed it again with no further correspondence. No prns given. No aggression or violence. Inappropriately interactive and poorly aware. Tolerating court ordered medications well. Eating ok and sleeping fairly (7hrs) . 4/24 - Kaylyn Fonseca moved her hands to my voices but refused to converse with me. Selective with nursing also. She responded appropriately when breakfast was called only. Moods are down. No aggression or violence. Selectively interactive. Limited awareness. Refusing medications. Eating and sleeping fairly. 04/25 - the patient slept 8 hours overnight. She continues to refuse medications and requires much prompting to participate in ADLs. No agitation noted today, and patient got no PRNs for aggression x24 hours. She appears catatonic at times, but will react to direct questions. Patient getting IM injections of Haldol for psychosis by court order as she refuses all oral agents. On interview she says nothing and lies in bed.     04/26 - no active overnight events. Patient has been mute, she ate breakfast this morning and was noted to say only 'no, no, no' regarding court ordered IM medication after refusing PO agent. Patient has been isolative to her room with a flat affect and little spontaneous speech. She slept 8 hours overnight and got no other PRNs for agitation. Patient has not been communicative for much of the day. 04/27 - the patient continues to refuse vitals and medications; she is disorganized and with no significant change in her mental state. Patient discharge focused, slept 8 hours and got no PRNs for agitation.  She has been in bed since getting court ordered IM after refusing PO agents. Per , son is able to visit this week. She remains mute on interview and does not engage MD and treatment team.    04/28 - there has been no significant improvement in the patient's mental state. She remains mute, isolative, and refusing all medication. Patient slept 9+ hours, got Haldol and Ativan injections due to ongoing disorganization of thought and catatonia. On interview, she states only 'leave me alone' and is incoherent. Per , son will come visit to try to help reconstitute the patient, stating she may respond to a familiar face and that she remains far from her baseline. 04/29 - the patient is little improved. She remains guarded and mute, catatonic throughout the day but up this morning when she refused medications and got IM injections via court order. She has little insight concerning the admission but slept 9 hours overnight. Patient unable to make her needs known, she speaks sparingly about her immediate desires but cannot provide any abstract reasoning regarding her refusal of medication or her treatment course thus far. Patient will be visited by her son this afternoon per .    4/30- the patient refuses to speak with treatment team this morning. States \" I dont want to talk\"  Patient refuses to answer questions. She continues to refuse medications,has court order medications requiring IM injections. Remains isolative to her room, sleeping most of the day. 5/1- \" I want to go home\"  Patient seen this morning in her room where she is sitting up in the bed eating pudding. She was willing to take PO medication this morning with some encouragement, after explaining that she will not be able to go home until she is not receiving IM injections. She remains isolative to her room and in bed most of the day.      Objective:     Vitals:    04/28/22 2030 04/29/22 2000 04/30/22 2000 04/30/22 2033   BP: 106/71 106/60 100/60 100/60   Pulse:  94 76 76 Resp: 15 16 16 16   Temp:   98.8 °F (37.1 °C) 98.8 °F (37.1 °C)   SpO2:  100% 98% 98%   Weight:       Height:            Mental Status Exam:   Sensorium  not oriented to situation   Relations uncooperative   Eye Contact poor   Appearance:  age appropriate   Speech:  mute   Thought Process: blocked   Thought Content internally preoccupied    Suicidal ideations none   Mood:  dysphoric   Affect:  constricted   Memory   N/A   Concentration:  adequate   Insight:  poor   Judgment:  poor       MEDICATIONS:  Current Facility-Administered Medications   Medication Dose Route Frequency    ondansetron (ZOFRAN ODT) tablet 4 mg  4 mg Oral Q6H PRN    LORazepam (INTENSOL) 2 mg/mL oral concentrate 1 mg  1 mg Oral TID    Or    LORazepam (ATIVAN) injection 1 mg ++COURT ORDERED MEDICATION FOR REFUSAL OF ORAL LORAZEPAM++  1 mg IntraMUSCular TID    haloperidol (HALDOL) 2 mg/mL oral solution 7.5 mg  7.5 mg Oral BID    Or    haloperidol lactate (HALDOL) injection 7.5 mg ++COURT ORDERED FOR REFUSAL OF ORAL HALOPERIDOL++  7.5 mg IntraMUSCular BID    albuterol (PROVENTIL HFA, VENTOLIN HFA, PROAIR HFA) inhaler 2 Puff  2 Puff Inhalation TID PRN    OLANZapine (ZyPREXA) tablet 5 mg  5 mg Oral Q6H PRN    haloperidol lactate (HALDOL) injection 5 mg  5 mg IntraMUSCular Q6H PRN    benztropine (COGENTIN) tablet 1 mg  1 mg Oral BID PRN    diphenhydrAMINE (BENADRYL) injection 50 mg  50 mg IntraMUSCular BID PRN    hydrOXYzine HCL (ATARAX) tablet 50 mg  50 mg Oral TID PRN    LORazepam (ATIVAN) injection 1 mg  1 mg IntraMUSCular Q4H PRN    traZODone (DESYREL) tablet 50 mg  50 mg Oral QHS PRN    acetaminophen (TYLENOL) tablet 650 mg  650 mg Oral Q4H PRN    magnesium hydroxide (MILK OF MAGNESIA) 400 mg/5 mL oral suspension 30 mL  30 mL Oral DAILY PRN    nicotine (NICODERM CQ) 21 mg/24 hr patch 1 Patch  1 Patch TransDERmal DAILY PRN      DISCUSSION:   the risks and benefits of the proposed medication  patient given opportunity to ask questions    Lab/Data Review: All lab results for the last 24 hours reviewed. No results found for this or any previous visit (from the past 24 hour(s)). Assessment:     Principal Problem:    Schizoaffective disorder ([facility] [address] (3/30/2018)     Assessment: the patient presents with gerardo psychosis in the setting of medication non-compliance x1.5 weeks. She is unable to make her needs known and vacillates between agitation and near catatonia. Will obtain court order for psychiatric treatment, observe and treat symptomatically. Plan:      Plan:   COURT ORDERED MEDICATION:  - CONTINUE Haldol oral soln 7.5 mg PO *OR* IM BID for psychosis  - CONTINUE Ativan 1 mg PO *OR* IM TID for catatonia     - DISCONTINUE Trazodone 150 mg QHS for insomnia due to no medical necessity  - IGM therapy as tolerated  - Expand database / obtain collateral  - Dispo planning    I certify that this patient's inpatient psychiatric hospital services furnished since the previous certification were, and continue to be, required for treatment that could reasonably be expected to improve the patient's condition, or for diagnostic study, and that the patient continues to need, on a daily basis, active treatment furnished directly by or requiring the supervision of inpatient psychiatric facility personnel. In addition, the hospital records show that services furnished were intensive treatment services, admission or related services, or equivalent services.     Signed By: Leonel Hatchet, NP     May 1, 2022

## 2022-05-01 NOTE — PROGRESS NOTES
Problem: Psychosis  Goal: *STG: Remains safe in hospital  Outcome: Progressing Towards Goal     56 y/o female pt received this evening awake in room. Pt continues to refuse any and all assessment. No self injurious or aggressive behaviors noted. No inappropriate behaviors noted. 7.75 hours of sleep logged. No medications scheduled, no PRNs given. Will continue to monitor.

## 2022-05-01 NOTE — PROGRESS NOTES
Pt refused morning medications, so IM alternative was administered. Pt also refused answering any of the RN's assessment questions. She refused vital signs. She was however, compliant with the PO court ordered medication. Pt was observed leaving her room for snacks but is isolative throughout the day. RN observed vomiting and nasaeus at 1100, but she refused PO zofran for relief. Will continue to monitor for changes.

## 2022-05-02 PROCEDURE — 99232 SBSQ HOSP IP/OBS MODERATE 35: CPT | Performed by: PSYCHIATRY & NEUROLOGY

## 2022-05-02 PROCEDURE — 74011250637 HC RX REV CODE- 250/637: Performed by: PSYCHIATRY & NEUROLOGY

## 2022-05-02 PROCEDURE — 65220000003 HC RM SEMIPRIVATE PSYCH

## 2022-05-02 RX ADMIN — Medication 1 MG: at 17:45

## 2022-05-02 RX ADMIN — Medication 1 MG: at 12:49

## 2022-05-02 RX ADMIN — HALOPERIDOL 7.5 MG: 2 SOLUTION ORAL at 08:06

## 2022-05-02 RX ADMIN — Medication 1 MG: at 08:06

## 2022-05-02 RX ADMIN — HALOPERIDOL 7.5 MG: 2 SOLUTION ORAL at 17:45

## 2022-05-02 NOTE — PROGRESS NOTES
Pt is compliant with scheduled medications and had not required prn medication this shift. Pt offers little and is isolative to their room. Denies SI, HI, AH and VH.

## 2022-05-02 NOTE — GROUP NOTE
[facility]  GROUP DOCUMENTATION INDIVIDUAL                                                                          Group Therapy Note    Date: 5/2/2022    Group Start Time: 0900  Group End Time: 1000  Group Topic: Topic Group    [facility] ACUTE BEHAV TH    [facility] [address] GROUP    Group Therapy Note    Attendees: 8         Attendance: Did not attend    Patient's Goal:      Interventions/techniques  Taffy Councilman

## 2022-05-02 NOTE — GROUP NOTE
[facility] GROUP DOCUMENTATION INDIVIDUAL                                                                          Group Therapy Note    Date: 5/2/2022    Group Start Time: 1500  Group End Time: 1600  Group Topic: Recreational/Music Therapy    [facility] [place] [address] ACUTE BEHAV [facility]    Stephan Bustamante [facility] GROUP    Group Therapy Note    Attendees: 9         Attendance: Did not attend    Patient's Goal:      Interventions/techniques  Elsa Queen

## 2022-05-02 NOTE — BH NOTES
Psychiatric Progress Note    Patient: Kristal Ramirez MRN: [id number]  SSN: [id number]    YOB: 1960  Age: 57 y.o. Sex: female      Admit Date: 4/20/2022    LOS: 11 days     Subjective:     Kristal Ramirez  is selectively mute per staff and moods are depressed. She opened an eye then closed it again with no further correspondence. No prns given. No aggression or violence. Inappropriately interactive and poorly aware. Tolerating court ordered medications well. Eating ok and sleeping fairly (7hrs) . 4/24 - Kristal Ramirez moved her hands to my voices but refused to converse with me. Selective with nursing also. She responded appropriately when breakfast was called only. Moods are down. No aggression or violence. Selectively interactive. Limited awareness. Refusing medications. Eating and sleeping fairly. 04/25 - the patient slept 8 hours overnight. She continues to refuse medications and requires much prompting to participate in ADLs. No agitation noted today, and patient got no PRNs for aggression x24 hours. She appears catatonic at times, but will react to direct questions. Patient getting IM injections of Haldol for psychosis by court order as she refuses all oral agents. On interview she says nothing and lies in bed.     04/26 - no active overnight events. Patient has been mute, she ate breakfast this morning and was noted to say only 'no, no, no' regarding court ordered IM medication after refusing PO agent. Patient has been isolative to her room with a flat affect and little spontaneous speech. She slept 8 hours overnight and got no other PRNs for agitation. Patient has not been communicative for much of the day. 04/27 - the patient continues to refuse vitals and medications; she is disorganized and with no significant change in her mental state. Patient discharge focused, slept 8 hours and got no PRNs for agitation.  She has been in bed since getting court ordered IM after refusing PO agents. Per , son is able to visit this week. She remains mute on interview and does not engage MD and treatment team.    04/28 - there has been no significant improvement in the patient's mental state. She remains mute, isolative, and refusing all medication. Patient slept 9+ hours, got Haldol and Ativan injections due to ongoing disorganization of thought and catatonia. On interview, she states only 'leave me alone' and is incoherent. Per , son will come visit to try to help reconstitute the patient, stating she may respond to a familiar face and that she remains far from her baseline. 04/29 - the patient is little improved. She remains guarded and mute, catatonic throughout the day but up this morning when she refused medications and got IM injections via court order. She has little insight concerning the admission but slept 9 hours overnight. Patient unable to make her needs known, she speaks sparingly about her immediate desires but cannot provide any abstract reasoning regarding her refusal of medication or her treatment course thus far. Patient will be visited by her son this afternoon per .    04/30- the patient refuses to speak with treatment team this morning. States \"I dont want to talk\"  Patient refuses to answer questions. She continues to refuse medications,has court order medications requiring IM injections. Remains isolative to her room, sleeping most of the day. 05/01- \" I want to go home\"  Patient seen this morning in her room where she is sitting up in the bed eating pudding. She was willing to take PO medication this morning with some encouragement, after explaining that she will not be able to go home until she is not receiving IM injections. She remains isolative to her room and in bed most of the day. 05/02 - overnight, the patient remained isolative and disorganized.  She has been refusing medications and getting IM court ordered alternative. Patient was out of bed for breakfast this morning but otherwise has been in her bed for much of the day. Patient took PO medication with much encouragement from unit staff. She slept 8.25 hours overnight. She remains internally preoccupied and does not speak with MD on interview.     Objective:     Vitals:    04/29/22 2000 04/30/22 2000 04/30/22 2033 05/01/22 2012   BP: 106/60 100/60 100/60    Pulse: 94 76 76    Resp: 16 16 16 16   Temp:  98.8 °F (37.1 °C) 98.8 °F (37.1 °C)    SpO2: 100% 98% 98%    Weight:       Height:            Mental Status Exam:   Sensorium  not oriented to situation   Relations uncooperative   Eye Contact poor   Appearance:  age appropriate   Speech:  mute   Thought Process: blocked   Thought Content internally preoccupied    Suicidal ideations none   Mood:  dysphoric   Affect:  constricted   Memory   N/A   Concentration:  adequate   Insight:  poor   Judgment:  poor       MEDICATIONS:  Current Facility-Administered Medications   Medication Dose Route Frequency    ondansetron (ZOFRAN ODT) tablet 4 mg  4 mg Oral Q6H PRN    LORazepam (INTENSOL) 2 mg/mL oral concentrate 1 mg  1 mg Oral TID    Or    LORazepam (ATIVAN) injection 1 mg ++COURT ORDERED MEDICATION FOR REFUSAL OF ORAL LORAZEPAM++  1 mg IntraMUSCular TID    haloperidol (HALDOL) 2 mg/mL oral solution 7.5 mg  7.5 mg Oral BID    Or    haloperidol lactate (HALDOL) injection 7.5 mg ++COURT ORDERED FOR REFUSAL OF ORAL HALOPERIDOL++  7.5 mg IntraMUSCular BID    albuterol (PROVENTIL HFA, VENTOLIN HFA, PROAIR HFA) inhaler 2 Puff  2 Puff Inhalation TID PRN    OLANZapine (ZyPREXA) tablet 5 mg  5 mg Oral Q6H PRN    haloperidol lactate (HALDOL) injection 5 mg  5 mg IntraMUSCular Q6H PRN    benztropine (COGENTIN) tablet 1 mg  1 mg Oral BID PRN    diphenhydrAMINE (BENADRYL) injection 50 mg  50 mg IntraMUSCular BID PRN    hydrOXYzine HCL (ATARAX) tablet 50 mg  50 mg Oral TID PRN    LORazepam (ATIVAN) injection 1 mg  1 mg IntraMUSCular Q4H PRN    traZODone (DESYREL) tablet 50 mg  50 mg Oral QHS PRN    acetaminophen (TYLENOL) tablet 650 mg  650 mg Oral Q4H PRN    magnesium hydroxide (MILK OF MAGNESIA) 400 mg/5 mL oral suspension 30 mL  30 mL Oral DAILY PRN    nicotine (NICODERM CQ) 21 mg/24 hr patch 1 Patch  1 Patch TransDERmal DAILY PRN      DISCUSSION:   the risks and benefits of the proposed medication  patient given opportunity to ask questions    Lab/Data Review: All lab results for the last 24 hours reviewed. No results found for this or any previous visit (from the past 24 hour(s)). Assessment:     Principal Problem:    Schizoaffective disorder ([facility] [address] (3/30/2018)     Assessment: the patient presents with gerardo psychosis in the setting of medication non-compliance x1.5 weeks. She is unable to make her needs known and vacillates between agitation and near catatonia. Will obtain court order for psychiatric treatment, observe and treat symptomatically. Plan:      Plan:   COURT ORDERED MEDICATION:  - CONTINUE Haldol oral soln 7.5 mg PO *OR* IM BID for psychosis  - CONTINUE Ativan 1 mg PO *OR* IM TID for catatonia     - DISCONTINUE Trazodone 150 mg QHS for insomnia due to no medical necessity  - IGM therapy as tolerated  - Expand database / obtain collateral  - Dispo planning    I certify that this patient's inpatient psychiatric hospital services furnished since the previous certification were, and continue to be, required for treatment that could reasonably be expected to improve the patient's condition, or for diagnostic study, and that the patient continues to need, on a daily basis, active treatment furnished directly by or requiring the supervision of inpatient psychiatric facility personnel. In addition, the hospital records show that services furnished were intensive treatment services, admission or related services, or equivalent services.     Signed By: Bowen Fletcher MD     May 2, 2022

## 2022-05-02 NOTE — GROUP NOTE
[facility] GROUP DOCUMENTATION INDIVIDUAL                                                                          Group Therapy Note    Date: 5/2/2022    Group Start Time: 1100  Group End Time: 1200  Group Topic: Topic Group    [address] ACUTE BEHAV [facility]    [facility]    Group Therapy Note    Attendees: 3         Attendance: Did not attend    Patient's Goal:      Interventions/techniques:   Ruby Carrion

## 2022-05-02 NOTE — PROGRESS NOTES
Problem: Psychosis  Goal: *STG: Remains safe in hospital  Outcome: Progressing Towards Goal     58 y/o female pt received this evening awake in room. Pt is alert but refuses to participate in any assessment. No self injurious or aggressive behaviors noted. No behavioral concerns. No medications scheduled during shift. No PRNs given. 8.25 hours of sleep logged.

## 2022-05-02 NOTE — BH NOTES
Behavioral Health Treatment Team Note      Patient goal(s) for today: continue taking meds as prescribed, engage in unit activities, participate in hygiene/ADLs, prepare for discharge, follow directions from staff, contact support team, attend groups  Treatment team focus/goals: continue adjusting meds as needed, discharge planning, update support system     Progress note: Pt was seen by treatment team in her room. Pt remains isolative to her room, selectively mute and does not interact with others.   department to continue to coordinate discharge plans.     LOS:  11                      Expected LOS: TBD     Insurance info/prescription coverage:  [facility]  Date of last family contact:  RUBY Sotelo ([phone])  Family requesting physician contact today:  no  Discharge plan:  Return home   Guns in the home:  no   Outpatient provider(s):  Insurance  [phone]     Participating treatment team Deven CARROLL, Dr. Roxanne Lester

## 2022-05-03 PROCEDURE — 99231 SBSQ HOSP IP/OBS SF/LOW 25: CPT | Performed by: PSYCHIATRY & NEUROLOGY

## 2022-05-03 PROCEDURE — 74011250636 HC RX REV CODE- 250/636: Performed by: PSYCHIATRY & NEUROLOGY

## 2022-05-03 PROCEDURE — 74011250637 HC RX REV CODE- 250/637: Performed by: PSYCHIATRY & NEUROLOGY

## 2022-05-03 PROCEDURE — 65220000003 HC RM SEMIPRIVATE PSYCH

## 2022-05-03 RX ADMIN — HALOPERIDOL 7.5 MG: 2 SOLUTION ORAL at 17:02

## 2022-05-03 RX ADMIN — HALOPERIDOL LACTATE 7.5 MG: 5 INJECTION, SOLUTION INTRAMUSCULAR at 08:22

## 2022-05-03 RX ADMIN — Medication 1 MG: at 17:09

## 2022-05-03 RX ADMIN — LORAZEPAM 1 MG: 2 INJECTION INTRAMUSCULAR; INTRAVENOUS at 08:19

## 2022-05-03 RX ADMIN — Medication 1 MG: at 12:18

## 2022-05-03 NOTE — PROGRESS NOTES
Pt was originally compliant with scheduled court ordered medications at 0800 but upon providing PO medication they proceeded to spit it back into their water cup, IM medication provided at this time. Pt complains of pain during the process and agrees to PO medications for the rest of the shift. Pt remains isolative to their room and offers little.

## 2022-05-03 NOTE — GROUP NOTE
[facility]  GROUP DOCUMENTATION INDIVIDUAL                                                                          Group Therapy Note    Date: 5/3/2022    Group Start Time: 0900  Group End Time: 1000  Group Topic: Topic Group    [facility] [place] [address] ACUTE BEHAV [facility]    [place] [facility] GROUP DOCUMENTATION GROUP    Group Therapy Note    Attendees: 6         Attendance: Did not attend    Patient's Goal:      Interventions/techniques:Whitney Yeung

## 2022-05-03 NOTE — BH NOTES
During shift, pt remained in bed. Pt refused to answer questions pertaining to SI, HI, A/V hallucinations. Patient had no scheduled night medication. Currently, pt is resting in bed with eyes closed. No signs of distress nor made any further complaints. Locked Unit. Patient slept this shift 9 hours, respirations noted even and unlabored. Safe environment maintained, q15 min safety checks maintained. Q1 hourly nursing rounds maintained. Patient updated on plan of care.

## 2022-05-03 NOTE — BH NOTES
Psychiatric Progress Note    Patient: Ede Allen MRN: [id number]  SSN: [id number]    YOB: 1960  Age: 57 y.o. Sex: female      Admit Date: 4/20/2022    LOS: 12 days     Subjective:     Ede Allen  is selectively mute per staff and moods are depressed. She opened an eye then closed it again with no further correspondence. No prns given. No aggression or violence. Inappropriately interactive and poorly aware. Tolerating court ordered medications well. Eating ok and sleeping fairly (7hrs) . 4/24 - Ede Allen moved her hands to my voices but refused to converse with me. Selective with nursing also. She responded appropriately when breakfast was called only. Moods are down. No aggression or violence. Selectively interactive. Limited awareness. Refusing medications. Eating and sleeping fairly. 04/25 - the patient slept 8 hours overnight. She continues to refuse medications and requires much prompting to participate in ADLs. No agitation noted today, and patient got no PRNs for aggression x24 hours. She appears catatonic at times, but will react to direct questions. Patient getting IM injections of Haldol for psychosis by court order as she refuses all oral agents. On interview she says nothing and lies in bed.     04/26 - no active overnight events. Patient has been mute, she ate breakfast this morning and was noted to say only 'no, no, no' regarding court ordered IM medication after refusing PO agent. Patient has been isolative to her room with a flat affect and little spontaneous speech. She slept 8 hours overnight and got no other PRNs for agitation. Patient has not been communicative for much of the day. 04/27 - the patient continues to refuse vitals and medications; she is disorganized and with no significant change in her mental state. Patient discharge focused, slept 8 hours and got no PRNs for agitation.  She has been in bed since getting court ordered IM after refusing PO agents. Per , son is able to visit this week. She remains mute on interview and does not engage MD and treatment team.    04/28 - there has been no significant improvement in the patient's mental state. She remains mute, isolative, and refusing all medication. Patient slept 9+ hours, got Haldol and Ativan injections due to ongoing disorganization of thought and catatonia. On interview, she states only 'leave me alone' and is incoherent. Per , son will come visit to try to help reconstitute the patient, stating she may respond to a familiar face and that she remains far from her baseline. 04/29 - the patient is little improved. She remains guarded and mute, catatonic throughout the day but up this morning when she refused medications and got IM injections via court order. She has little insight concerning the admission but slept 9 hours overnight. Patient unable to make her needs known, she speaks sparingly about her immediate desires but cannot provide any abstract reasoning regarding her refusal of medication or her treatment course thus far. Patient will be visited by her son this afternoon per .    04/30- the patient refuses to speak with treatment team this morning. States \"I dont want to talk\"  Patient refuses to answer questions. She continues to refuse medications,has court order medications requiring IM injections. Remains isolative to her room, sleeping most of the day. 05/01- \" I want to go home\"  Patient seen this morning in her room where she is sitting up in the bed eating pudding. She was willing to take PO medication this morning with some encouragement, after explaining that she will not be able to go home until she is not receiving IM injections. She remains isolative to her room and in bed most of the day. 05/02 - overnight, the patient remained isolative and disorganized.  She has been refusing medications and getting IM court ordered alternative. Patient was out of bed for breakfast this morning but otherwise has been in her bed for much of the day. Patient took PO medication with much encouragement from unit staff. She slept 8.25 hours overnight. She remains internally preoccupied and does not speak with MD on interview. 05/03 - the patient remains disorganized and has been isolative to her room. She was noted to spit out her oral solution medication and was thus given IM injection per court order. The patient has been out for meals at times but not interacting in any appreciable way with staff or peers. She slept 9 hours overnight and got no PRNs for agitation. On interview looks at MD but does not speak.     Objective:     Vitals:    04/29/22 2000 04/30/22 2000 04/30/22 2033 05/01/22 2012   BP: 106/60 100/60 100/60    Pulse: 94 76 76    Resp:  16 16 16   Temp:  98.8 °F (37.1 °C) 98.8 °F (37.1 °C)    SpO2: 100% 98% 98%    Weight:       Height:            Mental Status Exam:   Sensorium  not oriented to situation   Relations uncooperative   Eye Contact poor   Appearance:  age appropriate   Speech:  mute   Thought Process: blocked   Thought Content internally preoccupied    Suicidal ideations none   Mood:  dysphoric   Affect:  constricted   Memory   N/A   Concentration:  adequate   Insight:  poor   Judgment:  poor       MEDICATIONS:  Current Facility-Administered Medications   Medication Dose Route Frequency    ondansetron (ZOFRAN ODT) tablet 4 mg  4 mg Oral Q6H PRN    LORazepam (INTENSOL) 2 mg/mL oral concentrate 1 mg  1 mg Oral TID    Or    LORazepam (ATIVAN) injection 1 mg ++COURT ORDERED MEDICATION FOR REFUSAL OF ORAL LORAZEPAM++  1 mg IntraMUSCular TID    haloperidol (HALDOL) 2 mg/mL oral solution 7.5 mg  7.5 mg Oral BID    Or    haloperidol lactate (HALDOL) injection 7.5 mg ++COURT ORDERED FOR REFUSAL OF ORAL HALOPERIDOL++  7.5 mg IntraMUSCular BID    albuterol (PROVENTIL HFA, VENTOLIN HFA, PROAIR HFA) inhaler 2 Puff  2 Puff Inhalation TID PRN    OLANZapine (ZyPREXA) tablet 5 mg  5 mg Oral Q6H PRN    haloperidol lactate (HALDOL) injection 5 mg  5 mg IntraMUSCular Q6H PRN    benztropine (COGENTIN) tablet 1 mg  1 mg Oral BID PRN    diphenhydrAMINE (BENADRYL) injection 50 mg  50 mg IntraMUSCular BID PRN    hydrOXYzine HCL (ATARAX) tablet 50 mg  50 mg Oral TID PRN    LORazepam (ATIVAN) injection 1 mg  1 mg IntraMUSCular Q4H PRN    traZODone (DESYREL) tablet 50 mg  50 mg Oral QHS PRN    acetaminophen (TYLENOL) tablet 650 mg  650 mg Oral Q4H PRN    magnesium hydroxide (MILK OF MAGNESIA) 400 mg/5 mL oral suspension 30 mL  30 mL Oral DAILY PRN    nicotine (NICODERM CQ) 21 mg/24 hr patch 1 Patch  1 Patch TransDERmal DAILY PRN      DISCUSSION:   the risks and benefits of the proposed medication  patient given opportunity to ask questions    Lab/Data Review: All lab results for the last 24 hours reviewed. No results found for this or any previous visit (from the past 24 hour(s)). Assessment:     Principal Problem:    Schizoaffective disorder ([facility] [address] (3/30/2018)     Assessment: the patient presents with gerardo psychosis in the setting of medication non-compliance x1.5 weeks. She is unable to make her needs known and vacillates between agitation and near catatonia. Will obtain court order for psychiatric treatment, observe and treat symptomatically.       Plan:      Plan:   COURT ORDERED MEDICATION:  - CONTINUE Haldol oral soln 7.5 mg PO *OR* IM BID for psychosis  - CONTINUE Ativan 1 mg PO *OR* IM TID for catatonia     - DISCONTINUE Trazodone 150 mg QHS for insomnia due to no medical necessity  - IGM therapy as tolerated  - Expand database / obtain collateral  - Dispo planning    I certify that this patient's inpatient psychiatric hospital services furnished since the previous certification were, and continue to be, required for treatment that could reasonably be expected to improve the patient's condition, or for diagnostic study, and that the patient continues to need, on a daily basis, active treatment furnished directly by or requiring the supervision of inpatient psychiatric facility personnel. In addition, the hospital records show that services furnished were intensive treatment services, admission or related services, or equivalent services.     Signed By: Francine Hobbs MD     May 3, 2022

## 2022-05-03 NOTE — GROUP NOTE
DORETHA  GROUP DOCUMENTATION INDIVIDUAL                                                                          Group Therapy Note    Date: 5/3/2022    Group Start Time: 1100  Group End Time: 1200  Group Topic: Topic Group    [address] ACUTE BEHAV [facility]    Stephan Bustamante [facility] GROUP    Group Therapy Note    Attendees: 6         Attendance: Did not attend    Interventions/techniques  Sapna Dietz

## 2022-05-03 NOTE — BH NOTES
GROUP THERAPY PROGRESS NOTE    Christian Finney is participating in psychoeducation. Group time: 45 minutes    Personal goal for participation: CBT Triangle (connection of thoughts,feelings, and behaviors)    Goal orientation: personal    Group therapy participation: did not attend    Therapeutic interventions reviewed and discussed: This writer discussed the CBT triangle in detail this morning in effort to help participants understand how thoughts, feelings, and behaviors are connected to each other. This writer introduced the [employer] (CBT) and discussed ways to identify what got them hospitalized.        Impression of participation: patient did not attend group      CARLY Menendez

## 2022-05-03 NOTE — GROUP NOTE
DORETHA  GROUP DOCUMENTATION INDIVIDUAL                                                                          Group Therapy Note    Date: 5/3/2022    Group Start Time: 1500  Group End Time: 1600  Group Topic: Recreational/Music Therapy    [facility] [place] [address] ACUTE BEHAV [facility]    [place], [address] [facility] GROUP DOCUMENTATION GROUP    Group Therapy Note    Attendees: 7         Attendance: Did not attend    Patient's Goal:

## 2022-05-03 NOTE — PROGRESS NOTES
Problem: Psychosis  Goal: *STG: Remains safe in hospital  Outcome: Progressing Towards Goal     Problem: Falls - Risk of  Goal: *Absence of Falls  Description: Document Shraddha Fall Risk and appropriate interventions in the flowsheet.   Outcome: Progressing Towards Goal  Note: Fall Risk Interventions:       Mentation Interventions: More frequent rounding    Medication Interventions: Teach patient to arise slowly

## 2022-05-03 NOTE — BH NOTES
Behavioral Health Treatment Team Note      Patient goal(s) for today: continue taking meds as prescribed, engage in unit activities, participate in hygiene/ADLs, prepare for discharge, follow directions from staff, contact support team, attend groups  Treatment team focus/goals: continue adjusting meds as needed, discharge planning, update support system     Progress note: Pt was seen by treatment team in her room. Pt remains isolative to her room, selectively mute and does not interact with others.   department to continue to coordinate discharge plans.     LOS:  12                    Expected LOS: TBD     Insurance info/prescription coverage:  [facility]  Date of last family contact:  OMI Sotelo ([phone])  Family requesting physician contact today: No  Discharge plan:  Return home   Guns in the home:  no   Outpatient provider(s):  Insurance  [phone]     Participating treatment team Destin CARROLL, Dr. Margy Wallace

## 2022-05-04 PROCEDURE — 65220000003 HC RM SEMIPRIVATE PSYCH

## 2022-05-04 PROCEDURE — 99232 SBSQ HOSP IP/OBS MODERATE 35: CPT | Performed by: PSYCHIATRY & NEUROLOGY

## 2022-05-04 PROCEDURE — 74011250637 HC RX REV CODE- 250/637: Performed by: PSYCHIATRY & NEUROLOGY

## 2022-05-04 RX ORDER — HALOPERIDOL 5 MG/ML
7.5 INJECTION INTRAMUSCULAR 2 TIMES DAILY
Status: DISCONTINUED | OUTPATIENT
Start: 2022-05-05 | End: 2022-05-05

## 2022-05-04 RX ORDER — HALOPERIDOL 2 MG/ML
10 SOLUTION ORAL 2 TIMES DAILY
Status: DISCONTINUED | OUTPATIENT
Start: 2022-05-05 | End: 2022-05-05

## 2022-05-04 RX ADMIN — HALOPERIDOL 7.5 MG: 2 SOLUTION ORAL at 17:04

## 2022-05-04 RX ADMIN — Medication 1 MG: at 09:14

## 2022-05-04 RX ADMIN — Medication 1 MG: at 17:09

## 2022-05-04 RX ADMIN — Medication 1 MG: at 11:25

## 2022-05-04 RX ADMIN — HALOPERIDOL 7.5 MG: 2 SOLUTION ORAL at 09:13

## 2022-05-04 NOTE — PROGRESS NOTES
Pt was agreeable and accepting of being moved to the general side. Pt moved with no complications. Pt remains isolative to their room and offers little. Pt was compliant with PO scheduled medications and has not requested any prn medication at this time. 1845 After having pt moved to general side they were more verbal during interactions. Pt remained compliant with scheduled medications and has not required prn medication or IM court ordered medications. Pt was asked about being more verbal and active currently and voices discontent on the acute side due to her roommate and pts being louder in general. Pt has denied SI, HI, AH and VH.

## 2022-05-04 NOTE — PROGRESS NOTES
Spiritual Care Assessment/Progress Note  [facility]      NAME: Kaleb Conti      MRN: [id number]  AGE: 57 y.o. SEX: female  Worship Affiliation: Congregational   Language: English     5/4/2022     Total Time (in minutes): 5     Spiritual Assessment begun in [place] [address] [address] [place] [address] through conversation with:         []Patient        [] Family    [] Friend(s)        Reason for Consult: Initial/Spiritual assessment, patient floor     Spiritual beliefs: (Please include comment if needed)     [] Identifies with a lorrie tradition:         [] Supported by a lorrie community:            [] Claims no spiritual orientation:           [] Seeking spiritual identity:                [] Adheres to an individual form of spirituality:           [x] Not able to assess:                           Identified resources for coping:      [] Prayer                               [] Music                  [] Guided Imagery     [] Family/friends                 [] Pet visits     [] Devotional reading                         [x] Unknown     [] Other:                                           Interventions offered during this visit: (See comments for more details)                Plan of Care:     [] Support spiritual and/or cultural needs    [] Support AMD and/or advance care planning process      [] Support grieving process   [] Coordinate Rites and/or Rituals    [] Coordination with community clergy   [] No spiritual needs identified at this time   [] Detailed Plan of Care below (See Comments)  [] Make referral to Music Therapy  [] Make referral to Pet Therapy     [] Make referral to Addiction services  [] Make referral to [facility]  [] Make referral to Spiritual Care Partner  [] No future visits requested        [x] Contact Spiritual Care for further referrals     Comments:  visit for initial spiritual assessment. Patient resting at the time of this visit, door closed for privacy.   Please contact spiritual care for further referral or consult. Rev.  Tyler Goel MDiv, [facility], [facility] paging service: [phone]

## 2022-05-04 NOTE — BH NOTES
Psychiatric Progress Note    Patient: Henrietta Miramontes MRN: [id number]  SSN: [id number]    YOB: 1960  Age: 57 y.o. Sex: female      Admit Date: 4/20/2022    LOS: 13 days     Subjective:     Henrietta Miramontes  is selectively mute per staff and moods are depressed. She opened an eye then closed it again with no further correspondence. No prns given. No aggression or violence. Inappropriately interactive and poorly aware. Tolerating court ordered medications well. Eating ok and sleeping fairly (7hrs) . 4/24 - Henrietta Miramontes moved her hands to my voices but refused to converse with me. Selective with nursing also. She responded appropriately when breakfast was called only. Moods are down. No aggression or violence. Selectively interactive. Limited awareness. Refusing medications. Eating and sleeping fairly. 04/25 - the patient slept 8 hours overnight. She continues to refuse medications and requires much prompting to participate in ADLs. No agitation noted today, and patient got no PRNs for aggression x24 hours. She appears catatonic at times, but will react to direct questions. Patient getting IM injections of Haldol for psychosis by court order as she refuses all oral agents. On interview she says nothing and lies in bed.     04/26 - no active overnight events. Patient has been mute, she ate breakfast this morning and was noted to say only 'no, no, no' regarding court ordered IM medication after refusing PO agent. Patient has been isolative to her room with a flat affect and little spontaneous speech. She slept 8 hours overnight and got no other PRNs for agitation. Patient has not been communicative for much of the day. 04/27 - the patient continues to refuse vitals and medications; she is disorganized and with no significant change in her mental state. Patient discharge focused, slept 8 hours and got no PRNs for agitation.  She has been in bed since getting court ordered IM after refusing PO agents. Per , son is able to visit this week. She remains mute on interview and does not engage MD and treatment team.    04/28 - there has been no significant improvement in the patient's mental state. She remains mute, isolative, and refusing all medication. Patient slept 9+ hours, got Haldol and Ativan injections due to ongoing disorganization of thought and catatonia. On interview, she states only 'leave me alone' and is incoherent. Per , son will come visit to try to help reconstitute the patient, stating she may respond to a familiar face and that she remains far from her baseline. 04/29 - the patient is little improved. She remains guarded and mute, catatonic throughout the day but up this morning when she refused medications and got IM injections via court order. She has little insight concerning the admission but slept 9 hours overnight. Patient unable to make her needs known, she speaks sparingly about her immediate desires but cannot provide any abstract reasoning regarding her refusal of medication or her treatment course thus far. Patient will be visited by her son this afternoon per .    04/30- the patient refuses to speak with treatment team this morning. States \"I dont want to talk\"  Patient refuses to answer questions. She continues to refuse medications,has court order medications requiring IM injections. Remains isolative to her room, sleeping most of the day. 05/01- \" I want to go home\"  Patient seen this morning in her room where she is sitting up in the bed eating pudding. She was willing to take PO medication this morning with some encouragement, after explaining that she will not be able to go home until she is not receiving IM injections. She remains isolative to her room and in bed most of the day. 05/02 - overnight, the patient remained isolative and disorganized.  She has been refusing medications and getting IM court ordered alternative. Patient was out of bed for breakfast this morning but otherwise has been in her bed for much of the day. Patient took PO medication with much encouragement from unit staff. She slept 8.25 hours overnight. She remains internally preoccupied and does not speak with MD on interview. 05/03 - the patient remains disorganized and has been isolative to her room. She was noted to spit out her oral solution medication and was thus given IM injection per court order. The patient has been out for meals at times but not interacting in any appreciable way with staff or peers. She slept 9 hours overnight and got no PRNs for agitation. On interview looks at MD but does not speak. 05/04 - the patient is medication compliant, but still mute for much of the day. She is taking court ordered medications without issue today and slept 9 hours overnight. Patient internally preoccupied, eating meals in her room and isolative for much of the day. She denies any concerns but does not respond appropriately to direct questions.     Objective:     Vitals:    04/30/22 2000 04/30/22 2033 05/01/22 2012 05/03/22 2009   BP: 100/60 100/60  118/75   Pulse: 76 76  80   Resp: 16 16 16 17   Temp:    97.6 °F (36.4 °C)   SpO2: 98% 98%  99%   Weight:       Height:            Mental Status Exam:   Sensorium  not oriented to situation   Relations uncooperative   Eye Contact poor   Appearance:  age appropriate   Speech:  mute   Thought Process: blocked   Thought Content internally preoccupied    Suicidal ideations none   Mood:  dysphoric   Affect:  constricted   Memory   N/A   Concentration:  adequate   Insight:  poor   Judgment:  poor       MEDICATIONS:  Current Facility-Administered Medications   Medication Dose Route Frequency    ondansetron (ZOFRAN ODT) tablet 4 mg  4 mg Oral Q6H PRN    LORazepam (INTENSOL) 2 mg/mL oral concentrate 1 mg  1 mg Oral TID    Or    LORazepam (ATIVAN) injection 1 mg ++COURT ORDERED MEDICATION FOR REFUSAL OF ORAL LORAZEPAM++  1 mg IntraMUSCular TID    haloperidol (HALDOL) 2 mg/mL oral solution 7.5 mg  7.5 mg Oral BID    Or    haloperidol lactate (HALDOL) injection 7.5 mg ++COURT ORDERED FOR REFUSAL OF ORAL HALOPERIDOL++  7.5 mg IntraMUSCular BID    albuterol (PROVENTIL HFA, VENTOLIN HFA, PROAIR HFA) inhaler 2 Puff  2 Puff Inhalation TID PRN    OLANZapine (ZyPREXA) tablet 5 mg  5 mg Oral Q6H PRN    haloperidol lactate (HALDOL) injection 5 mg  5 mg IntraMUSCular Q6H PRN    benztropine (COGENTIN) tablet 1 mg  1 mg Oral BID PRN    diphenhydrAMINE (BENADRYL) injection 50 mg  50 mg IntraMUSCular BID PRN    hydrOXYzine HCL (ATARAX) tablet 50 mg  50 mg Oral TID PRN    LORazepam (ATIVAN) injection 1 mg  1 mg IntraMUSCular Q4H PRN    traZODone (DESYREL) tablet 50 mg  50 mg Oral QHS PRN    acetaminophen (TYLENOL) tablet 650 mg  650 mg Oral Q4H PRN    magnesium hydroxide (MILK OF MAGNESIA) 400 mg/5 mL oral suspension 30 mL  30 mL Oral DAILY PRN    nicotine (NICODERM CQ) 21 mg/24 hr patch 1 Patch  1 Patch TransDERmal DAILY PRN      DISCUSSION:   the risks and benefits of the proposed medication  patient given opportunity to ask questions    Lab/Data Review: All lab results for the last 24 hours reviewed. No results found for this or any previous visit (from the past 24 hour(s)). Assessment:     Principal Problem:    Schizoaffective disorder ([facility] [address] (3/30/2018)     Assessment: the patient presents with gerardo psychosis in the setting of medication non-compliance x1.5 weeks. She is unable to make her needs known and vacillates between agitation and near catatonia. Will obtain court order for psychiatric treatment, observe and treat symptomatically.       Plan:      Plan:   COURT ORDERED MEDICATION:  - INCREASE Haldol oral soln to 10 mg PO *OR* IM BID for psychosis  - SCHEDULE Haldol decanoate prior to discharge  - CONTINUE Ativan 1 mg PO *OR* IM TID for catatonia     - Consider ECT if self care isn't improving  - IGM therapy as tolerated  - Expand database / obtain collateral  - Dispo planning    I certify that this patient's inpatient psychiatric hospital services furnished since the previous certification were, and continue to be, required for treatment that could reasonably be expected to improve the patient's condition, or for diagnostic study, and that the patient continues to need, on a daily basis, active treatment furnished directly by or requiring the supervision of inpatient psychiatric facility personnel. In addition, the hospital records show that services furnished were intensive treatment services, admission or related services, or equivalent services.     Signed By: Bibi Mancilla MD     May 4, 2022

## 2022-05-04 NOTE — GROUP NOTE
DORETHA  GROUP DOCUMENTATION INDIVIDUAL                                                                          Group Therapy Note    Date: 5/4/2022    Group Start Time: 0900  Group End Time: 1000  Group Topic: Topic Group    [facility] ACUTE BEHAV [facility]    Stephan Bustamante [id number] GROUP    Group Therapy Note    Attendees: 5         Attendance: Did not attend    Patient's Goal:      Interventions/techniques:  Heather Beaulieu

## 2022-05-04 NOTE — GROUP NOTE
DORETHA  GROUP DOCUMENTATION INDIVIDUAL                                                                          Group Therapy Note    Date: 5/4/2022    Group Start Time: 1400  Group End Time: 1500  Group Topic: Recreational/Music Therapy    [facility] [place] [address] ACUTE BEHAV [facility]    [place], [address] [facility] GROUP DOCUMENTATION GROUP    Group Therapy Note    Attendees: 11         Attendance: Attended    Patient's Goal:  To concentrate on selected task    Interventions/techniques: Supported-crafts,games,music    Follows Directions:  Followed directions    Interactions: Interacted appropriately    Mental Status: Calm-pleasant    Behavior/appearance: Attentive, Cooperative and Needed prompting    Goals Achieved: Able to engage in interactions and Able to listen to others-worked on selected task    Taiwo Montejo

## 2022-05-04 NOTE — BH NOTES
During shift, pt remained in bed. Pt denies SI, HI, A/V hallucinations. Meal/med compliant. Currently, pt is resting in bed with eyes closed. No signs of distress nor made any further complaints. Locked Unit. Patient slept this shift 9 hours, respirations noted even and unlabored. Safe environment maintained, q15 min safety checks maintained. Q1 hourly nursing rounds maintained. Patient updated on plan of care.

## 2022-05-05 PROCEDURE — 65220000003 HC RM SEMIPRIVATE PSYCH

## 2022-05-05 PROCEDURE — 74011250637 HC RX REV CODE- 250/637: Performed by: PSYCHIATRY & NEUROLOGY

## 2022-05-05 PROCEDURE — 99231 SBSQ HOSP IP/OBS SF/LOW 25: CPT | Performed by: PSYCHIATRY & NEUROLOGY

## 2022-05-05 RX ORDER — HALOPERIDOL 2 MG/ML
10 SOLUTION ORAL 2 TIMES DAILY
Status: DISCONTINUED | OUTPATIENT
Start: 2022-05-05 | End: 2022-05-07

## 2022-05-05 RX ORDER — HALOPERIDOL 5 MG/ML
10 INJECTION INTRAMUSCULAR 2 TIMES DAILY
Status: DISCONTINUED | OUTPATIENT
Start: 2022-05-05 | End: 2022-05-07

## 2022-05-05 RX ADMIN — Medication 1 MG: at 17:51

## 2022-05-05 RX ADMIN — Medication 1 MG: at 12:48

## 2022-05-05 RX ADMIN — HALOPERIDOL 10 MG: 2 SOLUTION ORAL at 17:51

## 2022-05-05 RX ADMIN — Medication 1 MG: at 09:20

## 2022-05-05 RX ADMIN — HALOPERIDOL 10 MG: 2 SOLUTION ORAL at 09:20

## 2022-05-05 NOTE — PROGRESS NOTES
GROUP THERAPY PROGRESS NOTE      Emory Angulo was not present for medication group.       Harper Duane, PHARMD, BCPS

## 2022-05-05 NOTE — PROGRESS NOTES
Patient received resting in her room. Denies SI/HI/AVH, depression and anxiety. Calm, cooperative, guarded, out on the unit, attending groups. Taking medications as prescribed. Will continue to monitor for safety.

## 2022-05-05 NOTE — PROGRESS NOTES
Problem: Psychosis  Goal: *STG: Remains safe in hospital  Outcome: Progressing Towards Goal  Goal: *STG: Participates in individual and group therapy  Outcome: Progressing Towards Goal  Goal: *STG/LTG: Complies with medication therapy  Outcome: Progressing Towards Goal     Problem: General Medical Care Plan  Goal: *Vital signs within specified parameters  Outcome: Progressing Towards Goal  Goal: *Anxiety reduced or absent  Outcome: Progressing Towards Goal

## 2022-05-05 NOTE — PROGRESS NOTES
Donita Jason was noted in the dayroom watching tv. She presented alert and oriented x3; disoriented to situation. She reported that she didn't know why she was in the hospital. She denied SI/HI/AVH, depression, anxiety, and pain. Affect flat, mood euthymic, speech clear. 2300-Pt resting quietly in bed, eyes closed, lying on her left side, breathing even and unlabored. No distress observed. 0100- Pt resting quietly in bed, lying supine. No distress observed. 0300- Pt resting quietly in bed, lying on her left side. No distress observed. 0500- Pt resting quietly in bed, lying supine. No distress observed. 0700- Pt appeared to have slept approx. 9 hours during the night. No distress observed. Monitoring for safety and behaviors continues.

## 2022-05-05 NOTE — BH NOTES
Behavioral Health Treatment Team Note      Patient goal(s) for today: continue taking meds as prescribed, engage in unit activities, participate in hygiene/ADLs, prepare for discharge, follow directions from staff, contact support team, attend groups  Treatment team focus/goals: continue adjusting meds as needed, discharge planning, update support system     Progress note: Pt was seen by treatment team in her room. Pt remains isolative to her room, selectively mute but accepting medications. SW left a message for pt's son.  SW department to continue to coordinate discharge plans.     LOS:  14                    Expected LOS: TBD     Insurance info/prescription coverage:  [employer] [facility] [address]  Date of last family contact:  GEREMIAS Sotelo ([phone])  Family requesting physician contact today: No  Discharge plan:  Return home   Guns in the home:  no   Outpatient provider(s):  Insurance  [phone]     Participating treatment team Austin CARROLL, Dr. Maribel Steiner

## 2022-05-06 PROCEDURE — 74011250637 HC RX REV CODE- 250/637: Performed by: PSYCHIATRY & NEUROLOGY

## 2022-05-06 PROCEDURE — 99232 SBSQ HOSP IP/OBS MODERATE 35: CPT | Performed by: PSYCHIATRY & NEUROLOGY

## 2022-05-06 PROCEDURE — 65220000003 HC RM SEMIPRIVATE PSYCH

## 2022-05-06 RX ADMIN — HALOPERIDOL 10 MG: 2 SOLUTION ORAL at 07:52

## 2022-05-06 RX ADMIN — Medication 1 MG: at 11:18

## 2022-05-06 RX ADMIN — Medication 1 MG: at 07:51

## 2022-05-06 RX ADMIN — Medication 1 MG: at 16:53

## 2022-05-06 RX ADMIN — HALOPERIDOL 10 MG: 2 SOLUTION ORAL at 16:53

## 2022-05-06 NOTE — GROUP NOTE
DORETHA  GROUP DOCUMENTATION INDIVIDUAL                                                                          Group Therapy Note    Date: 5/6/2022    Group Start Time: 1000  Group End Time: 1100  Group Topic: Topic Group    [facility] [place] [address] ACUTE BEHAV [facility]    [place], [address] [facility] GROUP DOCUMENTATION GROUP    Group Therapy Note    Attendees: 8         Attendance: Attended    Patient's Goal:  To participate in stress management [email or web address] game    Interventions/techniques: Supported-things pertaining to stress    Interactions: Interacted appropriately    Mental Status: Calm    Behavior/appearance: Cooperative and Needed prompting    Goals Achieved: Able to engage in interactions and Able to listen to others      Additional Notes:  Pt arrived late-elected to listen    Todd Bañuelos

## 2022-05-06 NOTE — BH NOTES
Psychiatric Progress Note    Patient: Christian Finney MRN: [id number]  SSN: [id number]    YOB: 1960  Age: 57 y.o. Sex: female      Admit Date: 4/20/2022    LOS: 14 days     Subjective:     Christian Finney  is selectively mute per staff and moods are depressed. She opened an eye then closed it again with no further correspondence. No prns given. No aggression or violence. Inappropriately interactive and poorly aware. Tolerating court ordered medications well. Eating ok and sleeping fairly (7hrs) . 4/24 - Christian Finney moved her hands to my voices but refused to converse with me. Selective with nursing also. She responded appropriately when breakfast was called only. Moods are down. No aggression or violence. Selectively interactive. Limited awareness. Refusing medications. Eating and sleeping fairly. 04/25 - the patient slept 8 hours overnight. She continues to refuse medications and requires much prompting to participate in ADLs. No agitation noted today, and patient got no PRNs for aggression x24 hours. She appears catatonic at times, but will react to direct questions. Patient getting IM injections of Haldol for psychosis by court order as she refuses all oral agents. On interview she says nothing and lies in bed.     04/26 - no active overnight events. Patient has been mute, she ate breakfast this morning and was noted to say only 'no, no, no' regarding court ordered IM medication after refusing PO agent. Patient has been isolative to her room with a flat affect and little spontaneous speech. She slept 8 hours overnight and got no other PRNs for agitation. Patient has not been communicative for much of the day. 04/27 - the patient continues to refuse vitals and medications; she is disorganized and with no significant change in her mental state. Patient discharge focused, slept 8 hours and got no PRNs for agitation.  She has been in bed since getting court ordered IM after refusing PO agents. Per , son is able to visit this week. She remains mute on interview and does not engage MD and treatment team.    04/28 - there has been no significant improvement in the patient's mental state. She remains mute, isolative, and refusing all medication. Patient slept 9+ hours, got Haldol and Ativan injections due to ongoing disorganization of thought and catatonia. On interview, she states only 'leave me alone' and is incoherent. Per , son will come visit to try to help reconstitute the patient, stating she may respond to a familiar face and that she remains far from her baseline. 04/29 - the patient is little improved. She remains guarded and mute, catatonic throughout the day but up this morning when she refused medications and got IM injections via court order. She has little insight concerning the admission but slept 9 hours overnight. Patient unable to make her needs known, she speaks sparingly about her immediate desires but cannot provide any abstract reasoning regarding her refusal of medication or her treatment course thus far. Patient will be visited by her son this afternoon per .    04/30- the patient refuses to speak with treatment team this morning. States \"I dont want to talk\"  Patient refuses to answer questions. She continues to refuse medications,has court order medications requiring IM injections. Remains isolative to her room, sleeping most of the day. 05/01- \" I want to go home\"  Patient seen this morning in her room where she is sitting up in the bed eating pudding. She was willing to take PO medication this morning with some encouragement, after explaining that she will not be able to go home until she is not receiving IM injections. She remains isolative to her room and in bed most of the day. 05/02 - overnight, the patient remained isolative and disorganized.  She has been refusing medications and getting IM court ordered alternative. Patient was out of bed for breakfast this morning but otherwise has been in her bed for much of the day. Patient took PO medication with much encouragement from unit staff. She slept 8.25 hours overnight. She remains internally preoccupied and does not speak with MD on interview. 05/03 - the patient remains disorganized and has been isolative to her room. She was noted to spit out her oral solution medication and was thus given IM injection per court order. The patient has been out for meals at times but not interacting in any appreciable way with staff or peers. She slept 9 hours overnight and got no PRNs for agitation. On interview looks at MD but does not speak. 05/04 - the patient is medication compliant, but still mute for much of the day. She is taking court ordered medications without issue today and slept 9 hours overnight. Patient internally preoccupied, eating meals in her room and isolative for much of the day. She denies any concerns but does not respond appropriately to direct questions. 05/05 - overnight, the patient remained flat and was fairly uncooperative with assessments. She was noted to be internally preoccupied and slept 9 hours overnight. She doesn't speak with the MD on interview and has little insight into her condition. Per nurse report, the patient has been trying to avoid taking PO medication and must be supervised.     Objective:     Vitals:    05/03/22 2009 05/04/22 2033 05/05/22 0816 05/05/22 2000   BP: 118/75 113/69 107/68 114/70   Pulse: 80 80 70 79   Resp: 17 18 18 16   Temp:  98.4 °F (36.9 °C) 97.6 °F (36.4 °C) 97.9 °F (36.6 °C)   SpO2: 99% 100% 99% 99%   Weight:       Height:            Mental Status Exam:   Sensorium  not oriented to situation   Relations uncooperative   Eye Contact poor   Appearance:  age appropriate   Speech:  mute   Thought Process: blocked   Thought Content internally preoccupied    Suicidal ideations none   Mood:  dysphoric   Affect: constricted   Memory   N/A   Concentration:  adequate   Insight:  poor   Judgment:  poor       MEDICATIONS:  Current Facility-Administered Medications   Medication Dose Route Frequency    haloperidol lactate (HALDOL) injection 10 mg +++COURT ORDERED MEDICATION FOR REFUSAL OF ORAL HALOPERIDOL+++  10 mg IntraMUSCular BID    Or    haloperidol (HALDOL) 2 mg/mL oral solution 10 mg  10 mg Oral BID    ondansetron (ZOFRAN ODT) tablet 4 mg  4 mg Oral Q6H PRN    LORazepam (INTENSOL) 2 mg/mL oral concentrate 1 mg  1 mg Oral TID    Or    LORazepam (ATIVAN) injection 1 mg ++COURT ORDERED MEDICATION FOR REFUSAL OF ORAL LORAZEPAM++  1 mg IntraMUSCular TID    albuterol (PROVENTIL HFA, VENTOLIN HFA, PROAIR HFA) inhaler 2 Puff  2 Puff Inhalation TID PRN    OLANZapine (ZyPREXA) tablet 5 mg  5 mg Oral Q6H PRN    haloperidol lactate (HALDOL) injection 5 mg  5 mg IntraMUSCular Q6H PRN    benztropine (COGENTIN) tablet 1 mg  1 mg Oral BID PRN    diphenhydrAMINE (BENADRYL) injection 50 mg  50 mg IntraMUSCular BID PRN    hydrOXYzine HCL (ATARAX) tablet 50 mg  50 mg Oral TID PRN    LORazepam (ATIVAN) injection 1 mg  1 mg IntraMUSCular Q4H PRN    traZODone (DESYREL) tablet 50 mg  50 mg Oral QHS PRN    acetaminophen (TYLENOL) tablet 650 mg  650 mg Oral Q4H PRN    magnesium hydroxide (MILK OF MAGNESIA) 400 mg/5 mL oral suspension 30 mL  30 mL Oral DAILY PRN    nicotine (NICODERM CQ) 21 mg/24 hr patch 1 Patch  1 Patch TransDERmal DAILY PRN      DISCUSSION:   the risks and benefits of the proposed medication  patient given opportunity to ask questions    Lab/Data Review: All lab results for the last 24 hours reviewed. No results found for this or any previous visit (from the past 24 hour(s)). Assessment:     Principal Problem:    Schizoaffective disorder ([facility] [address] (3/30/2018)     Assessment: the patient presents with gerardo psychosis in the setting of medication non-compliance x1.5 weeks.  She is unable to make her needs known and vacillates between agitation and near catatonia. Will obtain court order for psychiatric treatment, observe and treat symptomatically. Plan:      Plan:   COURT ORDERED MEDICATION:  - CONTINUE Haldol oral soln 10 mg PO *OR* IM BID for psychosis  - SCHEDULE Haldol decanoate prior to discharge  - CONTINUE Ativan 1 mg PO *OR* IM TID for catatonia     - Consider ECT if self care isn't improving  - IGM therapy as tolerated  - Expand database / obtain collateral  - Dispo planning    I certify that this patient's inpatient psychiatric hospital services furnished since the previous certification were, and continue to be, required for treatment that could reasonably be expected to improve the patient's condition, or for diagnostic study, and that the patient continues to need, on a daily basis, active treatment furnished directly by or requiring the supervision of inpatient psychiatric facility personnel. In addition, the hospital records show that services furnished were intensive treatment services, admission or related services, or equivalent services.     Signed By: Yamini Franz MD     May 5, 2022

## 2022-05-06 NOTE — PROGRESS NOTES
Problem: Falls - Risk of  Goal: *Absence of Falls  Description: Document Virgil Bernstein Fall Risk and appropriate interventions in the flowsheet. Outcome: Progressing Towards Goal  Note: Fall Risk Interventions:       Mentation Interventions: More frequent rounding    Medication Interventions: Teach patient to arise slowly    Problem: Psychosis  Goal: *STG: Remains safe in hospital  Outcome: Progressing Towards Goal     Problem: Psychosis  Goal: *STG: Decreased hallucinations  Outcome: Progressing Towards Goal       1900 to 0700: Report received from out going day shift RN. Assumed care of Patient. Patient is up in day room. Patient is cooperative alert and oriented and denies any S/I, H/I, anxiety and depression. Hourly rounds maintained during shift . Patient is resting quietly in bed as if sleep.  Patient slept about 7.5 hrs

## 2022-05-06 NOTE — PROGRESS NOTES
Bedside shift change report given to IRAM Rhodes (oncoming nurse) by Pacheco Ruiz RN (offgoing nurse). Report included the following information SBAR, Kardex, MAR, and Accordion. Assumed care of patient up ad ramin in day room. AO x 3 (disoriented to situation). Did not endorse pain, depression, SI, HI, AVH. Patient was calm and cooperative. Med/meal compliant. Uneventful shift. Present on unit. Patient keeps to herself not really interacting with peers or staff. Bedside shift change report given to Stanley Beatty RN (oncoming nurse) by IRAM Rhodes (offgoing nurse). Report included the following information SBAR, Kardex, MAR, Accordion, Recent Results and Med Rec Status. Problem: Psychosis  Goal: *STG: Decreased delusional thinking  Outcome: Progressing Towards Goal  Goal: *STG: Remains safe in hospital  Outcome: Progressing Towards Goal  Goal: *STG/LTG: Complies with medication therapy  Outcome: Progressing Towards Goal     Problem: General Medical Care Plan  Goal: *Vital signs within specified parameters  Outcome: Progressing Towards Goal     Problem: Falls - Risk of  Goal: *Absence of Falls  Description: Document Shraddha Fall Risk and appropriate interventions in the flowsheet.   Outcome: Progressing Towards Goal  Note: Fall Risk Interventions:       Mentation Interventions: More frequent rounding    Medication Interventions: Teach patient to arise slowly

## 2022-05-06 NOTE — GROUP NOTE
DORETHA  GROUP DOCUMENTATION INDIVIDUAL                                                                          Group Therapy Note    Date: 5/6/2022    Group Start Time: 1400  Group End Time: 1500  Group Topic: Recreational/Music Therapy    [facility] [place] [address] ACUTE BEHAV [facility]    [place], [address] [facility] GROUP DOCUMENTATION GROUP    Group Therapy Note    Attendees: 8       Attendance: Attended    Patient's Goal:  To concentrate on selected task    Interventions/techniques: Supported-crafts,games,music    Follows Directions:  Followed directions    Interactions: Interacted appropriately    Mental Status: Calm    Behavior/appearance: Attentive, Cooperative and Needed prompting    Goals Achieved: Able to engage in interactions and Able to listen to others -worked on selected task    Mickeal Lites

## 2022-05-06 NOTE — BH NOTES
Behavioral Health Treatment Team Note      Patient goal(s) for today: continue taking meds as prescribed, engage in unit activities, participate in hygiene/ADLs, prepare for discharge, follow directions from staff, contact support team, attend groups  Treatment team focus/goals: continue adjusting meds as needed, discharge planning, update support system     Progress note: Pt was seen by treatment team in her room. Pt remains isolative to her room, selectively mute but accepting medications. SW left a message for pt's son.  SW department to continue to coordinate discharge plans.     LOS:  15                    Expected LOS: TBD     Insurance info/prescription coverage:  [employer] [facility] [address]  Date of last family contact:  DOMI Sotelo ([phone])  Family requesting physician contact today: No  Discharge plan:  Return home   Guns in the home:  No   Outpatient provider(s):  Insurance  [phone]     Participating treatment team Sofi CARROLL, Dr. Kristen Castle

## 2022-05-07 PROCEDURE — 65220000003 HC RM SEMIPRIVATE PSYCH

## 2022-05-07 PROCEDURE — 74011250637 HC RX REV CODE- 250/637: Performed by: NURSE PRACTITIONER

## 2022-05-07 PROCEDURE — 74011250637 HC RX REV CODE- 250/637: Performed by: PSYCHIATRY & NEUROLOGY

## 2022-05-07 PROCEDURE — 99232 SBSQ HOSP IP/OBS MODERATE 35: CPT | Performed by: PSYCHIATRY & NEUROLOGY

## 2022-05-07 RX ORDER — HALOPERIDOL DECANOATE 100 MG/ML
100 INJECTION INTRAMUSCULAR ONCE
Status: COMPLETED | OUTPATIENT
Start: 2022-05-08 | End: 2022-05-08

## 2022-05-07 RX ORDER — HALOPERIDOL 2 MG/ML
12.5 SOLUTION ORAL 2 TIMES DAILY
Status: DISCONTINUED | OUTPATIENT
Start: 2022-05-08 | End: 2022-05-08

## 2022-05-07 RX ORDER — HALOPERIDOL 5 MG/ML
10 INJECTION INTRAMUSCULAR 2 TIMES DAILY
Status: DISCONTINUED | OUTPATIENT
Start: 2022-05-08 | End: 2022-05-08

## 2022-05-07 RX ADMIN — TRAZODONE HYDROCHLORIDE 50 MG: 50 TABLET ORAL at 21:26

## 2022-05-07 RX ADMIN — Medication 1 MG: at 07:47

## 2022-05-07 RX ADMIN — HALOPERIDOL 10 MG: 2 SOLUTION ORAL at 07:47

## 2022-05-07 RX ADMIN — HALOPERIDOL 10 MG: 2 SOLUTION ORAL at 16:45

## 2022-05-07 RX ADMIN — Medication 1 MG: at 17:00

## 2022-05-07 RX ADMIN — Medication 1 MG: at 11:50

## 2022-05-07 NOTE — PROGRESS NOTES
1930: Patient's care assumed with the change of shift report received from the outgoing nurse - Kush Schwarz RN. Patient received resting quietly in bed alert and disoriented to situation dull affect, calm and cooperative with assessment, patient denied SI, HI, AVH, pain, anxiety and depression. Patient remained isolative to room, snack compliant, no sign of behavioral or medical concerns observed. Patient observed asleep for 7.25 hours. Nurse's hourly rounding performed. Problem: Psychosis  Goal: *STG: Seeks staff when feelings of self harm or harm towards others arise  Outcome: Progressing Towards Goal     Problem: Falls - Risk of  Goal: *Absence of Falls  Description: Document Shraddha Fall Risk and appropriate interventions in the flowsheet.   Outcome: Progressing Towards Goal  Note: Fall Risk Interventions:       Mentation Interventions: More frequent rounding    Medication Interventions: Teach patient to arise slowly

## 2022-05-07 NOTE — PROGRESS NOTES
Bedside shift change report given to Kirby Bowman RN (oncoming nurse) by Chayito Trujillo RN (offgoing nurse). Report included the following information SBAR, Kardex, MAR, Accordion, and Recent Results. Assumed care of patient ambulating in hallway. AO x 3 (disoriented to situation). Did note endorse pain, depression, anxiety, SI, HI, AVH. Med/meal compliant. Patient in and out of room, difficulty sitting still. Patient has been keeping to herself, not really interacting with peers or staff unless spoken to. She took medications with ease. Bedside shift change report given to IRAM Franklin (oncoming nurse) by Kirby Bowman RN (offgoing nurse). Report included the following information SBAR, Kardex, MAR, Accordion, Recent Results and Med Rec Status. Problem: Psychosis  Goal: *STG: Decreased hallucinations  Outcome: Progressing Towards Goal  Goal: *STG: Remains safe in hospital  Outcome: Progressing Towards Goal  Goal: *STG/LTG: Complies with medication therapy  Outcome: Progressing Towards Goal     Problem: Falls - Risk of  Goal: *Absence of Falls  Description: Document Shraddha Fall Risk and appropriate interventions in the flowsheet.   Outcome: Progressing Towards Goal  Note: Fall Risk Interventions:       Mentation Interventions: Adequate sleep, hydration, pain control,Evaluate medications/consider consulting pharmacy,Increase mobility,More frequent rounding,Reorient patient,Room close to nurse's station,Update white board    Medication Interventions: Evaluate medications/consider consulting pharmacy

## 2022-05-08 PROCEDURE — 74011250636 HC RX REV CODE- 250/636: Performed by: PSYCHIATRY & NEUROLOGY

## 2022-05-08 PROCEDURE — 74011250637 HC RX REV CODE- 250/637: Performed by: NURSE PRACTITIONER

## 2022-05-08 PROCEDURE — 65220000003 HC RM SEMIPRIVATE PSYCH

## 2022-05-08 PROCEDURE — 74011250637 HC RX REV CODE- 250/637: Performed by: PSYCHIATRY & NEUROLOGY

## 2022-05-08 PROCEDURE — 99232 SBSQ HOSP IP/OBS MODERATE 35: CPT | Performed by: PSYCHIATRY & NEUROLOGY

## 2022-05-08 RX ORDER — HALOPERIDOL 5 MG/ML
10 INJECTION INTRAMUSCULAR 2 TIMES DAILY
Status: DISCONTINUED | OUTPATIENT
Start: 2022-05-09 | End: 2022-05-10

## 2022-05-08 RX ORDER — HALOPERIDOL 2 MG/ML
15 SOLUTION ORAL 2 TIMES DAILY
Status: DISCONTINUED | OUTPATIENT
Start: 2022-05-09 | End: 2022-05-10

## 2022-05-08 RX ADMIN — HALOPERIDOL 12.5 MG: 2 SOLUTION ORAL at 07:21

## 2022-05-08 RX ADMIN — Medication 1 MG: at 07:22

## 2022-05-08 RX ADMIN — Medication 1 MG: at 11:23

## 2022-05-08 RX ADMIN — Medication 1 MG: at 16:39

## 2022-05-08 RX ADMIN — HALOPERIDOL DECANOATE 100 MG: 100 INJECTION INTRAMUSCULAR at 08:02

## 2022-05-08 RX ADMIN — HALOPERIDOL 12.5 MG: 2 SOLUTION ORAL at 16:39

## 2022-05-08 RX ADMIN — TRAZODONE HYDROCHLORIDE 50 MG: 50 TABLET ORAL at 21:21

## 2022-05-08 NOTE — PROGRESS NOTES
Bedside shift change report given to Bethel Simmons RN (oncoming nurse) by Jo Richey RN (offgoing nurse). Report included the following information SBAR, Kardex, Intake/Output, MAR, Accordion, and Recent Results. Assumed care of patient resting quietly in bed. AO x 3 (disoriented to situation). Did not endorse pain, depression, anxiety, SI, HI, AVH. Took medications without issue. IM haldol decanoate given. Isolative to room, withdrawn. Problem: Psychosis  Goal: *STG: Decreased hallucinations  Outcome: Progressing Towards Goal  Goal: *STG: Remains safe in hospital  Outcome: Progressing Towards Goal     Problem: Falls - Risk of  Goal: *Absence of Falls  Description: Document Shraddha Fall Risk and appropriate interventions in the flowsheet.   Outcome: Progressing Towards Goal  Note: Fall Risk Interventions:       Mentation Interventions: Adequate sleep, hydration, pain control,Door open when patient unattended,More frequent rounding,Room close to nurse's station    Medication Interventions: Evaluate medications/consider consulting pharmacy,Teach patient to arise slowly

## 2022-05-08 NOTE — BH NOTES
Psychiatric Progress Note    Patient: Clarita Vlilaseñor MRN: [id number]  SSN: [id number]    YOB: 1960  Age: 57 y.o. Sex: female      Admit Date: 4/20/2022    LOS: 16 days     Subjective:     Clarita Villaseñor  is selectively mute per staff and moods are depressed. She opened an eye then closed it again with no further correspondence. No prns given. No aggression or violence. Inappropriately interactive and poorly aware. Tolerating court ordered medications well. Eating ok and sleeping fairly (7hrs) . 4/24 - Clarita Villaseñor moved her hands to my voices but refused to converse with me. Selective with nursing also. She responded appropriately when breakfast was called only. Moods are down. No aggression or violence. Selectively interactive. Limited awareness. Refusing medications. Eating and sleeping fairly. 04/25 - the patient slept 8 hours overnight. She continues to refuse medications and requires much prompting to participate in ADLs. No agitation noted today, and patient got no PRNs for aggression x24 hours. She appears catatonic at times, but will react to direct questions. Patient getting IM injections of Haldol for psychosis by court order as she refuses all oral agents. On interview she says nothing and lies in bed.     04/26 - no active overnight events. Patient has been mute, she ate breakfast this morning and was noted to say only 'no, no, no' regarding court ordered IM medication after refusing PO agent. Patient has been isolative to her room with a flat affect and little spontaneous speech. She slept 8 hours overnight and got no other PRNs for agitation. Patient has not been communicative for much of the day. 04/27 - the patient continues to refuse vitals and medications; she is disorganized and with no significant change in her mental state. Patient discharge focused, slept 8 hours and got no PRNs for agitation.  She has been in bed since getting court ordered IM after refusing PO agents. Per , son is able to visit this week. She remains mute on interview and does not engage MD and treatment team.    04/28 - there has been no significant improvement in the patient's mental state. She remains mute, isolative, and refusing all medication. Patient slept 9+ hours, got Haldol and Ativan injections due to ongoing disorganization of thought and catatonia. On interview, she states only 'leave me alone' and is incoherent. Per , son will come visit to try to help reconstitute the patient, stating she may respond to a familiar face and that she remains far from her baseline. 04/29 - the patient is little improved. She remains guarded and mute, catatonic throughout the day but up this morning when she refused medications and got IM injections via court order. She has little insight concerning the admission but slept 9 hours overnight. Patient unable to make her needs known, she speaks sparingly about her immediate desires but cannot provide any abstract reasoning regarding her refusal of medication or her treatment course thus far. Patient will be visited by her son this afternoon per .    04/30- the patient refuses to speak with treatment team this morning. States \"I dont want to talk\"  Patient refuses to answer questions. She continues to refuse medications,has court order medications requiring IM injections. Remains isolative to her room, sleeping most of the day. 05/01- \" I want to go home\"  Patient seen this morning in her room where she is sitting up in the bed eating pudding. She was willing to take PO medication this morning with some encouragement, after explaining that she will not be able to go home until she is not receiving IM injections. She remains isolative to her room and in bed most of the day. 05/02 - overnight, the patient remained isolative and disorganized.  She has been refusing medications and getting IM court ordered alternative. Patient was out of bed for breakfast this morning but otherwise has been in her bed for much of the day. Patient took PO medication with much encouragement from unit staff. She slept 8.25 hours overnight. She remains internally preoccupied and does not speak with MD on interview. 05/03 - the patient remains disorganized and has been isolative to her room. She was noted to spit out her oral solution medication and was thus given IM injection per court order. The patient has been out for meals at times but not interacting in any appreciable way with staff or peers. She slept 9 hours overnight and got no PRNs for agitation. On interview looks at MD but does not speak. 05/04 - the patient is medication compliant, but still mute for much of the day. She is taking court ordered medications without issue today and slept 9 hours overnight. Patient internally preoccupied, eating meals in her room and isolative for much of the day. She denies any concerns but does not respond appropriately to direct questions. 05/05 - overnight, the patient remained flat and was fairly uncooperative with assessments. She was noted to be internally preoccupied and slept 9 hours overnight. She doesn't speak with the MD on interview and has little insight into her condition. Per nurse report, the patient has been trying to avoid taking PO medication and must be supervised. 05/06 - the patient denies all, she remains disorganized and with no significant improvement in her mental state per nurse report. Patient has been medication compliant and got no PRNs for agitation. She is compliant with court ordered medication and speaks non-spontaneously. Patient discharge focused, unable to make her needs known.     Objective:     Vitals:    05/06/22 0729 05/06/22 2009 05/07/22 0723 05/07/22 2009   BP: 98/62 106/65 123/71 (!) 88/46   Pulse: 69 77 70 72   Resp: 18 16 18 16   Temp: 97.6 °F (36.4 °C) 98.3 °F (36.8 °C) 97.8 °F (36.6 °C) 98.1 °F (36.7 °C)   SpO2: 99% 99% 99% 98%   Weight:       Height:            Mental Status Exam:   Sensorium  not oriented to situation   Relations uncooperative   Eye Contact poor   Appearance:  age appropriate   Speech:  mute   Thought Process: blocked   Thought Content internally preoccupied    Suicidal ideations none   Mood:  dysphoric   Affect:  constricted   Memory   N/A   Concentration:  adequate   Insight:  poor   Judgment:  poor       MEDICATIONS:  Current Facility-Administered Medications   Medication Dose Route Frequency    haloperidol lactate (HALDOL) injection 10 mg +++COURT ORDERED MEDICATION FOR REFUSAL OF ORAL HALOPERIDOL+++  10 mg IntraMUSCular BID    Or    haloperidol (HALDOL) 2 mg/mL oral solution 10 mg  10 mg Oral BID    ondansetron (ZOFRAN ODT) tablet 4 mg  4 mg Oral Q6H PRN    LORazepam (INTENSOL) 2 mg/mL oral concentrate 1 mg  1 mg Oral TID    Or    LORazepam (ATIVAN) injection 1 mg ++COURT ORDERED MEDICATION FOR REFUSAL OF ORAL LORAZEPAM++  1 mg IntraMUSCular TID    albuterol (PROVENTIL HFA, VENTOLIN HFA, PROAIR HFA) inhaler 2 Puff  2 Puff Inhalation TID PRN    OLANZapine (ZyPREXA) tablet 5 mg  5 mg Oral Q6H PRN    haloperidol lactate (HALDOL) injection 5 mg  5 mg IntraMUSCular Q6H PRN    benztropine (COGENTIN) tablet 1 mg  1 mg Oral BID PRN    diphenhydrAMINE (BENADRYL) injection 50 mg  50 mg IntraMUSCular BID PRN    hydrOXYzine HCL (ATARAX) tablet 50 mg  50 mg Oral TID PRN    LORazepam (ATIVAN) injection 1 mg  1 mg IntraMUSCular Q4H PRN    traZODone (DESYREL) tablet 50 mg  50 mg Oral QHS PRN    acetaminophen (TYLENOL) tablet 650 mg  650 mg Oral Q4H PRN    magnesium hydroxide (MILK OF MAGNESIA) 400 mg/5 mL oral suspension 30 mL  30 mL Oral DAILY PRN    nicotine (NICODERM CQ) 21 mg/24 hr patch 1 Patch  1 Patch TransDERmal DAILY PRN      DISCUSSION:   the risks and benefits of the proposed medication  patient given opportunity to ask questions    Lab/Data Review:   All lab results for the last 24 hours reviewed. No results found for this or any previous visit (from the past 24 hour(s)). Assessment:     Principal Problem:    Schizoaffective disorder ([facility] [address] (3/30/2018)     Assessment: the patient presents with gerardo psychosis in the setting of medication non-compliance x1.5 weeks. She is unable to make her needs known and vacillates between agitation and near catatonia. Will obtain court order for psychiatric treatment, observe and treat symptomatically. Plan:      Plan:   COURT ORDERED MEDICATION:  - CONTINUE Haldol oral soln 10 mg PO *OR* IM BID for psychosis  - SCHEDULE Haldol decanoate prior to discharge  - CONTINUE Ativan 1 mg PO *OR* IM TID for catatonia     - Consider ECT if self care isn't improving  - IGM therapy as tolerated  - Expand database / obtain collateral  - Dispo planning    I certify that this patient's inpatient psychiatric hospital services furnished since the previous certification were, and continue to be, required for treatment that could reasonably be expected to improve the patient's condition, or for diagnostic study, and that the patient continues to need, on a daily basis, active treatment furnished directly by or requiring the supervision of inpatient psychiatric facility personnel. In addition, the hospital records show that services furnished were intensive treatment services, admission or related services, or equivalent services.     Signed By: Byron Jimenez MD     May 7, 2022

## 2022-05-08 NOTE — BH NOTES
Psychiatric Progress Note    Patient: Lawrence Miller MRN: [id number]  SSN: [id number]    YOB: 1960  Age: 57 y.o. Sex: female      Admit Date: 4/20/2022    LOS: 16 days     Subjective:     Lawrence Miller  is selectively mute per staff and moods are depressed. She opened an eye then closed it again with no further correspondence. No prns given. No aggression or violence. Inappropriately interactive and poorly aware. Tolerating court ordered medications well. Eating ok and sleeping fairly (7hrs) . 4/24 - Lawrence Miller moved her hands to my voices but refused to converse with me. Selective with nursing also. She responded appropriately when breakfast was called only. Moods are down. No aggression or violence. Selectively interactive. Limited awareness. Refusing medications. Eating and sleeping fairly. 04/25 - the patient slept 8 hours overnight. She continues to refuse medications and requires much prompting to participate in ADLs. No agitation noted today, and patient got no PRNs for aggression x24 hours. She appears catatonic at times, but will react to direct questions. Patient getting IM injections of Haldol for psychosis by court order as she refuses all oral agents. On interview she says nothing and lies in bed.     04/26 - no active overnight events. Patient has been mute, she ate breakfast this morning and was noted to say only 'no, no, no' regarding court ordered IM medication after refusing PO agent. Patient has been isolative to her room with a flat affect and little spontaneous speech. She slept 8 hours overnight and got no other PRNs for agitation. Patient has not been communicative for much of the day. 04/27 - the patient continues to refuse vitals and medications; she is disorganized and with no significant change in her mental state. Patient discharge focused, slept 8 hours and got no PRNs for agitation.  She has been in bed since getting court ordered IM after refusing PO agents. Per , son is able to visit this week. She remains mute on interview and does not engage MD and treatment team.    04/28 - there has been no significant improvement in the patient's mental state. She remains mute, isolative, and refusing all medication. Patient slept 9+ hours, got Haldol and Ativan injections due to ongoing disorganization of thought and catatonia. On interview, she states only 'leave me alone' and is incoherent. Per , son will come visit to try to help reconstitute the patient, stating she may respond to a familiar face and that she remains far from her baseline. 04/29 - the patient is little improved. She remains guarded and mute, catatonic throughout the day but up this morning when she refused medications and got IM injections via court order. She has little insight concerning the admission but slept 9 hours overnight. Patient unable to make her needs known, she speaks sparingly about her immediate desires but cannot provide any abstract reasoning regarding her refusal of medication or her treatment course thus far. Patient will be visited by her son this afternoon per .    04/30- the patient refuses to speak with treatment team this morning. States \"I dont want to talk\"  Patient refuses to answer questions. She continues to refuse medications,has court order medications requiring IM injections. Remains isolative to her room, sleeping most of the day. 05/01- \" I want to go home\"  Patient seen this morning in her room where she is sitting up in the bed eating pudding. She was willing to take PO medication this morning with some encouragement, after explaining that she will not be able to go home until she is not receiving IM injections. She remains isolative to her room and in bed most of the day. 05/02 - overnight, the patient remained isolative and disorganized.  She has been refusing medications and getting IM court ordered alternative. Patient was out of bed for breakfast this morning but otherwise has been in her bed for much of the day. Patient took PO medication with much encouragement from unit staff. She slept 8.25 hours overnight. She remains internally preoccupied and does not speak with MD on interview. 05/03 - the patient remains disorganized and has been isolative to her room. She was noted to spit out her oral solution medication and was thus given IM injection per court order. The patient has been out for meals at times but not interacting in any appreciable way with staff or peers. She slept 9 hours overnight and got no PRNs for agitation. On interview looks at MD but does not speak. 05/04 - the patient is medication compliant, but still mute for much of the day. She is taking court ordered medications without issue today and slept 9 hours overnight. Patient internally preoccupied, eating meals in her room and isolative for much of the day. She denies any concerns but does not respond appropriately to direct questions. 05/05 - overnight, the patient remained flat and was fairly uncooperative with assessments. She was noted to be internally preoccupied and slept 9 hours overnight. She doesn't speak with the MD on interview and has little insight into her condition. Per nurse report, the patient has been trying to avoid taking PO medication and must be supervised. 05/06 - the patient denies all, she remains disorganized and with no significant improvement in her mental state per nurse report. Patient has been medication compliant and got no PRNs for agitation. She is compliant with court ordered medication and speaks non-spontaneously. Patient discharge focused, unable to make her needs known. 05/07 - the patient slept 7.25 hours overnight; she denies all symptoms but per nurse appeared restless and was pacing the unit. Patient denies SI/HI/AVH/PI.  She is markedly flat with no improvement in her thought process.     Objective:     Vitals:    05/06/22 0729 05/06/22 2009 05/07/22 0723 05/07/22 2009   BP: 98/62 106/65 123/71 (!) 88/46   Pulse: 69 77 70 72   Resp: 18 16 18 16   Temp: 97.6 °F (36.4 °C) 98.3 °F (36.8 °C) 97.8 °F (36.6 °C) 98.1 °F (36.7 °C)   SpO2: 99% 99% 99% 98%   Weight:       Height:            Mental Status Exam:   Sensorium  not oriented to situation   Relations uncooperative   Eye Contact poor   Appearance:  age appropriate   Speech:  mute   Thought Process: blocked   Thought Content internally preoccupied    Suicidal ideations none   Mood:  dysphoric   Affect:  constricted   Memory   N/A   Concentration:  adequate   Insight:  poor   Judgment:  poor       MEDICATIONS:  Current Facility-Administered Medications   Medication Dose Route Frequency    haloperidol lactate (HALDOL) injection 10 mg +++COURT ORDERED MEDICATION FOR REFUSAL OF ORAL HALOPERIDOL+++  10 mg IntraMUSCular BID    Or    haloperidol (HALDOL) 2 mg/mL oral solution 10 mg  10 mg Oral BID    ondansetron (ZOFRAN ODT) tablet 4 mg  4 mg Oral Q6H PRN    LORazepam (INTENSOL) 2 mg/mL oral concentrate 1 mg  1 mg Oral TID    Or    LORazepam (ATIVAN) injection 1 mg ++COURT ORDERED MEDICATION FOR REFUSAL OF ORAL LORAZEPAM++  1 mg IntraMUSCular TID    albuterol (PROVENTIL HFA, VENTOLIN HFA, PROAIR HFA) inhaler 2 Puff  2 Puff Inhalation TID PRN    OLANZapine (ZyPREXA) tablet 5 mg  5 mg Oral Q6H PRN    haloperidol lactate (HALDOL) injection 5 mg  5 mg IntraMUSCular Q6H PRN    benztropine (COGENTIN) tablet 1 mg  1 mg Oral BID PRN    diphenhydrAMINE (BENADRYL) injection 50 mg  50 mg IntraMUSCular BID PRN    hydrOXYzine HCL (ATARAX) tablet 50 mg  50 mg Oral TID PRN    LORazepam (ATIVAN) injection 1 mg  1 mg IntraMUSCular Q4H PRN    traZODone (DESYREL) tablet 50 mg  50 mg Oral QHS PRN    acetaminophen (TYLENOL) tablet 650 mg  650 mg Oral Q4H PRN    magnesium hydroxide (MILK OF MAGNESIA) 400 mg/5 mL oral suspension 30 mL  30 mL Oral DAILY PRN    nicotine (NICODERM CQ) 21 mg/24 hr patch 1 Patch  1 Patch TransDERmal DAILY PRN      DISCUSSION:   the risks and benefits of the proposed medication  patient given opportunity to ask questions    Lab/Data Review: All lab results for the last 24 hours reviewed. No results found for this or any previous visit (from the past 24 hour(s)). Assessment:     Principal Problem:    Schizoaffective disorder ([facility] [address] (3/30/2018)     Assessment: the patient presents with gerardo psychosis in the setting of medication non-compliance x1.5 weeks. She is unable to make her needs known and vacillates between agitation and near catatonia. Will obtain court order for psychiatric treatment, observe and treat symptomatically. Plan:      Plan:   COURT ORDERED MEDICATION:  - INCREASE Haldol oral soln to 12.5 mg PO *OR* 10 mg IM BID for psychosis  - SCHEDULE Haldol decanoate 100 mg ONCE and 200 mg on day 4 for psychosis [place]  - CONTINUE Ativan 1 mg PO *OR* IM TID for catatonia     - Consider ECT if self care isn't improving  - IGM therapy as tolerated  - Expand database / obtain collateral  - Dispo planning    I certify that this patient's inpatient psychiatric hospital services furnished since the previous certification were, and continue to be, required for treatment that could reasonably be expected to improve the patient's condition, or for diagnostic study, and that the patient continues to need, on a daily basis, active treatment furnished directly by or requiring the supervision of inpatient psychiatric facility personnel. In addition, the hospital records show that services furnished were intensive treatment services, admission or related services, or equivalent services.     Signed By: Kev Saavedra MD     May 7, 2022 ankle screws

## 2022-05-08 NOTE — PROGRESS NOTES
Patient resting in bed during transition of care. Patient presented as alert, however withdrawn and isolative. Patient oriented to self, place and situations. Patient reported no immediate concerns, no obvious signs of medical or psychiatric distress observed. Patient denied any symptoms of depression or anxiety. Patient denied hallucinations, S/I or H/I. Patient was given PRN HS Trazodone. Patient remained isolative to her room throughout the evening/night. Patent observed resting/sleeping in bed throughout the night. Hourly rounds completed. Staff will continue to assess and monitor. Patient slept for 8+ hours.        Problem: Psychosis  Goal: *STG: Remains safe in hospital  Outcome: Progressing Towards Goal  Goal: *STG: Seeks staff when feelings of self harm or harm towards others arise  Outcome: Progressing Towards Goal

## 2022-05-08 NOTE — PROGRESS NOTES
Behavioral Services                                              Medicare Re-Certification    I certify that the inpatient psychiatric hospital services furnished since the previous certification/re-certification were, and continue to be, medically necessary for;    [x] (1) Treatment which could reasonably be expected to improve the patient's condition,    [x] (2) Or for diagnostic study. Estimated length of stay/service 5-7 days    Plan for post-hospital care home    This patient continues to need, on a daily basis, active treatment furnished directly by or requiring the supervision of inpatient psychiatric personnel.     Electronically signed by Michaela Murphy MD on 5/7/2022 at 8:50 PM

## 2022-05-09 PROCEDURE — 65220000003 HC RM SEMIPRIVATE PSYCH

## 2022-05-09 PROCEDURE — 99232 SBSQ HOSP IP/OBS MODERATE 35: CPT | Performed by: PSYCHIATRY & NEUROLOGY

## 2022-05-09 PROCEDURE — 74011250637 HC RX REV CODE- 250/637: Performed by: NURSE PRACTITIONER

## 2022-05-09 PROCEDURE — 74011250637 HC RX REV CODE- 250/637: Performed by: PSYCHIATRY & NEUROLOGY

## 2022-05-09 RX ADMIN — HALOPERIDOL 15 MG: 2 SOLUTION ORAL at 08:37

## 2022-05-09 RX ADMIN — HYDROXYZINE HYDROCHLORIDE 50 MG: 25 TABLET, FILM COATED ORAL at 21:17

## 2022-05-09 RX ADMIN — HALOPERIDOL 15 MG: 2 SOLUTION ORAL at 17:42

## 2022-05-09 RX ADMIN — Medication 1 MG: at 11:40

## 2022-05-09 RX ADMIN — TRAZODONE HYDROCHLORIDE 50 MG: 50 TABLET ORAL at 21:17

## 2022-05-09 RX ADMIN — Medication 1 MG: at 08:39

## 2022-05-09 RX ADMIN — Medication 1 MG: at 17:42

## 2022-05-09 NOTE — GROUP NOTE
DORET[facility]  GROUP DOCUMENTATION INDIVIDUAL                                                                          Group Therapy Note    Date: 5/9/2022    Group Start Time: 1400  Group End Time: 1500  Group Topic: Recreational/Music Therapy    [facility] [place] [id number] ACUTE BEHAV [facility]    [place], [address] [facility] GROUP DOCUMENTATION GROUP    Group Therapy Note    Attendees: 7         Attendance: Attended    Patient's Goal:  To concentrate on selected task    Interventions/techniques: Supported-crafts,games,music    Follows Directions:  Followed directions    Interactions: Interacted appropriately    Mental Status: Calm    Behavior/appearance: Attentive, Cooperative and Needed prompting    Goals Achieved: Able to engage in interactions and Able to listen to others-listened to music    Leedey Layer

## 2022-05-09 NOTE — GROUP NOTE
DORETHA  GROUP DOCUMENTATION INDIVIDUAL                                                                          Group Therapy Note    Date: 5/9/2022    Group Start Time: 1000  Group End Time: 1100  Group Topic: Topic Group    [facility] [place] [address] ACUTE BEHAV [facility]    [facility] GROUP    Group Therapy Note    Attendees: 8         Attendance: Did not attend    Patient's Goal:      Interventions/techniques: Jannet Mccoy

## 2022-05-09 NOTE — BH NOTES
0800 pt is resting in room. VSS.     0900 pt ae breakfast. Isolative to self. Pt is accepting meals an medications. Pt denies si/hi/a/v [place]. Delayed response. Soft voice. Pt is encouraged to attend groups. 1000 pt is resting in room. 1100 pt is resting in room. 1200 pt ate lunch. Visible on the unit. 1300 pt is resting in room. 1400 pt is visible on the unit. 1500 pt is visible on the unit. 1600 pt is visible on the unit. 1700 pt ate dinner. Visible on th unit. 1800 pt is resting in room.

## 2022-05-09 NOTE — BH NOTES
Psychiatric Progress Note    Patient: Fede Pacheco MRN: [id number]  SSN: [id number]    YOB: 1960  Age: 57 y.o. Sex: female      Admit Date: 4/20/2022    LOS: 18 days     Subjective:     Fede Pacheco  is selectively mute per staff and moods are depressed. She opened an eye then closed it again with no further correspondence. No prns given. No aggression or violence. Inappropriately interactive and poorly aware. Tolerating court ordered medications well. Eating ok and sleeping fairly (7hrs) . 4/24 - Fede Pacheco moved her hands to my voices but refused to converse with me. Selective with nursing also. She responded appropriately when breakfast was called only. Moods are down. No aggression or violence. Selectively interactive. Limited awareness. Refusing medications. Eating and sleeping fairly. 04/25 - the patient slept 8 hours overnight. She continues to refuse medications and requires much prompting to participate in ADLs. No agitation noted today, and patient got no PRNs for aggression x24 hours. She appears catatonic at times, but will react to direct questions. Patient getting IM injections of Haldol for psychosis by court order as she refuses all oral agents. On interview she says nothing and lies in bed.     04/26 - no active overnight events. Patient has been mute, she ate breakfast this morning and was noted to say only 'no, no, no' regarding court ordered IM medication after refusing PO agent. Patient has been isolative to her room with a flat affect and little spontaneous speech. She slept 8 hours overnight and got no other PRNs for agitation. Patient has not been communicative for much of the day. 04/27 - the patient continues to refuse vitals and medications; she is disorganized and with no significant change in her mental state. Patient discharge focused, slept 8 hours and got no PRNs for agitation.  She has been in bed since getting court ordered IM after refusing PO agents. Per , son is able to visit this week. She remains mute on interview and does not engage MD and treatment team.    04/28 - there has been no significant improvement in the patient's mental state. She remains mute, isolative, and refusing all medication. Patient slept 9+ hours, got Haldol and Ativan injections due to ongoing disorganization of thought and catatonia. On interview, she states only 'leave me alone' and is incoherent. Per , son will come visit to try to help reconstitute the patient, stating she may respond to a familiar face and that she remains far from her baseline. 04/29 - the patient is little improved. She remains guarded and mute, catatonic throughout the day but up this morning when she refused medications and got IM injections via court order. She has little insight concerning the admission but slept 9 hours overnight. Patient unable to make her needs known, she speaks sparingly about her immediate desires but cannot provide any abstract reasoning regarding her refusal of medication or her treatment course thus far. Patient will be visited by her son this afternoon per .    04/30- the patient refuses to speak with treatment team this morning. States \"I dont want to talk\"  Patient refuses to answer questions. She continues to refuse medications,has court order medications requiring IM injections. Remains isolative to her room, sleeping most of the day. 05/01- \" I want to go home\"  Patient seen this morning in her room where she is sitting up in the bed eating pudding. She was willing to take PO medication this morning with some encouragement, after explaining that she will not be able to go home until she is not receiving IM injections. She remains isolative to her room and in bed most of the day. 05/02 - overnight, the patient remained isolative and disorganized.  She has been refusing medications and getting IM court ordered alternative. Patient was out of bed for breakfast this morning but otherwise has been in her bed for much of the day. Patient took PO medication with much encouragement from unit staff. She slept 8.25 hours overnight. She remains internally preoccupied and does not speak with MD on interview. 05/03 - the patient remains disorganized and has been isolative to her room. She was noted to spit out her oral solution medication and was thus given IM injection per court order. The patient has been out for meals at times but not interacting in any appreciable way with staff or peers. She slept 9 hours overnight and got no PRNs for agitation. On interview looks at MD but does not speak. 05/04 - the patient is medication compliant, but still mute for much of the day. She is taking court ordered medications without issue today and slept 9 hours overnight. Patient internally preoccupied, eating meals in her room and isolative for much of the day. She denies any concerns but does not respond appropriately to direct questions. 05/05 - overnight, the patient remained flat and was fairly uncooperative with assessments. She was noted to be internally preoccupied and slept 9 hours overnight. She doesn't speak with the MD on interview and has little insight into her condition. Per nurse report, the patient has been trying to avoid taking PO medication and must be supervised. 05/06 - the patient denies all, she remains disorganized and with no significant improvement in her mental state per nurse report. Patient has been medication compliant and got no PRNs for agitation. She is compliant with court ordered medication and speaks non-spontaneously. Patient discharge focused, unable to make her needs known. 05/07 - the patient slept 7.25 hours overnight; she denies all symptoms but per nurse appeared restless and was pacing the unit. Patient denies SI/HI/AVH/PI.  She is markedly flat with no improvement in her thought process. 05/08 - the patient slept 8 hours overnight, she got Trazodone PRN and received the Haldol decanoate injection without incident. Patient remains isolative to her room but in fair behavioral control. She is disorganized on interview and muted but denies SI/HI/AVH/PI. Patient has not been visible for much of today but is compliant with court ordered treatment. 05/09 - the patient remains isolative to her room, out for meals with much encouragement and compliant with court ordered treatment. BP is improved and she is able to make her needs known. She got PRN Trazodone and has been in fair behavioral control, though she remains markedly flat with no improvement in her thought process. Of note, patient is asked if she would like her son to visit and repeatedly exclaims that she has no son.      Objective:     Vitals:    05/07/22 2009 05/08/22 0756 05/08/22 2003 05/09/22 0752   BP: (!) 88/46 108/63 118/77 103/70   Pulse: 72 64 87 70   Resp: 16 16 16 18   Temp: 98.1 °F (36.7 °C) 97.7 °F (36.5 °C) 98.4 °F (36.9 °C) 98 °F (36.7 °C)   SpO2: 98% 98% 98% 98%   Weight:       Height:            Mental Status Exam:   Sensorium  not oriented to situation   Relations uncooperative   Eye Contact poor   Appearance:  age appropriate   Speech:  mute   Thought Process: blocked   Thought Content internally preoccupied    Suicidal ideations none   Mood:  dysphoric   Affect:  constricted   Memory   N/A   Concentration:  adequate   Insight:  poor   Judgment:  poor       MEDICATIONS:  Current Facility-Administered Medications   Medication Dose Route Frequency    haloperidol (HALDOL) 2 mg/mL oral solution 15 mg  15 mg Oral BID    Or    haloperidol lactate (HALDOL) injection 10 mg  10 mg IntraMUSCular BID    ondansetron (ZOFRAN ODT) tablet 4 mg  4 mg Oral Q6H PRN    LORazepam (INTENSOL) 2 mg/mL oral concentrate 1 mg  1 mg Oral TID    Or    LORazepam (ATIVAN) injection 1 mg ++COURT ORDERED MEDICATION FOR REFUSAL OF ORAL LORAZEPAM++  1 mg IntraMUSCular TID    albuterol (PROVENTIL HFA, VENTOLIN HFA, PROAIR HFA) inhaler 2 Puff  2 Puff Inhalation TID PRN    OLANZapine (ZyPREXA) tablet 5 mg  5 mg Oral Q6H PRN    haloperidol lactate (HALDOL) injection 5 mg  5 mg IntraMUSCular Q6H PRN    benztropine (COGENTIN) tablet 1 mg  1 mg Oral BID PRN    diphenhydrAMINE (BENADRYL) injection 50 mg  50 mg IntraMUSCular BID PRN    hydrOXYzine HCL (ATARAX) tablet 50 mg  50 mg Oral TID PRN    LORazepam (ATIVAN) injection 1 mg  1 mg IntraMUSCular Q4H PRN    traZODone (DESYREL) tablet 50 mg  50 mg Oral QHS PRN    acetaminophen (TYLENOL) tablet 650 mg  650 mg Oral Q4H PRN    magnesium hydroxide (MILK OF MAGNESIA) 400 mg/5 mL oral suspension 30 mL  30 mL Oral DAILY PRN    nicotine (NICODERM CQ) 21 mg/24 hr patch 1 Patch  1 Patch TransDERmal DAILY PRN      DISCUSSION:   the risks and benefits of the proposed medication  patient given opportunity to ask questions    Lab/Data Review: All lab results for the last 24 hours reviewed. No results found for this or any previous visit (from the past 24 hour(s)). Assessment:     Principal Problem:    Schizoaffective disorder ([facility] [address] (3/30/2018)     Assessment: the patient presents with gerardo psychosis in the setting of medication non-compliance x1.5 weeks. She is unable to make her needs known and vacillates between agitation and near catatonia. Will obtain court order for psychiatric treatment, observe and treat symptomatically.       Plan:      Plan:   COURT ORDERED MEDICATION:  - CONTINUE Haldol oral soln 15 mg PO *OR* 10 mg IM BID for psychosis  - SCHEDULE Haldol decanoate 100 mg ONCE and 200 mg on day 4 for psychosis WATERMAN  - CONTINUE Ativan 1 mg PO *OR* IM TID for catatonia  - ECT by court order pending referral  - IGM therapy as tolerated  - Expand database / obtain collateral  - Dispo planning    I certify that this patient's inpatient psychiatric hospital services furnished since the previous certification were, and continue to be, required for treatment that could reasonably be expected to improve the patient's condition, or for diagnostic study, and that the patient continues to need, on a daily basis, active treatment furnished directly by or requiring the supervision of inpatient psychiatric facility personnel. In addition, the hospital records show that services furnished were intensive treatment services, admission or related services, or equivalent services.     Signed By: Peewee Villatoro MD     May 9, 2022

## 2022-05-09 NOTE — PROGRESS NOTES
Patient resting/sleeping in bed during transition of care. Patient presented as isolative and withdrawn. Patient oriented to self, place and situations only. Patient reported no immediate needs. Patient displayed no obvious signs of medical or psychiatric distress. Patient denied any symptoms of depression or anxiety. Patient denied hallucinations, S/I or H/I. Patient requested and was given PRN HS Trazodone. Patient remained isolative to her room during the shift. Patent observed resting in bed throughout the night. Safety rounds completed. Staff will continue to assess and monitor. Patient slept for 8+ hours.      Problem: Psychosis  Goal: *STG: Decreased delusional thinking  Outcome: Progressing Towards Goal  Goal: *STG: Remains safe in hospital  Outcome: Progressing Towards Goal  Goal: *STG: Seeks staff when feelings of self harm or harm towards others arise  Outcome: Progressing Towards Goal

## 2022-05-09 NOTE — PROGRESS NOTES
Problem: Psychosis  Goal: *STG: Decreased hallucinations  Outcome: Progressing Towards Goal  Goal: *STG: Decreased delusional thinking  Outcome: Progressing Towards Goal  Goal: *STG: Remains safe in hospital  Outcome: Progressing Towards Goal  Goal: *STG: Seeks staff when feelings of self harm or harm towards others arise  Outcome: Progressing Towards Goal  Goal: *STG: Patient will verbalize areas in need of boundary recognition and limit setting  Outcome: Progressing Towards Goal  Goal: *STG: Patient will develop strategies to regulate emotions and corresponding behaviors  Outcome: Progressing Towards Goal  Goal: *STG: Accept constructive criticism without injury or isolation  Outcome: Progressing Towards Goal     Problem: Discharge Planning  Goal: *Discharge to safe environment  Outcome: Progressing Towards Goal  Goal: *Knowledge of medication management  Outcome: Progressing Towards Goal  Goal: *Knowledge of discharge instructions  Outcome: Progressing Towards Goal

## 2022-05-09 NOTE — BH NOTES
Psychiatric Progress Note    Patient: Kristal Ramirez MRN: [id number]  SSN: [id number]    YOB: 1960  Age: 57 y.o. Sex: female      Admit Date: 4/20/2022    LOS: 17 days     Subjective:     Kritsal Ramirez  is selectively mute per staff and moods are depressed. She opened an eye then closed it again with no further correspondence. No prns given. No aggression or violence. Inappropriately interactive and poorly aware. Tolerating court ordered medications well. Eating ok and sleeping fairly (7hrs) . 4/24 - Kristal Ramirez moved her hands to my voices but refused to converse with me. Selective with nursing also. She responded appropriately when breakfast was called only. Moods are down. No aggression or violence. Selectively interactive. Limited awareness. Refusing medications. Eating and sleeping fairly. 04/25 - the patient slept 8 hours overnight. She continues to refuse medications and requires much prompting to participate in ADLs. No agitation noted today, and patient got no PRNs for aggression x24 hours. She appears catatonic at times, but will react to direct questions. Patient getting IM injections of Haldol for psychosis by court order as she refuses all oral agents. On interview she says nothing and lies in bed.     04/26 - no active overnight events. Patient has been mute, she ate breakfast this morning and was noted to say only 'no, no, no' regarding court ordered IM medication after refusing PO agent. Patient has been isolative to her room with a flat affect and little spontaneous speech. She slept 8 hours overnight and got no other PRNs for agitation. Patient has not been communicative for much of the day. 04/27 - the patient continues to refuse vitals and medications; she is disorganized and with no significant change in her mental state. Patient discharge focused, slept 8 hours and got no PRNs for agitation.  She has been in bed since getting court ordered IM after refusing PO agents. Per , son is able to visit this week. She remains mute on interview and does not engage MD and treatment team.    04/28 - there has been no significant improvement in the patient's mental state. She remains mute, isolative, and refusing all medication. Patient slept 9+ hours, got Haldol and Ativan injections due to ongoing disorganization of thought and catatonia. On interview, she states only 'leave me alone' and is incoherent. Per , son will come visit to try to help reconstitute the patient, stating she may respond to a familiar face and that she remains far from her baseline. 04/29 - the patient is little improved. She remains guarded and mute, catatonic throughout the day but up this morning when she refused medications and got IM injections via court order. She has little insight concerning the admission but slept 9 hours overnight. Patient unable to make her needs known, she speaks sparingly about her immediate desires but cannot provide any abstract reasoning regarding her refusal of medication or her treatment course thus far. Patient will be visited by her son this afternoon per .    04/30- the patient refuses to speak with treatment team this morning. States \"I dont want to talk\"  Patient refuses to answer questions. She continues to refuse medications,has court order medications requiring IM injections. Remains isolative to her room, sleeping most of the day. 05/01- \" I want to go home\"  Patient seen this morning in her room where she is sitting up in the bed eating pudding. She was willing to take PO medication this morning with some encouragement, after explaining that she will not be able to go home until she is not receiving IM injections. She remains isolative to her room and in bed most of the day. 05/02 - overnight, the patient remained isolative and disorganized.  She has been refusing medications and getting IM court ordered alternative. Patient was out of bed for breakfast this morning but otherwise has been in her bed for much of the day. Patient took PO medication with much encouragement from unit staff. She slept 8.25 hours overnight. She remains internally preoccupied and does not speak with MD on interview. 05/03 - the patient remains disorganized and has been isolative to her room. She was noted to spit out her oral solution medication and was thus given IM injection per court order. The patient has been out for meals at times but not interacting in any appreciable way with staff or peers. She slept 9 hours overnight and got no PRNs for agitation. On interview looks at MD but does not speak. 05/04 - the patient is medication compliant, but still mute for much of the day. She is taking court ordered medications without issue today and slept 9 hours overnight. Patient internally preoccupied, eating meals in her room and isolative for much of the day. She denies any concerns but does not respond appropriately to direct questions. 05/05 - overnight, the patient remained flat and was fairly uncooperative with assessments. She was noted to be internally preoccupied and slept 9 hours overnight. She doesn't speak with the MD on interview and has little insight into her condition. Per nurse report, the patient has been trying to avoid taking PO medication and must be supervised. 05/06 - the patient denies all, she remains disorganized and with no significant improvement in her mental state per nurse report. Patient has been medication compliant and got no PRNs for agitation. She is compliant with court ordered medication and speaks non-spontaneously. Patient discharge focused, unable to make her needs known. 05/07 - the patient slept 7.25 hours overnight; she denies all symptoms but per nurse appeared restless and was pacing the unit. Patient denies SI/HI/AVH/PI.  She is markedly flat with no improvement in her thought process. 05/08 - the patient slept 8 hours overnight, she got Trazodone PRN and received the Haldol decanoate injection without incident. Patient remains isolative to her room but in fair behavioral control. She is disorganized on interview and muted but denies SI/HI/AVH/PI. Patient has not been visible for much of today but is compliant with court ordered treatment.     Objective:     Vitals:    05/07/22 0723 05/07/22 2009 05/08/22 0756 05/08/22 2003   BP: 123/71 (!) 88/46 108/63 118/77   Pulse: 70 72 64 87   Resp: 18 16 16 16   Temp: 97.8 °F (36.6 °C) 98.1 °F (36.7 °C) 97.7 °F (36.5 °C) 98.4 °F (36.9 °C)   SpO2: 99% 98% 98% 98%   Weight:       Height:            Mental Status Exam:   Sensorium  not oriented to situation   Relations uncooperative   Eye Contact poor   Appearance:  age appropriate   Speech:  mute   Thought Process: blocked   Thought Content internally preoccupied    Suicidal ideations none   Mood:  dysphoric   Affect:  constricted   Memory   N/A   Concentration:  adequate   Insight:  poor   Judgment:  poor       MEDICATIONS:  Current Facility-Administered Medications   Medication Dose Route Frequency    haloperidol (HALDOL) 2 mg/mL oral solution 12.5 mg  12.5 mg Oral BID    Or    haloperidol lactate (HALDOL) injection 10 mg  10 mg IntraMUSCular BID    ondansetron (ZOFRAN ODT) tablet 4 mg  4 mg Oral Q6H PRN    LORazepam (INTENSOL) 2 mg/mL oral concentrate 1 mg  1 mg Oral TID    Or    LORazepam (ATIVAN) injection 1 mg ++COURT ORDERED MEDICATION FOR REFUSAL OF ORAL LORAZEPAM++  1 mg IntraMUSCular TID    albuterol (PROVENTIL HFA, VENTOLIN HFA, PROAIR HFA) inhaler 2 Puff  2 Puff Inhalation TID PRN    OLANZapine (ZyPREXA) tablet 5 mg  5 mg Oral Q6H PRN    haloperidol lactate (HALDOL) injection 5 mg  5 mg IntraMUSCular Q6H PRN    benztropine (COGENTIN) tablet 1 mg  1 mg Oral BID PRN    diphenhydrAMINE (BENADRYL) injection 50 mg  50 mg IntraMUSCular BID PRN    hydrOXYzine HCL (ATARAX) tablet 50 mg  50 mg Oral TID PRN    LORazepam (ATIVAN) injection 1 mg  1 mg IntraMUSCular Q4H PRN    traZODone (DESYREL) tablet 50 mg  50 mg Oral QHS PRN    acetaminophen (TYLENOL) tablet 650 mg  650 mg Oral Q4H PRN    magnesium hydroxide (MILK OF MAGNESIA) 400 mg/5 mL oral suspension 30 mL  30 mL Oral DAILY PRN    nicotine (NICODERM CQ) 21 mg/24 hr patch 1 Patch  1 Patch TransDERmal DAILY PRN      DISCUSSION:   the risks and benefits of the proposed medication  patient given opportunity to ask questions    Lab/Data Review: All lab results for the last 24 hours reviewed. No results found for this or any previous visit (from the past 24 hour(s)). Assessment:     Principal Problem:    Schizoaffective disorder ([facility] [address] (3/30/2018)     Assessment: the patient presents with gerardo psychosis in the setting of medication non-compliance x1.5 weeks. She is unable to make her needs known and vacillates between agitation and near catatonia. Will obtain court order for psychiatric treatment, observe and treat symptomatically. Plan:      Plan:   COURT ORDERED MEDICATION:  - INCREASE Haldol oral soln to 15 mg PO *OR* 10 mg IM BID for psychosis  - SCHEDULE Haldol decanoate 100 mg ONCE and 200 mg on day 4 for psychosis WATERMAN  - CONTINUE Ativan 1 mg PO *OR* IM TID for catatonia     - Consider ECT if self care isn't improving  - IGM therapy as tolerated  - Expand database / obtain collateral  - Dispo planning    I certify that this patient's inpatient psychiatric hospital services furnished since the previous certification were, and continue to be, required for treatment that could reasonably be expected to improve the patient's condition, or for diagnostic study, and that the patient continues to need, on a daily basis, active treatment furnished directly by or requiring the supervision of inpatient psychiatric facility personnel.  In addition, the hospital records show that services furnished were intensive treatment services, admission or related services, or equivalent services.     Signed By: La Wise MD     May 8, 2022

## 2022-05-09 NOTE — BH NOTES
Behavioral Health Treatment Team Note      Patient goal(s) for today: continue taking meds as prescribed, engage in unit activities, participate in hygiene/ADLs, prepare for discharge, follow directions from staff, contact support team, attend groups  Treatment team focus/goals: continue adjusting meds as needed, discharge planning, update support system  Progress note: Pt was seen by treatment team in her room with the blankets over her head. . Pt remains isolative to her room, selectively mute but accepting medications.  Pt currently stating that she does not have any children and only has herself.  department to continue to coordinate discharge plans.     LOS:  18                    Expected LOS: TBD     Insurance info/prescription coverage: [employer] [facility] [address]  Date of last family contact: ORA Sotelo ([phone])  Family requesting physician contact today: No  Discharge plan: Return home   Guns in the home: No   Outpatient provider(s): Insurance  [phone]     Participating treatment team David CARROLL, Dr. Yani Peralta

## 2022-05-10 PROCEDURE — 74011250637 HC RX REV CODE- 250/637: Performed by: NURSE PRACTITIONER

## 2022-05-10 PROCEDURE — 65220000003 HC RM SEMIPRIVATE PSYCH

## 2022-05-10 PROCEDURE — 99232 SBSQ HOSP IP/OBS MODERATE 35: CPT | Performed by: PSYCHIATRY & NEUROLOGY

## 2022-05-10 PROCEDURE — 74011250637 HC RX REV CODE- 250/637: Performed by: PSYCHIATRY & NEUROLOGY

## 2022-05-10 RX ORDER — HALOPERIDOL 5 MG/ML
10 INJECTION INTRAMUSCULAR 2 TIMES DAILY
Status: DISCONTINUED | OUTPATIENT
Start: 2022-05-11 | End: 2022-05-13

## 2022-05-10 RX ORDER — HALOPERIDOL 2 MG/ML
20 SOLUTION ORAL 2 TIMES DAILY
Status: DISCONTINUED | OUTPATIENT
Start: 2022-05-11 | End: 2022-05-13

## 2022-05-10 RX ADMIN — HYDROXYZINE HYDROCHLORIDE 50 MG: 25 TABLET, FILM COATED ORAL at 21:15

## 2022-05-10 RX ADMIN — TRAZODONE HYDROCHLORIDE 50 MG: 50 TABLET ORAL at 21:15

## 2022-05-10 RX ADMIN — HALOPERIDOL 15 MG: 2 SOLUTION ORAL at 16:57

## 2022-05-10 RX ADMIN — Medication 1 MG: at 08:49

## 2022-05-10 RX ADMIN — Medication 1 MG: at 11:57

## 2022-05-10 RX ADMIN — Medication 1 MG: at 16:57

## 2022-05-10 RX ADMIN — HALOPERIDOL 15 MG: 2 SOLUTION ORAL at 08:49

## 2022-05-10 NOTE — GROUP NOTE
[facility] GROUP DOCUMENTATION INDIVIDUAL                                                                          Group Therapy Note    Date: 5/10/2022    Group Start Time: 1400  Group End Time: 1500  Group Topic: Recreational/Music Therapy    [address] [facility] 3 ACUTE BEHAV [facility]    [facility] GROUP    Group Therapy Note    Attendees: 7         Attendance: Did not attend    Patient's Goal:      Interventions/techniques: Monty Campos

## 2022-05-10 NOTE — PROGRESS NOTES
Problem: Psychosis  Goal: *STG: Remains safe in hospital  Outcome: Progressing Towards Goal     Problem: Falls - Risk of  Goal: *Absence of Falls  Description: Document Shraddha Fall Risk and appropriate interventions in the flowsheet.   Outcome: Progressing Towards Goal  Note: Fall Risk Interventions:       Mentation Interventions: Adequate sleep, hydration, pain control    Medication Interventions: Evaluate medications/consider consulting pharmacy

## 2022-05-10 NOTE — BH NOTES
During shift, pt remained in bed. Pt denies SI, HI, A/V hallucinations. Meal/med compliant. Currently, pt is resting in bed with eyes closed. No signs of distress nor made any further complaints. Locked Unit. Patient slept this shift 7 hours, respirations noted even and unlabored. Safe environment maintained, q15 min safety checks maintained. Q1 hourly nursing rounds maintained. Patient updated on plan of care.

## 2022-05-10 NOTE — BH NOTES
Psychiatric Progress Note    Patient: Alejandro Guo MRN: [id number]  SSN: [id number]    YOB: 1960  Age: 57 y.o. Sex: female      Admit Date: 4/20/2022    LOS: 19 days     Subjective:     Alejandro Guo  is selectively mute per staff and moods are depressed. She opened an eye then closed it again with no further correspondence. No prns given. No aggression or violence. Inappropriately interactive and poorly aware. Tolerating court ordered medications well. Eating ok and sleeping fairly (7hrs) . 4/24 - Alejandro Guo moved her hands to my voices but refused to converse with me. Selective with nursing also. She responded appropriately when breakfast was called only. Moods are down. No aggression or violence. Selectively interactive. Limited awareness. Refusing medications. Eating and sleeping fairly. 04/25 - the patient slept 8 hours overnight. She continues to refuse medications and requires much prompting to participate in ADLs. No agitation noted today, and patient got no PRNs for aggression x24 hours. She appears catatonic at times, but will react to direct questions. Patient getting IM injections of Haldol for psychosis by court order as she refuses all oral agents. On interview she says nothing and lies in bed.     04/26 - no active overnight events. Patient has been mute, she ate breakfast this morning and was noted to say only 'no, no, no' regarding court ordered IM medication after refusing PO agent. Patient has been isolative to her room with a flat affect and little spontaneous speech. She slept 8 hours overnight and got no other PRNs for agitation. Patient has not been communicative for much of the day. 04/27 - the patient continues to refuse vitals and medications; she is disorganized and with no significant change in her mental state. Patient discharge focused, slept 8 hours and got no PRNs for agitation.  She has been in bed since getting court ordered IM after refusing PO agents. Per , son is able to visit this week. She remains mute on interview and does not engage MD and treatment team.    04/28 - there has been no significant improvement in the patient's mental state. She remains mute, isolative, and refusing all medication. Patient slept 9+ hours, got Haldol and Ativan injections due to ongoing disorganization of thought and catatonia. On interview, she states only 'leave me alone' and is incoherent. Per , son will come visit to try to help reconstitute the patient, stating she may respond to a familiar face and that she remains far from her baseline. 04/29 - the patient is little improved. She remains guarded and mute, catatonic throughout the day but up this morning when she refused medications and got IM injections via court order. She has little insight concerning the admission but slept 9 hours overnight. Patient unable to make her needs known, she speaks sparingly about her immediate desires but cannot provide any abstract reasoning regarding her refusal of medication or her treatment course thus far. Patient will be visited by her son this afternoon per .    04/30- the patient refuses to speak with treatment team this morning. States \"I dont want to talk\"  Patient refuses to answer questions. She continues to refuse medications,has court order medications requiring IM injections. Remains isolative to her room, sleeping most of the day. 05/01- \" I want to go home\"  Patient seen this morning in her room where she is sitting up in the bed eating pudding. She was willing to take PO medication this morning with some encouragement, after explaining that she will not be able to go home until she is not receiving IM injections. She remains isolative to her room and in bed most of the day. 05/02 - overnight, the patient remained isolative and disorganized.  She has been refusing medications and getting IM court ordered alternative. Patient was out of bed for breakfast this morning but otherwise has been in her bed for much of the day. Patient took PO medication with much encouragement from unit staff. She slept 8.25 hours overnight. She remains internally preoccupied and does not speak with MD on interview. 05/03 - the patient remains disorganized and has been isolative to her room. She was noted to spit out her oral solution medication and was thus given IM injection per court order. The patient has been out for meals at times but not interacting in any appreciable way with staff or peers. She slept 9 hours overnight and got no PRNs for agitation. On interview looks at MD but does not speak. 05/04 - the patient is medication compliant, but still mute for much of the day. She is taking court ordered medications without issue today and slept 9 hours overnight. Patient internally preoccupied, eating meals in her room and isolative for much of the day. She denies any concerns but does not respond appropriately to direct questions. 05/05 - overnight, the patient remained flat and was fairly uncooperative with assessments. She was noted to be internally preoccupied and slept 9 hours overnight. She doesn't speak with the MD on interview and has little insight into her condition. Per nurse report, the patient has been trying to avoid taking PO medication and must be supervised. 05/06 - the patient denies all, she remains disorganized and with no significant improvement in her mental state per nurse report. Patient has been medication compliant and got no PRNs for agitation. She is compliant with court ordered medication and speaks non-spontaneously. Patient discharge focused, unable to make her needs known. 05/07 - the patient slept 7.25 hours overnight; she denies all symptoms but per nurse appeared restless and was pacing the unit. Patient denies SI/HI/AVH/PI.  She is markedly flat with no improvement in her thought process. 05/08 - the patient slept 8 hours overnight, she got Trazodone PRN and received the Haldol decanoate injection without incident. Patient remains isolative to her room but in fair behavioral control. She is disorganized on interview and muted but denies SI/HI/AVH/PI. Patient has not been visible for much of today but is compliant with court ordered treatment. 05/09 - the patient remains isolative to her room, out for meals with much encouragement and compliant with court ordered treatment. BP is improved and she is able to make her needs known. She got PRN Trazodone and has been in fair behavioral control, though she remains markedly flat with no improvement in her thought process. Of note, patient is asked if she would like her son to visit and repeatedly exclaims that she has no son. 05/10 - the patient slept 7 hours overnight; she remains isolative to her room and is internally preoccupied. She has been little improved with recent titration of Haldol and continues to state that she does not have a son or any other family members; she is unable to reality test. Patient is compliant with court ordered medication but unable to participate in disposition planning. Per , her son will visit tomorrow to assess her progress. The patient denies all symptoms, her ADLs are poor.     Objective:     Vitals:    05/08/22 2003 05/09/22 0752 05/09/22 1920 05/10/22 0751   BP: 118/77 103/70 111/67 106/70   Pulse: 87 70 87 93   Resp: 16 18 17 18   Temp: 98.4 °F (36.9 °C) 98 °F (36.7 °C) 98.6 °F (37 °C) 98.4 °F (36.9 °C)   SpO2: 98% 98%  99%   Weight:       Height:            Mental Status Exam:   Sensorium  not oriented to situation   Relations uncooperative   Eye Contact poor   Appearance:  age appropriate   Speech:  mute   Thought Process: blocked   Thought Content internally preoccupied    Suicidal ideations none   Mood:  dysphoric   Affect:  constricted   Memory   N/A   Concentration:  adequate   Insight: poor   Judgment:  poor       MEDICATIONS:  Current Facility-Administered Medications   Medication Dose Route Frequency    haloperidol (HALDOL) 2 mg/mL oral solution 15 mg  15 mg Oral BID    Or    haloperidol lactate (HALDOL) injection 10 mg  10 mg IntraMUSCular BID    ondansetron (ZOFRAN ODT) tablet 4 mg  4 mg Oral Q6H PRN    LORazepam (INTENSOL) 2 mg/mL oral concentrate 1 mg  1 mg Oral TID    Or    LORazepam (ATIVAN) injection 1 mg ++COURT ORDERED MEDICATION FOR REFUSAL OF ORAL LORAZEPAM++  1 mg IntraMUSCular TID    albuterol (PROVENTIL HFA, VENTOLIN HFA, PROAIR HFA) inhaler 2 Puff  2 Puff Inhalation TID PRN    OLANZapine (ZyPREXA) tablet 5 mg  5 mg Oral Q6H PRN    haloperidol lactate (HALDOL) injection 5 mg  5 mg IntraMUSCular Q6H PRN    benztropine (COGENTIN) tablet 1 mg  1 mg Oral BID PRN    diphenhydrAMINE (BENADRYL) injection 50 mg  50 mg IntraMUSCular BID PRN    hydrOXYzine HCL (ATARAX) tablet 50 mg  50 mg Oral TID PRN    LORazepam (ATIVAN) injection 1 mg  1 mg IntraMUSCular Q4H PRN    traZODone (DESYREL) tablet 50 mg  50 mg Oral QHS PRN    acetaminophen (TYLENOL) tablet 650 mg  650 mg Oral Q4H PRN    magnesium hydroxide (MILK OF MAGNESIA) 400 mg/5 mL oral suspension 30 mL  30 mL Oral DAILY PRN    nicotine (NICODERM CQ) 21 mg/24 hr patch 1 Patch  1 Patch TransDERmal DAILY PRN      DISCUSSION:   the risks and benefits of the proposed medication  patient given opportunity to ask questions    Lab/Data Review: All lab results for the last 24 hours reviewed. No results found for this or any previous visit (from the past 24 hour(s)). Assessment:     Principal Problem:    Schizoaffective disorder ([facility] [address] (3/30/2018)     Assessment: the patient presents with gerardo psychosis in the setting of medication non-compliance x1.5 weeks. She is unable to make her needs known and vacillates between agitation and near catatonia.  Will obtain court order for psychiatric treatment, observe and treat symptomatically. Plan:      Plan:   COURT ORDERED MEDICATION:  - INCREASE Haldol oral soln to 20 mg PO *OR* 10 mg IM BID for psychosis  - SCHEDULE Haldol decanoate 100 mg ONCE and 300 mg on day 4 (05/12/22) for psychosis [place]  - CONTINUE Ativan 1 mg PO *OR* IM TID for catatonia  - ECT by court order pending referral  - IGM therapy as tolerated  - Expand database / obtain collateral  - Dispo planning    I certify that this patient's inpatient psychiatric hospital services furnished since the previous certification were, and continue to be, required for treatment that could reasonably be expected to improve the patient's condition, or for diagnostic study, and that the patient continues to need, on a daily basis, active treatment furnished directly by or requiring the supervision of inpatient psychiatric facility personnel. In addition, the hospital records show that services furnished were intensive treatment services, admission or related services, or equivalent services.     Signed By: Nickie Perrin MD     May 10, 2022

## 2022-05-10 NOTE — PROGRESS NOTES
Problem: Psychosis  Goal: *STG: Decreased hallucinations  Outcome: Progressing Towards Goal  Goal: *STG: Decreased delusional thinking  Outcome: Progressing Towards Goal  Goal: *STG: Remains safe in hospital  Outcome: Progressing Towards Goal  Goal: *STG: Seeks staff when feelings of self harm or harm towards others arise  Outcome: Progressing Towards Goal  Goal: *STG: Patient will verbalize areas in need of boundary recognition and limit setting  Outcome: Progressing Towards Goal  Goal: *STG: Patient will develop strategies to regulate emotions and corresponding behaviors  Outcome: Progressing Towards Goal  Goal: *STG: Accept constructive criticism without injury or isolation  Outcome: Progressing Towards Goal  Goal: *STG: Participates in individual and group therapy  Outcome: Progressing Towards Goal     Problem: Discharge Planning  Goal: *Discharge to safe environment  Outcome: Progressing Towards Goal  Goal: *Knowledge of medication management  Outcome: Progressing Towards Goal  Goal: *Knowledge of discharge instructions  Outcome: Progressing Towards Goal

## 2022-05-10 NOTE — BH NOTES
0800 pt is resting in room. VSS.    0900 pt ate breakfast. Isolative to self. Pt is accepting meals an medications. Pt denies si/hi/a/v [place]. 1000 pt is resting in room.      1100 pt is resting in room.    1200 pt ate lunch. Pt is sitting in her room. 1300 pt is resting in room.    1400 pt is resting in her room.    1500 pt is resting in her room.    1600 pt is resting in her room.    1700 pt ate dinner. Visible on th unit.    1800 pt is resting in room.

## 2022-05-10 NOTE — GROUP NOTE
DORETHA  GROUP DOCUMENTATION INDIVIDUAL                                                                          Group Therapy Note    Date: 5/10/2022    Group Start Time: 1000  Group End Time: 1100  Group Topic: Topic Group    [facility] ACUTE BEHAV [facility]    [place], [address] GROUP    Group Therapy Note    Attendees: 4         Attendance: Did not attend    Patient's Goal:      Interventions/techniques:Whitney Yeung

## 2022-05-11 LAB
CHOLEST SERPL-MCNC: 148 MG/DL
EST. AVERAGE GLUCOSE BLD GHB EST-MCNC: 114 MG/DL
HBA1C MFR BLD: 5.6 % (ref 4–5.6)
HDLC SERPL-MCNC: 60 MG/DL
HDLC SERPL: 2.5 {RATIO} (ref 0–5)
LDLC SERPL CALC-MCNC: 79.6 MG/DL (ref 0–100)
TRIGL SERPL-MCNC: 42 MG/DL (ref ?–150)
TSH SERPL DL<=0.05 MIU/L-ACNC: 0.44 UIU/ML (ref 0.36–3.74)
VLDLC SERPL CALC-MCNC: 8.4 MG/DL

## 2022-05-11 PROCEDURE — 84443 ASSAY THYROID STIM HORMONE: CPT

## 2022-05-11 PROCEDURE — 99232 SBSQ HOSP IP/OBS MODERATE 35: CPT | Performed by: PSYCHIATRY & NEUROLOGY

## 2022-05-11 PROCEDURE — 80061 LIPID PANEL: CPT

## 2022-05-11 PROCEDURE — 36415 COLL VENOUS BLD VENIPUNCTURE: CPT

## 2022-05-11 PROCEDURE — 65220000003 HC RM SEMIPRIVATE PSYCH

## 2022-05-11 PROCEDURE — 83036 HEMOGLOBIN GLYCOSYLATED A1C: CPT

## 2022-05-11 PROCEDURE — 74011250637 HC RX REV CODE- 250/637: Performed by: PSYCHIATRY & NEUROLOGY

## 2022-05-11 RX ORDER — HALOPERIDOL DECANOATE 100 MG/ML
300 INJECTION INTRAMUSCULAR ONCE
Status: COMPLETED | OUTPATIENT
Start: 2022-05-13 | End: 2022-05-13

## 2022-05-11 RX ADMIN — Medication 1 MG: at 17:50

## 2022-05-11 RX ADMIN — HALOPERIDOL 20 MG: 2 SOLUTION ORAL at 08:57

## 2022-05-11 RX ADMIN — Medication 1 MG: at 12:49

## 2022-05-11 RX ADMIN — Medication 1 MG: at 08:57

## 2022-05-11 RX ADMIN — HALOPERIDOL 20 MG: 2 SOLUTION ORAL at 17:49

## 2022-05-11 NOTE — BH NOTES
Behavioral Health Treatment Team Note      Patient goal(s) for today: continue taking meds as prescribed, engage in unit activities, participate in hygiene/ADLs, prepare for discharge, follow directions from staff, contact support team, attend groups  Treatment team focus/goals: continue adjusting meds as needed, discharge planning, update support system  Progress note: Pt was seen by treatment team in her room with the blankets over her head. Pt remains isolative to her room, selectively mute but accepting medications. Pt was willing to meet with her sons briefly while eating her lunch. Pt's sons report pt is sounding more like herself - less delusional but concerned with her level of isolation. Sons say it takes a while to see the effects of the medication after a decompensation. Daughter to schedule a visit with pt this week.   department to continue to coordinate discharge plans.     LOS:  20                    Expected LOS: TBD     Insurance info/prescription coverage: [employer] [facility]  Date of last family contact: YASMINE Sotelo ([phone])  Family requesting physician contact today: No  Discharge plan: Return home   Guns in the home: No   Outpatient provider(s): Insurance  [phone]     Participating treatment team Sravan CARROLL, Dr. Linda Hyatt

## 2022-05-11 NOTE — PROGRESS NOTES
Problem: Psychosis  Goal: *STG: Remains safe in hospital  Outcome: Progressing Towards Goal  Goal: *STG/LTG: Complies with medication therapy  Outcome: Progressing Towards Goal     Problem: Falls - Risk of  Goal: *Absence of Falls  Description: Document Shraddha Fall Risk and appropriate interventions in the flowsheet.   Outcome: Progressing Towards Goal  Note: Fall Risk Interventions:     Medication Interventions:  Teach patient to arise slowly

## 2022-05-11 NOTE — PROGRESS NOTES
Patient participated in Spirituality Group on 5/11/2022. Rev.  Will Jeronimo MDiv, [facility], [facility]   paging service: Meghann-ARIN (1969)

## 2022-05-11 NOTE — BH NOTES
Psychiatric Progress Note    Patient: Alma Akins MRN: [id number]  SSN: [id number]    YOB: 1960  Age: 57 y.o. Sex: female      Admit Date: 4/20/2022    LOS: 20 days     Subjective:     Alma Akins  is selectively mute per staff and moods are depressed. She opened an eye then closed it again with no further correspondence. No prns given. No aggression or violence. Inappropriately interactive and poorly aware. Tolerating court ordered medications well. Eating ok and sleeping fairly (7hrs) . 4/24 - Alma Akins moved her hands to my voices but refused to converse with me. Selective with nursing also. She responded appropriately when breakfast was called only. Moods are down. No aggression or violence. Selectively interactive. Limited awareness. Refusing medications. Eating and sleeping fairly. 04/25 - the patient slept 8 hours overnight. She continues to refuse medications and requires much prompting to participate in ADLs. No agitation noted today, and patient got no PRNs for aggression x24 hours. She appears catatonic at times, but will react to direct questions. Patient getting IM injections of Haldol for psychosis by court order as she refuses all oral agents. On interview she says nothing and lies in bed.     04/26 - no active overnight events. Patient has been mute, she ate breakfast this morning and was noted to say only 'no, no, no' regarding court ordered IM medication after refusing PO agent. Patient has been isolative to her room with a flat affect and little spontaneous speech. She slept 8 hours overnight and got no other PRNs for agitation. Patient has not been communicative for much of the day. 04/27 - the patient continues to refuse vitals and medications; she is disorganized and with no significant change in her mental state. Patient discharge focused, slept 8 hours and got no PRNs for agitation.  She has been in bed since getting court ordered IM after refusing PO agents. Per , son is able to visit this week. She remains mute on interview and does not engage MD and treatment team.    04/28 - there has been no significant improvement in the patient's mental state. She remains mute, isolative, and refusing all medication. Patient slept 9+ hours, got Haldol and Ativan injections due to ongoing disorganization of thought and catatonia. On interview, she states only 'leave me alone' and is incoherent. Per , son will come visit to try to help reconstitute the patient, stating she may respond to a familiar face and that she remains far from her baseline. 04/29 - the patient is little improved. She remains guarded and mute, catatonic throughout the day but up this morning when she refused medications and got IM injections via court order. She has little insight concerning the admission but slept 9 hours overnight. Patient unable to make her needs known, she speaks sparingly about her immediate desires but cannot provide any abstract reasoning regarding her refusal of medication or her treatment course thus far. Patient will be visited by her son this afternoon per .    04/30- the patient refuses to speak with treatment team this morning. States \"I dont want to talk\"  Patient refuses to answer questions. She continues to refuse medications,has court order medications requiring IM injections. Remains isolative to her room, sleeping most of the day. 05/01- \" I want to go home\"  Patient seen this morning in her room where she is sitting up in the bed eating pudding. She was willing to take PO medication this morning with some encouragement, after explaining that she will not be able to go home until she is not receiving IM injections. She remains isolative to her room and in bed most of the day. 05/02 - overnight, the patient remained isolative and disorganized.  She has been refusing medications and getting IM court ordered alternative. Patient was out of bed for breakfast this morning but otherwise has been in her bed for much of the day. Patient took PO medication with much encouragement from unit staff. She slept 8.25 hours overnight. She remains internally preoccupied and does not speak with MD on interview. 05/03 - the patient remains disorganized and has been isolative to her room. She was noted to spit out her oral solution medication and was thus given IM injection per court order. The patient has been out for meals at times but not interacting in any appreciable way with staff or peers. She slept 9 hours overnight and got no PRNs for agitation. On interview looks at MD but does not speak. 05/04 - the patient is medication compliant, but still mute for much of the day. She is taking court ordered medications without issue today and slept 9 hours overnight. Patient internally preoccupied, eating meals in her room and isolative for much of the day. She denies any concerns but does not respond appropriately to direct questions. 05/05 - overnight, the patient remained flat and was fairly uncooperative with assessments. She was noted to be internally preoccupied and slept 9 hours overnight. She doesn't speak with the MD on interview and has little insight into her condition. Per nurse report, the patient has been trying to avoid taking PO medication and must be supervised. 05/06 - the patient denies all, she remains disorganized and with no significant improvement in her mental state per nurse report. Patient has been medication compliant and got no PRNs for agitation. She is compliant with court ordered medication and speaks non-spontaneously. Patient discharge focused, unable to make her needs known. 05/07 - the patient slept 7.25 hours overnight; she denies all symptoms but per nurse appeared restless and was pacing the unit. Patient denies SI/HI/AVH/PI.  She is markedly flat with no improvement in her thought process. 05/08 - the patient slept 8 hours overnight, she got Trazodone PRN and received the Haldol decanoate injection without incident. Patient remains isolative to her room but in fair behavioral control. She is disorganized on interview and muted but denies SI/HI/AVH/PI. Patient has not been visible for much of today but is compliant with court ordered treatment. 05/09 - the patient remains isolative to her room, out for meals with much encouragement and compliant with court ordered treatment. BP is improved and she is able to make her needs known. She got PRN Trazodone and has been in fair behavioral control, though she remains markedly flat with no improvement in her thought process. Of note, patient is asked if she would like her son to visit and repeatedly exclaims that she has no son. 05/10 - the patient slept 7 hours overnight; she remains isolative to her room and is internally preoccupied. She has been little improved with recent titration of Haldol and continues to state that she does not have a son or any other family members; she is unable to reality test. Patient is compliant with court ordered medication but unable to participate in disposition planning. Per , her son will visit tomorrow to assess her progress. The patient denies all symptoms, her ADLs are poor. 05/11 - no significant overnight events. Patient has been isolative to her room, out for meals with encouragement but in bed for much of the day. She is compliant with court ordered medications and denies active thoughts of self harm. Patient seen on the unit by her son who is visiting, she does not speak much and is otherwise unable to make her needs known. ADLs are poor. Per , son reports it does take some time for her to reconstitute her mental faculties and the death of her sister with whom she was close also has affected her.     Objective:     Vitals:    05/09/22 0752 05/09/22 1920 05/10/22 0751 05/10/22 1920   BP: 103/70 111/67 106/70 125/64   Pulse: 70 87 93 88   Resp: 18 17 18 17   Temp: 98 °F (36.7 °C) 98.6 °F (37 °C) 98.4 °F (36.9 °C) 99 °F (37.2 °C)   SpO2: 98%  99%    Weight:       Height:            Mental Status Exam:   Sensorium  not oriented to situation   Relations uncooperative   Eye Contact poor   Appearance:  age appropriate   Speech:  mute   Thought Process: blocked   Thought Content internally preoccupied    Suicidal ideations none   Mood:  dysphoric   Affect:  constricted   Memory   N/A   Concentration:  adequate   Insight:  poor   Judgment:  poor       MEDICATIONS:  Current Facility-Administered Medications   Medication Dose Route Frequency    haloperidol (HALDOL) 2 mg/mL oral solution 20 mg  20 mg Oral BID    Or    haloperidol lactate (HALDOL) injection 10 mg ++COURT ORDERED FOR REFUSAL OF ORAL HALOPERIDOL++  10 mg IntraMUSCular BID    ondansetron (ZOFRAN ODT) tablet 4 mg  4 mg Oral Q6H PRN    LORazepam (INTENSOL) 2 mg/mL oral concentrate 1 mg  1 mg Oral TID    Or    LORazepam (ATIVAN) injection 1 mg ++COURT ORDERED MEDICATION FOR REFUSAL OF ORAL LORAZEPAM++  1 mg IntraMUSCular TID    albuterol (PROVENTIL HFA, VENTOLIN HFA, PROAIR HFA) inhaler 2 Puff  2 Puff Inhalation TID PRN    OLANZapine (ZyPREXA) tablet 5 mg  5 mg Oral Q6H PRN    haloperidol lactate (HALDOL) injection 5 mg  5 mg IntraMUSCular Q6H PRN    benztropine (COGENTIN) tablet 1 mg  1 mg Oral BID PRN    diphenhydrAMINE (BENADRYL) injection 50 mg  50 mg IntraMUSCular BID PRN    hydrOXYzine HCL (ATARAX) tablet 50 mg  50 mg Oral TID PRN    LORazepam (ATIVAN) injection 1 mg  1 mg IntraMUSCular Q4H PRN    traZODone (DESYREL) tablet 50 mg  50 mg Oral QHS PRN    acetaminophen (TYLENOL) tablet 650 mg  650 mg Oral Q4H PRN    magnesium hydroxide (MILK OF MAGNESIA) 400 mg/5 mL oral suspension 30 mL  30 mL Oral DAILY PRN    nicotine (NICODERM CQ) 21 mg/24 hr patch 1 Patch  1 Patch TransDERmal DAILY PRN      DISCUSSION:   the risks and benefits of the proposed medication  patient given opportunity to ask questions    Lab/Data Review: All lab results for the last 24 hours reviewed. Recent Results (from the past 24 hour(s))   LIPID PANEL    Collection Time: 05/11/22  6:08 AM   Result Value Ref Range    Cholesterol, total 148 <200 MG/DL    Triglyceride 42 <150 MG/DL    HDL Cholesterol 60 MG/DL    LDL, calculated 79.6 0 - 100 MG/DL    VLDL, calculated 8.4 MG/DL    CHOL/HDL Ratio 2.5 0.0 - 5.0     HEMOGLOBIN A1C WITH EAG    Collection Time: 05/11/22  6:08 AM   Result Value Ref Range    Hemoglobin A1c 5.6 4.0 - 5.6 %    Est. average glucose 114 mg/dL   TSH 3RD GENERATION    Collection Time: 05/11/22  6:08 AM   Result Value Ref Range    TSH 0.44 0.36 - 3.74 uIU/mL         Assessment:     Principal Problem:    Schizoaffective disorder ([facility] [address] (3/30/2018)     Assessment: the patient presents with gerardo psychosis in the setting of medication non-compliance x1.5 weeks. She is unable to make her needs known and vacillates between agitation and near catatonia. Will obtain court order for psychiatric treatment, observe and treat symptomatically. Plan:      Plan:   COURT ORDERED MEDICATION:  - CONTINUE Haldol oral soln to 20 mg PO *OR* 10 mg IM BID for psychosis  - SCHEDULE Haldol decanoate 100 mg ONCE and 300 mg on day 4 (05/12/22) for psychosis WATERMAN  - CONTINUE Ativan 1 mg PO *OR* IM TID for catatonia  - ECT by court order pending referral  - IGM therapy as tolerated  - Expand database / obtain collateral  - Dispo planning    I certify that this patient's inpatient psychiatric hospital services furnished since the previous certification were, and continue to be, required for treatment that could reasonably be expected to improve the patient's condition, or for diagnostic study, and that the patient continues to need, on a daily basis, active treatment furnished directly by or requiring the supervision of inpatient psychiatric facility personnel.  In addition, the hospital records show that services furnished were intensive treatment services, admission or related services, or equivalent services.     Signed By: Roxana Oleary MD     May 11, 2022

## 2022-05-11 NOTE — PROGRESS NOTES
Spiritual Care Assessment/Progress Note  [facility]      NAME: Fede Pacheco      MRN: [id number]  AGE: 57 y.o. SEX: female  Adventism Affiliation: Rastafari   Language: English     5/11/2022     Total Time (in minutes): 5     Spiritual Assessment begun in [place] [address] [address] through conversation with:         []Patient        [] Family    [] Friend(s)        Reason for Consult: Follow-up, routine     Spiritual beliefs: (Please include comment if needed)     [] Identifies with a lorrie tradition:         [] Supported by a lorrie community:            [] Claims no spiritual orientation:           [] Seeking spiritual identity:                [] Adheres to an individual form of spirituality:           [x] Not able to assess:                           Identified resources for coping:      [] Prayer                               [] Music                  [] Guided Imagery     [] Family/friends                 [] Pet visits     [] Devotional reading                         [x] Unknown     [] Other:                                              Interventions offered during this visit: (See comments for more details)    Patient Interventions: Other (comment) (Spirituality Group)           Plan of Care:     [] Support spiritual and/or cultural needs    [] Support AMD and/or advance care planning process      [] Support grieving process   [] Coordinate Rites and/or Rituals    [] Coordination with community clergy   [] No spiritual needs identified at this time   [] Detailed Plan of Care below (See Comments)  [] Make referral to Music Therapy  [] Make referral to Pet Therapy     [] Make referral to Addiction services  [] Make referral to [facility]  [] Make referral to Spiritual Care Partner  [] No future visits requested        [x] Contact Spiritual Care for further referrals     Comments:  visit for initial spiritual assessment.   Patient resting at the time of this visit, door closed for privacy. Please contact spiritual care for further referral or consult. Rev.  Emmanuelle Deleon MDiv, [facility], [address]   paging service: [phone] ([phone])

## 2022-05-11 NOTE — PROGRESS NOTES
Laboratory monitoring for mood stabilizer and antipsychotics:    Recommended baseline monitoring has been completed based on this patient's current medication regimen. The patient is currently taking the following medication(s):   Current Facility-Administered Medications   Medication Dose Route Frequency    haloperidol (HALDOL) 2 mg/mL oral solution 20 mg  20 mg Oral BID    Or    haloperidol lactate (HALDOL) injection 10 mg ++COURT ORDERED FOR REFUSAL OF ORAL HALOPERIDOL++  10 mg IntraMUSCular BID    LORazepam (INTENSOL) 2 mg/mL oral concentrate 1 mg  1 mg Oral TID    Or    LORazepam (ATIVAN) injection 1 mg ++COURT ORDERED MEDICATION FOR REFUSAL OF ORAL LORAZEPAM++  1 mg IntraMUSCular TID       Height, Weight, BMI Estimation  Estimated body mass index is 26.61 kg/m² as calculated from the following:    Height as of this encounter: 157.5 cm (62\"). Weight as of this encounter: 66 kg (145 lb 8.1 oz). Renal Function, Hepatic Function and Chemistry  Estimated Creatinine Clearance: 57.8 mL/min (by C-G formula based on SCr of 0.9 mg/dL). Lab Results   Component Value Date/Time    Sodium 136 04/20/2022 01:10 PM    Potassium 3.4 (L) 04/20/2022 01:10 PM    Chloride 101 04/20/2022 01:10 PM    CO2 23 04/20/2022 01:10 PM    Anion gap 12 04/20/2022 01:10 PM    Glucose 89 04/20/2022 01:10 PM    BUN 14 04/20/2022 01:10 PM    Creatinine 0.90 04/20/2022 01:10 PM    BUN/Creatinine ratio 16 04/20/2022 01:10 PM    GFR est AA >60 04/20/2022 01:10 PM    GFR est non-AA >60 04/20/2022 01:10 PM    Calcium 8.9 04/20/2022 01:10 PM    ALT (SGPT) 25 04/20/2022 01:10 PM    Alk.  phosphatase 72 04/20/2022 01:10 PM    Protein, total 7.2 04/20/2022 01:10 PM    Albumin 3.7 04/20/2022 01:10 PM    Globulin 3.5 04/20/2022 01:10 PM    A-G Ratio 1.1 04/20/2022 01:10 PM    Bilirubin, total 0.4 04/20/2022 01:10 PM       Lab Results   Component Value Date/Time    Glucose 89 04/20/2022 01:10 PM       Lab Results   Component Value Date/Time Hemoglobin A1c 5.6 05/11/2022 06:08 AM       Hematology  Lab Results   Component Value Date/Time    WBC 6.0 04/20/2022 01:10 PM    HGB 12.9 04/20/2022 01:10 PM    HCT 37.1 04/20/2022 01:10 PM    PLATELET 929 (H) 28/82/3426 01:10 PM    MCV 92.3 04/20/2022 01:10 PM       Lipids  Lab Results   Component Value Date/Time    Cholesterol, total 148 05/11/2022 06:08 AM    HDL Cholesterol 60 05/11/2022 06:08 AM    LDL, calculated 79.6 05/11/2022 06:08 AM    Triglyceride 42 05/11/2022 06:08 AM    CHOL/HDL Ratio 2.5 05/11/2022 06:08 AM       Thyroid Function  Lab Results   Component Value Date/Time    TSH 0.44 05/11/2022 06:08 AM       Vitals  Visit Vitals  /64   Pulse 88   Temp 99 °F (37.2 °C)   Resp 17   Ht 157.5 cm (62\")   Wt 66 kg (145 lb 8.1 oz)   SpO2 99%   BMI 26.61 kg/m²       DERRICK Faulkner, BCPS  [phone] (pharmacy)

## 2022-05-11 NOTE — PROGRESS NOTES
Patient received resting in her room. Denies SI/HI/AVH, depression and anxiety. Calm, guarded, isolative to her room, her two sons came to visit, when this nurse asked if she enjoyed their visit she stated \"I don't know\". Taking medications as prescribed. Will continue to monitor for safety.

## 2022-05-11 NOTE — GROUP NOTE
DORETHA  GROUP DOCUMENTATION INDIVIDUAL                                                                          Group Therapy Note    Date: 5/11/2022    Group Start Time: 0900  Group End Time: 1000  Group Topic: Topic Group    [facility] [place] [address] ACUTE BEHAV [facility]    José Yeung [facility] [id number] [facility] GROUP DOCUMENTATION GROUP    Group Therapy Note    Attendees: 7         Did not attend    Patient's Goal:      Interventions/techniques:   [facility]

## 2022-05-11 NOTE — GROUP NOTE
DORETHA  GROUP DOCUMENTATION INDIVIDUAL                                                                          Group Therapy Note    Date: 5/11/2022    Group Start Time: 1500  Group End Time: 1600  Group Topic: Recreational/Music Therapy    [facility] [place] [address] ACUTE BEHAV [facility]    Stephan Bustamante [facility] GROUP    Group Therapy Note    Attendees: 7         Attendance: Did not attend    Patient's Goal:      Interventions/techniques:Whitney Yeung

## 2022-05-12 PROCEDURE — 74011250637 HC RX REV CODE- 250/637: Performed by: PSYCHIATRY & NEUROLOGY

## 2022-05-12 PROCEDURE — 99232 SBSQ HOSP IP/OBS MODERATE 35: CPT | Performed by: PSYCHIATRY & NEUROLOGY

## 2022-05-12 PROCEDURE — 65220000003 HC RM SEMIPRIVATE PSYCH

## 2022-05-12 RX ADMIN — Medication 1 MG: at 08:16

## 2022-05-12 RX ADMIN — HALOPERIDOL 20 MG: 2 SOLUTION ORAL at 08:15

## 2022-05-12 RX ADMIN — HALOPERIDOL 20 MG: 2 SOLUTION ORAL at 16:38

## 2022-05-12 RX ADMIN — Medication 1 MG: at 16:39

## 2022-05-12 RX ADMIN — Medication 1 MG: at 11:45

## 2022-05-12 NOTE — BH NOTES
Behavioral Health Treatment Team Note      Patient goal(s) for today: continue taking meds as prescribed, engage in unit activities, participate in hygiene/ADLs, prepare for discharge, follow directions from staff, contact support team, attend groups  Treatment team focus/goals: continue adjusting meds as needed, discharge planning, update support system  Progress note: Pt was seen by treatment team in her room with the blankets over her head. Pt remains isolative to her room, selectively mute but accepting medications. Sons say it takes a while to see the effects of the medication after a decompensation. Sons reporte the recent death of their aunt/pt's sister had a huge impact on pt - as pt would spend time with her sister daily. Daughter to schedule a visit with pt this week.   department to continue to coordinate discharge plans.     LOS:  20                    Expected LOS: TBD     Insurance info/prescription coverage: [facility]  Date of last family contact: DERRICK Sotelo ([phone])  Family requesting physician contact today: No  Discharge plan: Return home   Guns in the home: No   Outpatient provider(s): Insurance  [phone]     Participating treatment team Bridget CARROLL, Dr. Sangeetha Smith

## 2022-05-12 NOTE — BH NOTES
Psychiatric Progress Note    Patient: Chastity Galeano MRN: [id number]  SSN: [id number]    YOB: 1960  Age: 57 y.o. Sex: female      Admit Date: 4/20/2022    LOS: 21 days     Subjective:     Chastity Galeano  is selectively mute per staff and moods are depressed. She opened an eye then closed it again with no further correspondence. No prns given. No aggression or violence. Inappropriately interactive and poorly aware. Tolerating court ordered medications well. Eating ok and sleeping fairly (7hrs) . 4/24 - Chastity Galeano moved her hands to my voices but refused to converse with me. Selective with nursing also. She responded appropriately when breakfast was called only. Moods are down. No aggression or violence. Selectively interactive. Limited awareness. Refusing medications. Eating and sleeping fairly. 04/25 - the patient slept 8 hours overnight. She continues to refuse medications and requires much prompting to participate in ADLs. No agitation noted today, and patient got no PRNs for aggression x24 hours. She appears catatonic at times, but will react to direct questions. Patient getting IM injections of Haldol for psychosis by court order as she refuses all oral agents. On interview she says nothing and lies in bed.     04/26 - no active overnight events. Patient has been mute, she ate breakfast this morning and was noted to say only 'no, no, no' regarding court ordered IM medication after refusing PO agent. Patient has been isolative to her room with a flat affect and little spontaneous speech. She slept 8 hours overnight and got no other PRNs for agitation. Patient has not been communicative for much of the day. 04/27 - the patient continues to refuse vitals and medications; she is disorganized and with no significant change in her mental state. Patient discharge focused, slept 8 hours and got no PRNs for agitation.  She has been in bed since getting court ordered IM after refusing PO agents. Per , son is able to visit this week. She remains mute on interview and does not engage MD and treatment team.    04/28 - there has been no significant improvement in the patient's mental state. She remains mute, isolative, and refusing all medication. Patient slept 9+ hours, got Haldol and Ativan injections due to ongoing disorganization of thought and catatonia. On interview, she states only 'leave me alone' and is incoherent. Per , son will come visit to try to help reconstitute the patient, stating she may respond to a familiar face and that she remains far from her baseline. 04/29 - the patient is little improved. She remains guarded and mute, catatonic throughout the day but up this morning when she refused medications and got IM injections via court order. She has little insight concerning the admission but slept 9 hours overnight. Patient unable to make her needs known, she speaks sparingly about her immediate desires but cannot provide any abstract reasoning regarding her refusal of medication or her treatment course thus far. Patient will be visited by her son this afternoon per .    04/30- the patient refuses to speak with treatment team this morning. States \"I dont want to talk\"  Patient refuses to answer questions. She continues to refuse medications,has court order medications requiring IM injections. Remains isolative to her room, sleeping most of the day. 05/01- \" I want to go home\"  Patient seen this morning in her room where she is sitting up in the bed eating pudding. She was willing to take PO medication this morning with some encouragement, after explaining that she will not be able to go home until she is not receiving IM injections. She remains isolative to her room and in bed most of the day. 05/02 - overnight, the patient remained isolative and disorganized.  She has been refusing medications and getting IM court ordered alternative. Patient was out of bed for breakfast this morning but otherwise has been in her bed for much of the day. Patient took PO medication with much encouragement from unit staff. She slept 8.25 hours overnight. She remains internally preoccupied and does not speak with MD on interview. 05/03 - the patient remains disorganized and has been isolative to her room. She was noted to spit out her oral solution medication and was thus given IM injection per court order. The patient has been out for meals at times but not interacting in any appreciable way with staff or peers. She slept 9 hours overnight and got no PRNs for agitation. On interview looks at MD but does not speak. 05/04 - the patient is medication compliant, but still mute for much of the day. She is taking court ordered medications without issue today and slept 9 hours overnight. Patient internally preoccupied, eating meals in her room and isolative for much of the day. She denies any concerns but does not respond appropriately to direct questions. 05/05 - overnight, the patient remained flat and was fairly uncooperative with assessments. She was noted to be internally preoccupied and slept 9 hours overnight. She doesn't speak with the MD on interview and has little insight into her condition. Per nurse report, the patient has been trying to avoid taking PO medication and must be supervised. 05/06 - the patient denies all, she remains disorganized and with no significant improvement in her mental state per nurse report. Patient has been medication compliant and got no PRNs for agitation. She is compliant with court ordered medication and speaks non-spontaneously. Patient discharge focused, unable to make her needs known. 05/07 - the patient slept 7.25 hours overnight; she denies all symptoms but per nurse appeared restless and was pacing the unit. Patient denies SI/HI/AVH/PI.  She is markedly flat with no improvement in her thought process. 05/08 - the patient slept 8 hours overnight, she got Trazodone PRN and received the Haldol decanoate injection without incident. Patient remains isolative to her room but in fair behavioral control. She is disorganized on interview and muted but denies SI/HI/AVH/PI. Patient has not been visible for much of today but is compliant with court ordered treatment. 05/09 - the patient remains isolative to her room, out for meals with much encouragement and compliant with court ordered treatment. BP is improved and she is able to make her needs known. She got PRN Trazodone and has been in fair behavioral control, though she remains markedly flat with no improvement in her thought process. Of note, patient is asked if she would like her son to visit and repeatedly exclaims that she has no son. 05/10 - the patient slept 7 hours overnight; she remains isolative to her room and is internally preoccupied. She has been little improved with recent titration of Haldol and continues to state that she does not have a son or any other family members; she is unable to reality test. Patient is compliant with court ordered medication but unable to participate in disposition planning. Per , her son will visit tomorrow to assess her progress. The patient denies all symptoms, her ADLs are poor. 05/11 - no significant overnight events. Patient has been isolative to her room, out for meals with encouragement but in bed for much of the day. She is compliant with court ordered medications and denies active thoughts of self harm. Patient seen on the unit by her son who is visiting, she does not speak much and is otherwise unable to make her needs known. ADLs are poor.  Per , son reports it does take some time for her to reconstitute her mental faculties and the death of her sister with whom she was close also has affected her.    05/12 - the patient has been spending much of her time in her room, she is out for meals and communicates minimally. She remains guarded and answers 'I don't know' to assessment questions. Patient slept 8.5 hours overnight and is not interacting in any meaningful manner with the treatment team. She remains guarded and with no specific symptoms but is not attending to ADLs, eating with significant encouragement.      Objective:     Vitals:    05/10/22 0751 05/10/22 1920 05/11/22 1955 05/12/22 0732   BP: 106/70 125/64 128/66 (!) 105/55   Pulse: 93 88 94 77   Resp: 18 17 16 18   Temp: 98.4 °F (36.9 °C) 99 °F (37.2 °C) 98.9 °F (37.2 °C) 98.1 °F (36.7 °C)   SpO2: 99%  97% 97%   Weight:       Height:            Mental Status Exam:   Sensorium  not oriented to situation   Relations uncooperative   Eye Contact poor   Appearance:  age appropriate   Speech:  mute   Thought Process: blocked   Thought Content internally preoccupied    Suicidal ideations none   Mood:  dysphoric   Affect:  constricted   Memory   N/A   Concentration:  adequate   Insight:  poor   Judgment:  poor       MEDICATIONS:  Current Facility-Administered Medications   Medication Dose Route Frequency    [START ON 5/13/2022] haloperidol decanoate (HALDOL DECANOATE) 100 mg/mL LA injection 300 mg  300 mg IntraMUSCular ONCE    haloperidol (HALDOL) 2 mg/mL oral solution 20 mg  20 mg Oral BID    Or    haloperidol lactate (HALDOL) injection 10 mg ++COURT ORDERED FOR REFUSAL OF ORAL HALOPERIDOL++  10 mg IntraMUSCular BID    ondansetron (ZOFRAN ODT) tablet 4 mg  4 mg Oral Q6H PRN    LORazepam (INTENSOL) 2 mg/mL oral concentrate 1 mg  1 mg Oral TID    Or    LORazepam (ATIVAN) injection 1 mg ++COURT ORDERED MEDICATION FOR REFUSAL OF ORAL LORAZEPAM++  1 mg IntraMUSCular TID    albuterol (PROVENTIL HFA, VENTOLIN HFA, PROAIR HFA) inhaler 2 Puff  2 Puff Inhalation TID PRN    OLANZapine (ZyPREXA) tablet 5 mg  5 mg Oral Q6H PRN    haloperidol lactate (HALDOL) injection 5 mg  5 mg IntraMUSCular Q6H PRN    benztropine (COGENTIN) tablet 1 mg  1 mg Oral BID PRN  diphenhydrAMINE (BENADRYL) injection 50 mg  50 mg IntraMUSCular BID PRN    hydrOXYzine HCL (ATARAX) tablet 50 mg  50 mg Oral TID PRN    LORazepam (ATIVAN) injection 1 mg  1 mg IntraMUSCular Q4H PRN    traZODone (DESYREL) tablet 50 mg  50 mg Oral QHS PRN    acetaminophen (TYLENOL) tablet 650 mg  650 mg Oral Q4H PRN    magnesium hydroxide (MILK OF MAGNESIA) 400 mg/5 mL oral suspension 30 mL  30 mL Oral DAILY PRN    nicotine (NICODERM CQ) 21 mg/24 hr patch 1 Patch  1 Patch TransDERmal DAILY PRN      DISCUSSION:   the risks and benefits of the proposed medication  patient given opportunity to ask questions    Lab/Data Review: All lab results for the last 24 hours reviewed. No results found for this or any previous visit (from the past 24 hour(s)). Assessment:     Principal Problem:    Schizoaffective disorder ([facility] [address] (3/30/2018)     Assessment: the patient presents with gerardo psychosis in the setting of medication non-compliance x1.5 weeks. She is unable to make her needs known and vacillates between agitation and near catatonia. Will obtain court order for psychiatric treatment, observe and treat symptomatically.       Plan:      Plan:   COURT ORDERED MEDICATION:  - CONTINUE Haldol oral soln to 20 mg PO *OR* 10 mg IM BID for psychosis  - SCHEDULE Haldol decanoate 100 mg ONCE and 300 mg on day 5 (05/13/22) for psychosis W[place]  - CONTINUE Ativan 1 mg PO *OR* IM TID for catatonia  - ECT by court order pending referral  - IGM therapy as tolerated  - Expand database / obtain collateral  - Dispo planning    I certify that this patient's inpatient psychiatric hospital services furnished since the previous certification were, and continue to be, required for treatment that could reasonably be expected to improve the patient's condition, or for diagnostic study, and that the patient continues to need, on a daily basis, active treatment furnished directly by or requiring the supervision of inpatient psychiatric facility personnel. In addition, the hospital records show that services furnished were intensive treatment services, admission or related services, or equivalent services.     Signed By: Byron Jimenez MD     May 12, 2022

## 2022-05-12 NOTE — PROGRESS NOTES
Shift change report given to Milton Barry RN (oncoming nurse) by Surya Boston (offgoing nurse). Report included the following information SBAR, Kardex, MAR, and Recent Results. Received patient awake and alert. Pt appear withdrawn but denied SI, HI, AVH, depression, and anxiety. Meds and meal compliant. Vital signs within normal range. Will keep monitoring. 1800 - Shift uneventful. Pt sleept most of the day, isolated self from peers. Unable to attend groups. Pt tolerated meds and meals. No complaints made. Pt is currently sleeping in the room.      Problem: Psychosis  Goal: *STG: Remains safe in hospital  Outcome: Progressing Towards Goal  Goal: *STG: Seeks staff when feelings of self harm or harm towards others arise  Outcome: Progressing Towards Goal  Goal: *STG: Patient will develop strategies to regulate emotions and corresponding behaviors  Outcome: Progressing Towards Goal

## 2022-05-12 NOTE — PROGRESS NOTES
Problem: Falls - Risk of  Goal: *Absence of Falls  Description: Document Kina Yan Fall Risk and appropriate interventions in the flowsheet. Outcome: Progressing Towards Goal  Note: Fall Risk Interventions:       Mentation Interventions: Adequate sleep, hydration, pain control    Medication Interventions: Evaluate medications/consider consulting pharmacy    Problem: Psychosis  Goal: *STG: Remains safe in hospital  Outcome: Progressing Towards Goal     Problem: Psychosis  Goal: *STG: Decreased hallucinations  Outcome: Progressing Towards Goal   [id number]: Report received from outgoing day shift RN. Patient is resting in bed easily aroused. Patient is alert and oriented to name and person. Patient appears drowsy and answers assessment questions appropriately. Patient encouraged to eat snack. Patient remains in bed during hourly shift rounds. 0010- 0300: Hourly rounds Patient is resting quietly in bed as if sleep. Patient slept about 8.5 hrs.

## 2022-05-13 PROCEDURE — 74011250637 HC RX REV CODE- 250/637: Performed by: PSYCHIATRY & NEUROLOGY

## 2022-05-13 PROCEDURE — 99232 SBSQ HOSP IP/OBS MODERATE 35: CPT | Performed by: PSYCHIATRY & NEUROLOGY

## 2022-05-13 PROCEDURE — 74011250636 HC RX REV CODE- 250/636: Performed by: PSYCHIATRY & NEUROLOGY

## 2022-05-13 PROCEDURE — 65220000003 HC RM SEMIPRIVATE PSYCH

## 2022-05-13 RX ORDER — HALOPERIDOL DECANOATE 100 MG/ML
400 INJECTION INTRAMUSCULAR
Status: DISCONTINUED | OUTPATIENT
Start: 2022-06-10 | End: 2022-05-19 | Stop reason: HOSPADM

## 2022-05-13 RX ORDER — HALOPERIDOL 2 MG/ML
20 SOLUTION ORAL 2 TIMES DAILY
Status: DISCONTINUED | OUTPATIENT
Start: 2022-05-13 | End: 2022-05-19 | Stop reason: HOSPADM

## 2022-05-13 RX ORDER — LORAZEPAM 2 MG/ML
0.5 CONCENTRATE ORAL 3 TIMES DAILY
Status: DISCONTINUED | OUTPATIENT
Start: 2022-05-13 | End: 2022-05-16

## 2022-05-13 RX ORDER — LORAZEPAM 2 MG/ML
1 INJECTION INTRAMUSCULAR 3 TIMES DAILY
Status: DISCONTINUED | OUTPATIENT
Start: 2022-05-13 | End: 2022-05-16

## 2022-05-13 RX ORDER — HALOPERIDOL 5 MG/ML
10 INJECTION INTRAMUSCULAR 2 TIMES DAILY
Status: DISCONTINUED | OUTPATIENT
Start: 2022-05-13 | End: 2022-05-19 | Stop reason: HOSPADM

## 2022-05-13 RX ADMIN — Medication 1 MG: at 09:01

## 2022-05-13 RX ADMIN — HALOPERIDOL 20 MG: 2 SOLUTION ORAL at 17:08

## 2022-05-13 RX ADMIN — HALOPERIDOL DECANOATE 300 MG: 100 INJECTION INTRAMUSCULAR at 09:21

## 2022-05-13 RX ADMIN — LORAZEPAM 0.5 MG: 2 LIQUID ORAL at 12:12

## 2022-05-13 RX ADMIN — HALOPERIDOL 20 MG: 2 SOLUTION ORAL at 09:01

## 2022-05-13 RX ADMIN — LORAZEPAM 0.5 MG: 2 LIQUID ORAL at 17:08

## 2022-05-13 NOTE — GROUP NOTE
DORETHA  GROUP DOCUMENTATION INDIVIDUAL                                                                          Group Therapy Note    Date: 5/13/2022    Group Start Time: 1000  Group End Time: 1100  Group Topic: Topic Group    [facility] [place] [id number] ACUTE BEHAV [facility]    [place], [address] [facility] GROUP DOCUMENTATION GROUP    Group Therapy Note    Attendees: 7         Attendance: Attended    Patient's Goal:  To identify people and things most grateful for    Interventions/techniques: Supported-worksheet    Follows Directions:  Followed directions    Interactions: Interacted appropriately    Mental Status: Calm    Behavior/appearance: Attentive, Cooperative and Needed prompting    Goals Achieved: Able to engage in interactions, Able to listen to others, Able to self-disclose and Discussed coping    Olinda Tom

## 2022-05-13 NOTE — PROGRESS NOTES
Problem: *Psychosis: Discharge Outcome  Goal: *Absent or Controlled Active Psychotic Symptoms  Outcome: Progressing Towards Goal     Problem: Psychosis  Goal: *STG/LTG: Demonstrates improved social functioning by responding appropriately to staff  Outcome: Progressing Towards Goal     Problem: Psychosis  Goal: *STG/LTG: Complies with medication therapy  Outcome: Progressing Towards Goal     Problem: Psychosis  Goal: *STG: Remains safe in hospital  Outcome: Progressing Towards Goal   1900 - 0700: Report received from outgoing day shift RN. Assumed care of Patient. Patient is resting quietly in bed. Patient is awake and oriented to person and place. Patient denies any S/I, H/I, anxiety,A/V/H,  depression, or pain. Patient encouraged to get OOB. Patient says She is cold. Hourly rounding , Patient is resting in bed as if sleep, Continuous shift rounds maintained for care and safety. Patient slept about 8 hrs.

## 2022-05-13 NOTE — BH NOTES
Psychiatric Progress Note    Patient: Henrietta Miramontes MRN: [id number]  SSN: [id number]    YOB: 1960  Age: 57 y.o. Sex: female      Admit Date: 4/20/2022    LOS: 22 days     Subjective:     Henrietta Miramontes  is selectively mute per staff and moods are depressed. She opened an eye then closed it again with no further correspondence. No prns given. No aggression or violence. Inappropriately interactive and poorly aware. Tolerating court ordered medications well. Eating ok and sleeping fairly (7hrs) . 4/24 - Henrietta Miramontes moved her hands to my voices but refused to converse with me. Selective with nursing also. She responded appropriately when breakfast was called only. Moods are down. No aggression or violence. Selectively interactive. Limited awareness. Refusing medications. Eating and sleeping fairly. 04/25 - the patient slept 8 hours overnight. She continues to refuse medications and requires much prompting to participate in ADLs. No agitation noted today, and patient got no PRNs for aggression x24 hours. She appears catatonic at times, but will react to direct questions. Patient getting IM injections of Haldol for psychosis by court order as she refuses all oral agents. On interview she says nothing and lies in bed.     04/26 - no active overnight events. Patient has been mute, she ate breakfast this morning and was noted to say only 'no, no, no' regarding court ordered IM medication after refusing PO agent. Patient has been isolative to her room with a flat affect and little spontaneous speech. She slept 8 hours overnight and got no other PRNs for agitation. Patient has not been communicative for much of the day. 04/27 - the patient continues to refuse vitals and medications; she is disorganized and with no significant change in her mental state. Patient discharge focused, slept 8 hours and got no PRNs for agitation.  She has been in bed since getting court ordered IM after refusing PO agents. Per , son is able to visit this week. She remains mute on interview and does not engage MD and treatment team.    04/28 - there has been no significant improvement in the patient's mental state. She remains mute, isolative, and refusing all medication. Patient slept 9+ hours, got Haldol and Ativan injections due to ongoing disorganization of thought and catatonia. On interview, she states only 'leave me alone' and is incoherent. Per , son will come visit to try to help reconstitute the patient, stating she may respond to a familiar face and that she remains far from her baseline. 04/29 - the patient is little improved. She remains guarded and mute, catatonic throughout the day but up this morning when she refused medications and got IM injections via court order. She has little insight concerning the admission but slept 9 hours overnight. Patient unable to make her needs known, she speaks sparingly about her immediate desires but cannot provide any abstract reasoning regarding her refusal of medication or her treatment course thus far. Patient will be visited by her son this afternoon per .    04/30- the patient refuses to speak with treatment team this morning. States \"I dont want to talk\"  Patient refuses to answer questions. She continues to refuse medications,has court order medications requiring IM injections. Remains isolative to her room, sleeping most of the day. 05/01- \" I want to go home\"  Patient seen this morning in her room where she is sitting up in the bed eating pudding. She was willing to take PO medication this morning with some encouragement, after explaining that she will not be able to go home until she is not receiving IM injections. She remains isolative to her room and in bed most of the day. 05/02 - overnight, the patient remained isolative and disorganized.  She has been refusing medications and getting IM court ordered alternative. Patient was out of bed for breakfast this morning but otherwise has been in her bed for much of the day. Patient took PO medication with much encouragement from unit staff. She slept 8.25 hours overnight. She remains internally preoccupied and does not speak with MD on interview. 05/03 - the patient remains disorganized and has been isolative to her room. She was noted to spit out her oral solution medication and was thus given IM injection per court order. The patient has been out for meals at times but not interacting in any appreciable way with staff or peers. She slept 9 hours overnight and got no PRNs for agitation. On interview looks at MD but does not speak. 05/04 - the patient is medication compliant, but still mute for much of the day. She is taking court ordered medications without issue today and slept 9 hours overnight. Patient internally preoccupied, eating meals in her room and isolative for much of the day. She denies any concerns but does not respond appropriately to direct questions. 05/05 - overnight, the patient remained flat and was fairly uncooperative with assessments. She was noted to be internally preoccupied and slept 9 hours overnight. She doesn't speak with the MD on interview and has little insight into her condition. Per nurse report, the patient has been trying to avoid taking PO medication and must be supervised. 05/06 - the patient denies all, she remains disorganized and with no significant improvement in her mental state per nurse report. Patient has been medication compliant and got no PRNs for agitation. She is compliant with court ordered medication and speaks non-spontaneously. Patient discharge focused, unable to make her needs known. 05/07 - the patient slept 7.25 hours overnight; she denies all symptoms but per nurse appeared restless and was pacing the unit. Patient denies SI/HI/AVH/PI.  She is markedly flat with no improvement in her thought process. 05/08 - the patient slept 8 hours overnight, she got Trazodone PRN and received the Haldol decanoate injection without incident. Patient remains isolative to her room but in fair behavioral control. She is disorganized on interview and muted but denies SI/HI/AVH/PI. Patient has not been visible for much of today but is compliant with court ordered treatment. 05/09 - the patient remains isolative to her room, out for meals with much encouragement and compliant with court ordered treatment. BP is improved and she is able to make her needs known. She got PRN Trazodone and has been in fair behavioral control, though she remains markedly flat with no improvement in her thought process. Of note, patient is asked if she would like her son to visit and repeatedly exclaims that she has no son. 05/10 - the patient slept 7 hours overnight; she remains isolative to her room and is internally preoccupied. She has been little improved with recent titration of Haldol and continues to state that she does not have a son or any other family members; she is unable to reality test. Patient is compliant with court ordered medication but unable to participate in disposition planning. Per , her son will visit tomorrow to assess her progress. The patient denies all symptoms, her ADLs are poor. 05/11 - no significant overnight events. Patient has been isolative to her room, out for meals with encouragement but in bed for much of the day. She is compliant with court ordered medications and denies active thoughts of self harm. Patient seen on the unit by her son who is visiting, she does not speak much and is otherwise unable to make her needs known. ADLs are poor.  Per , son reports it does take some time for her to reconstitute her mental faculties and the death of her sister with whom she was close also has affected her.    05/12 - the patient has been spending much of her time in her room, she is out for meals and communicates minimally. She remains guarded and answers 'I don't know' to assessment questions. Patient slept 8.5 hours overnight and is not interacting in any meaningful manner with the treatment team. She remains guarded and with no specific symptoms but is not attending to ADLs, eating with significant encouragement. 05/13 - overnight, the patient was noted to be pacing the hallway, not significantly interacting with staff or peers. She took PO medication and got decanoate injection. She reports feeling cold but is otherwise able to make her needs known. Patient denies active thoughts of self harm; she remains disorganized and with a flat affect on interview but she is out of her room more often for meals. Patient encouarged to speak with her daughter, whom she last said was dead.     Objective:     Vitals:    05/12/22 0732 05/12/22 0835 05/12/22 2017 05/13/22 0813   BP: (!) 105/55 116/66 117/67 124/68   Pulse: 77 85 79 81   Resp: 18  16 17   Temp: 98.1 °F (36.7 °C)  98.6 °F (37 °C) 97.6 °F (36.4 °C)   SpO2: 97%  96% 99%   Weight:       Height:            Mental Status Exam:   Sensorium  not oriented to situation   Relations uncooperative   Eye Contact poor   Appearance:  age appropriate   Speech:  mute   Thought Process: blocked   Thought Content internally preoccupied    Suicidal ideations none   Mood:  dysphoric   Affect:  constricted   Memory   N/A   Concentration:  adequate   Insight:  poor   Judgment:  poor       MEDICATIONS:  Current Facility-Administered Medications   Medication Dose Route Frequency    LORazepam (INTENSOL) 2 mg/mL oral concentrate 0.5 mg  0.5 mg Oral TID    Or    LORazepam (ATIVAN) injection 1 mg ++COURT ORDERED MEDICATION FOR REFUSAL OF ORAL LORAZEPAM++  1 mg IntraMUSCular TID    [START ON 6/10/2022] haloperidol decanoate (HALDOL DECANOATE) 100 mg/mL LA injection 400 mg  400 mg IntraMUSCular Q28D    haloperidol (HALDOL) 2 mg/mL oral solution 20 mg  20 mg Oral BID    Or    haloperidol lactate (HALDOL) injection 10 mg +++COURT ORDERED FOR REFUSAL OF ORAL HALOPERIDOL+++  10 mg IntraMUSCular BID    ondansetron (ZOFRAN ODT) tablet 4 mg  4 mg Oral Q6H PRN    albuterol (PROVENTIL HFA, VENTOLIN HFA, PROAIR HFA) inhaler 2 Puff  2 Puff Inhalation TID PRN    OLANZapine (ZyPREXA) tablet 5 mg  5 mg Oral Q6H PRN    haloperidol lactate (HALDOL) injection 5 mg  5 mg IntraMUSCular Q6H PRN    benztropine (COGENTIN) tablet 1 mg  1 mg Oral BID PRN    diphenhydrAMINE (BENADRYL) injection 50 mg  50 mg IntraMUSCular BID PRN    hydrOXYzine HCL (ATARAX) tablet 50 mg  50 mg Oral TID PRN    LORazepam (ATIVAN) injection 1 mg  1 mg IntraMUSCular Q4H PRN    traZODone (DESYREL) tablet 50 mg  50 mg Oral QHS PRN    acetaminophen (TYLENOL) tablet 650 mg  650 mg Oral Q4H PRN    magnesium hydroxide (MILK OF MAGNESIA) 400 mg/5 mL oral suspension 30 mL  30 mL Oral DAILY PRN    nicotine (NICODERM CQ) 21 mg/24 hr patch 1 Patch  1 Patch TransDERmal DAILY PRN      DISCUSSION:   the risks and benefits of the proposed medication  patient given opportunity to ask questions    Lab/Data Review: All lab results for the last 24 hours reviewed. No results found for this or any previous visit (from the past 24 hour(s)). Assessment:     Principal Problem:    Schizoaffective disorder ([facility] [address] (3/30/2018)     Assessment: the patient presents with gerardo psychosis in the setting of medication non-compliance x1.5 weeks. She is unable to make her needs known and vacillates between agitation and near catatonia. Will obtain court order for psychiatric treatment, observe and treat symptomatically.       Plan:      Plan:   COURT ORDERED MEDICATION:  -  Haldol oral soln to 20 mg PO *OR* 10 mg IM BID for psychosis x27 days  - CONTINUE Haldol decanoate 400 mg IM Q4Wks for psychosis [facility] (next due 6/10/22)  - TAPER Ativan to 0.5 mg PO *OR* IM TID for catatonia  - IGM therapy as tolerated  - Expand database / obtain collateral  - Dispo planning    I certify that this patient's inpatient psychiatric hospital services furnished since the previous certification were, and continue to be, required for treatment that could reasonably be expected to improve the patient's condition, or for diagnostic study, and that the patient continues to need, on a daily basis, active treatment furnished directly by or requiring the supervision of inpatient psychiatric facility personnel. In addition, the hospital records show that services furnished were intensive treatment services, admission or related services, or equivalent services.     Signed By: Shaji Tejeda MD     May 13, 2022

## 2022-05-13 NOTE — GROUP NOTE
[facility]OR[facility]  GROUP DOCUMENTATION INDIVIDUAL                                                                          Group Therapy Note    Date: 5/13/2022    Group Start Time: 1400  Group End Time: 1500  Group Topic: Recreational/Music Therapy    [address] [facility] 3 ACUTE BE[facility]    [place] Tejeda, [address] [address] GROUP    Group Therapy Note    Attendees: 4         Attendance: Did not attend    Patient's Goal:      Interventions/techniquesWhitney Yeung

## 2022-05-13 NOTE — BH NOTES
Behavioral Health Treatment Team Note      Patient goal(s) for today: continue taking meds as prescribed, engage in unit activities, participate in hygiene/ADLs, prepare for discharge, follow directions from staff, contact support team, attend groups  Treatment team focus/goals: continue adjusting meds as needed, discharge planning, update support system  Progress note: Pt was seen by treatment team in her room with the blankets over her head. Pt observed spending more time in milieu and talking to staff. Sons say it takes a while to see the effects of the medication after a decompensation. Sons report the recent death of their aunt/pt's sister had a huge impact on pt - as pt would spend time with her sister daily.  Daughter to schedule a visit with pt next week - SW awaiting a return call. SW department to continue to coordinate discharge plans.     LOS:  22                    Expected LOS: TBD     Insurance info/prescription coverage: [facility]  Date of last family contact: JULIANNE Sotelo ([phone])  Family requesting physician contact today: No  Discharge plan: Return home   Guns in the home: No   Outpatient provider(s): Insurance  [phone]     Participating treatment team members: Carina Cross LCSW, and Dr. Ray Duncan

## 2022-05-14 PROCEDURE — 65220000003 HC RM SEMIPRIVATE PSYCH

## 2022-05-14 PROCEDURE — 74011250637 HC RX REV CODE- 250/637: Performed by: PSYCHIATRY & NEUROLOGY

## 2022-05-14 RX ADMIN — LORAZEPAM 0.5 MG: 2 LIQUID ORAL at 12:40

## 2022-05-14 RX ADMIN — HALOPERIDOL 20 MG: 2 SOLUTION ORAL at 17:36

## 2022-05-14 RX ADMIN — LORAZEPAM 0.5 MG: 2 LIQUID ORAL at 17:36

## 2022-05-14 RX ADMIN — HALOPERIDOL 20 MG: 2 SOLUTION ORAL at 09:02

## 2022-05-14 RX ADMIN — LORAZEPAM 0.5 MG: 2 LIQUID ORAL at 09:02

## 2022-05-14 NOTE — BH NOTES
Psychiatric Progress Note    Patient: Damari Abdi MRN: [id number]  SSN: [id number]    YOB: 1960  Age: 57 y.o. Sex: female      Admit Date: 4/20/2022    LOS: 23 days     Subjective:     Damari Abdi  is selectively mute per staff and moods are depressed. She opened an eye then closed it again with no further correspondence. No prns given. No aggression or violence. Inappropriately interactive and poorly aware. Tolerating court ordered medications well. Eating ok and sleeping fairly (7hrs) . 4/24 - Damari Abdi moved her hands to my voices but refused to converse with me. Selective with nursing also. She responded appropriately when breakfast was called only. Moods are down. No aggression or violence. Selectively interactive. Limited awareness. Refusing medications. Eating and sleeping fairly. 04/25 - the patient slept 8 hours overnight. She continues to refuse medications and requires much prompting to participate in ADLs. No agitation noted today, and patient got no PRNs for aggression x24 hours. She appears catatonic at times, but will react to direct questions. Patient getting IM injections of Haldol for psychosis by court order as she refuses all oral agents. On interview she says nothing and lies in bed.     04/26 - no active overnight events. Patient has been mute, she ate breakfast this morning and was noted to say only 'no, no, no' regarding court ordered IM medication after refusing PO agent. Patient has been isolative to her room with a flat affect and little spontaneous speech. She slept 8 hours overnight and got no other PRNs for agitation. Patient has not been communicative for much of the day. 04/27 - the patient continues to refuse vitals and medications; she is disorganized and with no significant change in her mental state. Patient discharge focused, slept 8 hours and got no PRNs for agitation.  She has been in bed since getting court ordered IM after refusing PO agents. Per , son is able to visit this week. She remains mute on interview and does not engage MD and treatment team.    04/28 - there has been no significant improvement in the patient's mental state. She remains mute, isolative, and refusing all medication. Patient slept 9+ hours, got Haldol and Ativan injections due to ongoing disorganization of thought and catatonia. On interview, she states only 'leave me alone' and is incoherent. Per , son will come visit to try to help reconstitute the patient, stating she may respond to a familiar face and that she remains far from her baseline. 04/29 - the patient is little improved. She remains guarded and mute, catatonic throughout the day but up this morning when she refused medications and got IM injections via court order. She has little insight concerning the admission but slept 9 hours overnight. Patient unable to make her needs known, she speaks sparingly about her immediate desires but cannot provide any abstract reasoning regarding her refusal of medication or her treatment course thus far. Patient will be visited by her son this afternoon per .    04/30- the patient refuses to speak with treatment team this morning. States \"I dont want to talk\"  Patient refuses to answer questions. She continues to refuse medications,has court order medications requiring IM injections. Remains isolative to her room, sleeping most of the day. 05/01- \" I want to go home\"  Patient seen this morning in her room where she is sitting up in the bed eating pudding. She was willing to take PO medication this morning with some encouragement, after explaining that she will not be able to go home until she is not receiving IM injections. She remains isolative to her room and in bed most of the day. 05/02 - overnight, the patient remained isolative and disorganized.  She has been refusing medications and getting IM court ordered alternative. Patient was out of bed for breakfast this morning but otherwise has been in her bed for much of the day. Patient took PO medication with much encouragement from unit staff. She slept 8.25 hours overnight. She remains internally preoccupied and does not speak with MD on interview. 05/03 - the patient remains disorganized and has been isolative to her room. She was noted to spit out her oral solution medication and was thus given IM injection per court order. The patient has been out for meals at times but not interacting in any appreciable way with staff or peers. She slept 9 hours overnight and got no PRNs for agitation. On interview looks at MD but does not speak. 05/04 - the patient is medication compliant, but still mute for much of the day. She is taking court ordered medications without issue today and slept 9 hours overnight. Patient internally preoccupied, eating meals in her room and isolative for much of the day. She denies any concerns but does not respond appropriately to direct questions. 05/05 - overnight, the patient remained flat and was fairly uncooperative with assessments. She was noted to be internally preoccupied and slept 9 hours overnight. She doesn't speak with the MD on interview and has little insight into her condition. Per nurse report, the patient has been trying to avoid taking PO medication and must be supervised. 05/06 - the patient denies all, she remains disorganized and with no significant improvement in her mental state per nurse report. Patient has been medication compliant and got no PRNs for agitation. She is compliant with court ordered medication and speaks non-spontaneously. Patient discharge focused, unable to make her needs known. 05/07 - the patient slept 7.25 hours overnight; she denies all symptoms but per nurse appeared restless and was pacing the unit. Patient denies SI/HI/AVH/PI.  She is markedly flat with no improvement in her thought process. 05/08 - the patient slept 8 hours overnight, she got Trazodone PRN and received the Haldol decanoate injection without incident. Patient remains isolative to her room but in fair behavioral control. She is disorganized on interview and muted but denies SI/HI/AVH/PI. Patient has not been visible for much of today but is compliant with court ordered treatment. 05/09 - the patient remains isolative to her room, out for meals with much encouragement and compliant with court ordered treatment. BP is improved and she is able to make her needs known. She got PRN Trazodone and has been in fair behavioral control, though she remains markedly flat with no improvement in her thought process. Of note, patient is asked if she would like her son to visit and repeatedly exclaims that she has no son. 05/10 - the patient slept 7 hours overnight; she remains isolative to her room and is internally preoccupied. She has been little improved with recent titration of Haldol and continues to state that she does not have a son or any other family members; she is unable to reality test. Patient is compliant with court ordered medication but unable to participate in disposition planning. Per , her son will visit tomorrow to assess her progress. The patient denies all symptoms, her ADLs are poor. 05/11 - no significant overnight events. Patient has been isolative to her room, out for meals with encouragement but in bed for much of the day. She is compliant with court ordered medications and denies active thoughts of self harm. Patient seen on the unit by her son who is visiting, she does not speak much and is otherwise unable to make her needs known. ADLs are poor.  Per , son reports it does take some time for her to reconstitute her mental faculties and the death of her sister with whom she was close also has affected her.    05/12 - the patient has been spending much of her time in her room, she is out for meals and communicates minimally. She remains guarded and answers 'I don't know' to assessment questions. Patient slept 8.5 hours overnight and is not interacting in any meaningful manner with the treatment team. She remains guarded and with no specific symptoms but is not attending to ADLs, eating with significant encouragement. 05/13 - overnight, the patient was noted to be pacing the hallway, not significantly interacting with staff or peers. She took PO medication and got decanoate injection. She reports feeling cold but is otherwise able to make her needs known. Patient denies active thoughts of self harm; she remains disorganized and with a flat affect on interview but she is out of her room more often for meals. Patient encouarged to speak with her daughter, whom she last said was dead. 05/14 - per staff pt has had some increased participation today. She had a shower and was up and about earlier this morning. She had her Haldol injection yesterday. She remains medication complaint. She is sleeping well and eating well. She has no SE's from medication to report. She would not pull her head out of her covers to talk.     Objective:     Vitals:    05/12/22 2017 05/13/22 0813 05/13/22 1945 05/14/22 0816   BP: 117/67 124/68 118/68 (!) 96/54   Pulse: 79 81 86 67   Resp: 16 17 20 19   Temp: 98.6 °F (37 °C) 97.6 °F (36.4 °C) 98.7 °F (37.1 °C) 97.5 °F (36.4 °C)   SpO2: 96% 99% 98% 99%   Weight:       Height:            Mental Status Exam:   Sensorium  not oriented to situation   Relations uncooperative   Eye Contact poor   Appearance:  age appropriate   Speech:  mute   Thought Process: blocked   Thought Content internally preoccupied    Suicidal ideations none   Mood:  dysphoric   Affect:  constricted   Memory   N/A   Concentration:  adequate   Insight:  poor   Judgment:  poor       MEDICATIONS:  Current Facility-Administered Medications   Medication Dose Route Frequency    LORazepam (INTENSOL) 2 mg/mL oral concentrate 0.5 mg  0.5 mg Oral TID    Or    LORazepam (ATIVAN) injection 1 mg ++COURT ORDERED MEDICATION FOR REFUSAL OF ORAL LORAZEPAM++  1 mg IntraMUSCular TID    [START ON 6/10/2022] haloperidol decanoate (HALDOL DECANOATE) 100 mg/mL LA injection 400 mg  400 mg IntraMUSCular Q28D    haloperidol (HALDOL) 2 mg/mL oral solution 20 mg  20 mg Oral BID    Or    haloperidol lactate (HALDOL) injection 10 mg +++COURT ORDERED FOR REFUSAL OF ORAL HALOPERIDOL+++  10 mg IntraMUSCular BID    ondansetron (ZOFRAN ODT) tablet 4 mg  4 mg Oral Q6H PRN    albuterol (PROVENTIL HFA, VENTOLIN HFA, PROAIR HFA) inhaler 2 Puff  2 Puff Inhalation TID PRN    OLANZapine (ZyPREXA) tablet 5 mg  5 mg Oral Q6H PRN    haloperidol lactate (HALDOL) injection 5 mg  5 mg IntraMUSCular Q6H PRN    benztropine (COGENTIN) tablet 1 mg  1 mg Oral BID PRN    diphenhydrAMINE (BENADRYL) injection 50 mg  50 mg IntraMUSCular BID PRN    hydrOXYzine HCL (ATARAX) tablet 50 mg  50 mg Oral TID PRN    LORazepam (ATIVAN) injection 1 mg  1 mg IntraMUSCular Q4H PRN    traZODone (DESYREL) tablet 50 mg  50 mg Oral QHS PRN    acetaminophen (TYLENOL) tablet 650 mg  650 mg Oral Q4H PRN    magnesium hydroxide (MILK OF MAGNESIA) 400 mg/5 mL oral suspension 30 mL  30 mL Oral DAILY PRN    nicotine (NICODERM CQ) 21 mg/24 hr patch 1 Patch  1 Patch TransDERmal DAILY PRN      DISCUSSION:   the risks and benefits of the proposed medication  patient given opportunity to ask questions    Lab/Data Review: All lab results for the last 24 hours reviewed. No results found for this or any previous visit (from the past 24 hour(s)). Assessment:     Principal Problem:    Schizoaffective disorder ([facility] [address] (3/30/2018)     Assessment: the patient presents with gerardo psychosis in the setting of medication non-compliance x1.5 weeks. She is unable to make her needs known and vacillates between agitation and near catatonia.  Will obtain court order for psychiatric treatment, observe and treat symptomatically. Plan:      Plan:   COURT ORDERED MEDICATION:  -  Haldol oral soln to 20 mg PO *OR* 10 mg IM BID for psychosis x27 days  - CONTINUE Haldol decanoate 400 mg IM Q4Wks for psychosis [place] (next due 6/10/22)  - TAPER Ativan to 0.5 mg PO *OR* IM TID for catatonia  - IGM therapy as tolerated  - Expand database / obtain collateral  - Dispo planning    I certify that this patient's inpatient psychiatric hospital services furnished since the previous certification were, and continue to be, required for treatment that could reasonably be expected to improve the patient's condition, or for diagnostic study, and that the patient continues to need, on a daily basis, active treatment furnished directly by or requiring the supervision of inpatient psychiatric facility personnel. In addition, the hospital records show that services furnished were intensive treatment services, admission or related services, or equivalent services.     Signed By: Quinn Kehr, NP     May 14, 2022

## 2022-05-14 NOTE — PROGRESS NOTES
0700- Shift change report given to [address] Hoffmann (oncoming nurse) by Molina Pickens RN (offgoing nurse). Report included the following information SBAR, Kardex, MAR, and Recent Results. 2940-4013 Received pt lying on the bed. On assessment pt denied AVH, anxiety, depression, HI and SI. Pt made no compliant. Tolerated both meals and meds, then went back to sleep. Will continue to monitor. 100-1200 Pt lying on the bed appear to be sleeping.     Problem: Psychosis  Goal: *STG: Remains safe in hospital  Outcome: Progressing Towards Goal  Goal: *STG: Seeks staff when feelings of self harm or harm towards others arise  Outcome: Progressing Towards Goal  Goal: *STG: Patient will develop strategies to regulate emotions and corresponding behaviors  Outcome: Progressing Towards Goal

## 2022-05-14 NOTE — PROGRESS NOTES
Bedside and Verbal shift change report given to Pastor Kd RN  (oncoming nurse) by Luna Wei RN (offgoing nurse). Report included the following information SBAR, Kardex, Accordion, Recent Results, and Med Rec Status. Assumed care of the patient, she is resting quietly in bed. She denies pain, SI, HI, anxiety or depression. She stated she will get up for activities today, no distress noted.    1030- no distress noted   1230- no distress noted   1430- no distress noted 1630- no distress noted   1630 no distress noted   1830- no distress noted   1900- no distress noted

## 2022-05-14 NOTE — PROGRESS NOTES
Liz Esquivel remained in her room this evening, but stated she felt fine all day. She denied SI, HI, AVH and pain. She ate a snack. She appears to be sleeping well.

## 2022-05-14 NOTE — PROGRESS NOTES
Problem: Psychosis  Goal: *STG: Decreased hallucinations  Outcome: Progressing Towards Goal  Goal: *STG: Decreased delusional thinking  Outcome: Progressing Towards Goal  Goal: *STG: Seeks staff when feelings of self harm or harm towards others arise  Outcome: Progressing Towards Goal

## 2022-05-15 PROCEDURE — 74011250637 HC RX REV CODE- 250/637: Performed by: PSYCHIATRY & NEUROLOGY

## 2022-05-15 PROCEDURE — 65220000003 HC RM SEMIPRIVATE PSYCH

## 2022-05-15 RX ADMIN — HALOPERIDOL 20 MG: 2 SOLUTION ORAL at 08:37

## 2022-05-15 RX ADMIN — HALOPERIDOL 20 MG: 2 SOLUTION ORAL at 17:20

## 2022-05-15 RX ADMIN — LORAZEPAM 0.5 MG: 2 LIQUID ORAL at 08:37

## 2022-05-15 RX ADMIN — LORAZEPAM 0.5 MG: 2 LIQUID ORAL at 17:20

## 2022-05-15 RX ADMIN — LORAZEPAM 0.5 MG: 2 LIQUID ORAL at 11:56

## 2022-05-15 NOTE — PROGRESS NOTES
Problem: Psychosis  Goal: *STG: Decreased hallucinations  Outcome: Progressing Towards Goal  Goal: *STG: Decreased delusional thinking  Outcome: Progressing Towards Goal  Goal: *STG: Remains safe in hospital  Outcome: Progressing Towards Goal     8784-4291: Shift change report given to Jade CASTILLO (oncoming nurse) by Becca Tan (offgoing nurse). Report included the following information SBAR, Kardex, MAR and Recent Results. 4830-1192: Assumed care of patient. Met patient in hallway. Calm and cooperative during assessment. Patient denied anxiety, depression, SI, HI, and AVH. No complaints of pain. Medication and meal compliant. 0097-5917: Patient in bed sleeping. 2906-7210: Patient sleeping. 3695-0616: Meal compliant. 9930-4797: Patient in bed sleeping. 2952-6623: Patient awake in bed resting quietly. 6778-0948: Patient awake in bed resting quietly. 8696-5045: Patient in dayroom watching TV.    4230-3851: Patient in dayroom watching TV. Meal compliant. 7230-9626: Patient in dayroom watching TV. Med compliant. 5727-1176: Patient in dayroom watching TV.

## 2022-05-15 NOTE — BH NOTES
Assumed care of the patient. Patient was isolative to her room . She was cooperative with the assessments however, interacted minimally and did not maintained any eye contact. Patient denied S.I/H. I/A/V/H, anxiety and depression. She did not express any needs and did not care for bedtime snacks. Will continue to monitor and provide support as needed. Patient appeared to be sleeping for about 7.5 hours. Hourly rounds completed.

## 2022-05-15 NOTE — PROGRESS NOTES
Problem: Psychosis  Goal: *STG: Remains safe in hospital  Outcome: Progressing Towards Goal  Goal: *STG/LTG: Demonstrates improved social functioning by responding appropriately to staff  Outcome: Progressing Towards Goal

## 2022-05-15 NOTE — BH NOTES
Psychiatric Progress Note    Patient: Kaylyn Fonseca MRN: [id number]  SSN: [id number]    YOB: 1960  Age: 57 y.o. Sex: female      Admit Date: 4/20/2022    LOS: 24 days     Subjective:     Kaylyn Fonseca  is selectively mute per staff and moods are depressed. She opened an eye then closed it again with no further correspondence. No prns given. No aggression or violence. Inappropriately interactive and poorly aware. Tolerating court ordered medications well. Eating ok and sleeping fairly (7hrs) . 4/24 - Kaylyn Fonseca moved her hands to my voices but refused to converse with me. Selective with nursing also. She responded appropriately when breakfast was called only. Moods are down. No aggression or violence. Selectively interactive. Limited awareness. Refusing medications. Eating and sleeping fairly. 04/25 - the patient slept 8 hours overnight. She continues to refuse medications and requires much prompting to participate in ADLs. No agitation noted today, and patient got no PRNs for aggression x24 hours. She appears catatonic at times, but will react to direct questions. Patient getting IM injections of Haldol for psychosis by court order as she refuses all oral agents. On interview she says nothing and lies in bed.     04/26 - no active overnight events. Patient has been mute, she ate breakfast this morning and was noted to say only 'no, no, no' regarding court ordered IM medication after refusing PO agent. Patient has been isolative to her room with a flat affect and little spontaneous speech. She slept 8 hours overnight and got no other PRNs for agitation. Patient has not been communicative for much of the day. 04/27 - the patient continues to refuse vitals and medications; she is disorganized and with no significant change in her mental state. Patient discharge focused, slept 8 hours and got no PRNs for agitation.  She has been in bed since getting court ordered IM after refusing PO agents. Per , son is able to visit this week. She remains mute on interview and does not engage MD and treatment team.    04/28 - there has been no significant improvement in the patient's mental state. She remains mute, isolative, and refusing all medication. Patient slept 9+ hours, got Haldol and Ativan injections due to ongoing disorganization of thought and catatonia. On interview, she states only 'leave me alone' and is incoherent. Per , son will come visit to try to help reconstitute the patient, stating she may respond to a familiar face and that she remains far from her baseline. 04/29 - the patient is little improved. She remains guarded and mute, catatonic throughout the day but up this morning when she refused medications and got IM injections via court order. She has little insight concerning the admission but slept 9 hours overnight. Patient unable to make her needs known, she speaks sparingly about her immediate desires but cannot provide any abstract reasoning regarding her refusal of medication or her treatment course thus far. Patient will be visited by her son this afternoon per .    04/30- the patient refuses to speak with treatment team this morning. States \"I dont want to talk\"  Patient refuses to answer questions. She continues to refuse medications,has court order medications requiring IM injections. Remains isolative to her room, sleeping most of the day. 05/01- \" I want to go home\"  Patient seen this morning in her room where she is sitting up in the bed eating pudding. She was willing to take PO medication this morning with some encouragement, after explaining that she will not be able to go home until she is not receiving IM injections. She remains isolative to her room and in bed most of the day. 05/02 - overnight, the patient remained isolative and disorganized.  She has been refusing medications and getting IM court ordered alternative. Patient was out of bed for breakfast this morning but otherwise has been in her bed for much of the day. Patient took PO medication with much encouragement from unit staff. She slept 8.25 hours overnight. She remains internally preoccupied and does not speak with MD on interview. 05/03 - the patient remains disorganized and has been isolative to her room. She was noted to spit out her oral solution medication and was thus given IM injection per court order. The patient has been out for meals at times but not interacting in any appreciable way with staff or peers. She slept 9 hours overnight and got no PRNs for agitation. On interview looks at MD but does not speak. 05/04 - the patient is medication compliant, but still mute for much of the day. She is taking court ordered medications without issue today and slept 9 hours overnight. Patient internally preoccupied, eating meals in her room and isolative for much of the day. She denies any concerns but does not respond appropriately to direct questions. 05/05 - overnight, the patient remained flat and was fairly uncooperative with assessments. She was noted to be internally preoccupied and slept 9 hours overnight. She doesn't speak with the MD on interview and has little insight into her condition. Per nurse report, the patient has been trying to avoid taking PO medication and must be supervised. 05/06 - the patient denies all, she remains disorganized and with no significant improvement in her mental state per nurse report. Patient has been medication compliant and got no PRNs for agitation. She is compliant with court ordered medication and speaks non-spontaneously. Patient discharge focused, unable to make her needs known. 05/07 - the patient slept 7.25 hours overnight; she denies all symptoms but per nurse appeared restless and was pacing the unit. Patient denies SI/HI/AVH/PI.  She is markedly flat with no improvement in her thought process. 05/08 - the patient slept 8 hours overnight, she got Trazodone PRN and received the Haldol decanoate injection without incident. Patient remains isolative to her room but in fair behavioral control. She is disorganized on interview and muted but denies SI/HI/AVH/PI. Patient has not been visible for much of today but is compliant with court ordered treatment. 05/09 - the patient remains isolative to her room, out for meals with much encouragement and compliant with court ordered treatment. BP is improved and she is able to make her needs known. She got PRN Trazodone and has been in fair behavioral control, though she remains markedly flat with no improvement in her thought process. Of note, patient is asked if she would like her son to visit and repeatedly exclaims that she has no son. 05/10 - the patient slept 7 hours overnight; she remains isolative to her room and is internally preoccupied. She has been little improved with recent titration of Haldol and continues to state that she does not have a son or any other family members; she is unable to reality test. Patient is compliant with court ordered medication but unable to participate in disposition planning. Per , her son will visit tomorrow to assess her progress. The patient denies all symptoms, her ADLs are poor. 05/11 - no significant overnight events. Patient has been isolative to her room, out for meals with encouragement but in bed for much of the day. She is compliant with court ordered medications and denies active thoughts of self harm. Patient seen on the unit by her son who is visiting, she does not speak much and is otherwise unable to make her needs known. ADLs are poor.  Per , son reports it does take some time for her to reconstitute her mental faculties and the death of her sister with whom she was close also has affected her.    05/12 - the patient has been spending much of her time in her room, she is out for meals and communicates minimally. She remains guarded and answers 'I don't know' to assessment questions. Patient slept 8.5 hours overnight and is not interacting in any meaningful manner with the treatment team. She remains guarded and with no specific symptoms but is not attending to ADLs, eating with significant encouragement. 05/13 - overnight, the patient was noted to be pacing the hallway, not significantly interacting with staff or peers. She took PO medication and got decanoate injection. She reports feeling cold but is otherwise able to make her needs known. Patient denies active thoughts of self harm; she remains disorganized and with a flat affect on interview but she is out of her room more often for meals. Patient encouarged to speak with her daughter, whom she last said was dead. 05/14 - per staff pt has had some increased participation today. She had a shower and was up and about earlier this morning. She had her Haldol injection yesterday. She remains medication complaint. She is sleeping well and eating well. She has no SE's from medication to report. She would not pull her head out of her covers to talk. 05/15 - Pt presents laying in bed with covers over her head. She states she is doing okay. She is sleeping well. Eating well. She again would not pull her head out of the covers to speak to this provider. She is medication compliant and denies SE's at this time. She denies SI/HI/AVH currently.     Objective:     Vitals:    05/13/22 1945 05/14/22 0816 05/14/22 1949 05/15/22 0816   BP: 118/68 (!) 96/54 114/61 136/71   Pulse: 86 67 74 76   Resp: 20 19 18 16   Temp: 98.7 °F (37.1 °C) 97.5 °F (36.4 °C) 99 °F (37.2 °C) 98 °F (36.7 °C)   SpO2: 98% 99% 98% 97%   Weight:       Height:            Mental Status Exam:   Sensorium  not oriented to situation   Relations uncooperative   Eye Contact poor   Appearance:  age appropriate   Speech:  mute   Thought Process: blocked   Thought Content internally preoccupied    Suicidal ideations none   Mood:  dysphoric   Affect:  constricted   Memory   N/A   Concentration:  adequate   Insight:  poor   Judgment:  poor       MEDICATIONS:  Current Facility-Administered Medications   Medication Dose Route Frequency    LORazepam (INTENSOL) 2 mg/mL oral concentrate 0.5 mg  0.5 mg Oral TID    Or    LORazepam (ATIVAN) injection 1 mg ++COURT ORDERED MEDICATION FOR REFUSAL OF ORAL LORAZEPAM++  1 mg IntraMUSCular TID    [START ON 6/10/2022] haloperidol decanoate (HALDOL DECANOATE) 100 mg/mL LA injection 400 mg  400 mg IntraMUSCular Q28D    haloperidol (HALDOL) 2 mg/mL oral solution 20 mg  20 mg Oral BID    Or    haloperidol lactate (HALDOL) injection 10 mg +++COURT ORDERED FOR REFUSAL OF ORAL HALOPERIDOL+++  10 mg IntraMUSCular BID    ondansetron (ZOFRAN ODT) tablet 4 mg  4 mg Oral Q6H PRN    albuterol (PROVENTIL HFA, VENTOLIN HFA, PROAIR HFA) inhaler 2 Puff  2 Puff Inhalation TID PRN    OLANZapine (ZyPREXA) tablet 5 mg  5 mg Oral Q6H PRN    haloperidol lactate (HALDOL) injection 5 mg  5 mg IntraMUSCular Q6H PRN    benztropine (COGENTIN) tablet 1 mg  1 mg Oral BID PRN    diphenhydrAMINE (BENADRYL) injection 50 mg  50 mg IntraMUSCular BID PRN    hydrOXYzine HCL (ATARAX) tablet 50 mg  50 mg Oral TID PRN    LORazepam (ATIVAN) injection 1 mg  1 mg IntraMUSCular Q4H PRN    traZODone (DESYREL) tablet 50 mg  50 mg Oral QHS PRN    acetaminophen (TYLENOL) tablet 650 mg  650 mg Oral Q4H PRN    magnesium hydroxide (MILK OF MAGNESIA) 400 mg/5 mL oral suspension 30 mL  30 mL Oral DAILY PRN    nicotine (NICODERM CQ) 21 mg/24 hr patch 1 Patch  1 Patch TransDERmal DAILY PRN      DISCUSSION:   the risks and benefits of the proposed medication  patient given opportunity to ask questions    Lab/Data Review: All lab results for the last 24 hours reviewed. No results found for this or any previous visit (from the past 24 hour(s)).       Assessment:     Principal Problem: Schizoaffective disorder ([facility] [address] (3/30/2018)     Assessment: the patient presents with gerardo psychosis in the setting of medication non-compliance x1.5 weeks. She is unable to make her needs known and vacillates between agitation and near catatonia. Will obtain court order for psychiatric treatment, observe and treat symptomatically. Plan:      Plan:   COURT ORDERED MEDICATION:  -  Haldol oral soln to 20 mg PO *OR* 10 mg IM BID for psychosis x27 days  - CONTINUE Haldol decanoate 400 mg IM Q4Wks for psychosis [place] (next due 6/10/22)  - TAPER Ativan to 0.5 mg PO *OR* IM TID for catatonia  - IGM therapy as tolerated  - Expand database / obtain collateral  - Dispo planning    I certify that this patient's inpatient psychiatric hospital services furnished since the previous certification were, and continue to be, required for treatment that could reasonably be expected to improve the patient's condition, or for diagnostic study, and that the patient continues to need, on a daily basis, active treatment furnished directly by or requiring the supervision of inpatient psychiatric facility personnel. In addition, the hospital records show that services furnished were intensive treatment services, admission or related services, or equivalent services.     Signed By: Rock Garcia NP     May 15, 2022

## 2022-05-16 PROCEDURE — 65220000003 HC RM SEMIPRIVATE PSYCH

## 2022-05-16 PROCEDURE — 99232 SBSQ HOSP IP/OBS MODERATE 35: CPT | Performed by: PSYCHIATRY & NEUROLOGY

## 2022-05-16 PROCEDURE — 74011250637 HC RX REV CODE- 250/637: Performed by: PSYCHIATRY & NEUROLOGY

## 2022-05-16 RX ORDER — LORAZEPAM 2 MG/ML
0.5 CONCENTRATE ORAL 2 TIMES DAILY
Status: DISCONTINUED | OUTPATIENT
Start: 2022-05-16 | End: 2022-05-19 | Stop reason: HOSPADM

## 2022-05-16 RX ORDER — LORAZEPAM 2 MG/ML
1 INJECTION INTRAMUSCULAR 2 TIMES DAILY
Status: DISCONTINUED | OUTPATIENT
Start: 2022-05-16 | End: 2022-05-19 | Stop reason: HOSPADM

## 2022-05-16 RX ADMIN — HALOPERIDOL 20 MG: 2 SOLUTION ORAL at 17:23

## 2022-05-16 RX ADMIN — LORAZEPAM 0.5 MG: 2 LIQUID ORAL at 11:42

## 2022-05-16 RX ADMIN — HALOPERIDOL 20 MG: 2 SOLUTION ORAL at 08:59

## 2022-05-16 RX ADMIN — LORAZEPAM 0.5 MG: 2 LIQUID ORAL at 08:59

## 2022-05-16 RX ADMIN — LORAZEPAM 0.5 MG: 2 LIQUID ORAL at 17:37

## 2022-05-16 NOTE — GROUP NOTE
DORETHA  GROUP DOCUMENTATION INDIVIDUAL                                                                          Group Therapy Note    Date: 5/16/2022    Group Start Time: 1000  Group End Time: 1100  Group Topic: Topic Group    [facility] [place] 3 ACUTE BEHAV [facility], [id number] GROUP    Group Therapy Note    Attendees: 8         Attendance: Did not attend    Patient's Goal:      Interventions/techniques:  Broderick Becker

## 2022-05-16 NOTE — BH NOTES
Psychiatric Progress Note    Patient: Felisha Dumas MRN: [id number]  SSN: [id number]    YOB: 1960  Age: 57 y.o. Sex: female      Admit Date: 4/20/2022    LOS: 25 days     Subjective:     Felisha Dumas  is selectively mute per staff and moods are depressed. She opened an eye then closed it again with no further correspondence. No prns given. No aggression or violence. Inappropriately interactive and poorly aware. Tolerating court ordered medications well. Eating ok and sleeping fairly (7hrs) . 4/24 - Felisha Dumas moved her hands to my voices but refused to converse with me. Selective with nursing also. She responded appropriately when breakfast was called only. Moods are down. No aggression or violence. Selectively interactive. Limited awareness. Refusing medications. Eating and sleeping fairly. 04/25 - the patient slept 8 hours overnight. She continues to refuse medications and requires much prompting to participate in ADLs. No agitation noted today, and patient got no PRNs for aggression x24 hours. She appears catatonic at times, but will react to direct questions. Patient getting IM injections of Haldol for psychosis by court order as she refuses all oral agents. On interview she says nothing and lies in bed.     04/26 - no active overnight events. Patient has been mute, she ate breakfast this morning and was noted to say only 'no, no, no' regarding court ordered IM medication after refusing PO agent. Patient has been isolative to her room with a flat affect and little spontaneous speech. She slept 8 hours overnight and got no other PRNs for agitation. Patient has not been communicative for much of the day. 04/27 - the patient continues to refuse vitals and medications; she is disorganized and with no significant change in her mental state. Patient discharge focused, slept 8 hours and got no PRNs for agitation.  She has been in bed since getting court ordered IM after refusing PO agents. Per , son is able to visit this week. She remains mute on interview and does not engage MD and treatment team.    04/28 - there has been no significant improvement in the patient's mental state. She remains mute, isolative, and refusing all medication. Patient slept 9+ hours, got Haldol and Ativan injections due to ongoing disorganization of thought and catatonia. On interview, she states only 'leave me alone' and is incoherent. Per , son will come visit to try to help reconstitute the patient, stating she may respond to a familiar face and that she remains far from her baseline. 04/29 - the patient is little improved. She remains guarded and mute, catatonic throughout the day but up this morning when she refused medications and got IM injections via court order. She has little insight concerning the admission but slept 9 hours overnight. Patient unable to make her needs known, she speaks sparingly about her immediate desires but cannot provide any abstract reasoning regarding her refusal of medication or her treatment course thus far. Patient will be visited by her son this afternoon per .    04/30- the patient refuses to speak with treatment team this morning. States \"I dont want to talk\"  Patient refuses to answer questions. She continues to refuse medications,has court order medications requiring IM injections. Remains isolative to her room, sleeping most of the day. 05/01- \" I want to go home\"  Patient seen this morning in her room where she is sitting up in the bed eating pudding. She was willing to take PO medication this morning with some encouragement, after explaining that she will not be able to go home until she is not receiving IM injections. She remains isolative to her room and in bed most of the day. 05/02 - overnight, the patient remained isolative and disorganized.  She has been refusing medications and getting IM court ordered alternative. Patient was out of bed for breakfast this morning but otherwise has been in her bed for much of the day. Patient took PO medication with much encouragement from unit staff. She slept 8.25 hours overnight. She remains internally preoccupied and does not speak with MD on interview. 05/03 - the patient remains disorganized and has been isolative to her room. She was noted to spit out her oral solution medication and was thus given IM injection per court order. The patient has been out for meals at times but not interacting in any appreciable way with staff or peers. She slept 9 hours overnight and got no PRNs for agitation. On interview looks at MD but does not speak. 05/04 - the patient is medication compliant, but still mute for much of the day. She is taking court ordered medications without issue today and slept 9 hours overnight. Patient internally preoccupied, eating meals in her room and isolative for much of the day. She denies any concerns but does not respond appropriately to direct questions. 05/05 - overnight, the patient remained flat and was fairly uncooperative with assessments. She was noted to be internally preoccupied and slept 9 hours overnight. She doesn't speak with the MD on interview and has little insight into her condition. Per nurse report, the patient has been trying to avoid taking PO medication and must be supervised. 05/06 - the patient denies all, she remains disorganized and with no significant improvement in her mental state per nurse report. Patient has been medication compliant and got no PRNs for agitation. She is compliant with court ordered medication and speaks non-spontaneously. Patient discharge focused, unable to make her needs known. 05/07 - the patient slept 7.25 hours overnight; she denies all symptoms but per nurse appeared restless and was pacing the unit. Patient denies SI/HI/AVH/PI.  She is markedly flat with no improvement in her thought process. 05/08 - the patient slept 8 hours overnight, she got Trazodone PRN and received the Haldol decanoate injection without incident. Patient remains isolative to her room but in fair behavioral control. She is disorganized on interview and muted but denies SI/HI/AVH/PI. Patient has not been visible for much of today but is compliant with court ordered treatment. 05/09 - the patient remains isolative to her room, out for meals with much encouragement and compliant with court ordered treatment. BP is improved and she is able to make her needs known. She got PRN Trazodone and has been in fair behavioral control, though she remains markedly flat with no improvement in her thought process. Of note, patient is asked if she would like her son to visit and repeatedly exclaims that she has no son. 05/10 - the patient slept 7 hours overnight; she remains isolative to her room and is internally preoccupied. She has been little improved with recent titration of Haldol and continues to state that she does not have a son or any other family members; she is unable to reality test. Patient is compliant with court ordered medication but unable to participate in disposition planning. Per , her son will visit tomorrow to assess her progress. The patient denies all symptoms, her ADLs are poor. 05/11 - no significant overnight events. Patient has been isolative to her room, out for meals with encouragement but in bed for much of the day. She is compliant with court ordered medications and denies active thoughts of self harm. Patient seen on the unit by her son who is visiting, she does not speak much and is otherwise unable to make her needs known. ADLs are poor.  Per , son reports it does take some time for her to reconstitute her mental faculties and the death of her sister with whom she was close also has affected her.    05/12 - the patient has been spending much of her time in her room, she is out for meals and communicates minimally. She remains guarded and answers 'I don't know' to assessment questions. Patient slept 8.5 hours overnight and is not interacting in any meaningful manner with the treatment team. She remains guarded and with no specific symptoms but is not attending to ADLs, eating with significant encouragement. 05/13 - overnight, the patient was noted to be pacing the hallway, not significantly interacting with staff or peers. She took PO medication and got decanoate injection. She reports feeling cold but is otherwise able to make her needs known. Patient denies active thoughts of self harm; she remains disorganized and with a flat affect on interview but she is out of her room more often for meals. Patient encouarged to speak with her daughter, whom she last said was dead. 05/16 - the patient was visible at times, out for meals and per nurse report she smiled at times. She remains largely isolative to her room and continues to exhibit a flat affect on interview. Patient medication compliant, discharge focused but disorganized with little motivation to performs ADLs unprompted. SW arranging daughter visit this week to discuss goals of care, home help.      Objective:     Vitals:    05/14/22 1949 05/15/22 0816 05/15/22 2158 05/16/22 0750   BP: 114/61 136/71 130/66 115/64   Pulse: 74 76 90    Resp: 18 16 16 18   Temp: 99 °F (37.2 °C) 98 °F (36.7 °C) 99 °F (37.2 °C) 98 °F (36.7 °C)   SpO2: 98% 97% 99% 100%   Weight:       Height:            Mental Status Exam:   Sensorium  not oriented to situation   Relations uncooperative   Eye Contact poor   Appearance:  age appropriate   Speech:  mute   Thought Process: blocked   Thought Content internally preoccupied    Suicidal ideations none   Mood:  dysphoric   Affect:  constricted   Memory   N/A   Concentration:  adequate   Insight:  poor   Judgment:  poor       MEDICATIONS:  Current Facility-Administered Medications   Medication Dose Route Frequency    LORazepam (INTENSOL) 2 mg/mL oral concentrate 0.5 mg  0.5 mg Oral BID    Or    LORazepam (ATIVAN) injection 1 mg  1 mg IntraMUSCular BID    [START ON 6/10/2022] haloperidol decanoate (HALDOL DECANOATE) 100 mg/mL LA injection 400 mg  400 mg IntraMUSCular Q28D    haloperidol (HALDOL) 2 mg/mL oral solution 20 mg  20 mg Oral BID    Or    haloperidol lactate (HALDOL) injection 10 mg +++COURT ORDERED FOR REFUSAL OF ORAL HALOPERIDOL+++  10 mg IntraMUSCular BID    ondansetron (ZOFRAN ODT) tablet 4 mg  4 mg Oral Q6H PRN    albuterol (PROVENTIL HFA, VENTOLIN HFA, PROAIR HFA) inhaler 2 Puff  2 Puff Inhalation TID PRN    OLANZapine (ZyPREXA) tablet 5 mg  5 mg Oral Q6H PRN    haloperidol lactate (HALDOL) injection 5 mg  5 mg IntraMUSCular Q6H PRN    benztropine (COGENTIN) tablet 1 mg  1 mg Oral BID PRN    diphenhydrAMINE (BENADRYL) injection 50 mg  50 mg IntraMUSCular BID PRN    hydrOXYzine HCL (ATARAX) tablet 50 mg  50 mg Oral TID PRN    LORazepam (ATIVAN) injection 1 mg  1 mg IntraMUSCular Q4H PRN    traZODone (DESYREL) tablet 50 mg  50 mg Oral QHS PRN    acetaminophen (TYLENOL) tablet 650 mg  650 mg Oral Q4H PRN    magnesium hydroxide (MILK OF MAGNESIA) 400 mg/5 mL oral suspension 30 mL  30 mL Oral DAILY PRN    nicotine (NICODERM CQ) 21 mg/24 hr patch 1 Patch  1 Patch TransDERmal DAILY PRN      DISCUSSION:   the risks and benefits of the proposed medication  patient given opportunity to ask questions    Lab/Data Review: All lab results for the last 24 hours reviewed. No results found for this or any previous visit (from the past 24 hour(s)). Assessment:     Principal Problem:    Schizoaffective disorder ([facility] [address] (3/30/2018)     Assessment: the patient presents with gerardo psychosis in the setting of medication non-compliance x1.5 weeks. She is unable to make her needs known and vacillates between agitation and near catatonia.  Will obtain court order for psychiatric treatment, observe and treat symptomatically. Plan:      Plan:   COURT ORDERED MEDICATION:  -  Haldol oral soln to 20 mg PO *OR* 10 mg IM BID for psychosis x24 days  - CONTINUE Haldol decanoate 400 mg IM Q4Wks for psychosis [place] (next due 6/10/22)  - TAPER Ativan to 0.5 mg PO *OR* IM BID for catatonia  - IGM therapy as tolerated  - Expand database / obtain collateral  - Dispo planning    I certify that this patient's inpatient psychiatric hospital services furnished since the previous certification were, and continue to be, required for treatment that could reasonably be expected to improve the patient's condition, or for diagnostic study, and that the patient continues to need, on a daily basis, active treatment furnished directly by or requiring the supervision of inpatient psychiatric facility personnel. In addition, the hospital records show that services furnished were intensive treatment services, admission or related services, or equivalent services.     Signed By: Suzi Pfeiffer MD     May 16, 2022

## 2022-05-16 NOTE — BH NOTES
Behavioral Health Treatment Team Note      Patient goal(s) for today: continue taking meds as prescribed, engage in unit activities, participate in hygiene/ADLs, prepare for discharge, follow directions from staff, contact support team, attend groups  Treatment team focus/goals: continue adjusting meds as needed, discharge planning, update support system  Progress note: Pt was seen by treatment team in her room. Pt observed spending more time in milieu, watching TV and talking to staff. Daughter to schedule a visit with pt this week - SW awaiting a return call from pt's children.  department to continue to coordinate discharge plans.     LOS:  25                    Expected LOS: TBD     Insurance info/prescription coverage: [employer] [facility] [address]  Date of last family contact: IVETTE Sotelo ([phone])  Family requesting physician contact today: No  Discharge plan: Return home   Guns in the home: No   Outpatient provider(s): Insurance  [phone]     Participating treatment team members: [facility] [facility], [facility], and Dr. Yanelis Anand

## 2022-05-16 NOTE — PROGRESS NOTES
Problem: Psychosis  Goal: *STG: Decreased hallucinations  Outcome: Progressing Towards Goal  Goal: *STG: Decreased delusional thinking  Outcome: Progressing Towards Goal  Goal: *STG: Remains safe in hospital  Outcome: Progressing Towards Goal     0237-5041: Shift change report given to Jade CASTILLO (oncoming nurse) by Cally Garcia (offgoing nurse). Report included the following information SBAR, Kardex, MAR and Recent Results.      0425-3840: Assumed care of patient. Met patient in room. Calm and cooperative during assessment. Patient denied anxiety, depression, SI, HI, and AVH. No complaints of pain. Medication and meal compliant.      5299-1692: Patient in bed sleeping.      0875-4577: Patient sleeping.      6094-0478: Patient sleeping.     3593-0611: Meal compliant.      6123-5133: Patient awake in bed resting quietly.    4035-9381: Patient in dayroom for groups. 3200-9493: Patient awake in bed resting quietly.    6112-5394: Meal compliant.     9469-6558: Med compliant.      5269-0292: Patient in bed sleeping.

## 2022-05-16 NOTE — PROGRESS NOTES
Behavioral Services                                              Medicare Re-Certification    I certify that the inpatient psychiatric hospital services furnished since the previous certification/re-certification were, and continue to be, medically necessary for;    [x] (1) Treatment which could reasonably be expected to improve the patient's condition,    [x] (2) Or for diagnostic study. Estimated length of stay/service 5-7 days    Plan for post-hospital care home    This patient continues to need, on a daily basis, active treatment furnished directly by or requiring the supervision of inpatient psychiatric personnel.     Electronically signed by Yojana Donald MD on 5/16/2022 at 5:07 PM

## 2022-05-16 NOTE — GROUP NOTE
DORETHA  GROUP DOCUMENTATION INDIVIDUAL                                                                          Group Therapy Note    Date: 5/16/2022    Group Start Time: 1400  Group End Time: 1500  Group Topic: Recreational/Music Therapy    [facility] [place] [address] ACUTE BEHAV [facility]    [place] [facility] GROUP DOCUMENTATION GROUP    Group Therapy Note    Attendees: 9         Attendance: Attended    Patient's Goal:  To concentrate on selected task    Interventions/techniques: Supported-crafts,games,music    Follows Directions:  Followed directions    Interactions: Interacted appropriately    Mental Status: Calm    Behavior/appearance: Attentive, Cooperative and Needed prompting    Goals Achieved: Able to engage in interactions and Able to listen to others-active participant in game    Perla Chand

## 2022-05-17 PROCEDURE — 99232 SBSQ HOSP IP/OBS MODERATE 35: CPT | Performed by: PSYCHIATRY & NEUROLOGY

## 2022-05-17 PROCEDURE — 65220000003 HC RM SEMIPRIVATE PSYCH

## 2022-05-17 PROCEDURE — 74011250637 HC RX REV CODE- 250/637: Performed by: PSYCHIATRY & NEUROLOGY

## 2022-05-17 RX ADMIN — HALOPERIDOL 20 MG: 2 SOLUTION ORAL at 17:23

## 2022-05-17 RX ADMIN — LORAZEPAM 0.5 MG: 2 LIQUID ORAL at 17:23

## 2022-05-17 RX ADMIN — HALOPERIDOL 20 MG: 2 SOLUTION ORAL at 08:56

## 2022-05-17 RX ADMIN — LORAZEPAM 0.5 MG: 2 LIQUID ORAL at 08:56

## 2022-05-17 NOTE — GROUP NOTE
DORETHA  GROUP DOCUMENTATION INDIVIDUAL                                                                          Group Therapy Note    Date: 5/17/2022    Group Start Time: 1400  Group End Time: 1500  Group Topic: Recreational/Music Therapy    [facility] [place] [address] ACUTE BEHAV [facility]    [place] [facility] GROUP DOCUMENTATION GROUP    Group Therapy Note    Attendees: 11         Attendance: Attended    Patient's Goal:  To concentrate on selected task    Interventions/techniques: Supported-crafts,games,music    Interactions: Interacted appropriately    Mental Status: Calm    Behavior/appearance: Cooperative and Needed prompting    Goals Achieved: Able to engage in interactions and Able to listen to others-listened to music selections     Halo

## 2022-05-17 NOTE — BH NOTES
Behavioral Health Treatment Team Note      Patient goal(s) for today: continue taking meds as prescribed, engage in unit activities, participate in hygiene/ADLs, prepare for discharge, follow directions from staff, contact support team, attend groups  Treatment team focus/goals: continue adjusting meds as needed, discharge planning, update support system  Progress note: Pt was seen by treatment team in her room. Pt observed spending more time in milieu, watching TV and talking to staff. Daughter and son visited with pt today. Family feels comfortable with discharge Thursday.   department to continue to coordinate discharge plans.     LOS:  26                    Expected LOS: 5/19 at 9:30 AM - daughter to transport home     Insurance info/prescription coverage: [employer] [facility] [address]  Date of last family contact: LILA Sotelo ([phone])  Family requesting physician contact today: No  Discharge plan: Return home   Guns in the home: No   Outpatient provider(s): Insurance  [phone]     Participating treatment team members: Ricky Najera [address]

## 2022-05-17 NOTE — BH NOTES
During shift, pt remained in bed. Pt denies SI, HI, A/V hallucinations. Meal/med compliant. Currently, pt is resting in bed with eyes closed. No signs of distress nor made any further complaints. Locked Unit. Patient slept this shift 3 hours, respirations noted even and unlabored. Safe environment maintained, q15 min safety checks maintained. Q1 hourly nursing rounds maintained. Patient updated on plan of care.

## 2022-05-17 NOTE — GROUP NOTE
[facility] GROUP DOCUMENTATION INDIVIDUAL                                                                          Group Therapy Note    Date: 5/17/2022    Group Start Time: 1000  Group End Time: 1100  Group Topic: Topic Group    [address] [facility]    Group Therapy Note    Attendees: 8         Attendance: Did not attend    Patient's Goal:      Interventions/techniques  Jayne Garcia

## 2022-05-17 NOTE — PROGRESS NOTES
Assumed care of pt after receiving shift report from outgoing nurse. No apparent distress. Pt currently denies SI/HI/AVH and pain. Pt denies depression and anxiety. Mood is depressed with constricted affect. Pt isolative to room. Pt voices no concerns/complaints at this time. Pt interacts appropriately with staff and peers. No behavioral issues observed. 8103-8469: pt awake in bed, no distress noted. Pt med/meal compliant  4937-7545: pt in CR meeting with a visitor, ate lunch  0582-1022: pt attended group, calm and cooperative  1670-1941: pt lying awake in bed, calm/cooperative, med/meal compliant          Problem: Falls - Risk of  Goal: *Absence of Falls  Description: Document Rhonda Barton Fall Risk and appropriate interventions in the flowsheet.   Outcome: Progressing Towards Goal  Note: Fall Risk Interventions:       Mentation Interventions: Adequate sleep, hydration, pain control    Medication Interventions: Teach patient to arise slowly       Problem: Suicide  Goal: *STG/LTG: Complies with medication therapy  Outcome: Progressing Towards Goal

## 2022-05-17 NOTE — BH NOTES
GROUP THERAPY PROGRESS NOTE    Patient is participating in Coping Skills group. Group time: 50 minutes    Personal goal for participation: To process healthy support systems and establish concrete ways to access support systems when needed    Goal orientation: Personal    Group therapy participation: minimal    Therapeutic interventions reviewed and discussed: Patients identified what a good support system looks like, feels like, and sounds like. Patients discussed ways in which their social supports have changed, how to ask for help when needed, emotions associated with asking for help, and ways to change their social supports to best aid them following discharge. Patients processed setting appropriate boundaries with social supports and identified coping skills to complete with support system. Impression of participation: Pt with depressed mood, clear speech, calm and cooperative. Pt identified her mother as a positive support. Pt left group early and did not return.     CARLY Smith

## 2022-05-18 PROCEDURE — 65220000003 HC RM SEMIPRIVATE PSYCH

## 2022-05-18 PROCEDURE — 99231 SBSQ HOSP IP/OBS SF/LOW 25: CPT | Performed by: PSYCHIATRY & NEUROLOGY

## 2022-05-18 PROCEDURE — 74011250637 HC RX REV CODE- 250/637: Performed by: PSYCHIATRY & NEUROLOGY

## 2022-05-18 PROCEDURE — 74011250637 HC RX REV CODE- 250/637: Performed by: NURSE PRACTITIONER

## 2022-05-18 RX ADMIN — TRAZODONE HYDROCHLORIDE 50 MG: 50 TABLET ORAL at 21:32

## 2022-05-18 RX ADMIN — HALOPERIDOL 20 MG: 2 SOLUTION ORAL at 17:40

## 2022-05-18 RX ADMIN — HALOPERIDOL 20 MG: 2 SOLUTION ORAL at 08:30

## 2022-05-18 RX ADMIN — LORAZEPAM 0.5 MG: 2 LIQUID ORAL at 08:30

## 2022-05-18 RX ADMIN — LORAZEPAM 0.5 MG: 2 LIQUID ORAL at 17:40

## 2022-05-18 NOTE — PROGRESS NOTES
visit for initial spiritual assessment. Patient resting at the time of this visit. Please contact spiritual care for further referral or consult. Rev.  Rosendo Mark MDiv, [facility], [facility]   paging service: [phone])

## 2022-05-18 NOTE — BH NOTES
Assumed  care of the patient. Patient was isolative to her room. She was cooperative with the assessments and  pleasant on approach. Patient interacted minimally. She denied S.I/H. I/A/V/H, anxiety and depression. No PRN medication requested or needed. Will continue to monitor and provide support as needed. Patient appeared to be sleeping for about 6.5 hours.

## 2022-05-18 NOTE — PROGRESS NOTES
Patient received resting in her room. Denies SI/HI/AVH and depression, endorses an anxiety level of 7. Calm, cooperative, isolative to her room throughout the shift. Taking medications as prescribed. Will continue to monitor for safety.

## 2022-05-18 NOTE — GROUP NOTE
DORETHA  GROUP DOCUMENTATION INDIVIDUAL                                                                          Group Therapy Note    Date: 5/18/2022    Group Start Time: 1500  Group End Time: 1600  Group Topic: Recreational/Music Therapy    [facility] [place] [address] ACUTE BEHAV [facility]    [place] [facility] GROUP DOCUMENTATION GROUP    Group Therapy Note    Attendees: 10         Attendance: Attended    Patient's Goal:  To concentrate on selected task    Interventions/techniques: Supported-crafts,games,music    Follows Directions:  Followed directions    Interactions: Interacted appropriately    Mental Status: Calm    Behavior/appearance: Attentive, Cooperative and Needed prompting    Goals Achieved: Able to engage in interactions and Able to listen to others-active participant in [address] [place] [address]

## 2022-05-18 NOTE — GROUP NOTE
DORETHA  GROUP DOCUMENTATION INDIVIDUAL                                                                          Group Therapy Note    Date: 5/18/2022    Group Start Time: 0900  Group End Time: 1000  Group Topic: Topic Group    [facility] [place] [address] ACUTE BEHAV [facility]    [place], [address] [facility] GROUP DOCUMENTATION GROUP    Group Therapy Note    Attendees: 9         Attendance: Did not attend    Patient's Goal:      Interventions/techniques:   Jannet Mccoy

## 2022-05-18 NOTE — BH NOTES
.The documentation for this period is being entered following the guidelines as defined in the [address] [place] [address] downtime policy by Cornelia Cuadra.

## 2022-05-18 NOTE — BH NOTES
Psychiatric Progress Note    Patient: Cole Wall MRN: [id number]  SSN: [id number]    YOB: 1960  Age: 57 y.o. Sex: female      Admit Date: 4/20/2022    LOS: 26 days     Subjective:     Cole Wall  is selectively mute per staff and moods are depressed. She opened an eye then closed it again with no further correspondence. No prns given. No aggression or violence. Inappropriately interactive and poorly aware. Tolerating court ordered medications well. Eating ok and sleeping fairly (7hrs) . 4/24 - Cole Wall moved her hands to my voices but refused to converse with me. Selective with nursing also. She responded appropriately when breakfast was called only. Moods are down. No aggression or violence. Selectively interactive. Limited awareness. Refusing medications. Eating and sleeping fairly. 04/25 - the patient slept 8 hours overnight. She continues to refuse medications and requires much prompting to participate in ADLs. No agitation noted today, and patient got no PRNs for aggression x24 hours. She appears catatonic at times, but will react to direct questions. Patient getting IM injections of Haldol for psychosis by court order as she refuses all oral agents. On interview she says nothing and lies in bed.     04/26 - no active overnight events. Patient has been mute, she ate breakfast this morning and was noted to say only 'no, no, no' regarding court ordered IM medication after refusing PO agent. Patient has been isolative to her room with a flat affect and little spontaneous speech. She slept 8 hours overnight and got no other PRNs for agitation. Patient has not been communicative for much of the day. 04/27 - the patient continues to refuse vitals and medications; she is disorganized and with no significant change in her mental state. Patient discharge focused, slept 8 hours and got no PRNs for agitation.  She has been in bed since getting court ordered IM after refusing PO agents. Per , son is able to visit this week. She remains mute on interview and does not engage MD and treatment team.    04/28 - there has been no significant improvement in the patient's mental state. She remains mute, isolative, and refusing all medication. Patient slept 9+ hours, got Haldol and Ativan injections due to ongoing disorganization of thought and catatonia. On interview, she states only 'leave me alone' and is incoherent. Per , son will come visit to try to help reconstitute the patient, stating she may respond to a familiar face and that she remains far from her baseline. 04/29 - the patient is little improved. She remains guarded and mute, catatonic throughout the day but up this morning when she refused medications and got IM injections via court order. She has little insight concerning the admission but slept 9 hours overnight. Patient unable to make her needs known, she speaks sparingly about her immediate desires but cannot provide any abstract reasoning regarding her refusal of medication or her treatment course thus far. Patient will be visited by her son this afternoon per .    04/30- the patient refuses to speak with treatment team this morning. States \"I dont want to talk\"  Patient refuses to answer questions. She continues to refuse medications,has court order medications requiring IM injections. Remains isolative to her room, sleeping most of the day. 05/01- \" I want to go home\"  Patient seen this morning in her room where she is sitting up in the bed eating pudding. She was willing to take PO medication this morning with some encouragement, after explaining that she will not be able to go home until she is not receiving IM injections. She remains isolative to her room and in bed most of the day. 05/02 - overnight, the patient remained isolative and disorganized.  She has been refusing medications and getting IM court ordered alternative. Patient was out of bed for breakfast this morning but otherwise has been in her bed for much of the day. Patient took PO medication with much encouragement from unit staff. She slept 8.25 hours overnight. She remains internally preoccupied and does not speak with MD on interview. 05/03 - the patient remains disorganized and has been isolative to her room. She was noted to spit out her oral solution medication and was thus given IM injection per court order. The patient has been out for meals at times but not interacting in any appreciable way with staff or peers. She slept 9 hours overnight and got no PRNs for agitation. On interview looks at MD but does not speak. 05/04 - the patient is medication compliant, but still mute for much of the day. She is taking court ordered medications without issue today and slept 9 hours overnight. Patient internally preoccupied, eating meals in her room and isolative for much of the day. She denies any concerns but does not respond appropriately to direct questions. 05/05 - overnight, the patient remained flat and was fairly uncooperative with assessments. She was noted to be internally preoccupied and slept 9 hours overnight. She doesn't speak with the MD on interview and has little insight into her condition. Per nurse report, the patient has been trying to avoid taking PO medication and must be supervised. 05/06 - the patient denies all, she remains disorganized and with no significant improvement in her mental state per nurse report. Patient has been medication compliant and got no PRNs for agitation. She is compliant with court ordered medication and speaks non-spontaneously. Patient discharge focused, unable to make her needs known. 05/07 - the patient slept 7.25 hours overnight; she denies all symptoms but per nurse appeared restless and was pacing the unit. Patient denies SI/HI/AVH/PI.  She is markedly flat with no improvement in her thought process. 05/08 - the patient slept 8 hours overnight, she got Trazodone PRN and received the Haldol decanoate injection without incident. Patient remains isolative to her room but in fair behavioral control. She is disorganized on interview and muted but denies SI/HI/AVH/PI. Patient has not been visible for much of today but is compliant with court ordered treatment. 05/09 - the patient remains isolative to her room, out for meals with much encouragement and compliant with court ordered treatment. BP is improved and she is able to make her needs known. She got PRN Trazodone and has been in fair behavioral control, though she remains markedly flat with no improvement in her thought process. Of note, patient is asked if she would like her son to visit and repeatedly exclaims that she has no son. 05/10 - the patient slept 7 hours overnight; she remains isolative to her room and is internally preoccupied. She has been little improved with recent titration of Haldol and continues to state that she does not have a son or any other family members; she is unable to reality test. Patient is compliant with court ordered medication but unable to participate in disposition planning. Per , her son will visit tomorrow to assess her progress. The patient denies all symptoms, her ADLs are poor. 05/11 - no significant overnight events. Patient has been isolative to her room, out for meals with encouragement but in bed for much of the day. She is compliant with court ordered medications and denies active thoughts of self harm. Patient seen on the unit by her son who is visiting, she does not speak much and is otherwise unable to make her needs known. ADLs are poor.  Per , son reports it does take some time for her to reconstitute her mental faculties and the death of her sister with whom she was close also has affected her.    05/12 - the patient has been spending much of her time in her room, she is out for meals and communicates minimally. She remains guarded and answers 'I don't know' to assessment questions. Patient slept 8.5 hours overnight and is not interacting in any meaningful manner with the treatment team. She remains guarded and with no specific symptoms but is not attending to ADLs, eating with significant encouragement. 05/13 - overnight, the patient was noted to be pacing the hallway, not significantly interacting with staff or peers. She took PO medication and got decanoate injection. She reports feeling cold but is otherwise able to make her needs known. Patient denies active thoughts of self harm; she remains disorganized and with a flat affect on interview but she is out of her room more often for meals. Patient encouarged to speak with her daughter, whom she last said was dead. 05/16 - the patient was visible at times, out for meals and per nurse report she smiled at times. She remains largely isolative to her room and continues to exhibit a flat affect on interview. Patient medication compliant, discharge focused but disorganized with little motivation to performs ADLs unprompted. SW arranging daughter visit this week to discuss goals of care, home help.     05/17 - the patient slept 3 hours overnight, though she was in bed for large portions of the day with sheets pulled over her head. She remains isolative but will speak when prompted. She was out of her room for meals, visited by her daughter this afternoon. Patient denies SI/HI/AVH; per staff her ADLs are showing some improvement but she will not act spontaneously.     Objective:     Vitals:    05/16/22 0750 05/16/22 1920 05/17/22 0810 05/17/22 1935   BP: 115/64 119/62 108/65 113/64   Pulse:  93 81 85   Resp: 18 17 16 14   Temp: 98 °F (36.7 °C) 98.9 °F (37.2 °C) 98.3 °F (36.8 °C) 98.6 °F (37 °C)   SpO2: 100%  100% 96%   Weight:       Height:            Mental Status Exam:   Sensorium  not oriented to situation   Relations uncooperative   Eye Contact poor   Appearance:  age appropriate   Speech:  mute   Thought Process: blocked   Thought Content internally preoccupied    Suicidal ideations none   Mood:  dysphoric   Affect:  constricted   Memory   N/A   Concentration:  adequate   Insight:  poor   Judgment:  poor       MEDICATIONS:  Current Facility-Administered Medications   Medication Dose Route Frequency    LORazepam (INTENSOL) 2 mg/mL oral concentrate 0.5 mg  0.5 mg Oral BID    Or    LORazepam (ATIVAN) injection 1 mg +++COURT ORDERED MEDICATION FOR REFUSAL OF ORAL LORAZEPAM+++  1 mg IntraMUSCular BID    [START ON 6/10/2022] haloperidol decanoate (HALDOL DECANOATE) 100 mg/mL LA injection 400 mg  400 mg IntraMUSCular Q28D    haloperidol (HALDOL) 2 mg/mL oral solution 20 mg  20 mg Oral BID    Or    haloperidol lactate (HALDOL) injection 10 mg +++COURT ORDERED FOR REFUSAL OF ORAL HALOPERIDOL+++  10 mg IntraMUSCular BID    ondansetron (ZOFRAN ODT) tablet 4 mg  4 mg Oral Q6H PRN    albuterol (PROVENTIL HFA, VENTOLIN HFA, PROAIR HFA) inhaler 2 Puff  2 Puff Inhalation TID PRN    OLANZapine (ZyPREXA) tablet 5 mg  5 mg Oral Q6H PRN    haloperidol lactate (HALDOL) injection 5 mg  5 mg IntraMUSCular Q6H PRN    benztropine (COGENTIN) tablet 1 mg  1 mg Oral BID PRN    diphenhydrAMINE (BENADRYL) injection 50 mg  50 mg IntraMUSCular BID PRN    hydrOXYzine HCL (ATARAX) tablet 50 mg  50 mg Oral TID PRN    LORazepam (ATIVAN) injection 1 mg  1 mg IntraMUSCular Q4H PRN    traZODone (DESYREL) tablet 50 mg  50 mg Oral QHS PRN    acetaminophen (TYLENOL) tablet 650 mg  650 mg Oral Q4H PRN    magnesium hydroxide (MILK OF MAGNESIA) 400 mg/5 mL oral suspension 30 mL  30 mL Oral DAILY PRN    nicotine (NICODERM CQ) 21 mg/24 hr patch 1 Patch  1 Patch TransDERmal DAILY PRN      DISCUSSION:   the risks and benefits of the proposed medication  patient given opportunity to ask questions    Lab/Data Review: All lab results for the last 24 hours reviewed.      No results found for this or any previous visit (from the past 24 hour(s)). Assessment:     Principal Problem:    Schizoaffective disorder ([facility] [address] (3/30/2018)     Assessment: the patient presents with gerardo psychosis in the setting of medication non-compliance x1.5 weeks. She is unable to make her needs known and vacillates between agitation and near catatonia. Will obtain court order for psychiatric treatment, observe and treat symptomatically. Plan:      Plan:   COURT ORDERED MEDICATION:  -  Haldol oral soln to 20 mg PO *OR* 10 mg IM BID for psychosis x23 days  - CONTINUE Haldol decanoate 400 mg IM Q4Wks for psychosis [place] (next due 6/10/22)  - CONTINUE Ativan to 0.5 mg PO *OR* IM BID for catatonia  - IGM therapy as tolerated  - Expand database / obtain collateral  - Dispo planning (home on 3/29-79)    I certify that this patient's inpatient psychiatric hospital services furnished since the previous certification were, and continue to be, required for treatment that could reasonably be expected to improve the patient's condition, or for diagnostic study, and that the patient continues to need, on a daily basis, active treatment furnished directly by or requiring the supervision of inpatient psychiatric facility personnel. In addition, the hospital records show that services furnished were intensive treatment services, admission or related services, or equivalent services.     Signed By: Racquel Ruiz MD     May 17, 2022

## 2022-05-18 NOTE — PROGRESS NOTES
Problem: Psychosis  Goal: *STG: Remains safe in hospital  Outcome: Progressing Towards Goal  Goal: *STG/LTG: Complies with medication therapy  Outcome: Progressing Towards Goal     Problem: Suicide  Goal: *STG: Remains safe in hospital  Outcome: Progressing Towards Goal  Goal: *STG/LTG: No longer expresses self destructive or suicidal thoughts  Outcome: Progressing Towards Goal     Problem: Falls - Risk of  Goal: *Absence of Falls  Description: Document Shraddha Fall Risk and appropriate interventions in the flowsheet.   Outcome: Progressing Towards Goal  Note: Fall Risk Interventions:       Mentation Interventions: Adequate sleep, hydration, pain control    Medication Interventions: Teach patient to arise slowly

## 2022-05-18 NOTE — BH NOTES
Behavioral Health Treatment Team Note      Patient goal(s) for today: continue taking meds as prescribed, engage in unit activities, participate in hygiene/ADLs, prepare for discharge, follow directions from staff, contact support team, attend groups  Treatment team focus/goals: continue adjusting meds as needed, discharge planning, update support system  Progress note: Pt was seen by treatment team in her room. Pt observed spending more time in milieu, watching TV and talking to staff. [facility] crisis to begin services in home on Friday.  SW department to continue to coordinate discharge plans.     LOS:  27                    Expected LOS: 5/19 at 9:30 AM - daughter to transport home     Insurance info/prescription coverage: [facility] [address] [facility]  Date of last family contact: KATY Sotelo ([phone])  Family requesting physician contact today: No  Discharge plan: Return home   Guns in the home: No   Outpatient provider(s): Insurance  [phone]     Participating treatment team members: Ricky Russo 144

## 2022-05-19 VITALS
OXYGEN SATURATION: 98 % | BODY MASS INDEX: 26.78 KG/M2 | TEMPERATURE: 97.5 F | DIASTOLIC BLOOD PRESSURE: 71 MMHG | HEART RATE: 91 BPM | WEIGHT: 145.5 LBS | SYSTOLIC BLOOD PRESSURE: 128 MMHG | RESPIRATION RATE: 16 BRPM | HEIGHT: 62 IN

## 2022-05-19 PROCEDURE — 99239 HOSP IP/OBS DSCHRG MGMT >30: CPT | Performed by: PSYCHIATRY & NEUROLOGY

## 2022-05-19 PROCEDURE — 74011250637 HC RX REV CODE- 250/637: Performed by: PSYCHIATRY & NEUROLOGY

## 2022-05-19 RX ORDER — LORAZEPAM 0.5 MG/1
0.5 TABLET ORAL 2 TIMES DAILY
Qty: 60 TABLET | Refills: 1 | Status: SHIPPED | OUTPATIENT
Start: 2022-05-19

## 2022-05-19 RX ORDER — HALOPERIDOL DECANOATE 100 MG/ML
400 INJECTION INTRAMUSCULAR
Qty: 4 ML | Refills: 1 | Status: SHIPPED | OUTPATIENT
Start: 2022-06-10

## 2022-05-19 RX ORDER — HALOPERIDOL 20 MG/1
20 TABLET ORAL 2 TIMES DAILY
Qty: 43 TABLET | Refills: 0 | Status: SHIPPED | OUTPATIENT
Start: 2022-05-19

## 2022-05-19 RX ORDER — TRAZODONE HYDROCHLORIDE 50 MG/1
50 TABLET ORAL
Qty: 30 TABLET | Refills: 1 | Status: SHIPPED | OUTPATIENT
Start: 2022-05-19

## 2022-05-19 RX ORDER — ALBUTEROL SULFATE 90 UG/1
2 AEROSOL, METERED RESPIRATORY (INHALATION)
Qty: 18 G | Refills: 1 | Status: SHIPPED | OUTPATIENT
Start: 2022-05-19

## 2022-05-19 RX ADMIN — HALOPERIDOL 20 MG: 2 SOLUTION ORAL at 08:33

## 2022-05-19 RX ADMIN — LORAZEPAM 0.5 MG: 2 LIQUID ORAL at 08:33

## 2022-05-19 NOTE — BH NOTES
Psychiatric Progress Note    Patient: Zeny Richard MRN: [id number]  SSN: [id number]    YOB: 1960  Age: 57 y.o. Sex: female      Admit Date: 4/20/2022    LOS: 27 days     Subjective:     Zeny Richard  is selectively mute per staff and moods are depressed. She opened an eye then closed it again with no further correspondence. No prns given. No aggression or violence. Inappropriately interactive and poorly aware. Tolerating court ordered medications well. Eating ok and sleeping fairly (7hrs) . 4/24 - Zeny Richard moved her hands to my voices but refused to converse with me. Selective with nursing also. She responded appropriately when breakfast was called only. Moods are down. No aggression or violence. Selectively interactive. Limited awareness. Refusing medications. Eating and sleeping fairly. 04/25 - the patient slept 8 hours overnight. She continues to refuse medications and requires much prompting to participate in ADLs. No agitation noted today, and patient got no PRNs for aggression x24 hours. She appears catatonic at times, but will react to direct questions. Patient getting IM injections of Haldol for psychosis by court order as she refuses all oral agents. On interview she says nothing and lies in bed.     04/26 - no active overnight events. Patient has been mute, she ate breakfast this morning and was noted to say only 'no, no, no' regarding court ordered IM medication after refusing PO agent. Patient has been isolative to her room with a flat affect and little spontaneous speech. She slept 8 hours overnight and got no other PRNs for agitation. Patient has not been communicative for much of the day. 04/27 - the patient continues to refuse vitals and medications; she is disorganized and with no significant change in her mental state. Patient discharge focused, slept 8 hours and got no PRNs for agitation.  She has been in bed since getting court ordered IM after refusing PO agents. Per , son is able to visit this week. She remains mute on interview and does not engage MD and treatment team.    04/28 - there has been no significant improvement in the patient's mental state. She remains mute, isolative, and refusing all medication. Patient slept 9+ hours, got Haldol and Ativan injections due to ongoing disorganization of thought and catatonia. On interview, she states only 'leave me alone' and is incoherent. Per , son will come visit to try to help reconstitute the patient, stating she may respond to a familiar face and that she remains far from her baseline. 04/29 - the patient is little improved. She remains guarded and mute, catatonic throughout the day but up this morning when she refused medications and got IM injections via court order. She has little insight concerning the admission but slept 9 hours overnight. Patient unable to make her needs known, she speaks sparingly about her immediate desires but cannot provide any abstract reasoning regarding her refusal of medication or her treatment course thus far. Patient will be visited by her son this afternoon per .    04/30- the patient refuses to speak with treatment team this morning. States \"I dont want to talk\"  Patient refuses to answer questions. She continues to refuse medications,has court order medications requiring IM injections. Remains isolative to her room, sleeping most of the day. 05/01- \" I want to go home\"  Patient seen this morning in her room where she is sitting up in the bed eating pudding. She was willing to take PO medication this morning with some encouragement, after explaining that she will not be able to go home until she is not receiving IM injections. She remains isolative to her room and in bed most of the day. 05/02 - overnight, the patient remained isolative and disorganized.  She has been refusing medications and getting IM court ordered alternative. Patient was out of bed for breakfast this morning but otherwise has been in her bed for much of the day. Patient took PO medication with much encouragement from unit staff. She slept 8.25 hours overnight. She remains internally preoccupied and does not speak with MD on interview. 05/03 - the patient remains disorganized and has been isolative to her room. She was noted to spit out her oral solution medication and was thus given IM injection per court order. The patient has been out for meals at times but not interacting in any appreciable way with staff or peers. She slept 9 hours overnight and got no PRNs for agitation. On interview looks at MD but does not speak. 05/04 - the patient is medication compliant, but still mute for much of the day. She is taking court ordered medications without issue today and slept 9 hours overnight. Patient internally preoccupied, eating meals in her room and isolative for much of the day. She denies any concerns but does not respond appropriately to direct questions. 05/05 - overnight, the patient remained flat and was fairly uncooperative with assessments. She was noted to be internally preoccupied and slept 9 hours overnight. She doesn't speak with the MD on interview and has little insight into her condition. Per nurse report, the patient has been trying to avoid taking PO medication and must be supervised. 05/06 - the patient denies all, she remains disorganized and with no significant improvement in her mental state per nurse report. Patient has been medication compliant and got no PRNs for agitation. She is compliant with court ordered medication and speaks non-spontaneously. Patient discharge focused, unable to make her needs known. 05/07 - the patient slept 7.25 hours overnight; she denies all symptoms but per nurse appeared restless and was pacing the unit. Patient denies SI/HI/AVH/PI.  She is markedly flat with no improvement in her thought process. 05/08 - the patient slept 8 hours overnight, she got Trazodone PRN and received the Haldol decanoate injection without incident. Patient remains isolative to her room but in fair behavioral control. She is disorganized on interview and muted but denies SI/HI/AVH/PI. Patient has not been visible for much of today but is compliant with court ordered treatment. 05/09 - the patient remains isolative to her room, out for meals with much encouragement and compliant with court ordered treatment. BP is improved and she is able to make her needs known. She got PRN Trazodone and has been in fair behavioral control, though she remains markedly flat with no improvement in her thought process. Of note, patient is asked if she would like her son to visit and repeatedly exclaims that she has no son. 05/10 - the patient slept 7 hours overnight; she remains isolative to her room and is internally preoccupied. She has been little improved with recent titration of Haldol and continues to state that she does not have a son or any other family members; she is unable to reality test. Patient is compliant with court ordered medication but unable to participate in disposition planning. Per , her son will visit tomorrow to assess her progress. The patient denies all symptoms, her ADLs are poor. 05/11 - no significant overnight events. Patient has been isolative to her room, out for meals with encouragement but in bed for much of the day. She is compliant with court ordered medications and denies active thoughts of self harm. Patient seen on the unit by her son who is visiting, she does not speak much and is otherwise unable to make her needs known. ADLs are poor.  Per , son reports it does take some time for her to reconstitute her mental faculties and the death of her sister with whom she was close also has affected her.    05/12 - the patient has been spending much of her time in her room, she is out for meals and communicates minimally. She remains guarded and answers 'I don't know' to assessment questions. Patient slept 8.5 hours overnight and is not interacting in any meaningful manner with the treatment team. She remains guarded and with no specific symptoms but is not attending to ADLs, eating with significant encouragement. 05/13 - overnight, the patient was noted to be pacing the hallway, not significantly interacting with staff or peers. She took PO medication and got decanoate injection. She reports feeling cold but is otherwise able to make her needs known. Patient denies active thoughts of self harm; she remains disorganized and with a flat affect on interview but she is out of her room more often for meals. Patient encouarged to speak with her daughter, whom she last said was dead. 05/16 - the patient was visible at times, out for meals and per nurse report she smiled at times. She remains largely isolative to her room and continues to exhibit a flat affect on interview. Patient medication compliant, discharge focused but disorganized with little motivation to performs ADLs unprompted. SW arranging daughter visit this week to discuss goals of care, home help.     05/17 - the patient slept 3 hours overnight, though she was in bed for large portions of the day with sheets pulled over her head. She remains isolative but will speak when prompted. She was out of her room for meals, visited by her daughter this afternoon. Patient denies SI/HI/AVH; per staff her ADLs are showing some improvement but she will not act spontaneously. 05/18 - the patient has been visible, isolative but pleasant. She is medication compliant, performing ADLs with prompting and with no instances of catatonia. Patient reported 'anxiety' to her nurse this morning, which is a significant change as she has been guarded about all symptoms. Patient slept 6.5 hours overnight and denies SI/HI/AVH/PI.  Plan for discharge tomorrow AM.    Objective:     Vitals:    05/16/22 1920 05/17/22 0810 05/17/22 1935 05/18/22 1937   BP: 119/62 108/65 113/64 138/82   Pulse: 93 81 85 91   Resp: 17 16 14 16   Temp: 98.9 °F (37.2 °C) 98.3 °F (36.8 °C) 98.6 °F (37 °C) 98.4 °F (36.9 °C)   SpO2:  100% 96% 99%   Weight:       Height:            Mental Status Exam:   Sensorium  not oriented to situation   Relations uncooperative   Eye Contact poor   Appearance:  age appropriate   Speech:  mute   Thought Process: blocked   Thought Content internally preoccupied    Suicidal ideations none   Mood:  dysphoric   Affect:  constricted   Memory   N/A   Concentration:  adequate   Insight:  poor   Judgment:  poor       MEDICATIONS:  Current Facility-Administered Medications   Medication Dose Route Frequency    LORazepam (INTENSOL) 2 mg/mL oral concentrate 0.5 mg  0.5 mg Oral BID    Or    LORazepam (ATIVAN) injection 1 mg +++COURT ORDERED MEDICATION FOR REFUSAL OF ORAL LORAZEPAM+++  1 mg IntraMUSCular BID    [START ON 6/10/2022] haloperidol decanoate (HALDOL DECANOATE) 100 mg/mL LA injection 400 mg  400 mg IntraMUSCular Q28D    haloperidol (HALDOL) 2 mg/mL oral solution 20 mg  20 mg Oral BID    Or    haloperidol lactate (HALDOL) injection 10 mg +++COURT ORDERED FOR REFUSAL OF ORAL HALOPERIDOL+++  10 mg IntraMUSCular BID    ondansetron (ZOFRAN ODT) tablet 4 mg  4 mg Oral Q6H PRN    albuterol (PROVENTIL HFA, VENTOLIN HFA, PROAIR HFA) inhaler 2 Puff  2 Puff Inhalation TID PRN    OLANZapine (ZyPREXA) tablet 5 mg  5 mg Oral Q6H PRN    haloperidol lactate (HALDOL) injection 5 mg  5 mg IntraMUSCular Q6H PRN    benztropine (COGENTIN) tablet 1 mg  1 mg Oral BID PRN    diphenhydrAMINE (BENADRYL) injection 50 mg  50 mg IntraMUSCular BID PRN    hydrOXYzine HCL (ATARAX) tablet 50 mg  50 mg Oral TID PRN    LORazepam (ATIVAN) injection 1 mg  1 mg IntraMUSCular Q4H PRN    traZODone (DESYREL) tablet 50 mg  50 mg Oral QHS PRN    acetaminophen (TYLENOL) tablet 650 mg  650 mg Oral Q4H PRN    magnesium hydroxide (MILK OF MAGNESIA) 400 mg/5 mL oral suspension 30 mL  30 mL Oral DAILY PRN    nicotine (NICODERM CQ) 21 mg/24 hr patch 1 Patch  1 Patch TransDERmal DAILY PRN      DISCUSSION:   the risks and benefits of the proposed medication  patient given opportunity to ask questions    Lab/Data Review: All lab results for the last 24 hours reviewed. No results found for this or any previous visit (from the past 24 hour(s)). Assessment:     Principal Problem:    Schizoaffective disorder ([facility] [place] [address] (3/30/2018)     Assessment: the patient presents with gerardo psychosis in the setting of medication non-compliance x1.5 weeks. She is unable to make her needs known and vacillates between agitation and near catatonia. Will obtain court order for psychiatric treatment, observe and treat symptomatically. Plan:      Plan:   COURT ORDERED MEDICATION:  -  Haldol oral soln to 20 mg PO *OR* 10 mg IM BID for psychosis x22 days  - CONTINUE Haldol decanoate 400 mg IM Q4Wks for psychosis [facility] (next due 6/10/22)  - CONTINUE Ativan to 0.5 mg PO *OR* IM BID for catatonia  - IGM therapy as tolerated  - Expand database / obtain collateral  - Dispo planning (home on )    I certify that this patient's inpatient psychiatric hospital services furnished since the previous certification were, and continue to be, required for treatment that could reasonably be expected to improve the patient's condition, or for diagnostic study, and that the patient continues to need, on a daily basis, active treatment furnished directly by or requiring the supervision of inpatient psychiatric facility personnel. In addition, the hospital records show that services furnished were intensive treatment services, admission or related services, or equivalent services.     Signed By: Kandice Tidwell MD     May 18, 2022

## 2022-05-19 NOTE — PROGRESS NOTES
Problem: Psychosis  Goal: *STG: Remains safe in hospital  Outcome: Progressing Towards Goal  Goal: *STG: Accept constructive criticism without injury or isolation  Outcome: Progressing Towards Goal

## 2022-05-19 NOTE — PROGRESS NOTES
Problem: Psychosis  Goal: *STG: Decreased hallucinations  5/19/2022 0914 by Shirley Chun RN  Outcome: Resolved/Met  5/19/2022 0801 by Shirley Chun RN  Outcome: Progressing Towards Goal  Goal: *STG: Decreased delusional thinking  Outcome: Resolved/Met  Goal: *STG: Remains safe in hospital  5/19/2022 0914 by Shirley Chun RN  Outcome: Resolved/Met  5/19/2022 0801 by Shirley Chun RN  Outcome: Progressing Towards Goal  Goal: *STG: Seeks staff when feelings of self harm or harm towards others arise  5/19/2022 0914 by Shirley Chun RN  Outcome: Resolved/Met  5/19/2022 0801 by Shirley Chun RN  Outcome: Progressing Towards Goal  Goal: *STG: Patient will verbalize areas in need of boundary recognition and limit setting  Outcome: Resolved/Met  Goal: *STG: Patient will develop strategies to regulate emotions and corresponding behaviors  5/19/2022 0914 by Shirley Chun RN  Outcome: Resolved/Met  5/19/2022 0801 by Shirley Chun RN  Outcome: Progressing Towards Goal  Goal: *STG: Accept constructive criticism without injury or isolation  Outcome: Resolved/Met  Goal: *STG: Participates in individual and group therapy  Outcome: Resolved/Met  Goal: *STG: Develop safety plan for times when triggered in stressful situations  Outcome: Resolved/Met  Goal: *STG: Patient will verbalize issues that get in their way of progress (i.e.: anger, fear etc.)  Outcome: Resolved/Met  Goal: *STG/LTG: Complies with medication therapy  Outcome: Resolved/Met  Goal: *STG/LTG: Demonstrates improved thought patterns as evidenced by logical and coherent speech  Outcome: Resolved/Met  Goal: *STG/LTG: Demonstrates improved social functioning by responding appropriately to staff  Outcome: Resolved/Met  Goal: Interventions  Outcome: Resolved/Met     Problem: *Psychosis: Discharge Outcome  Goal: *Absent or Controlled Active Psychotic Symptoms  Outcome: Resolved/Met     Problem: Patient Education: Go to Patient Education Activity  Goal: Patient/Family Education  Outcome: Resolved/Met     Problem: General Medical Care Plan  Goal: *Vital signs within specified parameters  Outcome: Resolved/Met  Goal: *Labs within defined limits  Outcome: Resolved/Met  Goal: *Absence of infection signs and symptoms  Outcome: Resolved/Met  Goal: *Optimal pain control at patient's stated goal  Outcome: Resolved/Met  Goal: *Skin integrity maintained  Outcome: Resolved/Met  Goal: *Fluid volume balance  Outcome: Resolved/Met  Goal: *Optimize nutritional status  Outcome: Resolved/Met  Goal: *Anxiety reduced or absent  Outcome: Resolved/Met  Goal: *Progressive mobility and function (eg: ADL's)  Outcome: Resolved/Met     Problem: Patient Education: Go to Patient Education Activity  Goal: Patient/Family Education  Outcome: Resolved/Met     Problem: Falls - Risk of  Goal: *Absence of Falls  Description: Document Shraddha Fall Risk and appropriate interventions in the flowsheet.   Outcome: Resolved/Met  Note: Fall Risk Interventions:       Mentation Interventions: Adequate sleep, hydration, pain control    Medication Interventions: Teach patient to arise slowly                   Problem: Patient Education: Go to Patient Education Activity  Goal: Patient/Family Education  Outcome: Resolved/Met     Problem: Discharge Planning  Goal: *Discharge to safe environment  Outcome: Resolved/Met  Goal: *Knowledge of medication management  Outcome: Resolved/Met  Goal: *Knowledge of discharge instructions  Outcome: Resolved/Met     Problem: Suicide  Goal: *STG: Remains safe in hospital  Outcome: Resolved/Met  Goal: *STG: Seeks staff when feelings of self harm or harm towards others arise  Outcome: Resolved/Met  Goal: *STG: Attends activities and groups  Outcome: Resolved/Met  Goal: *STG:  Verbalizes alternative ways of dealing with maladaptive feelings/behaviors  Outcome: Resolved/Met  Goal: *STG/LTG: Complies with medication therapy  Outcome: Resolved/Met  Goal: *STG/LTG: No longer expresses self destructive or suicidal thoughts  Outcome: Resolved/Met  Goal: *LTG:  Identifies available community resources  Outcome: Resolved/Met  Goal: *LTG:  Develops proactive suicide prevention plan  Outcome: Resolved/Met  Goal: Interventions  Outcome: Resolved/Met     Problem: Patient Education: Go to Patient Education Activity  Goal: Patient/Family Education  Outcome: Resolved/Met

## 2022-05-19 NOTE — PROGRESS NOTES
Shift change report given to Aly Herrera RN(oncoming nurse) by Jaylin Jones RN (offgoing nurse). Report included the following information SBAR, Kardex, MAR, and Recent Results. 0700-Received pt alert and oriented. Pt is calm and cooperative. Denied SI, AVH, and depression. When asked, pt state am anxious because am going home . Condition is stable. Vital signs within normal range. Pt is for possible discharge today.     Problem: Psychosis  Goal: *STG: Decreased hallucinations  Outcome: Progressing Towards Goal  Goal: *STG: Remains safe in hospital  Outcome: Progressing Towards Goal  Goal: *STG: Seeks staff when feelings of self harm or harm towards others arise  Outcome: Progressing Towards Goal  Goal: *STG: Patient will develop strategies to regulate emotions and corresponding behaviors  Outcome: Progressing Towards Goal

## 2022-05-19 NOTE — BH NOTES
Behavioral Health Transition Record to Provider    Patient Name: Renee Bravo  YOB: 1960  Medical Record Number: [id number]  Date of Admission: 4/20/2022  Date of Discharge: 5/19/2022    Attending Provider: Ramya Girard, *  Discharging Provider: Ramya Girard  To contact this individual call [phone] and ask the  to page. If unavailable, ask to be transferred to [facility] Provider on call. [facility] Provider will be available on call 24/7 and during holidays. Primary Care Provider: None    No Known Allergies    Reason for Admission: Patient was involuntarily committed at her TDO hearing with [facility]. Per prescreening pt stopped taking her medication a week ago and has been nonverbal the past few days. Pt has reportedly not slept or eaten in 4 days. Admission Diagnosis: Schizoaffective disorder ([facility] [address] [F25.9]    * No surgery found *    Results for orders placed or performed during the hospital encounter of 04/20/22   COVID-19 WITH INFLUENZA A/B   Result Value Ref Range    SARS-CoV-2 by PCR Not detected NOTD      Influenza A by PCR Not detected      Influenza B by PCR Not detected     ETHYL ALCOHOL   Result Value Ref Range    ALCOHOL(ETHYL),SERUM <10 <10 MG/DL   CBC WITH AUTOMATED DIFF   Result Value Ref Range    WBC 6.0 3.6 - 11.0 K/uL    RBC 4.02 3.80 - 5.20 M/uL    HGB 12.9 11.5 - 16.0 g/dL    HCT 37.1 35.0 - 47.0 %    MCV 92.3 80.0 - 99.0 FL    MCH 32.1 26.0 - 34.0 PG    MCHC 34.8 30.0 - 36.5 g/dL    RDW 12.3 11.5 - 14.5 %    PLATELET 021 (H) 514 - 400 K/uL    MPV 9.0 8.9 - 12.9 FL    NRBC 0.0 0  WBC    ABSOLUTE NRBC 0.00 0.00 - 0.01 K/uL    NEUTROPHILS 66 32 - 75 %    LYMPHOCYTES 25 12 - 49 %    MONOCYTES 8 5 - 13 %    EOSINOPHILS 1 0 - 7 %    BASOPHILS 0 0 - 1 %    IMMATURE GRANULOCYTES 0 0.0 - 0.5 %    ABS. NEUTROPHILS 3.9 1.8 - 8.0 K/UL    ABS. LYMPHOCYTES 1.5 0.8 - 3.5 K/UL    ABS. MONOCYTES 0.5 0.0 - 1.0 K/UL    ABS.  EOSINOPHILS 0.0 0.0 - 0.4 K/UL    ABS. BASOPHILS 0.0 0.0 - 0.1 K/UL    ABS. IMM. GRANS. 0.0 0.00 - 0.04 K/UL    DF AUTOMATED     URINALYSIS W/ REFLEX CULTURE    Specimen: Urine   Result Value Ref Range    Color YELLOW/STRAW      Appearance CLOUDY (A) CLEAR      Specific gravity 1.020      pH (UA) 5.5 5.0 - 8.0      Protein 30 (A) NEG mg/dL    Glucose Negative NEG mg/dL    Ketone 80 (A) NEG mg/dL    Bilirubin Negative NEG      Blood Negative NEG      Urobilinogen 1.0 0.2 - 1.0 EU/dL    Nitrites Negative NEG      Leukocyte Esterase SMALL (A) NEG      WBC 0-4 0 - 4 /hpf    RBC 0-5 0 - 5 /hpf    Epithelial cells MANY (A) FEW /lpf    Bacteria Negative NEG /hpf    UA:UC IF INDICATED CULTURE NOT INDICATED BY UA RESULT CNI     DRUG SCREEN, URINE   Result Value Ref Range    AMPHETAMINES Negative NEG      BARBITURATES Negative NEG      BENZODIAZEPINES Negative NEG      COCAINE Negative NEG      METHADONE Negative NEG      OPIATES Negative NEG      PCP(PHENCYCLIDINE) Negative NEG      THC (TH-CANNABINOL) Negative NEG      Drug screen comment (NOTE)    METABOLIC PANEL, COMPREHENSIVE   Result Value Ref Range    Sodium 136 136 - 145 mmol/L    Potassium 3.4 (L) 3.5 - 5.1 mmol/L    Chloride 101 97 - 108 mmol/L    CO2 23 21 - 32 mmol/L    Anion gap 12 5 - 15 mmol/L    Glucose 89 65 - 100 mg/dL    BUN 14 6 - 20 MG/DL    Creatinine 0.90 0.55 - 1.02 MG/DL    BUN/Creatinine ratio 16 12 - 20      GFR est AA >60 >60 ml/min/1.73m2    GFR est non-AA >60 >60 ml/min/1.73m2    Calcium 8.9 8.5 - 10.1 MG/DL    Bilirubin, total 0.4 0.2 - 1.0 MG/DL    ALT (SGPT) 25 12 - 78 U/L    AST (SGOT) 29 15 - 37 U/L    Alk.  phosphatase 72 45 - 117 U/L    Protein, total 7.2 6.4 - 8.2 g/dL    Albumin 3.7 3.5 - 5.0 g/dL    Globulin 3.5 2.0 - 4.0 g/dL    A-G Ratio 1.1 1.1 - 2.2     LIPID PANEL   Result Value Ref Range    Cholesterol, total 148 <200 MG/DL    Triglyceride 42 <150 MG/DL    HDL Cholesterol 60 MG/DL    LDL, calculated 79.6 0 - 100 MG/DL    VLDL, calculated 8.4 MG/DL    CHOL/HDL Ratio 2.5 0.0 - 5.0     HEMOGLOBIN A1C WITH EAG   Result Value Ref Range    Hemoglobin A1c 5.6 4.0 - 5.6 %    Est. average glucose 114 mg/dL   TSH 3RD GENERATION   Result Value Ref Range    TSH 0.44 0.36 - 3.74 uIU/mL       Immunizations administered during this encounter: There is no immunization history on file for this patient. Screening for Metabolic Disorders for Patients on Antipsychotic Medications  (Data obtained from the EMR)    Estimated Body Mass Index  Estimated body mass index is 26.61 kg/m² as calculated from the following:    Height as of this encounter: 5' 2\" (1.575 m). Weight as of this encounter: 66 kg (145 lb 8.1 oz). Vital Signs/Blood Pressure  Visit Vitals  /71   Pulse 91   Temp 97.5 °F (36.4 °C)   Resp 16   Ht 5' 2\" (1.575 m)   Wt 66 kg (145 lb 8.1 oz)   SpO2 98%   BMI 26.61 kg/m²       Blood Glucose/Hemoglobin A1c  Lab Results   Component Value Date/Time    Glucose 89 04/20/2022 01:10 PM       Lab Results   Component Value Date/Time    Hemoglobin A1c 5.6 05/11/2022 06:08 AM        Lipid Panel  Lab Results   Component Value Date/Time    Cholesterol, total 148 05/11/2022 06:08 AM    HDL Cholesterol 60 05/11/2022 06:08 AM    LDL, calculated 79.6 05/11/2022 06:08 AM    Triglyceride 42 05/11/2022 06:08 AM    CHOL/HDL Ratio 2.5 05/11/2022 06:08 AM        Discharge Diagnosis: Please refer to physician's discharge summary. Discharge Plan: The patient Yoly Barreto exhibits the ability to control behavior in a less restrictive environment. Patient's level of functioning is improving. No assaultive/destructive behavior has been observed for the past 24 hours. No suicidal/homicidal threat or behavior has been observed for the past 24 hours. There is no evidence of serious medication side effects. Patient has not been in physical or protective restraints for at least the past 24 hours. If weapons involved, how are they secured?  No weapons    Is patient aware of and in agreement with discharge plan? Yes    Arrangements for medication:  Patient given signed scripts. Patient given a 30 day supply. Copy of discharge instructions to provider?:  Yes    Arrangements for transportation home:  Daughter to transport     Keep all follow up appointments as scheduled, continue to take prescribed medications per physician instructions. Mental health crisis number:  [phone] or your local mental health crisis line number at [phone]        Discharge Medication List and Instructions:   Current Discharge Medication List          Unresulted Labs (24h ago, onward)            None        To obtain results of studies pending at discharge, please contact Medical Records [phone] Option 4    Follow-up Information     Follow up With Specialties Details Why Contact Info    Monthly injection  On 6/10/2022 Your monthly injection, haloperidol decanoate 400 mg, is due on 6/10/22     [facility] on 5/20/2022 You will begin in-home crisis services on Friday 5/20. The counselor will reach out to you today or tomorrow to initiate services. Carmel[address]  Phone: [phone]  [facility]:[phone]    [address] [place] [address]   Call today Please call Rapid Access phone line [phone] between 8:00AM -2:00PM to schedule intake assessment for ongoing mental health case management, therapy, and medication management. Address: [address] [place] [address]  Phone: [phone]  Fax: [phone]  Rapid access appointment hours:  Monday - Friday: 8:00 AM - 2:00 PM              Advanced Directive:   Does the patient have an appointed surrogate decision maker? No  Does the patient have a Medical Advance Directive? Unknown  Does the patient have a Psychiatric Advance Directive?  Unknown  If the patient does not have a surrogate or Medical Advance Directive AND Psychiatric Advance Directive, the patient was offered information on these advance directives Unknown    Patient Instructions: Please continue all medications until otherwise directed by physician. Tobacco Cessation Discharge Plan:   Is the patient a smoker and needs referral for smoking cessation? No  Patient referred to the following for smoking cessation with an appointment? Not applicable     Patient was offered medication to assist with smoking cessation at discharge? Not applicable  Was education for smoking cessation added to the discharge instructions? Not applicable    Alcohol/Substance Abuse Discharge Plan:   Does the patient have a history of substance/alcohol abuse and requires a referral for treatment? No  Patient referred to the following for substance/alcohol abuse treatment with an appointment? Not applicable  Patient was offered medication to assist with alcohol cessation at discharge? Not applicable  Was education for substance/alcohol abuse added to discharge instructions?  Not applicable    Patient discharged to Home; discussed with patient/caregiver

## 2022-05-19 NOTE — DISCHARGE SUMMARY
PSYCHIATRIC DISCHARGE SUMMARY         IDENTIFICATION:    Patient Name  Samuel Carranza   Date of Birth 1960   [facility] [id number]   Medical Record Number  [id number]      Age  57 y.o. PCP None   Admit date:  4/20/2022    Discharge date: 5/19/2022   Room Number  320/02  @ [facility]   Date of Service  5/19/2022            TYPE OF DISCHARGE: REGULAR               CONDITION AT DISCHARGE: improved and fair       PROVISIONAL & DISCHARGE DIAGNOSES:    Problem List  Never Reviewed          Codes Class    Agitation ICD-10-CM: R45.1  ICD-9-CM: 307.9         * (Principal) Schizoaffective disorder ([facility] [address] ICD-10-CM: F25.9  ICD-9-CM: 295.70               Active Hospital Problems    *Schizoaffective disorder ([facility] [address]        DISCHARGE DIAGNOSIS:   Axis I:  SEE ABOVE  Axis II: SEE ABOVE  Axis III: SEE ABOVE  Axis IV:  lack of structure  Axis V:  30 on admission, 70 on discharge     CC & HISTORY OF PRESENT ILLNESS:  \"psychosis\"    Samuel Carranza  is selectively mute per staff and moods are depressed. She opened an eye then closed it again with no further correspondence. No prns given. No aggression or violence. Inappropriately interactive and poorly aware. Tolerating court ordered medications well. Eating ok and sleeping fairly (7hrs) .     4/24 - Samuel Carranza moved her hands to my voices but refused to converse with me. Selective with nursing also. She responded appropriately when breakfast was called only. Moods are down. No aggression or violence. Selectively interactive. Limited awareness. Refusing medications. Eating and sleeping fairly.     04/25 - the patient slept 8 hours overnight. She continues to refuse medications and requires much prompting to participate in ADLs. No agitation noted today, and patient got no PRNs for aggression x24 hours. She appears catatonic at times, but will react to direct questions.  Patient getting IM injections of Haldol for psychosis by court order as she refuses all oral agents. On interview she says nothing and lies in bed.      04/26 - no active overnight events. Patient has been mute, she ate breakfast this morning and was noted to say only 'no, no, no' regarding court ordered IM medication after refusing PO agent. Patient has been isolative to her room with a flat affect and little spontaneous speech. She slept 8 hours overnight and got no other PRNs for agitation. Patient has not been communicative for much of the day.      04/27 - the patient continues to refuse vitals and medications; she is disorganized and with no significant change in her mental state. Patient discharge focused, slept 8 hours and got no PRNs for agitation. She has been in bed since getting court ordered IM after refusing PO agents. Per SW, son is able to visit this week. She remains mute on interview and does not engage MD and treatment team.     04/28 - there has been no significant improvement in the patient's mental state. She remains mute, isolative, and refusing all medication. Patient slept 9+ hours, got Haldol and Ativan injections due to ongoing disorganization of thought and catatonia. On interview, she states only 'leave me alone' and is incoherent. Per SW, son will come visit to try to help reconstitute the patient, stating she may respond to a familiar face and that she remains far from her baseline.      04/29 - the patient is little improved. She remains guarded and mute, catatonic throughout the day but up this morning when she refused medications and got IM injections via court order. She has little insight concerning the admission but slept 9 hours overnight. Patient unable to make her needs known, she speaks sparingly about her immediate desires but cannot provide any abstract reasoning regarding her refusal of medication or her treatment course thus far.  Patient will be visited by her son this afternoon per SW.     04/30- the patient refuses to speak with treatment team this morning. States \"I dont want to talk\"  Patient refuses to answer questions. She continues to refuse medications,has court order medications requiring IM injections. Remains isolative to her room, sleeping most of the day.       05/01- \" I want to go home\"  Patient seen this morning in her room where she is sitting up in the bed eating pudding. She was willing to take PO medication this morning with some encouragement, after explaining that she will not be able to go home until she is not receiving IM injections. She remains isolative to her room and in bed most of the day.      05/02 - overnight, the patient remained isolative and disorganized. She has been refusing medications and getting IM court ordered alternative. Patient was out of bed for breakfast this morning but otherwise has been in her bed for much of the day. Patient took PO medication with much encouragement from unit staff. She slept 8.25 hours overnight. She remains internally preoccupied and does not speak with MD on interview.     05/03 - the patient remains disorganized and has been isolative to her room. She was noted to spit out her oral solution medication and was thus given IM injection per court order. The patient has been out for meals at times but not interacting in any appreciable way with staff or peers. She slept 9 hours overnight and got no PRNs for agitation. On interview looks at MD but does not speak.     05/04 - the patient is medication compliant, but still mute for much of the day. She is taking court ordered medications without issue today and slept 9 hours overnight. Patient internally preoccupied, eating meals in her room and isolative for much of the day. She denies any concerns but does not respond appropriately to direct questions.     05/05 - overnight, the patient remained flat and was fairly uncooperative with assessments. She was noted to be internally preoccupied and slept 9 hours overnight.  She doesn't speak with the MD on interview and has little insight into her condition. Per nurse report, the patient has been trying to avoid taking PO medication and must be supervised.     05/06 - the patient denies all, she remains disorganized and with no significant improvement in her mental state per nurse report. Patient has been medication compliant and got no PRNs for agitation. She is compliant with court ordered medication and speaks non-spontaneously. Patient discharge focused, unable to make her needs known.     05/07 - the patient slept 7.25 hours overnight; she denies all symptoms but per nurse appeared restless and was pacing the unit. Patient denies SI/HI/AVH/PI. She is markedly flat with no improvement in her thought process.     05/08 - the patient slept 8 hours overnight, she got Trazodone PRN and received the Haldol decanoate injection without incident. Patient remains isolative to her room but in fair behavioral control. She is disorganized on interview and muted but denies SI/HI/AVH/PI. Patient has not been visible for much of today but is compliant with court ordered treatment.     05/09 - the patient remains isolative to her room, out for meals with much encouragement and compliant with court ordered treatment. BP is improved and she is able to make her needs known. She got PRN Trazodone and has been in fair behavioral control, though she remains markedly flat with no improvement in her thought process. Of note, patient is asked if she would like her son to visit and repeatedly exclaims that she has no son.      05/10 - the patient slept 7 hours overnight; she remains isolative to her room and is internally preoccupied. She has been little improved with recent titration of Haldol and continues to state that she does not have a son or any other family members; she is unable to reality test. Patient is compliant with court ordered medication but unable to participate in disposition planning.  Per EDDI, her son will visit tomorrow to assess her progress. The patient denies all symptoms, her ADLs are poor.     05/11 - no significant overnight events. Patient has been isolative to her room, out for meals with encouragement but in bed for much of the day. She is compliant with court ordered medications and denies active thoughts of self harm. Patient seen on the unit by her son who is visiting, she does not speak much and is otherwise unable to make her needs known. ADLs are poor. Per EDDI, son reports it does take some time for her to reconstitute her mental faculties and the death of her sister with whom she was close also has affected her.     05/12 - the patient has been spending much of her time in her room, she is out for meals and communicates minimally. She remains guarded and answers 'I don't know' to assessment questions. Patient slept 8.5 hours overnight and is not interacting in any meaningful manner with the treatment team. She remains guarded and with no specific symptoms but is not attending to ADLs, eating with significant encouragement.      05/13 - overnight, the patient was noted to be pacing the hallway, not significantly interacting with staff or peers. She took PO medication and got decanoate injection. She reports feeling cold but is otherwise able to make her needs known. Patient denies active thoughts of self harm; she remains disorganized and with a flat affect on interview but she is out of her room more often for meals. Patient encouarged to speak with her daughter, whom she last said was dead.     05/16 - the patient was visible at times, out for meals and per nurse report she smiled at times. She remains largely isolative to her room and continues to exhibit a flat affect on interview. Patient medication compliant, discharge focused but disorganized with little motivation to performs ADLs unprompted.  EDDI arranging daughter visit this week to discuss goals of care, home help.      05/17 - the patient slept 3 hours overnight, though she was in bed for large portions of the day with sheets pulled over her head. She remains isolative but will speak when prompted. She was out of her room for meals, visited by her daughter this afternoon. Patient denies SI/HI/AVH; per staff her ADLs are showing some improvement but she will not act spontaneously.     05/18 - the patient has been visible, isolative but pleasant. She is medication compliant, performing ADLs with prompting and with no instances of catatonia. Patient reported 'anxiety' to her nurse this morning, which is a significant change as she has been guarded about all symptoms. Patient slept 6.5 hours overnight and denies SI/HI/AVH/PI. Plan for discharge tomorrow AM.       SOCIAL HISTORY:    Social History     Socioeconomic History    Marital status: SINGLE     Spouse name: Not on file    Number of children: Not on file    Years of education: Not on file    Highest education level: Not on file   Occupational History    Not on file   Tobacco Use    Smoking status: Former Smoker    Smokeless tobacco: Never Used   Vaping Use    Vaping Use: Never used   Substance and Sexual Activity    Alcohol use: Not Currently    Drug use: Yes     Types: Marijuana    Sexual activity: Yes     Partners: Male     Birth control/protection: None   Other Topics Concern    Not on file   Social History Narrative    Not on file     Social Determinants of Health     Financial Resource Strain:     Difficulty of Paying Living Expenses: Not on file   Food Insecurity:     Worried About Running Out of Food in the Last Year: Not on file    [place] of Food in the Last Year: Not on file   Transportation Needs:     Lack of Transportation (Medical): Not on file    Lack of Transportation (Non-Medical):  Not on file   Physical Activity:     Days of Exercise per Week: Not on file    Minutes of Exercise per Session: Not on file   Stress:     Feeling of Stress : Not on file Social Connections:     Frequency of Communication with Friends and Family: Not on file    Frequency of Social Gatherings with Friends and Family: Not on file    Attends Taoism Services: Not on file    Active Member of Clubs or Organizations: Not on file    Attends Club or Organization Meetings: Not on file    Marital Status: Not on file   Intimate Partner Violence:     Fear of Current or Ex-Partner: Not on file    Emotionally Abused: Not on file    Physically Abused: Not on file    Sexually Abused: Not on file   Housing Stability:     Unable to Pay for Housing in the Last Year: Not on file    Number of [place] in the Last Year: Not on file    Unstable Housing in the Last Year: Not on file      FAMILY HISTORY:   History reviewed. No pertinent family history. HOSPITALIZATION COURSE:    Ethyl Anselmo was admitted to the inpatient psychiatric unit [facility] for acute psychiatric stabilization in regards to symptomatology as described in the HPI above. The differential diagnosis at time of admission included: schizophrenia vs schizoaffective disorder. While on the unit Ethyl Anselmo was involved in individual, group, occupational and milieu therapy. Psychiatric medications were adjusted during this hospitalization including Haldol and Ativan. Ethyl Anselmo demonstrated a slow, but progressive improvement in overall condition. Much of patient's initial presentation appeared to be related to situational stressors, effects of medication non-compliance and psychological factors. Please see individual progress notes for more specific details regarding patient's hospitalization course. At time of discharge, Ethyl Anselmo is without significant problems of depression, psychosis, or sam.  Patient free of suicidal and homicidal ideations (appears to be at very low risk of suicide or homicide) and reports many positive predictive factors in terms of not attempting suicide or homicide. Overall presentation at time of discharge is most consistent with the diagnosis of schizophrenia. Patient has maximized benefit to be derived from acute inpatient psychiatric treatment. All members of the treatment team concur with each other in regards to plans for discharge today. Patient and family are aware and in agreement with discharge and discharge plan.          LABS AND IMAGAING:    Labs Reviewed   CBC WITH AUTOMATED DIFF - Abnormal; Notable for the following components:       Result Value    PLATELET 530 (*)     All other components within normal limits   URINALYSIS W/ REFLEX CULTURE - Abnormal; Notable for the following components:    Appearance CLOUDY (*)     Protein 30 (*)     Ketone 80 (*)     Leukocyte Esterase SMALL (*)     Epithelial cells MANY (*)     All other components within normal limits   METABOLIC PANEL, COMPREHENSIVE - Abnormal; Notable for the following components:    Potassium 3.4 (*)     All other components within normal limits   COVID-19 WITH INFLUENZA A/B   ETHYL ALCOHOL   DRUG SCREEN, URINE   LIPID PANEL   HEMOGLOBIN A1C WITH EAG   TSH 3RD GENERATION     Lab Results   Component Value Date/Time    Valproic acid 87 04/20/2018 08:35 AM     Admission on 04/20/2022   Component Date Value Ref Range Status    ALCOHOL(ETHYL),SERUM 04/20/2022 <10  <10 MG/DL Final    WBC 04/20/2022 6.0  3.6 - 11.0 K/uL Final    RBC 04/20/2022 4.02  3.80 - 5.20 M/uL Final    HGB 04/20/2022 12.9  11.5 - 16.0 g/dL Final    HCT 04/20/2022 37.1  35.0 - 47.0 % Final    MCV 04/20/2022 92.3  80.0 - 99.0 FL Final    MCH 04/20/2022 32.1  26.0 - 34.0 PG Final    MCHC 04/20/2022 34.8  30.0 - 36.5 g/dL Final    RDW 04/20/2022 12.3  11.5 - 14.5 % Final    PLATELET 72/77/9577 721* 150 - 400 K/uL Final    MPV 04/20/2022 9.0  8.9 - 12.9 FL Final    NRBC 04/20/2022 0.0  0  WBC Final    ABSOLUTE NRBC 04/20/2022 0.00  0.00 - 0.01 K/uL Final    NEUTROPHILS 04/20/2022 66  32 - 75 % Final    LYMPHOCYTES 04/20/2022 25  12 - 49 % Final    MONOCYTES 04/20/2022 8  5 - 13 % Final    EOSINOPHILS 04/20/2022 1  0 - 7 % Final    BASOPHILS 04/20/2022 0  0 - 1 % Final    IMMATURE GRANULOCYTES 04/20/2022 0  0.0 - 0.5 % Final    ABS. NEUTROPHILS 04/20/2022 3.9  1.8 - 8.0 K/UL Final    ABS. LYMPHOCYTES 04/20/2022 1.5  0.8 - 3.5 K/UL Final    ABS. MONOCYTES 04/20/2022 0.5  0.0 - 1.0 K/UL Final    ABS. EOSINOPHILS 04/20/2022 0.0  0.0 - 0.4 K/UL Final    ABS. BASOPHILS 04/20/2022 0.0  0.0 - 0.1 K/UL Final    ABS. IMM.  GRANS. 04/20/2022 0.0  0.00 - 0.04 K/UL Final    DF 04/20/2022 AUTOMATED    Final    Color 04/20/2022 YELLOW/STRAW    Final    Appearance 04/20/2022 CLOUDY* CLEAR   Final    Specific gravity 04/20/2022 1.020    Final    pH (UA) 04/20/2022 5.5  5.0 - 8.0   Final    Protein 04/20/2022 30* NEG mg/dL Final    Glucose 04/20/2022 Negative  NEG mg/dL Final    Ketone 04/20/2022 80* NEG mg/dL Final    Bilirubin 04/20/2022 Negative  NEG   Final    Blood 04/20/2022 Negative  NEG   Final    Urobilinogen 04/20/2022 1.0  0.2 - 1.0 EU/dL Final    Nitrites 04/20/2022 Negative  NEG   Final    Leukocyte Esterase 04/20/2022 SMALL* NEG   Final    WBC 04/20/2022 0-4  0 - 4 /hpf Final    RBC 04/20/2022 0-5  0 - 5 /hpf Final    Epithelial cells 04/20/2022 MANY* FEW /lpf Final    Bacteria 04/20/2022 Negative  NEG /hpf Final    UA:UC IF INDICATED 04/20/2022 CULTURE NOT INDICATED BY UA RESULT  CNI   Final    AMPHETAMINES 04/20/2022 Negative  NEG   Final    BARBITURATES 04/20/2022 Negative  NEG   Final    BENZODIAZEPINES 04/20/2022 Negative  NEG   Final    COCAINE 04/20/2022 Negative  NEG   Final    METHADONE 04/20/2022 Negative  NEG   Final    OPIATES 04/20/2022 Negative  NEG   Final    PCP(PHENCYCLIDINE) 04/20/2022 Negative  NEG   Final    THC (TH-CANNABINOL) 04/20/2022 Negative  NEG   Final    Drug screen comment 04/20/2022 (NOTE)   Final    SARS-CoV-2 by PCR 04/20/2022 Not detected  NOTD   Final    Influenza A by PCR 04/20/2022 Not detected    Final    Influenza B by PCR 04/20/2022 Not detected    Final    Sodium 04/20/2022 136  136 - 145 mmol/L Final    Potassium 04/20/2022 3.4* 3.5 - 5.1 mmol/L Final    Chloride 04/20/2022 101  97 - 108 mmol/L Final    CO2 04/20/2022 23  21 - 32 mmol/L Final    Anion gap 04/20/2022 12  5 - 15 mmol/L Final    Glucose 04/20/2022 89  65 - 100 mg/dL Final    BUN 04/20/2022 14  6 - 20 MG/DL Final    Creatinine 04/20/2022 0.90  0.55 - 1.02 MG/DL Final    BUN/Creatinine ratio 04/20/2022 16  12 - 20   Final    GFR est AA 04/20/2022 >60  >60 ml/min/1.73m2 Final    GFR est non-AA 04/20/2022 >60  >60 ml/min/1.73m2 Final    Calcium 04/20/2022 8.9  8.5 - 10.1 MG/DL Final    Bilirubin, total 04/20/2022 0.4  0.2 - 1.0 MG/DL Final    ALT (SGPT) 04/20/2022 25  12 - 78 U/L Final    AST (SGOT) 04/20/2022 29  15 - 37 U/L Final    Alk. phosphatase 04/20/2022 72  45 - 117 U/L Final    Protein, total 04/20/2022 7.2  6.4 - 8.2 g/dL Final    Albumin 04/20/2022 3.7  3.5 - 5.0 g/dL Final    Globulin 04/20/2022 3.5  2.0 - 4.0 g/dL Final    A-G Ratio 04/20/2022 1.1  1.1 - 2.2   Final    Cholesterol, total 05/11/2022 148  <200 MG/DL Final    Triglyceride 05/11/2022 42  <150 MG/DL Final    HDL Cholesterol 05/11/2022 60  MG/DL Final    LDL, calculated 05/11/2022 79.6  0 - 100 MG/DL Final    VLDL, calculated 05/11/2022 8.4  MG/DL Final    CHOL/HDL Ratio 05/11/2022 2.5  0.0 - 5.0   Final    Hemoglobin A1c 05/11/2022 5.6  4.0 - 5.6 % Final    Est. average glucose 05/11/2022 114  mg/dL Final    TSH 05/11/2022 0.44  0.36 - 3.74 uIU/mL Final     No results found. DISPOSITION:    Home. Patient to f/u with psychiatric appointments. FOLLOW-UP CARE:    Activity as tolerated  Regular diet  Wound Care: none needed.   Follow-up Information     Follow up With Specialties Details Why Contact Info    Monthly injection On 6/10/2022 Your monthly injection, haloperidol decanoate 400 mg, is due on 6/10/22     [facility] on 5/20/2022 You will begin in-home crisis services on Friday 5/20. The counselor will reach out to you today or tomorrow to initiate services. [address]el[address], [address] [place] [address] Avenue  Phone: [phone]  YXB:[phone]    [facility]   Call today Please call Rapid Access phone line [phone] between 8:00AM -2:00PM to schedule intake assessment for ongoing mental health case management, therapy, and medication management. Address: 12 [facility], [address] [place] [address]  Phone: [phone]  Fax: [phone]  Rapid access appointment hours:  Monday - Friday: 8:00 AM - 2:00 PM        None    None ([phone]) Patient stated that they have no PCP                   PROGNOSIS:   Guarded ---- based on nature of patient's pathology/ies and treatment compliance issues. Prognosis is greatly dependent upon patient's ability to remain sober and to follow up with scheduled appointments as well as to comply with psychiatric medications as prescribed. DISCHARGE MEDICATIONS:     Informed consent given for the use of following psychotropic medications:  Current Discharge Medication List      START taking these medications    Details   haloperidoL (HALDOL) 20 mg tablet Take 1 Tablet by mouth two (2) times a day. WATERMAN overlap, do not refill  Indications: schizophrenia  Qty: 43 Tablet, Refills: 0  Start date: 5/19/2022      haloperidol decanoate (HALDOL DECANOATE) 100 mg/mL injection 4 mL by IntraMUSCular route every twenty-eight (28) days. Indications: schizophrenia, schizoaffective disorder  Qty: 4 mL, Refills: 1  Start date: 6/10/2022      LORazepam (Ativan) 0.5 mg tablet Take 1 Tablet by mouth two (2) times a day. Max Daily Amount: 1 mg.  Indications: Catatonia prevention  Qty: 60 Tablet, Refills: 1  Start date: 5/19/2022    Associated Diagnoses: [facility]organized [facility] ([facility] CONTINUE these medications which have CHANGED    Details   albuterol (PROVENTIL HFA, VENTOLIN HFA, PROAIR HFA) 90 mcg/actuation inhaler Take 2 Puffs by inhalation three (3) times daily as needed for Wheezing or Shortness of Breath. Indications: asthma attack  Qty: 18 g, Refills: 1  Start date: 5/19/2022      traZODone (DESYREL) 50 mg tablet Take 1 Tablet by mouth nightly as needed for Sleep (For insomnia). Indications: insomnia associated with depression  Qty: 30 Tablet, Refills: 1  Start date: 5/19/2022                    A coordinated, multidisplinary treatment team round was conducted with Arben Simeon---this is done daily here at [facility]. This team consists of the nurse, psychiatric unit pharmacist,  and writer. I have spent greater than 35 minutes on discharge work.     Signed:  Paulette Yeager MD  5/19/2022

## 2022-05-19 NOTE — PROGRESS NOTES
Pharmacist Discharge Medication Reconciliation    Discharging Provider: Dr. Eddie Grimm PMH:   Past Medical History:   Diagnosis Date    Asthma     Depression     Hepatitis C     Psychiatric disorder     bipolar     Psychiatric disorder     schizophernia     Chief Complaint for this Admission:   Chief Complaint   Patient presents with    Mental Health Problem     Allergies: Patient has no known allergies. Discharge Medications:   Current Discharge Medication List        START taking these medications    Details   haloperidoL (HALDOL) 20 mg tablet Take 1 Tablet by mouth two (2) times a day. WATERMAN overlap, do not refill  Indications: schizophrenia  Qty: 43 Tablet, Refills: 0  Start date: 5/19/2022      haloperidol decanoate (HALDOL DECANOATE) 100 mg/mL injection 4 mL by IntraMUSCular route every twenty-eight (28) days. Indications: schizophrenia, schizoaffective disorder  Qty: 4 mL, Refills: 1  Start date: 6/10/2022      LORazepam (Ativan) 0.5 mg tablet Take 1 Tablet by mouth two (2) times a day. Max Daily Amount: 1 mg. Indications: Catatonia prevention  Qty: 60 Tablet, Refills: 1  Start date: 5/19/2022    Associated Diagnoses: Disorganized schizophrenia ([address]           CONTINUE these medications which have CHANGED    Details   albuterol (PROVENTIL HFA, VENTOLIN HFA, PROAIR HFA) 90 mcg/actuation inhaler Take 2 Puffs by inhalation three (3) times daily as needed for Wheezing or Shortness of Breath. Indications: asthma attack  Qty: 18 g, Refills: 1  Start date: 5/19/2022      traZODone (DESYREL) 50 mg tablet Take 1 Tablet by mouth nightly as needed for Sleep (For insomnia).  Indications: insomnia associated with depression  Qty: 30 Tablet, Refills: 1  Start date: 5/19/2022             The patient's chart, MAR and AVS were reviewed by Maria A Griffin PHARMD.

## 2022-05-19 NOTE — PROGRESS NOTES
Pt discharged in a stable condition to home. Writer went through discharge instruction with patient and pt verbalized understanding. Pt walked down to the main entrance to met daughter. Belongings handed over to pt' daughter.

## 2022-05-19 NOTE — BH NOTES
Assumed care of the patient. Patient appeared pleasant and was visible on the milieu today. She denied S.I/H.I/A//VH, anxiety and depression. Patient is not sure why is she at the hospital.     Patient requested PRN medication for sleep, Trazodone administered at bed time. Will continue to monitor and provide support as needed. Patient appeared to be sleeping for about 7.5 hours. Nursing rounds completed.

## 2022-05-19 NOTE — DISCHARGE INSTRUCTIONS
DISCHARGE SUMMARY    Olivia Paul  : 1960  MRN: [id number]    The patient Mabel Gonzalez exhibits the ability to control behavior in a less restrictive environment. Patient's level of functioning is improving. No assaultive/destructive behavior has been observed for the past 24 hours. No suicidal/homicidal threat or behavior has been observed for the past 24 hours. There is no evidence of serious medication side effects. Patient has not been in physical or protective restraints for at least the past 24 hours. If weapons involved, how are they secured? No weapons    Is patient aware of and in agreement with discharge plan? Yes    Arrangements for medication:  Patient given signed scripts. Patient given a 30 day supply. Copy of discharge instructions to provider?:  Yes    Arrangements for transportation home:  Daughter to transport     Keep all follow up appointments as scheduled, continue to take prescribed medications per physician instructions.   Mental health crisis number:  137 or your local mental health crisis line number at [address] [facility] [address] WARM LINE      3-675-987-MHAV ([phone])      M-F: 9am to 9pm      Sat & Sun: 5pm - 9pm  National suicide prevention lines:                             0-039-VHEHCOT (0-083-873-539-757-2384)       4-015-364-TALK ([phone])    Crisis Text Line:  Text HOME to [phone]

## 2022-05-26 NOTE — PROGRESS NOTES
PT DAILY TREATMENT NOTE     Patient Name: Nighat Board  Date:2021  : 1960  [x]  Patient  Verified  Payor: Radha Pinto / Plan: [id number] / Product Type: Managed Care Medicaid /    In time:900  Out time:948  Total Treatment Time (min): 48  Visit #: 4 of 12    Treatment Area: Low back pain [M54.5]  Cervicalgia [M54.2]    SUBJECTIVE  Pain Level (0-10 scale): 6/10  Any medication changes, allergies to medications, adverse drug reactions, diagnosis change, or new procedure performed?: [x] No    [] Yes (see summary sheet for update)  Subjective functional status/changes:   [] No changes reported  Pt stated that she is doing good today    OBJECTIVE    Modality rationale: decrease pain and increase tissue extensibility to improve the patients ability to increase ease with ADLs   Min Type Additional Details   15 [x] Estim:  [x]Unatt       [x]IFC  []Premod                        []Other:  []w/ice   [x]w/heat  Position:seated  Location:right upper back    [] Estim: []Att    []TENS instruct  []NMES                    []Other:  []w/US   []w/ice   []w/heat  Position:  Location:    []  Traction: [] Cervical       []Lumbar                       [] Prone          []Supine                       []Intermittent   []Continuous Lbs:  [] before manual  [] after manual    []  Ultrasound: []Continuous   [] Pulsed                           []1MHz   []3MHz W/cm2:  Location:    []  Iontophoresis with dexamethasone         Location: [] Take home patch   [] In clinic    []  Ice     []  heat  []  Ice massage  []  Laser   []  Anodyne Position:  Location:    []  Laser with stim  []  Other:  Position:  Location:    []  Vasopneumatic Device    []  Right     []  Left  Pre-treatment girth:  Post-treatment girth:  Measured at (location):  Pressure:       [] lo [] med [] hi   Temperature: [] lo [] med [] hi   [x] Skin assessment post-treatment:  [x]intact []redness- no adverse reaction    []redness - adverse reaction: Report given to Juanis BROWN in ICU   15 min Therapeutic Exercise:  [x] See flow sheet :   Rationale: increase ROM and increase strength to improve the patients ability to increase ease with ADLs    9 min Therapeutic Activity:  [x]  See flow sheet :   Rationale: increase strength and improve coordination  to improve the patients ability to increase ease with functional tasks     9 min Neuromuscular Re-education:  [x]  See flow sheet :   Rationale: increase strength, improve coordination and increase proprioception  to improve the patients ability to increase ease with functional activities    With   [x] TE   [] TA   [] neuro   [] other: Patient Education: [x] Review HEP    [] Progressed/Changed HEP based on:   [] positioning   [] body mechanics   [] transfers   [] heat/ice application    [] other:      Other Objective/Functional Measures:   Had improved C/S AROM today  No complaint of increased pain during session  No difficulty with exercises     Pain Level (0-10 scale) post treatment: 0/10    ASSESSMENT/Changes in Function:   Pt is slowly progressing toward goals. Pain level cont to decrease. Pt reported that she is seeing some improvement in her sx's. Cont to have some difficulty with lifting objects from the floor. Cont to report some difficulty with performing ADLs and household chores    Patient will continue to benefit from skilled PT services to modify and progress therapeutic interventions, address functional mobility deficits, address ROM deficits, address strength deficits, analyze and cue movement patterns, analyze and modify body mechanics/ergonomics, assess and modify postural abnormalities, address imbalance/dizziness and instruct in home and community integration to attain remaining goals.      [x]  See Plan of Care  []  See progress note/recertification  []  See Discharge Summary         Progress towards goals / Updated goals:  Short Term Goals: To be accomplished in 1 weeks:  1.  Pt will be compliant with initial HEP in order to optimize therapy outcomes.      Long Term Goals: To be accomplished in 4 weeks:  1.  Pt will improve FOTO by 17 points in order to demonstrate functional improvement. 2.  Pt will report 50% improvement since start of care in order to improve QOL. 3.  Pt will improve right cervical rotation AROM to > 60* in order to improve ease of checking blind spots with driving.    4.  Pt will perform 15# box lift floor<>waist with correct ergonomics without pain increase in order to demonstrate improved safety/ability with lifting with job tasks.   5.  Pt will report a little difficulty with performing usual work/housework in order to demonstrate improved ability with job tasks and household management.     PLAN  []  Upgrade activities as tolerated     [x]  Continue plan of care  []  Update interventions per flow sheet       []  Discharge due to:_  []  Other:_      Virginie Grover PTA 9/22/2021  6:59 AM    Future Appointments   Date Time Provider [place]   9/22/2021  9:00 AM Brain Au, PTA [facility]   9/27/2021  9:00 AM Brain Au, PTA [facility]   9/29/2021 11:00 AM Tania Weaver, NP [facility]   9/30/2021  7:30 AM Zari Arias, PT [facility]   10/4/2021  9:00 AM Brain Au, PTA [facility]   10/6/2021  9:00 AM Brain Au, PTA [facility]   10/11/2021  9:00 AM Brain Au, PTA [facility]   10/13/2021  9:00 AM Brain Au, PTA [facility]   10/18/2021  9:00 AM Brain Au, PTA [facility]   10/21/2021  8:15 AM Rex Torres, PT [facility]   10/25/2021  9:00 AM Brain Au, PTA [facility]   10/27/2021  9:00 AM Brain Au, PTA [facility]

## 2022-09-07 NOTE — GROUP NOTE
DORETHA  GROUP DOCUMENTATION INDIVIDUAL Group Therapy Note Date: 3/18/2020 Group Start Time: 1000 Group End Time: 1100 Group Topic: Topic Group [facility] ACUTE [facility] [place] [facility] GROUP DOCUMENTATION GROUP Group Therapy Note Attendees: 7 Attendance: Did not attend Patient's Goal: Interventions/techniques: Protopic Pregnancy And Lactation Text: This medication is Pregnancy Category C. It is unknown if this medication is excreted in breast milk when applied topically.

## 2022-11-13 ENCOUNTER — HOSPITAL ENCOUNTER (EMERGENCY)
Age: 62
Discharge: HOME | End: 2022-11-13
Attending: EMERGENCY MEDICINE

## 2022-11-13 ENCOUNTER — APPOINTMENT (OUTPATIENT)
Dept: GENERAL RADIOLOGY | Age: 62
End: 2022-11-13
Attending: EMERGENCY MEDICINE

## 2022-11-13 VITALS
TEMPERATURE: 97.8 F | OXYGEN SATURATION: 98 % | RESPIRATION RATE: 16 BRPM | SYSTOLIC BLOOD PRESSURE: 124 MMHG | HEART RATE: 71 BPM | DIASTOLIC BLOOD PRESSURE: 70 MMHG

## 2022-11-13 DIAGNOSIS — R05.9 COUGH, UNSPECIFIED TYPE: Primary | ICD-10-CM

## 2022-11-13 PROCEDURE — 71046 X-RAY EXAM CHEST 2 VIEWS: CPT

## 2022-11-13 PROCEDURE — 99283 EMERGENCY DEPT VISIT LOW MDM: CPT

## 2022-11-13 RX ORDER — AZITHROMYCIN 500 MG/1
TABLET, FILM COATED ORAL
Qty: 3 TABLET | Refills: 0 | Status: SHIPPED | OUTPATIENT
Start: 2022-11-13

## 2022-11-13 RX ORDER — AZITHROMYCIN 500 MG/1
TABLET, FILM COATED ORAL
Qty: 3 TABLET | Refills: 0 | Status: SHIPPED | OUTPATIENT
Start: 2022-11-13 | End: 2022-11-13 | Stop reason: SDUPTHER

## 2022-11-14 NOTE — ED PROVIDER NOTES
EMERGENCY DEPARTMENT HISTORY AND PHYSICAL EXAM      Date: 11/13/2022  Patient Name: Montse Beck    History of Presenting Illness     Chief Complaint   Patient presents with    Cough     Ambulatory into triage cc of productive cough x2-3 weeks         HPI: Montse Beck, 60 y.o. female denting to ED with chief complaint of productive cough. Several days of symptoms. Denies shortness of breath or chest pain. Denies other symptoms. There are no other complaints, changes, or physical findings at this time. PCP: None    No current facility-administered medications on file prior to encounter. Current Outpatient Medications on File Prior to Encounter   Medication Sig Dispense Refill    albuterol (PROVENTIL HFA, VENTOLIN HFA, PROAIR HFA) 90 mcg/actuation inhaler Take 2 Puffs by inhalation three (3) times daily as needed for Wheezing or Shortness of Breath. Indications: asthma attack 18 g 1    traZODone (DESYREL) 50 mg tablet Take 1 Tablet by mouth nightly as needed for Sleep (For insomnia). Indications: insomnia associated with depression 30 Tablet 1    haloperidoL (HALDOL) 20 mg tablet Take 1 Tablet by mouth two (2) times a day. WATERMAN overlap, do not refill  Indications: schizophrenia 43 Tablet 0    haloperidol decanoate (HALDOL DECANOATE) 100 mg/mL injection 4 mL by IntraMUSCular route every twenty-eight (28) days. Indications: schizophrenia, schizoaffective disorder 4 mL 1    LORazepam (Ativan) 0.5 mg tablet Take 1 Tablet by mouth two (2) times a day. Max Daily Amount: 1 mg. Indications: Catatonia prevention 60 Tablet 1       Past History     Past Medical History:  Past Medical History:   Diagnosis Date    Asthma     Depression     Hepatitis C     Psychiatric disorder     bipolar     Psychiatric disorder     schizophernia       Past Surgical History:  No past surgical history on file. Family History:  No family history on file.     Social History:  Social History     Tobacco Use Smoking status: Former    Smokeless tobacco: Never   Vaping Use    Vaping Use: Never used   Substance Use Topics    Alcohol use: Not Currently    Drug use: Yes     Types: Marijuana       Allergies:  No Known Allergies      Review of Systems   Review of Systems   Constitutional:  Negative for chills and fever. HENT:  Negative for rhinorrhea. Eyes:  Negative for redness. Respiratory:  Positive for cough. Negative for shortness of breath. Cardiovascular:  Negative for chest pain. Gastrointestinal:  Negative for abdominal pain. Genitourinary:  Negative for dysuria. Musculoskeletal:  Negative for back pain. Neurological:  Negative for syncope. Psychiatric/Behavioral:  The patient is not nervous/anxious. All other systems reviewed and are negative. Physical Exam   Physical Exam  Vitals and nursing note reviewed. Constitutional:       Appearance: Normal appearance. HENT:      Head: Normocephalic and atraumatic. Mouth/Throat:      Mouth: Mucous membranes are moist.   Eyes:      Extraocular Movements: Extraocular movements intact. Cardiovascular:      Rate and Rhythm: Normal rate and regular rhythm. Pulses: Normal pulses. Pulmonary:      Effort: Pulmonary effort is normal.      Breath sounds: Normal breath sounds. Abdominal:      Palpations: Abdomen is soft. Tenderness: There is no abdominal tenderness. Musculoskeletal:         General: No deformity. Cervical back: Neck supple. Skin:     General: Skin is warm and dry. Neurological:      General: No focal deficit present. Mental Status: She is alert. Psychiatric:         Mood and Affect: Mood normal.         Behavior: Behavior normal.       Diagnostic Study Results     Labs -   No results found for this or any previous visit (from the past 24 hour(s)). Radiologic Studies -   XR CHEST PA LAT   Final Result      No evidence of pneumonia.            CT Results  (Last 48 hours)      None          CXR Results (Last 48 hours)                 11/13/22 1639  XR CHEST PA LAT Final result    Impression:      No evidence of pneumonia. Narrative:  EXAM: XR CHEST PA LAT       INDICATION: Productive cough       COMPARISON: January 2020       TECHNIQUE: PA and lateral chest views       FINDINGS: The cardiac size is within normal limits. The pulmonary vasculature is   within normal limits. The lungs and pleural spaces are clear. The visualized bones and upper abdomen   are age-appropriate. Medical Decision Making   I am the first provider for this patient. I reviewed the vital signs, available nursing notes, past medical history, past surgical history, family history and social history. Vital Signs-Reviewed the patient's vital signs. Patient Vitals for the past 24 hrs:   Temp Pulse Resp BP SpO2   11/13/22 1555 97.8 °F (36.6 °C) 71 16 124/70 98 %         Provider Notes (Medical Decision Making):   Pleasant, Well-appearing woman with chief complaint of cough. Productive in nature. Vital signs normal and clear lungs on exam.  Chest x-ray normal with no pneumonia or pneumothorax. Primary care follow-up advised. We will plan for azithromycin due to productivity. Return precautions given. ED Course:     Initial assessment performed. The patients presenting problems have been discussed, and they are in agreement with the care plan formulated and outlined with them. I have encouraged them to ask questions as they arise throughout their visit. Disposition:  dc    PLAN:  1. Discharge Medication List as of 11/13/2022  5:10 PM        START taking these medications    Details   azithromycin (Zithromax TRI-LEON) 500 mg tab Take 500 mg daily for 3 days. , Normal, Disp-3 Tablet, R-0           CONTINUE these medications which have NOT CHANGED    Details   albuterol (PROVENTIL HFA, VENTOLIN HFA, PROAIR HFA) 90 mcg/actuation inhaler Take 2 Puffs by inhalation three (3) times daily as needed for Wheezing or Shortness of Breath. Indications: asthma attack, Print, Disp-18 g, R-1      traZODone (DESYREL) 50 mg tablet Take 1 Tablet by mouth nightly as needed for Sleep (For insomnia). Indications: insomnia associated with depression, Print, Disp-30 Tablet, R-1      haloperidoL (HALDOL) 20 mg tablet Take 1 Tablet by mouth two (2) times a day. WATERMAN overlap, do not refill  Indications: schizophrenia, Print, Disp-43 Tablet, R-0      haloperidol decanoate (HALDOL DECANOATE) 100 mg/mL injection 4 mL by IntraMUSCular route every twenty-eight (28) days. Indications: schizophrenia, schizoaffective disorder, Print, Disp-4 mL, R-1      LORazepam (Ativan) 0.5 mg tablet Take 1 Tablet by mouth two (2) times a day. Max Daily Amount: 1 mg. Indications: Catatonia prevention, Print, Disp-60 Tablet, R-1           2.    Follow-up Information    None       Return to ED if worse     Diagnosis     Clinical Impression: productive cough

## 2023-03-27 ENCOUNTER — TRANSCRIBE ORDER (OUTPATIENT)
Dept: REGISTRATION | Age: 63
End: 2023-03-27

## 2023-03-27 ENCOUNTER — HOSPITAL ENCOUNTER (OUTPATIENT)
Dept: GENERAL RADIOLOGY | Age: 63
Discharge: HOME | End: 2023-03-27
Payer: MEDICAID

## 2023-03-27 DIAGNOSIS — R05.3 CHRONIC COUGH: Primary | ICD-10-CM

## 2023-03-27 DIAGNOSIS — R05.3 CHRONIC COUGH: ICD-10-CM

## 2023-03-27 PROCEDURE — 71046 X-RAY EXAM CHEST 2 VIEWS: CPT

## 2023-05-23 RX ORDER — ALBUTEROL SULFATE 90 UG/1
2 AEROSOL, METERED RESPIRATORY (INHALATION) 3 TIMES DAILY PRN
COMMUNITY
Start: 2022-05-19

## 2023-05-23 RX ORDER — AZITHROMYCIN 500 MG/1
TABLET, FILM COATED ORAL
COMMUNITY
Start: 2022-11-13

## 2023-05-23 RX ORDER — LORAZEPAM 0.5 MG/1
0.5 TABLET ORAL 2 TIMES DAILY
COMMUNITY
Start: 2022-05-19

## 2023-05-23 RX ORDER — HALOPERIDOL 20 MG/1
20 TABLET ORAL 2 TIMES DAILY
COMMUNITY
Start: 2022-05-19

## 2023-05-23 RX ORDER — TRAZODONE HYDROCHLORIDE 50 MG/1
50 TABLET ORAL
COMMUNITY
Start: 2022-05-19

## 2023-10-08 ENCOUNTER — APPOINTMENT (OUTPATIENT)
Facility: HOSPITAL | Age: 63
End: 2023-10-08
Payer: COMMERCIAL

## 2023-10-08 ENCOUNTER — HOSPITAL ENCOUNTER (EMERGENCY)
Facility: HOSPITAL | Age: 63
Discharge: HOME | End: 2023-10-08
Attending: EMERGENCY MEDICINE | Admitting: EMERGENCY MEDICINE
Payer: COMMERCIAL

## 2023-10-08 VITALS
HEART RATE: 92 BPM | OXYGEN SATURATION: 100 % | RESPIRATION RATE: 18 BRPM | DIASTOLIC BLOOD PRESSURE: 88 MMHG | SYSTOLIC BLOOD PRESSURE: 126 MMHG | TEMPERATURE: 97 F

## 2023-10-08 DIAGNOSIS — R10.9 ABDOMINAL PAIN, UNSPECIFIED ABDOMINAL LOCATION: Primary | ICD-10-CM

## 2023-10-08 DIAGNOSIS — N30.00 ACUTE CYSTITIS WITHOUT HEMATURIA: ICD-10-CM

## 2023-10-08 DIAGNOSIS — R11.2 NAUSEA AND VOMITING, UNSPECIFIED VOMITING TYPE: ICD-10-CM

## 2023-10-08 DIAGNOSIS — D25.9 UTERINE LEIOMYOMA, UNSPECIFIED LOCATION: ICD-10-CM

## 2023-10-08 LAB
ALBUMIN SERPL-MCNC: 3.9 G/DL (ref 3.5–5)
ALBUMIN/GLOB SERPL: 1 (ref 1.1–2.2)
ALP SERPL-CCNC: 82 U/L (ref 45–117)
ALT SERPL-CCNC: 16 U/L (ref 12–78)
ANION GAP SERPL CALC-SCNC: 4 MMOL/L (ref 5–15)
APPEARANCE UR: ABNORMAL
AST SERPL-CCNC: 12 U/L (ref 15–37)
BACTERIA URNS QL MICRO: ABNORMAL /HPF
BASOPHILS # BLD: 0 K/UL (ref 0–0.1)
BASOPHILS NFR BLD: 0 % (ref 0–1)
BILIRUB SERPL-MCNC: 0.4 MG/DL (ref 0.2–1)
BILIRUB UR QL: NEGATIVE
BUN SERPL-MCNC: 7 MG/DL (ref 6–20)
BUN/CREAT SERPL: 6 (ref 12–20)
CALCIUM SERPL-MCNC: 8.8 MG/DL (ref 8.5–10.1)
CHLORIDE SERPL-SCNC: 107 MMOL/L (ref 97–108)
CO2 SERPL-SCNC: 30 MMOL/L (ref 21–32)
COLOR UR: ABNORMAL
COMMENT:: NORMAL
CREAT SERPL-MCNC: 1.17 MG/DL (ref 0.55–1.02)
DIFFERENTIAL METHOD BLD: NORMAL
EOSINOPHIL # BLD: 0.2 K/UL (ref 0–0.4)
EOSINOPHIL NFR BLD: 3 % (ref 0–7)
EPITH CASTS URNS QL MICRO: ABNORMAL /LPF
ERYTHROCYTE [DISTWIDTH] IN BLOOD BY AUTOMATED COUNT: 12.7 % (ref 11.5–14.5)
GLOBULIN SER CALC-MCNC: 3.8 G/DL (ref 2–4)
GLUCOSE SERPL-MCNC: 98 MG/DL (ref 65–100)
GLUCOSE UR STRIP.AUTO-MCNC: NEGATIVE MG/DL
HCT VFR BLD AUTO: 39.6 % (ref 35–47)
HGB BLD-MCNC: 13.4 G/DL (ref 11.5–16)
HGB UR QL STRIP: NEGATIVE
IMM GRANULOCYTES # BLD AUTO: 0 K/UL (ref 0–0.04)
IMM GRANULOCYTES NFR BLD AUTO: 0 % (ref 0–0.5)
KETONES UR QL STRIP.AUTO: ABNORMAL MG/DL
LEUKOCYTE ESTERASE UR QL STRIP.AUTO: ABNORMAL
LIPASE SERPL-CCNC: 41 U/L (ref 13–75)
LYMPHOCYTES # BLD: 3.2 K/UL (ref 0.8–3.5)
LYMPHOCYTES NFR BLD: 43 % (ref 12–49)
MCH RBC QN AUTO: 32.3 PG (ref 26–34)
MCHC RBC AUTO-ENTMCNC: 33.8 G/DL (ref 30–36.5)
MCV RBC AUTO: 95.4 FL (ref 80–99)
MONOCYTES # BLD: 0.8 K/UL (ref 0–1)
MONOCYTES NFR BLD: 11 % (ref 5–13)
NEUTS SEG # BLD: 3.2 K/UL (ref 1.8–8)
NEUTS SEG NFR BLD: 43 % (ref 32–75)
NITRITE UR QL STRIP.AUTO: POSITIVE
NRBC # BLD: 0 K/UL (ref 0–0.01)
NRBC BLD-RTO: 0 PER 100 WBC
PH UR STRIP: 5.5 (ref 5–8)
PLATELET # BLD AUTO: 390 K/UL (ref 150–400)
PMV BLD AUTO: 8.9 FL (ref 8.9–12.9)
POTASSIUM SERPL-SCNC: 3.4 MMOL/L (ref 3.5–5.1)
PROT SERPL-MCNC: 7.7 G/DL (ref 6.4–8.2)
PROT UR STRIP-MCNC: 30 MG/DL
RBC # BLD AUTO: 4.15 M/UL (ref 3.8–5.2)
RBC #/AREA URNS HPF: ABNORMAL /HPF (ref 0–5)
SODIUM SERPL-SCNC: 141 MMOL/L (ref 136–145)
SP GR UR REFRACTOMETRY: >1.03 (ref 1–1.03)
SPECIMEN HOLD: NORMAL
SPECIMEN HOLD: NORMAL
UROBILINOGEN UR QL STRIP.AUTO: 1 EU/DL (ref 0.2–1)
WBC # BLD AUTO: 7.4 K/UL (ref 3.6–11)
WBC URNS QL MICRO: ABNORMAL /HPF (ref 0–4)

## 2023-10-08 PROCEDURE — 87086 URINE CULTURE/COLONY COUNT: CPT

## 2023-10-08 PROCEDURE — 36415 COLL VENOUS BLD VENIPUNCTURE: CPT

## 2023-10-08 PROCEDURE — 81001 URINALYSIS AUTO W/SCOPE: CPT

## 2023-10-08 PROCEDURE — 80053 COMPREHEN METABOLIC PANEL: CPT

## 2023-10-08 PROCEDURE — 85025 COMPLETE CBC W/AUTO DIFF WBC: CPT

## 2023-10-08 PROCEDURE — 71046 X-RAY EXAM CHEST 2 VIEWS: CPT

## 2023-10-08 PROCEDURE — 99285 EMERGENCY DEPT VISIT HI MDM: CPT

## 2023-10-08 PROCEDURE — 6360000004 HC RX CONTRAST MEDICATION

## 2023-10-08 PROCEDURE — 83690 ASSAY OF LIPASE: CPT

## 2023-10-08 PROCEDURE — 74177 CT ABD & PELVIS W/CONTRAST: CPT

## 2023-10-08 RX ORDER — ONDANSETRON 4 MG/1
4 TABLET, FILM COATED ORAL EVERY 8 HOURS PRN
Qty: 15 TABLET | Refills: 0 | Status: SHIPPED | OUTPATIENT
Start: 2023-10-08

## 2023-10-08 RX ORDER — CEPHALEXIN 500 MG/1
500 CAPSULE ORAL 3 TIMES DAILY
Qty: 15 CAPSULE | Refills: 0 | Status: SHIPPED | OUTPATIENT
Start: 2023-10-08 | End: 2023-10-13

## 2023-10-08 RX ADMIN — IOPAMIDOL 100 ML: 755 INJECTION, SOLUTION INTRAVENOUS at 17:33

## 2023-10-08 ASSESSMENT — PAIN SCALES - GENERAL: PAINLEVEL_OUTOF10: 4

## 2023-10-08 ASSESSMENT — PAIN DESCRIPTION - LOCATION: LOCATION: ABDOMEN

## 2023-10-08 ASSESSMENT — ENCOUNTER SYMPTOMS
SHORTNESS OF BREATH: 0
ABDOMINAL PAIN: 1
NAUSEA: 0
CONSTIPATION: 0
COUGH: 1
VOMITING: 1
DIARRHEA: 0

## 2023-10-08 ASSESSMENT — PAIN DESCRIPTION - ORIENTATION: ORIENTATION: LOWER;LEFT;RIGHT

## 2023-10-08 ASSESSMENT — PAIN DESCRIPTION - PAIN TYPE: TYPE: ACUTE PAIN

## 2023-10-08 ASSESSMENT — PAIN DESCRIPTION - DESCRIPTORS: DESCRIPTORS: ACHING

## 2023-10-08 ASSESSMENT — PAIN - FUNCTIONAL ASSESSMENT: PAIN_FUNCTIONAL_ASSESSMENT: 0-10

## 2023-10-08 NOTE — DISCHARGE INSTRUCTIONS
As discussed, you have a urinary tract infection for which you are being prescribed antibiotics. You have an incidental finding of a uterine fibroid. The remainder of your work-up is negative for any emergent findings. You are being prescribed Zofran for nausea/vomiting. Follow up with your PCP and gastroenterologist for further management. Return to the ER if you experience severe or worsening symptoms.

## 2023-10-08 NOTE — ED PROVIDER NOTES
[facility] EMERGENCY DEP  EMERGENCY DEPARTMENT ENCOUNTER      Pt Name: Ronald Sweet  MRN: [id number]  [address] [facility] 1960  Date of evaluation: 10/8/2023  Provider: Keyon White PA-C    CHIEF COMPLAINT       Chief Complaint   Patient presents with    Abdominal Pain         HISTORY OF PRESENT ILLNESS   (Location/Symptom, Timing/Onset, Context/Setting, Quality, Duration, Modifying Factors, Severity)  Note limiting factors. The history is provided by the patient and a relative. History limited by: Patient is a poor historian. Ronald Sweet is a 58 y.o. female with Hx of schizoaffective disorder, hepatitis C who presents ambulatory with daughter to [facility] ED with cc of generalized abdominal pain. Also notes dry cough and daily vomiting for a few weeks. Denies fever, chills, nausea, bowel or bladder changes, history of abdominal surgeries, chest pain, any other concerns at this time. PCP: Lyssa Escalante MD    There are no other complaints, changes or physical findings at this time. Review of External Medical Records:     Nursing Notes were reviewed. REVIEW OF SYSTEMS    (2-9 systems for level 4, 10 or more for level 5)     Review of Systems   Constitutional:  Negative for chills and fever. HENT:  Negative for congestion. Respiratory:  Positive for cough. Negative for shortness of breath. Cardiovascular:  Negative for chest pain. Gastrointestinal:  Positive for abdominal pain and vomiting. Negative for constipation, diarrhea and nausea. Genitourinary:  Negative for dysuria and hematuria. Skin:  Negative for rash. Neurological:  Negative for dizziness, light-headedness and headaches. All other systems reviewed and are negative. Except as noted above the remainder of the review of systems was reviewed and negative.        PAST MEDICAL HISTORY     Past Medical History:   Diagnosis Date    Asthma     Depression     Hepatitis C     Psychiatric disorder

## 2023-10-10 LAB
BACTERIA SPEC CULT: NORMAL
CC UR VC: NORMAL
SERVICE CMNT-IMP: NORMAL

## 2024-05-14 ENCOUNTER — APPOINTMENT (OUTPATIENT)
Facility: HOSPITAL | Age: 64
End: 2024-05-14
Payer: COMMERCIAL

## 2024-05-14 ENCOUNTER — HOSPITAL ENCOUNTER (EMERGENCY)
Facility: HOSPITAL | Age: 64
Discharge: HOME | End: 2024-05-14
Attending: EMERGENCY MEDICINE
Payer: COMMERCIAL

## 2024-05-14 VITALS
RESPIRATION RATE: 16 BRPM | HEIGHT: 62 IN | WEIGHT: 151.9 LBS | BODY MASS INDEX: 27.95 KG/M2 | HEART RATE: 66 BPM | DIASTOLIC BLOOD PRESSURE: 79 MMHG | OXYGEN SATURATION: 98 % | SYSTOLIC BLOOD PRESSURE: 111 MMHG | TEMPERATURE: 97.5 F

## 2024-05-14 DIAGNOSIS — J06.9 ACUTE UPPER RESPIRATORY INFECTION: Primary | ICD-10-CM

## 2024-05-14 LAB
FLUAV AG NPH QL IA: NEGATIVE
FLUBV AG NOSE QL IA: NEGATIVE
SARS-COV-2 RDRP RESP QL NAA+PROBE: NOT DETECTED
SOURCE: NORMAL

## 2024-05-14 PROCEDURE — 99284 EMERGENCY DEPT VISIT MOD MDM: CPT

## 2024-05-14 PROCEDURE — 87804 INFLUENZA ASSAY W/OPTIC: CPT

## 2024-05-14 PROCEDURE — 71045 X-RAY EXAM CHEST 1 VIEW: CPT

## 2024-05-14 PROCEDURE — 87635 SARS-COV-2 COVID-19 AMP PRB: CPT

## 2024-05-14 RX ORDER — GUAIFENESIN 600 MG/1
600 TABLET, EXTENDED RELEASE ORAL 2 TIMES DAILY
Qty: 30 TABLET | Refills: 0 | Status: SHIPPED | OUTPATIENT
Start: 2024-05-14 | End: 2024-05-29

## 2024-05-14 ASSESSMENT — PAIN SCALES - GENERAL: PAINLEVEL_OUTOF10: 0

## 2024-05-14 NOTE — DISCHARGE INSTRUCTIONS
You were seen in the emergency department for your symptoms.  Please use the cough medication.  Please follow-up with your primary care doctor.  Return for new or worsening symptoms anytime.

## 2024-05-14 NOTE — ED PROVIDER NOTES
[facility] EMERGENCY DEPT  EMERGENCY DEPARTMENT ENCOUNTER       Pt Name: Elizabeth García  MRN: [id number]  Birthdate 1960  Date of evaluation: 5/14/2024  Provider: Stephen Carlos MD   PCP: Harman Trinh MD  Note Started: 11:23 AM EDT 5/14/24     CHIEF COMPLAINT       Chief Complaint   Patient presents with    Cough     Ambulatory to triage c/o productive cough and generalized malaise x3 days; denies SOB, NVD, fevers, chills        HISTORY OF PRESENT ILLNESS: 1 or more elements      History From: patient, History limited by: none     Elizabeth García is a 63 y.o. female with a history of schizophrenia and bipolar disorder who presents emergency department with a chief plaint of cough.    Patient reports family sick with similar symptoms.  Reports cough with rhinorrhea and \"spitting up\" sputum that has been ongoing for 3 days.  Denies fevers or chills.  Denies headache, shortness of breath or chest pain.  Denies abdominal pain, nausea, vomiting or diarrhea.  No leg swelling.       Please See MDM for Additional Details of the HPI/PMH  Nursing Notes were all reviewed and agreed with or any disagreements were addressed in the HPI.     REVIEW OF SYSTEMS        Positives and Pertinent negatives as per HPI.    PAST HISTORY     Past Medical History:  Past Medical History:   Diagnosis Date    Asthma     Depression     Hepatitis C     Psychiatric disorder     schizophernia    Psychiatric disorder     bipolar        Past Surgical History:  No past surgical history on file.    Family History:  No family history on file.    Social History:  Social History     Tobacco Use    Smoking status: Former    Smokeless tobacco: Never   Substance Use Topics    Alcohol use: Not Currently    Drug use: Yes     Types: Marijuana (Weed)       Allergies:  No Known Allergies    CURRENT MEDICATIONS      Discharge Medication List as of 5/14/2024 11:48 AM        CONTINUE these medications which have NOT CHANGED    Details   ondansetron

## 2024-06-22 ENCOUNTER — HOSPITAL ENCOUNTER (OUTPATIENT)
Facility: HOSPITAL | Age: 64
End: 2024-06-22
Payer: MEDICAID

## 2024-06-22 VITALS — HEIGHT: 66 IN | WEIGHT: 225.97 LBS | BODY MASS INDEX: 36.32 KG/M2

## 2024-06-22 DIAGNOSIS — Z12.31 SCREENING MAMMOGRAM FOR BREAST CANCER: ICD-10-CM

## 2024-06-22 PROCEDURE — 77063 BREAST TOMOSYNTHESIS BI: CPT

## 2024-07-19 ENCOUNTER — HOSPITAL ENCOUNTER (OUTPATIENT)
Facility: HOSPITAL | Age: 64
End: 2024-07-19
Payer: COMMERCIAL

## 2024-07-19 ENCOUNTER — TRANSCRIBE ORDERS (OUTPATIENT)
Facility: HOSPITAL | Age: 64
End: 2024-07-19

## 2024-07-19 DIAGNOSIS — R05.3 CHRONIC COUGH: Primary | ICD-10-CM

## 2024-07-19 DIAGNOSIS — R05.3 CHRONIC COUGH: ICD-10-CM

## 2024-07-19 PROCEDURE — 71046 X-RAY EXAM CHEST 2 VIEWS: CPT

## 2024-08-14 ENCOUNTER — HOSPITAL ENCOUNTER (OUTPATIENT)
Facility: HOSPITAL | Age: 64
Discharge: HOME | End: 2024-08-17
Payer: MEDICAID

## 2024-08-14 ENCOUNTER — TRANSCRIBE ORDERS (OUTPATIENT)
Facility: HOSPITAL | Age: 64
End: 2024-08-14

## 2024-08-14 DIAGNOSIS — M25.562 LEFT KNEE PAIN, UNSPECIFIED CHRONICITY: Primary | ICD-10-CM

## 2024-08-14 DIAGNOSIS — M25.562 LEFT KNEE PAIN, UNSPECIFIED CHRONICITY: ICD-10-CM

## 2024-08-14 PROCEDURE — 73560 X-RAY EXAM OF KNEE 1 OR 2: CPT

## 2024-12-10 ENCOUNTER — HOSPITAL ENCOUNTER (EMERGENCY)
Facility: HOSPITAL | Age: 64
Discharge: HOME | End: 2024-12-10
Attending: STUDENT IN AN ORGANIZED HEALTH CARE EDUCATION/TRAINING PROGRAM
Payer: COMMERCIAL

## 2024-12-10 ENCOUNTER — APPOINTMENT (OUTPATIENT)
Facility: HOSPITAL | Age: 64
End: 2024-12-10
Payer: COMMERCIAL

## 2024-12-10 VITALS
TEMPERATURE: 98.4 F | RESPIRATION RATE: 14 BRPM | DIASTOLIC BLOOD PRESSURE: 74 MMHG | SYSTOLIC BLOOD PRESSURE: 127 MMHG | BODY MASS INDEX: 29.03 KG/M2 | WEIGHT: 158.73 LBS | HEART RATE: 65 BPM | OXYGEN SATURATION: 97 %

## 2024-12-10 DIAGNOSIS — R11.2 NAUSEA AND VOMITING, UNSPECIFIED VOMITING TYPE: Primary | ICD-10-CM

## 2024-12-10 DIAGNOSIS — N13.30 HYDRONEPHROSIS, UNSPECIFIED HYDRONEPHROSIS TYPE: ICD-10-CM

## 2024-12-10 LAB
ALBUMIN SERPL-MCNC: 3.6 G/DL (ref 3.5–5)
ALBUMIN/GLOB SERPL: 0.9 (ref 1.1–2.2)
ALP SERPL-CCNC: 96 U/L (ref 45–117)
ALT SERPL-CCNC: 14 U/L (ref 12–78)
ANION GAP SERPL CALC-SCNC: 2 MMOL/L (ref 2–12)
APPEARANCE UR: CLEAR
AST SERPL-CCNC: 15 U/L (ref 15–37)
BACTERIA URNS QL MICRO: NEGATIVE /HPF
BASOPHILS # BLD: 0 K/UL (ref 0–0.1)
BASOPHILS NFR BLD: 0 % (ref 0–1)
BILIRUB SERPL-MCNC: 0.3 MG/DL (ref 0.2–1)
BILIRUB UR QL: NEGATIVE
BUN SERPL-MCNC: 7 MG/DL (ref 6–20)
BUN/CREAT SERPL: 6 (ref 12–20)
CALCIUM SERPL-MCNC: 9.3 MG/DL (ref 8.5–10.1)
CHLORIDE SERPL-SCNC: 105 MMOL/L (ref 97–108)
CO2 SERPL-SCNC: 31 MMOL/L (ref 21–32)
COLOR UR: ABNORMAL
CREAT SERPL-MCNC: 1.12 MG/DL (ref 0.55–1.02)
DIFFERENTIAL METHOD BLD: ABNORMAL
EOSINOPHIL # BLD: 0.1 K/UL (ref 0–0.4)
EOSINOPHIL NFR BLD: 2 % (ref 0–7)
EPITH CASTS URNS QL MICRO: ABNORMAL /LPF
ERYTHROCYTE [DISTWIDTH] IN BLOOD BY AUTOMATED COUNT: 12.6 % (ref 11.5–14.5)
GLOBULIN SER CALC-MCNC: 3.9 G/DL (ref 2–4)
GLUCOSE SERPL-MCNC: 114 MG/DL (ref 65–100)
GLUCOSE UR STRIP.AUTO-MCNC: NEGATIVE MG/DL
HCT VFR BLD AUTO: 38 % (ref 35–47)
HGB BLD-MCNC: 12.3 G/DL (ref 11.5–16)
HGB UR QL STRIP: NEGATIVE
IMM GRANULOCYTES # BLD AUTO: 0 K/UL (ref 0–0.04)
IMM GRANULOCYTES NFR BLD AUTO: 0 % (ref 0–0.5)
KETONES UR QL STRIP.AUTO: NEGATIVE MG/DL
LEUKOCYTE ESTERASE UR QL STRIP.AUTO: ABNORMAL
LIPASE SERPL-CCNC: 35 U/L (ref 13–75)
LYMPHOCYTES # BLD: 2.5 K/UL (ref 0.8–3.5)
LYMPHOCYTES NFR BLD: 52 % (ref 12–49)
MCH RBC QN AUTO: 31.8 PG (ref 26–34)
MCHC RBC AUTO-ENTMCNC: 32.4 G/DL (ref 30–36.5)
MCV RBC AUTO: 98.2 FL (ref 80–99)
MONOCYTES # BLD: 0.6 K/UL (ref 0–1)
MONOCYTES NFR BLD: 12 % (ref 5–13)
NEUTS SEG # BLD: 1.7 K/UL (ref 1.8–8)
NEUTS SEG NFR BLD: 34 % (ref 32–75)
NITRITE UR QL STRIP.AUTO: NEGATIVE
NRBC # BLD: 0 K/UL (ref 0–0.01)
NRBC BLD-RTO: 0 PER 100 WBC
PH UR STRIP: 6 (ref 5–8)
PLATELET # BLD AUTO: 370 K/UL (ref 150–400)
PMV BLD AUTO: 9.2 FL (ref 8.9–12.9)
POTASSIUM SERPL-SCNC: 3.8 MMOL/L (ref 3.5–5.1)
PROT SERPL-MCNC: 7.5 G/DL (ref 6.4–8.2)
PROT UR STRIP-MCNC: NEGATIVE MG/DL
RBC # BLD AUTO: 3.87 M/UL (ref 3.8–5.2)
RBC #/AREA URNS HPF: ABNORMAL /HPF (ref 0–5)
SODIUM SERPL-SCNC: 138 MMOL/L (ref 136–145)
SP GR UR REFRACTOMETRY: 1
URINE CULTURE IF INDICATED: ABNORMAL
UROBILINOGEN UR QL STRIP.AUTO: 1 EU/DL (ref 0.2–1)
WBC # BLD AUTO: 4.8 K/UL (ref 3.6–11)
WBC URNS QL MICRO: ABNORMAL /HPF (ref 0–4)

## 2024-12-10 PROCEDURE — 6360000004 HC RX CONTRAST MEDICATION: Performed by: STUDENT IN AN ORGANIZED HEALTH CARE EDUCATION/TRAINING PROGRAM

## 2024-12-10 PROCEDURE — 6360000002 HC RX W HCPCS: Performed by: STUDENT IN AN ORGANIZED HEALTH CARE EDUCATION/TRAINING PROGRAM

## 2024-12-10 PROCEDURE — 80053 COMPREHEN METABOLIC PANEL: CPT

## 2024-12-10 PROCEDURE — 96374 THER/PROPH/DIAG INJ IV PUSH: CPT

## 2024-12-10 PROCEDURE — 99285 EMERGENCY DEPT VISIT HI MDM: CPT

## 2024-12-10 PROCEDURE — 94640 AIRWAY INHALATION TREATMENT: CPT

## 2024-12-10 PROCEDURE — 36415 COLL VENOUS BLD VENIPUNCTURE: CPT

## 2024-12-10 PROCEDURE — 6370000000 HC RX 637 (ALT 250 FOR IP): Performed by: STUDENT IN AN ORGANIZED HEALTH CARE EDUCATION/TRAINING PROGRAM

## 2024-12-10 PROCEDURE — 96376 TX/PRO/DX INJ SAME DRUG ADON: CPT

## 2024-12-10 PROCEDURE — 83690 ASSAY OF LIPASE: CPT

## 2024-12-10 PROCEDURE — 85025 COMPLETE CBC W/AUTO DIFF WBC: CPT

## 2024-12-10 PROCEDURE — 81001 URINALYSIS AUTO W/SCOPE: CPT

## 2024-12-10 PROCEDURE — 74177 CT ABD & PELVIS W/CONTRAST: CPT

## 2024-12-10 RX ORDER — DICYCLOMINE HCL 20 MG
20 TABLET ORAL 3 TIMES DAILY PRN
Qty: 30 TABLET | Refills: 0 | Status: SHIPPED | OUTPATIENT
Start: 2024-12-10

## 2024-12-10 RX ORDER — ONDANSETRON 4 MG/1
4 TABLET, ORALLY DISINTEGRATING ORAL 3 TIMES DAILY PRN
Qty: 21 TABLET | Refills: 0 | Status: SHIPPED | OUTPATIENT
Start: 2024-12-10

## 2024-12-10 RX ORDER — IPRATROPIUM BROMIDE AND ALBUTEROL SULFATE 2.5; .5 MG/3ML; MG/3ML
1 SOLUTION RESPIRATORY (INHALATION)
Status: COMPLETED | OUTPATIENT
Start: 2024-12-10 | End: 2024-12-10

## 2024-12-10 RX ORDER — ONDANSETRON 2 MG/ML
4 INJECTION INTRAMUSCULAR; INTRAVENOUS ONCE
Status: COMPLETED | OUTPATIENT
Start: 2024-12-10 | End: 2024-12-10

## 2024-12-10 RX ORDER — IOPAMIDOL 755 MG/ML
100 INJECTION, SOLUTION INTRAVASCULAR
Status: COMPLETED | OUTPATIENT
Start: 2024-12-10 | End: 2024-12-10

## 2024-12-10 RX ADMIN — ONDANSETRON 4 MG: 2 INJECTION INTRAMUSCULAR; INTRAVENOUS at 16:25

## 2024-12-10 RX ADMIN — ONDANSETRON 4 MG: 2 INJECTION INTRAMUSCULAR; INTRAVENOUS at 15:28

## 2024-12-10 RX ADMIN — IOPAMIDOL 100 ML: 755 INJECTION, SOLUTION INTRAVENOUS at 16:12

## 2024-12-10 RX ADMIN — IPRATROPIUM BROMIDE AND ALBUTEROL SULFATE 1 DOSE: 2.5; .5 SOLUTION RESPIRATORY (INHALATION) at 16:33

## 2024-12-10 ASSESSMENT — LIFESTYLE VARIABLES
HOW MANY STANDARD DRINKS CONTAINING ALCOHOL DO YOU HAVE ON A TYPICAL DAY: PATIENT DOES NOT DRINK
HOW OFTEN DO YOU HAVE A DRINK CONTAINING ALCOHOL: NEVER

## 2024-12-10 ASSESSMENT — PAIN SCALES - GENERAL: PAINLEVEL_OUTOF10: 6

## 2024-12-10 NOTE — ED NOTES
Patient with episode of vomiting on the way back from CT scan.  Patient states she started to feel nauseated after contrast injection.  Upon arriving back to the room patient with expiratory wheezing.  Spoke with Dr. Roman who is aware of patient condition.  MD to place new orders.

## 2024-12-12 NOTE — ED PROVIDER NOTES
tablet by mouth 3 times daily as needed for Nausea or Vomiting            STOP taking these medications      ondansetron 4 MG tablet  Commonly known as: ZOFRAN            ASK your doctor about these medications      albuterol sulfate  (90 Base) MCG/ACT inhaler  Commonly known as: PROVENTIL;VENTOLIN;PROAIR     azithromycin 500 MG tablet  Commonly known as: ZITHROMAX     haloperidol 20 MG tablet  Commonly known as: HALDOL     LORazepam 0.5 MG tablet  Commonly known as: ATIVAN     traZODone 50 MG tablet  Commonly known as: DESYREL               Where to Get Your Medications        [id number] - [place], [place] - [address] [place] [address] -  [phone] - F [phone] [address] [facility] [address]      Phone: [phone]   dicyclomine 20 MG tablet  ondansetron 4 MG disintegrating tablet           DISCONTINUED MEDICATIONS:  Discharge Medication List as of 12/10/2024  5:46 PM          I am the Primary Clinician of Record.   Francis Ramirez MD (electronically signed)    (Please note that parts of this dictation were completed with voice recognition software. Quite often unanticipated grammatical, syntax, homophones, and other interpretive errors are inadvertently transcribed by the computer software. Please disregards these errors. Please excuse any errors that have escaped final proofreading.)        Francis Ramirez MD  12/12/24 0255

## 2025-01-03 NOTE — PROGRESS NOTES
Dilma Lema presents today for No chief complaint on file. Is someone accompanying this pt? no    Is the patient using any DME equipment during OV? no    Depression Screening:  No flowsheet data found. Learning Assessment:  No flowsheet data found. Abuse Screening:  No flowsheet data found. Fall Risk  No flowsheet data found. OPIOID RISK TOOL  No flowsheet data found. Coordination of Care:  1. Have you been to the ER, urgent care clinic since your last visit? no  Hospitalized since your last visit? no    2. Have you seen or consulted any other health care providers outside of the [address] [place] [address] since your last visit? no Include any pap smears or colon screening.  no PAST SURGICAL HISTORY:  No significant past surgical history

## 2025-04-01 ENCOUNTER — HOSPITAL ENCOUNTER (EMERGENCY)
Facility: HOSPITAL | Age: 65
Discharge: HOME | End: 2025-04-01
Payer: COMMERCIAL

## 2025-04-01 ENCOUNTER — APPOINTMENT (OUTPATIENT)
Facility: HOSPITAL | Age: 65
End: 2025-04-01
Payer: COMMERCIAL

## 2025-04-01 VITALS
BODY MASS INDEX: 27.79 KG/M2 | DIASTOLIC BLOOD PRESSURE: 101 MMHG | SYSTOLIC BLOOD PRESSURE: 149 MMHG | HEIGHT: 62 IN | OXYGEN SATURATION: 99 % | WEIGHT: 151 LBS | HEART RATE: 78 BPM | RESPIRATION RATE: 17 BRPM | TEMPERATURE: 98.1 F

## 2025-04-01 DIAGNOSIS — R05.1 ACUTE COUGH: Primary | ICD-10-CM

## 2025-04-01 DIAGNOSIS — R03.0 ELEVATED BLOOD PRESSURE READING: ICD-10-CM

## 2025-04-01 LAB
FLUAV RNA SPEC QL NAA+PROBE: NOT DETECTED
FLUBV RNA SPEC QL NAA+PROBE: NOT DETECTED
SARS-COV-2 RNA RESP QL NAA+PROBE: NOT DETECTED
SOURCE: NORMAL

## 2025-04-01 PROCEDURE — 87636 SARSCOV2 & INF A&B AMP PRB: CPT

## 2025-04-01 PROCEDURE — 99284 EMERGENCY DEPT VISIT MOD MDM: CPT

## 2025-04-01 PROCEDURE — 71046 X-RAY EXAM CHEST 2 VIEWS: CPT

## 2025-04-01 RX ORDER — FLUTICASONE PROPIONATE 50 MCG
1 SPRAY, SUSPENSION (ML) NASAL DAILY
Qty: 16 G | Refills: 0 | Status: SHIPPED | OUTPATIENT
Start: 2025-04-01

## 2025-04-01 RX ORDER — AZITHROMYCIN 250 MG/1
TABLET, FILM COATED ORAL
Qty: 6 TABLET | Refills: 0 | Status: SHIPPED | OUTPATIENT
Start: 2025-04-01 | End: 2025-04-11

## 2025-04-01 RX ORDER — METHYLPREDNISOLONE 4 MG/1
TABLET ORAL
Qty: 1 KIT | Refills: 0 | Status: SHIPPED | OUTPATIENT
Start: 2025-04-01

## 2025-04-01 ASSESSMENT — PAIN - FUNCTIONAL ASSESSMENT: PAIN_FUNCTIONAL_ASSESSMENT: 0-10

## 2025-04-01 ASSESSMENT — PAIN SCALES - GENERAL: PAINLEVEL_OUTOF10: 0

## 2025-04-01 NOTE — ED PROVIDER NOTES
[facility] EMERGENCY DEPARTMENT  EMERGENCY DEPARTMENT ENCOUNTER       Pt Name: Elizabeth García  MRN: [id number]  Birthdate 1960  Date of evaluation: 4/1/2025  Provider: NIK Pardo   PCP: Harman Trinh MD  Note Started: 2:27 PM EDT 4/1/25     CHIEF COMPLAINT       Chief Complaint   Patient presents with    Cough     Pt ambulatory into TR. Pt c/o cough ongoing x one week. Pt states she is concerned for PNA. Pt denies ill exposures. Pt denies SOB or CP.         HISTORY OF PRESENT ILLNESS: 1 or more elements      History From: Patient  None     Elizabeth García is a 64 y.o. female with a history of asthma, bipolar disorder, schizophrenia, depression, and additional history as noted below, who presents to the ED for evaluation of persistent cough for the past 1 to 2 weeks.  States cough is worse at nighttime and is keeping her awake.  Denies any fevers, chest pain, shortness of breath, or wheezing.  States last time she had the symptoms she was diagnosed with pneumonia and is concerned she may have pneumonia at this time.  Denies any abdominal pain, nausea, or vomiting.  Tolerating p.o. well.  Denies urinary symptoms.  Has been taking over-the-counter medications with minimal improvement.     Nursing Notes were all reviewed and agreed with or any disagreements were addressed in the HPI.     REVIEW OF SYSTEMS      Review of Systems   All other systems reviewed and are negative.       Positives and Pertinent negatives as per HPI.    PAST HISTORY     Past Medical History:  Past Medical History:   Diagnosis Date    Asthma     Depression     Hepatitis C     Psychiatric disorder     schizophernia    Psychiatric disorder     bipolar        Past Surgical History:  No past surgical history on file.    Family History:  No family history on file.    Social History:  Social History     Tobacco Use    Smoking status: Former    Smokeless tobacco: Never   Substance Use Topics    Alcohol use: Not

## 2025-04-01 NOTE — DISCHARGE INSTRUCTIONS
Thank You!    It was a pleasure taking care of you in our Emergency Department today. We know that when you come to [facility], you are entrusting us with your health, comfort, and safety. Our clinicians honor that trust, and truly appreciate the opportunity to care for you and your loved ones.    If you receive a survey about your Emergency Department experience today, please fill it out.  We value your feedback. Thank you.      Alva Castrejon PA-C    ___________________________________  I have included a copy of your lab results and/or radiologic studies from today's visit so you can have them easily available at your follow-up visit.   Recent Results (from the past 12 hours)   COVID-19 & Influenza Combo    Collection Time: 04/01/25  1:07 PM    Specimen: Nasopharyngeal   Result Value Ref Range    Source Nasopharyngeal      SARS-CoV-2, PCR Not detected NOTD      Rapid Influenza A By PCR Not detected NOTD      Rapid Influenza B By PCR Not detected NOTD         XR CHEST (2 VW)   Final Result   No acute process.          Electronically signed by Sven Bello        [unfilled]    
Please see discharge instructions on COVID19, self quarantine. Please return to the ER if symptoms worsen, significant trouble breathing, or other concerns.    COVID-19 (Coronavirus Disease 2019)    WHAT YOU NEED TO KNOW:    COVID-19 is the disease caused by the 2019 novel (new) coronavirus. It was first found in individuals in a part of [place] in late 2019. The virus is spreading to other countries as infected persons travel. Coronaviruses generally cause respiratory (nose, throat, and lung) infections, such as a cold. They can also cause serious infections such as Middle East respiratory syndrome (MERS) and severe acute respiratory syndrome (SARS). The new virus is related to the SARS coronavirus, so it is officially named SARS coronavirus 2 (SARS-CoV-2).    DISCHARGE INSTRUCTIONS:    If you think you or someone you know may be infected: It is important for anyone who may be infected to get tested right away. Do the following to protect others:     If emergency care is needed, tell the  about the possible infection, or call ahead and tell the emergency department.    Call a healthcare provider to be seen in the office. Anyone who may be infected should not arrive without calling first. The provider will need to protect staff members and other patients.    The person who may be infected needs to wear a medical mask before getting medical care. The mask needs to stay on until the provider says to remove it.    Call your local emergency number (911 in the [place]) or go to the emergency department if: You have signs or symptoms of COVID-19, and any of the following is true:     You traveled within the last 14 days to an area where the virus is active or had close contact with someone who did.    You had close contact within the last 14 days with someone who has a confirmed infection.    Call your doctor if: You do not have signs or symptoms of COVID-19, but any of the following is true:     You traveled within the last 14 days to an area where the virus is active or had close contact with someone who did.    You had close contact within the last 14 days with someone who has a confirmed infection.    You have questions or concerns about your condition or care.    Medicines: You may need any of the following for mild symptoms:     Decongestants help reduce nasal congestion and help you breathe more easily. If you take decongestant pills, they may make you feel restless or cause problems with your sleep. Do not use decongestant sprays for more than a few days.    Cough suppressants help reduce coughing. Ask your healthcare provider which type of cough medicine is best for you.    Acetaminophen decreases pain and fever. It is available without a doctor's order. Ask how much to take and how often to take it. Follow directions. Read the labels of all other medicines you are using to see if they also contain acetaminophen, or ask your doctor or pharmacist. Acetaminophen can cause liver damage if not taken correctly. Do not use more than 4 grams (4,000 milligrams) total of acetaminophen in one day.     Take your medicine as directed. Contact your healthcare provider if you think your medicine is not helping or if you have side effects. Tell him or her if you are allergic to any medicine. Keep a list of the medicines, vitamins, and herbs you take. Include the amounts, and when and why you take them. Bring the list or the pill bottles to follow-up visits. Carry your medicine list with you in case of an emergency.    How the 2019 coronavirus spreads: The virus appears to spread quickly and easily. The following are ways the virus is thought to spread, but more information may be coming:     Droplets are the most common way all coronaviruses spread. The virus can travel in droplets that form when a person talks, coughs, or sneezes. Anyone who breathes in the virus or gets it in his or her eyes can become infected.    An infected person may be able to leave the virus on objects and surfaces. Another person can get the virus on his or her hands by touching the object or surface. Infection happens if the person then touches his or her eyes or mouth with unwashed hands. It is not yet known how long the virus can stay on an object or surface. Evidence shows it may be as long as 9 days at room temperature.    Person-to-person contact may spread the virus. For example, an infected person can spread the virus by shaking hands with someone. At this time, it does not appear that the virus can be passed to a baby during pregnancy or delivery. The virus also does not appear to spread during breastfeeding. If you are pregnant or breastfeeding, talk to your healthcare provider or obstetrician about any concerns you have.    An infected animal may be able to infect a person who touches it. This may happen at live markets or on a farm.    Prevent a 2019 coronavirus infection: Everyone should do the following to prevent getting or spreading the virus:     Wash your hands often. Use soap and water every time you wash your hands. Rub your soapy hands together, lacing your fingers. Use the fingers of one hand to scrub under the nails of the other hand. Wash for at least 20 seconds. Rinse with warm, running water for several seconds. Then dry your hands. Use germ-killing gel if soap and water are not available. Do not touch your eyes or mouth without washing your hands first. Handwashing    Cover a sneeze or cough. Use a tissue that covers your mouth and nose. Throw the tissue away in a trash can right away. Use the bend of your arm if a tissue is not available. Wash your hands well with soap and water or use a hand . Do not stand close to anyone who is sneezing or coughing.    Be careful around others. The best way to prevent infection is to avoid anyone who is infected, but this may be difficult. An infected person may be able to spread the virus before signs or symptoms begin. Until more is known, you may not want to shake hands with others. If you do shake hands, wash your hands or use hand  as soon as possible. You do not need to wear a medical mask if you are well and not caring for an infected person.    Ask about vaccines you may need. No vaccine is available for the new coronavirus. But any infection can affect your immune system. A weakened immune system makes you more vulnerable to the new coronavirus. Until a vaccine against the new virus is developed, do the following:   Get a flu vaccine as soon as recommended each year. The flu vaccine is available starting in September or October. Flu viruses change, so it is important to get a flu vaccine every year.    Talk to your healthcare provider about your vaccine history. Tell him or her if you did not get certain vaccines as a child, or you did not get all recommended doses. Tell him or her if you do not know your vaccine history. He or she will tell you which vaccines you need, and when to get them.    If you have COVID-19: Healthcare providers will give you specific instructions to follow. The following are general guidelines to remind you of how to keep others safe until you are well:     Limit close contact with others. Your healthcare provider will tell you when it is okay to be around others. This may be when you do not have a fever, do not take fever medicine, and have no symptoms. Fluid from your respiratory tract will need to test negative for the virus 2 times at least 24 hours apart. Until then, do the following along with any instructions from your provider:   Only go out of the house for medical appointments. Always call the provider’s office first so he or she can prepare to keep others safe.    Stay at least 6 feet (2 meters) away from others.    Sleep in a different room from others in the house.    Do not shake hands with other people.    Wear a medical mask when others are near you. This can help prevent droplets from spreading the virus when you talk, sneeze, or cough.    Do not share items. Do not share dishes, towels, or other items with anyone. Items need to be washed after you use them.    Do not handle live animals. The virus may be spread to animals, including pets. Until more is known, it is best not to touch, play with, or handle live animals.    Self-care:     Drink more liquids as directed. Liquids will help thin and loosen mucus so you can cough it up. Liquids will also help prevent dehydration. Liquids that help prevent dehydration include water, fruit juice, and broth. Do not drink liquids that contain caffeine. Caffeine can increase your risk for dehydration. Ask your healthcare provider how much liquid to drink each day.    Soothe a sore throat. Gargle with warm salt water. This may help your sore throat feel better. Make salt water by dissolving ¼ teaspoon salt in 1 cup warm water. You may also suck on hard candy or throat lozenges. You may use a sore throat spray.    Use a humidifier or vaporizer. Use a cool mist humidifier or a vaporizer to increase air moisture in your home. This may make it easier for you to breathe and help decrease your cough.    Use saline nasal drops as directed. These help relieve congestion.    Apply petroleum-based jelly around the outside of your nostrils. This can decrease irritation from blowing your nose.    Do not smoke. Nicotine and other chemicals in cigarettes and cigars can make your symptoms worse. They can also cause infections such as bronchitis or pneumonia. Ask your healthcare provider for information if you currently smoke and need help to quit. E-cigarettes or smokeless tobacco still contain nicotine. Talk to your healthcare provider before you use these products.    If you take care of someone who has COVID-19:     Wear a medical mask when you are near the person. This can help protect you from droplets that carry the virus when the person talks, sneezes, or coughs.    Do not allow others to go near the person. No one should come to the person's home unless it is necessary. Keep the room's door shut unless you need to go in or out.    Make sure the person's room has good air flow. You may be able to open the window if the weather allows. An air conditioner can also be turned on to help air move.    Clean items the person uses or touches. Wear disposable gloves to handle the person's laundry. Place the laundry in a plastic bag. Use hot water and soap to wash the person's laundry. Wash eating utensils and other items after the person uses them. Throw your gloves away after you use them.    Clean surfaces often. Use bleach diluted with water or disinfecting wipes. Clean counters, doorknobs, toilet seats, and other surfaces.    Follow up with your doctor as directed: Write down your questions so you remember to ask them during your visits.    For more information:     [employer]ers for Disease Control and Prevention  [address], [place]  Phone: [phone]  Phone: [phone]  Web Address: http://www.cdc.gov

## 2025-04-09 ENCOUNTER — ANESTHESIA EVENT (OUTPATIENT)
Facility: HOSPITAL | Age: 65
End: 2025-04-09
Payer: COMMERCIAL

## 2025-04-10 ENCOUNTER — ANESTHESIA (OUTPATIENT)
Facility: HOSPITAL | Age: 65
End: 2025-04-10
Payer: COMMERCIAL

## 2025-04-10 ENCOUNTER — HOSPITAL ENCOUNTER (OUTPATIENT)
Facility: HOSPITAL | Age: 65
Setting detail: OUTPATIENT SURGERY
Discharge: HOME | End: 2025-04-10
Attending: INTERNAL MEDICINE | Admitting: INTERNAL MEDICINE
Payer: COMMERCIAL

## 2025-04-10 VITALS
TEMPERATURE: 98 F | WEIGHT: 151 LBS | DIASTOLIC BLOOD PRESSURE: 67 MMHG | HEART RATE: 96 BPM | RESPIRATION RATE: 21 BRPM | HEIGHT: 61 IN | SYSTOLIC BLOOD PRESSURE: 145 MMHG | OXYGEN SATURATION: 98 % | BODY MASS INDEX: 28.51 KG/M2

## 2025-04-10 PROCEDURE — 2709999900 HC NON-CHARGEABLE SUPPLY: Performed by: INTERNAL MEDICINE

## 2025-04-10 PROCEDURE — 3600007502: Performed by: INTERNAL MEDICINE

## 2025-04-10 PROCEDURE — 88305 TISSUE EXAM BY PATHOLOGIST: CPT

## 2025-04-10 PROCEDURE — 88342 IMHCHEM/IMCYTCHM 1ST ANTB: CPT

## 2025-04-10 PROCEDURE — 6360000002 HC RX W HCPCS: Performed by: NURSE ANESTHETIST, CERTIFIED REGISTERED

## 2025-04-10 PROCEDURE — 3700000001 HC ADD 15 MINUTES (ANESTHESIA): Performed by: INTERNAL MEDICINE

## 2025-04-10 PROCEDURE — 2580000003 HC RX 258: Performed by: INTERNAL MEDICINE

## 2025-04-10 PROCEDURE — 7100000010 HC PHASE II RECOVERY - FIRST 15 MIN: Performed by: INTERNAL MEDICINE

## 2025-04-10 PROCEDURE — 3700000000 HC ANESTHESIA ATTENDED CARE: Performed by: INTERNAL MEDICINE

## 2025-04-10 PROCEDURE — 3600007512: Performed by: INTERNAL MEDICINE

## 2025-04-10 RX ORDER — LIDOCAINE HYDROCHLORIDE 20 MG/ML
INJECTION, SOLUTION EPIDURAL; INFILTRATION; INTRACAUDAL; PERINEURAL
Status: DISCONTINUED | OUTPATIENT
Start: 2025-04-10 | End: 2025-04-10 | Stop reason: SDUPTHER

## 2025-04-10 RX ORDER — SODIUM CHLORIDE 0.9 % (FLUSH) 0.9 %
5-40 SYRINGE (ML) INJECTION EVERY 12 HOURS SCHEDULED
Status: DISCONTINUED | OUTPATIENT
Start: 2025-04-10 | End: 2025-04-10 | Stop reason: HOSPADM

## 2025-04-10 RX ORDER — SODIUM CHLORIDE 0.9 % (FLUSH) 0.9 %
5-40 SYRINGE (ML) INJECTION PRN
Status: DISCONTINUED | OUTPATIENT
Start: 2025-04-10 | End: 2025-04-10 | Stop reason: HOSPADM

## 2025-04-10 RX ORDER — SODIUM CHLORIDE 9 MG/ML
INJECTION, SOLUTION INTRAVENOUS PRN
Status: DISCONTINUED | OUTPATIENT
Start: 2025-04-10 | End: 2025-04-10 | Stop reason: HOSPADM

## 2025-04-10 RX ADMIN — PROPOFOL 60 MG: 10 INJECTION, EMULSION INTRAVENOUS at 09:30

## 2025-04-10 RX ADMIN — PROPOFOL 50 MG: 10 INJECTION, EMULSION INTRAVENOUS at 09:21

## 2025-04-10 RX ADMIN — PROPOFOL 40 MG: 10 INJECTION, EMULSION INTRAVENOUS at 09:26

## 2025-04-10 RX ADMIN — LIDOCAINE HYDROCHLORIDE 100 MG: 20 INJECTION, SOLUTION EPIDURAL; INFILTRATION; INTRACAUDAL; PERINEURAL at 09:12

## 2025-04-10 RX ADMIN — PROPOFOL 100 MG: 10 INJECTION, EMULSION INTRAVENOUS at 09:12

## 2025-04-10 RX ADMIN — SODIUM CHLORIDE: 9 INJECTION, SOLUTION INTRAVENOUS at 09:10

## 2025-04-10 RX ADMIN — PROPOFOL 50 MG: 10 INJECTION, EMULSION INTRAVENOUS at 09:16

## 2025-04-10 ASSESSMENT — PAIN - FUNCTIONAL ASSESSMENT: PAIN_FUNCTIONAL_ASSESSMENT: NONE - DENIES PAIN

## 2025-04-10 NOTE — PROGRESS NOTES
The risks and benefits of the bite block have been explained to patient.  Patient verbalizes understanding.      ARRIVAL INFORMATION:  Verified patient name and date of birth, scheduled procedure, and informed consent.     : Danial  contact number: [phone]  Physician and staff can share information with the .     Receive texts: NO    Belongings with patient include:  Clothing, x1 ring    GI FOCUSED ASSESSMENT:  Neuro: Awake, alert, oriented x4  Respiratory: even and unlabored   GI: soft and non-distended  EKG Rhythm: normal sinus rhythm    Education:Reviewed general discharge instructions and  information.

## 2025-04-10 NOTE — DISCHARGE INSTRUCTIONS
Elizabeth García  [id number]  1960    EGD/COLON DISCHARGE INSTRUCTIONS  Discomfort:  Redness at IV site- apply warm compress to area; if redness or soreness persist- contact your physician  There may be a slight amount of blood passed from the rectum  Gaseous discomfort- walking, belching will help relieve any discomfort  You may not operate a vehicle for 12 hours  You may not engage in an occupation involving machinery or appliances for rest of today  You may not drink alcoholic beverages for at least 12 hours  Avoid making any critical decisions for at least 24 hour  DIET:   High fiber diet.   - however -  remember your colon is empty and a heavy meal will produce gas.   Avoid these foods:  vegetables, fried / greasy foods, carbonated drinks for today  MEDICATION:  [unfilled]     ACTIVITY:  You may not resume your normal daily activities until tomorrow AM; it is recommended that you spend the remainder of the day resting -  avoid any strenuous activity.  CALL M.D.  ANY SIGN OF:   Increasing pain, nausea, vomiting  Abdominal distension (swelling)  New increased bleeding (oral or rectal)  Fever (chills)  Pain in chest area  Bloody discharge from nose or mouth  Shortness of breath    IMPRESSION:  -Normal esophageal mucosa; biopsied to exclude inflammation  -Small 2cm long sliding hiatal hernia from 37-39cm  -Stomach erythema (redness) suggestive of possible gastropathy or gastritis; biopsied to exclude inflammation  -Normal duodenal mucosa; biopsied to exclude inflammation  -Normal terminal ileum; biopsied to exclude inflammation  -Rare colon diverticulosis  -Small grade 1 internal hemorrhoids  -No colon masses or polyps noted; random biopsies obtained to exclude inflammation    Follow-up Instructions:   Call Dr. Cole for the results of procedure / biopsy in 7-10 days  Telephone # [phone]  Repeat colonoscopy in 10 years    North Cole MD    Patient Education on Sedation / Analgesia Administered for

## 2025-04-10 NOTE — PROGRESS NOTES
Endoscopy Case End Note:    0934:  Procedure scope was pre-cleaned, per protocol, at bedside by Artie/Nelly.      0937:  Report received from anesthesia - GARCIA Brown /MP Rey.  See anesthesia flowsheet for intra-procedure vital signs and events.

## 2025-04-10 NOTE — ANESTHESIA POSTPROCEDURE EVALUATION
Department of Anesthesiology  Postprocedure Note    Patient: Elizabeth García  MRN: [id number]  YOB: 1960  Date of evaluation: 4/10/2025    Procedure Summary       Date: 04/10/25 Room / Location: [facility] ENDO 04 / [facility] ENDOSCOPY    Anesthesia Start: 0910 Anesthesia Stop: 0940    Procedures:       ESOPHAGOGASTRODUODENOSCOPY BIOPSY (Upper GI Region)      COLONOSCOPY BIOPSY (Lower GI Region) Diagnosis:       Vomiting, unspecified vomiting type, unspecified whether nausea present      Change in bowel habits      Hiatal hernia      First degree hemorrhoids      Diverticulosis of colon (without mention of hemorrhage)      (Vomiting, unspecified vomiting type, unspecified whether nausea present [R11.10])      (Change in bowel habits [R19.4])    Surgeons: North Cole MD Responsible Provider: Сергей Keita MD    Anesthesia Type: MAC, TIVA ASA Status: 2            Anesthesia Type: MAC, TIVA    Ann Phase I: Ann Score: 10    Ann Phase II: Ann Score: 10    Anesthesia Post Evaluation    Patient location during evaluation: PACU  Patient participation: complete - patient participated  Level of consciousness: awake  Airway patency: patent  Nausea & Vomiting: no vomiting  Cardiovascular status: hemodynamically stable  Respiratory status: acceptable  Hydration status: euvolemic    No notable events documented.

## 2025-04-10 NOTE — PROGRESS NOTES
Endoscopy recovery      Patient returned to baseline, vital signs stable (see vital sign flowsheet). Patient offered liquids and tolerated well. Respiratory status within defined limits. Abdomen soft not tender. Skin with in defined limits. Responsible party driving patient home was given the opportunity to ask questions. Patient discharged with documented belongings.

## 2025-04-10 NOTE — OP NOTE
NAME:  Elizabeth García   :   1960   MRN:   [id number]     Date/Time:  4/10/2025 9:48 AM    Esophagogastroduodenoscopy (EGD) Procedure Note    Procedure: Esophagogastroduodenoscopy with biopsy    Indication: N/V, diarrhea  Pre-operative Diagnosis: see indication above  Post-operative Diagnosis: see findings below  :  North Cole MD  Referring Provider:   --Harman Trinh MD    Exam:  Airway: clear, no airway problems anticipated  Heart: RRR, without gallops or rubs  Lungs: clear bilaterally without wheezes, crackles, or rhonchi  Abdomen: soft, nontender, nondistended, bowel sounds present  Mental Status: awake, alert and oriented to person, place and time     Anethesia/Sedation:  MAC anesthesia Propofol  Procedure Details   After informed consent was obtained for the procedure, with all risks and benefits of procedure explained the patient was taken to the endoscopy suite and placed in the left lateral decubitus position.  Following sequential administration of sedation as per above, the [id number] gastroscope was inserted into the mouth and advanced under direct vision to second portion of the duodenum.  A careful inspection was made as the gastroscope was withdrawn, including a retroflexed view of the proximal stomach; findings and interventions are described below.                  Findings:    -Normal esophageal mucosa; biopsied to exclude inflammation  -Small 2cm long sliding hiatal hernia from 37-39cm  -Stomach erythema (redness) suggestive of possible gastropathy or gastritis; biopsied to exclude inflammation  -Normal duodenal mucosa; biopsied to exclude inflammation    Therapies:  biopsy of esophagus; biopsy of stomach; biopsy of duodenal   Specimens: #1 duodenum; #2 gastric; #3 distal esophagus  EBL:  None.         Complications:   None; patient tolerated the procedure well.           Impression:    -Normal esophageal mucosa; biopsied to exclude inflammation  -Small 2cm long sliding  hiatal hernia from 37-39cm  -Stomach erythema (redness) suggestive of possible gastropathy or gastritis; biopsied to exclude inflammation  -Normal duodenal mucosa; biopsied to exclude inflammation    Recommendations:  -Await pathology., -Follow symptoms., -Proceed to colonoscopy    Discharge disposition:  Home in the company of  when able to ambulate after colonosocpy    North Cole MD

## 2025-04-10 NOTE — OP NOTE
NAME:  Elziabeth García   :   1960   MRN:   [id number]     Date/Time:  4/10/2025 9:45 AM    Colonoscopy Operative Report    Procedure Type:   Colonoscopy with biopsy     Indications:     Change in bowel habits, Diarrhea  Pre-operative Diagnosis: see indication above  Post-operative Diagnosis:  See findings below  :  North Cole MD  Referring Provider: -Harman Trinh MD    Exam:  Airway: clear, no airway problems anticipated  Heart: RRR, without gallops or rubs  Lungs: clear bilaterally without wheezes, crackles, or rhonchi  Abdomen: soft, nontender, nondistended, bowel sounds present  Mental Status: awake, alert and oriented to person, place and time    Sedation:  MAC anesthesia Propofol 300mg IV  Procedure Details:  After informed consent was obtained with all risks and benefits of procedure explained and preoperative exam completed, the patient was taken to the endoscopy suite and placed in the left lateral decubitus position.  Upon sequential sedation as per above, a digital rectal exam was performed demonstrating internal hemorrhoids.  The Olympus videocolonoscope  was inserted in the rectum and carefully advanced to the cecum, which was identified by the ileocecal valve and appendiceal orifice. The distal terminal ileum was evaluated.  Retroflexion was completed in the cecum.   The quality of preparation was adequate.  The colonoscope was slowly withdrawn with careful evaluation between folds. Retroflexion in the rectum was completed demonstrating internal hemorrhoids.     Findings:     -Normal terminal ileum; biopsied to exclude inflammation  -Rare colon diverticulosis  -Small grade 1 internal hemorrhoids  -No colon masses or polyps noted; random biopsies obtained to exclude inflammation    Specimen Removed:  #4 random colon; #5 terminal ileum  Complications: None.   EBL:  None.    Impression:    -Normal terminal ileum; biopsied to exclude inflammation  -Rare colon

## 2025-04-10 NOTE — ANESTHESIA PRE PROCEDURE
01:37 PM    ALKPHOS 72 04/20/2022 01:10 PM    AST 15 12/10/2024 01:37 PM    ALT 14 12/10/2024 01:37 PM       POC Tests: No results for input(s): \"POCGLU\", \"POCNA\", \"POCK\", \"POCCL\", \"POCBUN\", \"POCHEMO\", \"POCHCT\" in the last 72 hours.    Coags: No results found for: \"PROTIME\", \"INR\", \"APTT\"    HCG (If Applicable): No results found for: \"PREGTESTUR\", \"PREGSERUM\", \"HCG\", \"HCGQUANT\"     ABGs: No results found for: \"PHART\", \"PO2ART\", \"ZHQ1IZZ\", \"HMF3UXY\", \"BEART\", \"N2DMHRDX\"     Type & Screen (If Applicable):  No results found for: \"ABORH\", \"LABANTI\"    Drug/Infectious Status (If Applicable):  No results found for: \"HIV\", \"HEPCAB\"    COVID-19 Screening (If Applicable):   Lab Results   Component Value Date/Time    COVID19 Not detected 04/01/2025 01:07 PM           Anesthesia Evaluation  Patient summary reviewed and Nursing notes reviewed  Airway: Mallampati: II     Neck ROM: full  Mouth opening: > = 3 FB   Dental:    (+) poor dentition  Comment: Numerous missing teeth, denies any loose teeth    Pulmonary:normal exam    (+)           asthma:                           ROS comment: Former smoker     Cardiovascular:Negative CV ROS  Exercise tolerance: good (>4 METS)                    Neuro/Psych:   (+) psychiatric history:depression/anxiety              ROS comment: Schizoaffective disorder  Hx Agitation   Bipolar disorder       GI/Hepatic/Renal:   (+) hepatitis: C, liver disease:          Endo/Other:                     Abdominal:             Vascular: negative vascular ROS.         Other Findings:         Anesthesia Plan      TIVA     ASA 2       Induction: intravenous.  continuous noninvasive hemodynamic monitor    Anesthetic plan and risks discussed with patient.      Plan discussed with CRNA.                  Сергей Keita MD   4/10/2025

## 2025-06-02 ENCOUNTER — TRANSCRIBE ORDERS (OUTPATIENT)
Facility: HOSPITAL | Age: 65
End: 2025-06-02

## 2025-06-02 DIAGNOSIS — Z12.31 ENCOUNTER FOR SCREENING MAMMOGRAM FOR MALIGNANT NEOPLASM OF BREAST: Primary | ICD-10-CM

## 2025-07-19 ENCOUNTER — HOSPITAL ENCOUNTER (OUTPATIENT)
Facility: HOSPITAL | Age: 65
Discharge: HOME | End: 2025-07-22
Payer: MEDICARE

## 2025-07-19 DIAGNOSIS — Z12.31 ENCOUNTER FOR SCREENING MAMMOGRAM FOR MALIGNANT NEOPLASM OF BREAST: ICD-10-CM

## 2025-07-19 PROCEDURE — 77063 BREAST TOMOSYNTHESIS BI: CPT

## (undated) DEVICE — FORCEPS BX 240CM 2.4MM L NDL RAD JAW 4 M00513334

## (undated) DEVICE — ELECTRODE PT RET AD L9FT HI MOIST COND ADH HYDRGEL CORDED

## (undated) DEVICE — SET GRAV CK VLV NEEDLESS ST 3 GANGED 4WAY STPCOCK HI FLO 10

## (undated) DEVICE — SNARE ENDOSCP POLYP MED STD AD 2.4X27X240 CM 2.8 MM OVL SENS

## (undated) DEVICE — TRAP ENDOSCP POLYP 2 CHMBR DRAWER TYP

## (undated) DEVICE — CUFF BLD PRSS AD CLTH SGL TB W/ BAYNT CONN ROUNDED CORNER

## (undated) DEVICE — ENDOSCOPIC KIT COMPLIANCE ENDOKIT

## (undated) DEVICE — CATHETER IV 20GA L1.16IN OD1.0414-1.1176MM ID0.762-0.8382MM

## (undated) DEVICE — COVIDIEN KENDALL DL DISPOSABLE 3 LEAD SY: Brand: MEDLINE RENEWAL

## (undated) DEVICE — TIP SUCT TRNSPAR RIB SURF STD BLB RIG NVENT W/ 5IN1 CONN DYND50138] MEDLINE INDUSTRIES INC]

## (undated) DEVICE — CATHETER IV 18GA L1.16IN OD1.27-1.3462MM ID0.9398-1.016MM

## (undated) DEVICE — IV START KIT: Brand: MEDLINE

## (undated) DEVICE — CATHETER IV 22GA L1IN OD0.8382-0.9144MM ID0.6096-0.6858MM 382523

## (undated) DEVICE — CATHETER IV 24GA L0.75IN OD0.6604-0.7366MM

## (undated) DEVICE — CONTAINER SPEC 20 ML LID NEUT BUFF FORMALIN 10 % POLYPR STS